# Patient Record
Sex: MALE | Race: BLACK OR AFRICAN AMERICAN | NOT HISPANIC OR LATINO | Employment: OTHER | URBAN - METROPOLITAN AREA
[De-identification: names, ages, dates, MRNs, and addresses within clinical notes are randomized per-mention and may not be internally consistent; named-entity substitution may affect disease eponyms.]

---

## 2022-06-01 DIAGNOSIS — M54.9 BACK PAIN: Primary | ICD-10-CM

## 2023-12-07 ENCOUNTER — HOSPITAL ENCOUNTER (EMERGENCY)
Facility: HOSPITAL | Age: 58
Discharge: LEFT BEFORE DISCHARGE | End: 2023-12-07
Payer: MEDICARE

## 2023-12-07 VITALS
OXYGEN SATURATION: 97 % | WEIGHT: 169 LBS | TEMPERATURE: 99 F | BODY MASS INDEX: 26.53 KG/M2 | SYSTOLIC BLOOD PRESSURE: 156 MMHG | DIASTOLIC BLOOD PRESSURE: 106 MMHG | RESPIRATION RATE: 16 BRPM | HEIGHT: 67 IN | HEART RATE: 71 BPM

## 2023-12-07 DIAGNOSIS — R56.9 SEIZURES: ICD-10-CM

## 2023-12-07 LAB
ALBUMIN SERPL-MCNC: 4.2 G/DL (ref 3.5–5)
ALBUMIN/GLOB SERPL: 1.3 RATIO (ref 1.1–2)
ALP SERPL-CCNC: 67 UNIT/L (ref 40–150)
ALT SERPL-CCNC: 14 UNIT/L (ref 0–55)
AST SERPL-CCNC: 31 UNIT/L (ref 5–34)
BASOPHILS # BLD AUTO: 0.07 X10(3)/MCL
BASOPHILS NFR BLD AUTO: 0.7 %
BILIRUB SERPL-MCNC: 0.4 MG/DL
BUN SERPL-MCNC: 14.7 MG/DL (ref 8.4–25.7)
CALCIUM SERPL-MCNC: 9.8 MG/DL (ref 8.4–10.2)
CHLORIDE SERPL-SCNC: 101 MMOL/L (ref 98–107)
CO2 SERPL-SCNC: 26 MMOL/L (ref 22–29)
CREAT SERPL-MCNC: 1.25 MG/DL (ref 0.73–1.18)
EOSINOPHIL # BLD AUTO: 0.14 X10(3)/MCL (ref 0–0.9)
EOSINOPHIL NFR BLD AUTO: 1.4 %
ERYTHROCYTE [DISTWIDTH] IN BLOOD BY AUTOMATED COUNT: 13.7 % (ref 11.5–17)
ETHANOL SERPL-MCNC: 351 MG/DL
GFR SERPLBLD CREATININE-BSD FMLA CKD-EPI: >60 MLS/MIN/1.73/M2
GLOBULIN SER-MCNC: 3.2 GM/DL (ref 2.4–3.5)
GLUCOSE SERPL-MCNC: 89 MG/DL (ref 74–100)
HCT VFR BLD AUTO: 40.7 % (ref 42–52)
HGB BLD-MCNC: 13.4 G/DL (ref 14–18)
IMM GRANULOCYTES # BLD AUTO: 0.04 X10(3)/MCL (ref 0–0.04)
IMM GRANULOCYTES NFR BLD AUTO: 0.4 %
LYMPHOCYTES # BLD AUTO: 4.33 X10(3)/MCL (ref 0.6–4.6)
LYMPHOCYTES NFR BLD AUTO: 44.8 %
MAGNESIUM SERPL-MCNC: 1.5 MG/DL (ref 1.6–2.6)
MCH RBC QN AUTO: 33.1 PG (ref 27–31)
MCHC RBC AUTO-ENTMCNC: 32.9 G/DL (ref 33–36)
MCV RBC AUTO: 100.5 FL (ref 80–94)
MONOCYTES # BLD AUTO: 0.68 X10(3)/MCL (ref 0.1–1.3)
MONOCYTES NFR BLD AUTO: 7 %
NEUTROPHILS # BLD AUTO: 4.4 X10(3)/MCL (ref 2.1–9.2)
NEUTROPHILS NFR BLD AUTO: 45.7 %
NRBC BLD AUTO-RTO: 0 %
PLATELET # BLD AUTO: 320 X10(3)/MCL (ref 130–400)
PMV BLD AUTO: 9.9 FL (ref 7.4–10.4)
POTASSIUM SERPL-SCNC: 4.1 MMOL/L (ref 3.5–5.1)
PROT SERPL-MCNC: 7.4 GM/DL (ref 6.4–8.3)
RBC # BLD AUTO: 4.05 X10(6)/MCL (ref 4.7–6.1)
SODIUM SERPL-SCNC: 143 MMOL/L (ref 136–145)
WBC # SPEC AUTO: 9.66 X10(3)/MCL (ref 4.5–11.5)

## 2023-12-07 PROCEDURE — 93010 ELECTROCARDIOGRAM REPORT: CPT | Mod: ,,, | Performed by: INTERNAL MEDICINE

## 2023-12-07 PROCEDURE — 83735 ASSAY OF MAGNESIUM: CPT | Performed by: PHYSICIAN ASSISTANT

## 2023-12-07 PROCEDURE — 85025 COMPLETE CBC W/AUTO DIFF WBC: CPT | Performed by: NURSE PRACTITIONER

## 2023-12-07 PROCEDURE — 93010 EKG 12-LEAD: ICD-10-PCS | Mod: ,,, | Performed by: INTERNAL MEDICINE

## 2023-12-07 PROCEDURE — 82077 ASSAY SPEC XCP UR&BREATH IA: CPT | Performed by: NURSE PRACTITIONER

## 2023-12-07 PROCEDURE — 99285 EMERGENCY DEPT VISIT HI MDM: CPT | Mod: 25

## 2023-12-07 PROCEDURE — 80053 COMPREHEN METABOLIC PANEL: CPT | Performed by: NURSE PRACTITIONER

## 2023-12-07 NOTE — FIRST PROVIDER EVALUATION
"Medical screening examination initiated.  I have conducted a focused provider triage encounter, findings are as follows:    Brief history of present illness:  EMS reports that patient has witnessed seizure like activity PTA. Denies any hx. Of seizures.     Vitals:    12/07/23 1046   BP: (!) 156/106   Pulse: 71   Resp: 16   Temp: 98.8 °F (37.1 °C)   TempSrc: Oral   SpO2: 97%   Weight: 76.7 kg (169 lb)   Height: 5' 7" (1.702 m)       Pertinent physical exam:  Awake, alert    Brief workup plan:  Labs, Imaging    Preliminary workup initiated; this workup will be continued and followed by the physician or advanced practice provider that is assigned to the patient when roomed.  "

## 2025-03-29 ENCOUNTER — HOSPITAL ENCOUNTER (INPATIENT)
Facility: HOSPITAL | Age: 60
LOS: 10 days | Discharge: LEFT BEFORE DISCHARGE | DRG: 871 | End: 2025-04-08
Attending: STUDENT IN AN ORGANIZED HEALTH CARE EDUCATION/TRAINING PROGRAM | Admitting: HOSPITALIST
Payer: MEDICARE

## 2025-03-29 DIAGNOSIS — N17.9 ACUTE RENAL FAILURE: ICD-10-CM

## 2025-03-29 DIAGNOSIS — K92.1 MELENA: Primary | ICD-10-CM

## 2025-03-29 DIAGNOSIS — R78.81 BACTEREMIA: ICD-10-CM

## 2025-03-29 DIAGNOSIS — A41.9 SEPSIS, DUE TO UNSPECIFIED ORGANISM, UNSPECIFIED WHETHER ACUTE ORGAN DYSFUNCTION PRESENT: ICD-10-CM

## 2025-03-29 DIAGNOSIS — Z13.6 SCREENING FOR CARDIOVASCULAR CONDITION: ICD-10-CM

## 2025-03-29 DIAGNOSIS — R50.9 FEVER: ICD-10-CM

## 2025-03-29 DIAGNOSIS — N30.90 CYSTITIS: ICD-10-CM

## 2025-03-29 LAB
ABS NEUT (OLG): 41.25 X10(3)/MCL (ref 2.1–9.2)
ACB COMPLEX DNA BLD POS QL NAA+NON-PROBE: NOT DETECTED
ALBUMIN SERPL-MCNC: 2.1 G/DL (ref 3.5–5)
ALBUMIN/GLOB SERPL: 0.6 RATIO (ref 1.1–2)
ALP SERPL-CCNC: 98 UNIT/L (ref 40–150)
ALT SERPL-CCNC: 14 UNIT/L (ref 0–55)
ANION GAP SERPL CALC-SCNC: 18 MEQ/L
ANISOCYTOSIS BLD QL SMEAR: ABNORMAL
AST SERPL-CCNC: 20 UNIT/L (ref 11–45)
B FRAGILIS DNA BLD POS QL NAA+PROBE: NOT DETECTED
BACTERIA #/AREA URNS AUTO: ABNORMAL /HPF
BILIRUB SERPL-MCNC: 0.5 MG/DL
BILIRUB UR QL STRIP.AUTO: NEGATIVE
BNP BLD-MCNC: 83.6 PG/ML
BUN SERPL-MCNC: 162.9 MG/DL (ref 8.4–25.7)
C ALBICANS DNA BLD POS QL NAA+PROBE: NOT DETECTED
C AURIS DNA BLD POS QL NAA+NON-PROBE: NOT DETECTED
C GATTII+NEOFOR DNA CSF QL NAA+NON-PROBE: NOT DETECTED
C GLABRATA DNA BLD POS QL NAA+PROBE: NOT DETECTED
C KRUSEI DNA BLD POS QL NAA+PROBE: NOT DETECTED
C PARAP DNA BLD POS QL NAA+PROBE: NOT DETECTED
C TROPICLS DNA BLD POS QL NAA+PROBE: NOT DETECTED
CALCIUM SERPL-MCNC: 8.9 MG/DL (ref 8.4–10.2)
CHLORIDE SERPL-SCNC: 92 MMOL/L (ref 98–107)
CLARITY UR: ABNORMAL
CO2 SERPL-SCNC: 20 MMOL/L (ref 22–29)
COLISTIN RES MCR-1 ISLT/SPM QL: NOT DETECTED
COLOR UR AUTO: ABNORMAL
CREAT SERPL-MCNC: 11.76 MG/DL (ref 0.72–1.25)
CREAT/UREA NIT SERPL: 14
E CLOAC COMP DNA BLD POS QL NAA+PROBE: NOT DETECTED
E COLI DNA BLD POS QL NAA+PROBE: DETECTED
E FAECALIS+OTHR E SP RRNA BLD POS FISH: NOT DETECTED
E FAECIUM HSP60 BLD POS QL PROBE: NOT DETECTED
ENTEROBACTERALES DNA BLD POS NAA+N-PRB: ABNORMAL
ERYTHROCYTE [DISTWIDTH] IN BLOOD BY AUTOMATED COUNT: 16.8 % (ref 11.5–17)
ESBL CFT TO CFT-CLAV IC RTO BD POS IMP: NOT DETECTED
GFR SERPLBLD CREATININE-BSD FMLA CKD-EPI: 5 ML/MIN/1.73/M2
GLOBULIN SER-MCNC: 3.8 GM/DL (ref 2.4–3.5)
GLUCOSE SERPL-MCNC: 123 MG/DL (ref 74–100)
GLUCOSE UR QL STRIP: NORMAL
GP B STREP DNA CSF QL NAA+NON-PROBE: NOT DETECTED
HAEM INFLU DNA CSF QL NAA+NON-PROBE: NOT DETECTED
HCT VFR BLD AUTO: 23.3 % (ref 42–52)
HGB BLD-MCNC: 8.7 G/DL (ref 14–18)
HGB UR QL STRIP: ABNORMAL
HYALINE CASTS #/AREA URNS LPF: ABNORMAL /LPF
IMP CARBAPENEMASE ISLT QL IA.RAPID: NOT DETECTED
INSTRUMENT WBC (OLG): 47.97 X10(3)/MCL
IRON SATN MFR SERPL: 10 % (ref 20–50)
IRON SERPL-MCNC: 28 UG/DL (ref 65–175)
K OXYTOCA OMPA BLD POS QL PROBE: NOT DETECTED
KETONES UR QL STRIP: NEGATIVE
KLEBSIELLA SP DNA BLD POS QL NAA+NON-PRB: NOT DETECTED
KLEBSIELLA SP DNA BLD POS QL NAA+NON-PRB: NOT DETECTED
KPC CARBAPENEMASE ISLT QL IA.RAPID: NOT DETECTED
L MONOCYTOG DNA CSF QL NAA+NON-PROBE: NOT DETECTED
LACTATE SERPL-SCNC: 1.5 MMOL/L (ref 0.5–2.2)
LEUKOCYTE ESTERASE UR QL STRIP: 500
LIPASE SERPL-CCNC: 194 U/L
LYMPHOCYTES NFR BLD MANUAL: 1.44 X10(3)/MCL (ref 0.6–4.6)
LYMPHOCYTES NFR BLD MANUAL: 3 %
MAGNESIUM SERPL-MCNC: 2.2 MG/DL (ref 1.6–2.6)
MCH RBC QN AUTO: 29.6 PG (ref 27–31)
MCHC RBC AUTO-ENTMCNC: 37.3 G/DL (ref 33–36)
MCV RBC AUTO: 79.3 FL (ref 80–94)
MECA+MECC NOSE QL NAA+PROBE: ABNORMAL
MECA+MECC+MREJ ISLT/SPM QL: ABNORMAL
METAMYELOCYTES NFR BLD MANUAL: 3 %
MONOCYTES NFR BLD MANUAL: 2.4 X10(3)/MCL (ref 0.1–1.3)
MONOCYTES NFR BLD MANUAL: 5 %
MYELOCYTES NFR BLD MANUAL: 3 %
N MEN DNA CSF QL NAA+NON-PROBE: NOT DETECTED
NDM CARBAPENEMASE ISLT QL IA.RAPID: NOT DETECTED
NEUTROPHILS NFR BLD MANUAL: 86 %
NITRITE UR QL STRIP: NEGATIVE
OHS QRS DURATION: 86 MS
OHS QTC CALCULATION: 427 MS
OVALOCYTES (OLG): ABNORMAL
OXA-48-LIKE CRBPNASE ISLT QL IA.RAPID: NOT DETECTED
P AERUGINOSA DNA BLD POS QL NAA+PROBE: NOT DETECTED
PH UR STRIP: 6 [PH]
PLATELET # BLD AUTO: 109 X10(3)/MCL (ref 130–400)
PLATELET # BLD EST: ABNORMAL 10*3/UL
PLATELETS.RETICULATED NFR BLD AUTO: 11.9 % (ref 0.9–11.2)
PMV BLD AUTO: ABNORMAL FL
POIKILOCYTOSIS BLD QL SMEAR: ABNORMAL
POTASSIUM SERPL-SCNC: 4.3 MMOL/L (ref 3.5–5.1)
PROCALCITONIN SERPL-MCNC: 11.53 NG/ML
PROT SERPL-MCNC: 5.9 GM/DL (ref 6.4–8.3)
PROT UR QL STRIP: ABNORMAL
PROTEUS SP DNA BLD POS QL NAA+PROBE: NOT DETECTED
RBC # BLD AUTO: 2.94 X10(6)/MCL (ref 4.7–6.1)
RBC #/AREA URNS AUTO: ABNORMAL /HPF
RBC MORPH BLD: ABNORMAL
S AUREUS DNA BLD POS QL NAA+PROBE: NOT DETECTED
S ENT+BONG DNA STL QL NAA+NON-PROBE: NOT DETECTED
S EPIDERMIDIS HSP60 BLD POS QL PROBE: NOT DETECTED
S LUGDUNENSIS SODA BLD POS QL PROBE: NOT DETECTED
S MALTOPH DNA BLD POS QL NAA+PROBE: NOT DETECTED
S MARCESCENS DNA BLD POS QL NAA+PROBE: NOT DETECTED
S PNEUM DNA CSF QL NAA+NON-PROBE: NOT DETECTED
S PYOGENES HSP60 BLD POS QL PROBE: NOT DETECTED
SODIUM SERPL-SCNC: 130 MMOL/L (ref 136–145)
SP GR UR STRIP.AUTO: 1.01 (ref 1–1.03)
SQUAMOUS #/AREA URNS LPF: ABNORMAL /HPF
STAPH SP TUF BLD POS QL PROBE: NOT DETECTED
STREPTOCOCCUS SP TUF BLD POS QL PROBE: NOT DETECTED
TARGETS BLD QL SMEAR: ABNORMAL
TIBC SERPL-MCNC: 243 UG/DL (ref 60–240)
TIBC SERPL-MCNC: 271 UG/DL (ref 250–450)
TRANSFERRIN SERPL-MCNC: 237 MG/DL (ref 174–364)
TRICHOMONAS UR QL COMP ASSIST: ABNORMAL /HPF
TROPONIN I SERPL-MCNC: 0.01 NG/ML (ref 0–0.04)
UROBILINOGEN UR STRIP-ACNC: NORMAL
VAN(A+B+C1+C2) GENES ISLT/SPM: ABNORMAL
VIM CARBAPENEMASE ISLT QL IA.RAPID: NOT DETECTED
VIT B12 SERPL-MCNC: 836 PG/ML (ref 213–816)
WBC # BLD AUTO: 47.97 X10(3)/MCL (ref 4.5–11.5)
WBC #/AREA URNS AUTO: >100 /HPF
WBC CLUMPS UR QL AUTO: ABNORMAL

## 2025-03-29 PROCEDURE — 83540 ASSAY OF IRON: CPT | Performed by: HOSPITALIST

## 2025-03-29 PROCEDURE — 82607 VITAMIN B-12: CPT | Performed by: HOSPITALIST

## 2025-03-29 PROCEDURE — 25000003 PHARM REV CODE 250: Performed by: STUDENT IN AN ORGANIZED HEALTH CARE EDUCATION/TRAINING PROGRAM

## 2025-03-29 PROCEDURE — 93005 ELECTROCARDIOGRAM TRACING: CPT

## 2025-03-29 PROCEDURE — 93010 ELECTROCARDIOGRAM REPORT: CPT | Mod: ,,, | Performed by: INTERNAL MEDICINE

## 2025-03-29 PROCEDURE — 25000003 PHARM REV CODE 250: Performed by: NURSE PRACTITIONER

## 2025-03-29 PROCEDURE — 81001 URINALYSIS AUTO W/SCOPE: CPT | Performed by: EMERGENCY MEDICINE

## 2025-03-29 PROCEDURE — 84145 PROCALCITONIN (PCT): CPT | Performed by: STUDENT IN AN ORGANIZED HEALTH CARE EDUCATION/TRAINING PROGRAM

## 2025-03-29 PROCEDURE — 83605 ASSAY OF LACTIC ACID: CPT | Performed by: STUDENT IN AN ORGANIZED HEALTH CARE EDUCATION/TRAINING PROGRAM

## 2025-03-29 PROCEDURE — 80053 COMPREHEN METABOLIC PANEL: CPT | Performed by: EMERGENCY MEDICINE

## 2025-03-29 PROCEDURE — 84484 ASSAY OF TROPONIN QUANT: CPT | Performed by: STUDENT IN AN ORGANIZED HEALTH CARE EDUCATION/TRAINING PROGRAM

## 2025-03-29 PROCEDURE — 63600175 PHARM REV CODE 636 W HCPCS: Performed by: STUDENT IN AN ORGANIZED HEALTH CARE EDUCATION/TRAINING PROGRAM

## 2025-03-29 PROCEDURE — 83690 ASSAY OF LIPASE: CPT | Performed by: STUDENT IN AN ORGANIZED HEALTH CARE EDUCATION/TRAINING PROGRAM

## 2025-03-29 PROCEDURE — 96365 THER/PROPH/DIAG IV INF INIT: CPT

## 2025-03-29 PROCEDURE — 83880 ASSAY OF NATRIURETIC PEPTIDE: CPT | Performed by: STUDENT IN AN ORGANIZED HEALTH CARE EDUCATION/TRAINING PROGRAM

## 2025-03-29 PROCEDURE — 83735 ASSAY OF MAGNESIUM: CPT | Performed by: STUDENT IN AN ORGANIZED HEALTH CARE EDUCATION/TRAINING PROGRAM

## 2025-03-29 PROCEDURE — 99285 EMERGENCY DEPT VISIT HI MDM: CPT | Mod: 25

## 2025-03-29 PROCEDURE — 36415 COLL VENOUS BLD VENIPUNCTURE: CPT | Performed by: HOSPITALIST

## 2025-03-29 PROCEDURE — 87154 CUL TYP ID BLD PTHGN 6+ TRGT: CPT | Performed by: STUDENT IN AN ORGANIZED HEALTH CARE EDUCATION/TRAINING PROGRAM

## 2025-03-29 PROCEDURE — 11000001 HC ACUTE MED/SURG PRIVATE ROOM

## 2025-03-29 PROCEDURE — 96375 TX/PRO/DX INJ NEW DRUG ADDON: CPT

## 2025-03-29 PROCEDURE — 87077 CULTURE AEROBIC IDENTIFY: CPT | Performed by: EMERGENCY MEDICINE

## 2025-03-29 PROCEDURE — 85007 BL SMEAR W/DIFF WBC COUNT: CPT | Performed by: EMERGENCY MEDICINE

## 2025-03-29 PROCEDURE — 25000003 PHARM REV CODE 250: Performed by: HOSPITALIST

## 2025-03-29 PROCEDURE — 87040 BLOOD CULTURE FOR BACTERIA: CPT | Performed by: STUDENT IN AN ORGANIZED HEALTH CARE EDUCATION/TRAINING PROGRAM

## 2025-03-29 PROCEDURE — 96361 HYDRATE IV INFUSION ADD-ON: CPT

## 2025-03-29 RX ORDER — ACETAMINOPHEN 325 MG/1
650 TABLET ORAL EVERY 8 HOURS PRN
Status: DISCONTINUED | OUTPATIENT
Start: 2025-03-29 | End: 2025-04-01

## 2025-03-29 RX ORDER — SODIUM CHLORIDE 9 MG/ML
INJECTION, SOLUTION INTRAVENOUS CONTINUOUS
Status: DISCONTINUED | OUTPATIENT
Start: 2025-03-29 | End: 2025-03-30

## 2025-03-29 RX ORDER — TALC
6 POWDER (GRAM) TOPICAL NIGHTLY PRN
Status: DISCONTINUED | OUTPATIENT
Start: 2025-03-29 | End: 2025-04-08 | Stop reason: HOSPADM

## 2025-03-29 RX ORDER — GABAPENTIN 300 MG/1
600 CAPSULE ORAL 3 TIMES DAILY
Status: DISCONTINUED | OUTPATIENT
Start: 2025-03-29 | End: 2025-03-30

## 2025-03-29 RX ORDER — GABAPENTIN 600 MG/1
600 TABLET ORAL 3 TIMES DAILY
COMMUNITY

## 2025-03-29 RX ORDER — SODIUM CHLORIDE 0.9 % (FLUSH) 0.9 %
10 SYRINGE (ML) INJECTION
Status: DISCONTINUED | OUTPATIENT
Start: 2025-03-29 | End: 2025-04-08 | Stop reason: HOSPADM

## 2025-03-29 RX ORDER — ONDANSETRON HYDROCHLORIDE 2 MG/ML
4 INJECTION, SOLUTION INTRAVENOUS EVERY 8 HOURS PRN
Status: DISCONTINUED | OUTPATIENT
Start: 2025-03-29 | End: 2025-04-08 | Stop reason: HOSPADM

## 2025-03-29 RX ORDER — MORPHINE SULFATE 4 MG/ML
2 INJECTION, SOLUTION INTRAMUSCULAR; INTRAVENOUS EVERY 4 HOURS PRN
Refills: 0 | Status: DISCONTINUED | OUTPATIENT
Start: 2025-03-29 | End: 2025-04-08 | Stop reason: HOSPADM

## 2025-03-29 RX ADMIN — ACETAMINOPHEN 650 MG: 325 TABLET, FILM COATED ORAL at 11:03

## 2025-03-29 RX ADMIN — PIPERACILLIN AND TAZOBACTAM 4.5 G: 4; .5 INJECTION, POWDER, LYOPHILIZED, FOR SOLUTION INTRAVENOUS; PARENTERAL at 10:03

## 2025-03-29 RX ADMIN — PIPERACILLIN SODIUM AND TAZOBACTAM SODIUM 4.5 G: 4; .5 INJECTION, POWDER, LYOPHILIZED, FOR SOLUTION INTRAVENOUS at 09:03

## 2025-03-29 RX ADMIN — GABAPENTIN 600 MG: 300 CAPSULE ORAL at 09:03

## 2025-03-29 RX ADMIN — SODIUM CHLORIDE: 9 INJECTION, SOLUTION INTRAVENOUS at 03:03

## 2025-03-29 RX ADMIN — VANCOMYCIN HYDROCHLORIDE 1500 MG: 1.5 INJECTION, POWDER, LYOPHILIZED, FOR SOLUTION INTRAVENOUS at 11:03

## 2025-03-29 RX ADMIN — GABAPENTIN 600 MG: 300 CAPSULE ORAL at 03:03

## 2025-03-29 RX ADMIN — SODIUM CHLORIDE, POTASSIUM CHLORIDE, SODIUM LACTATE AND CALCIUM CHLORIDE 2244 ML: 600; 310; 30; 20 INJECTION, SOLUTION INTRAVENOUS at 09:03

## 2025-03-29 NOTE — ED PROVIDER NOTES
Encounter Date: 3/29/2025    SCRIBE #1 NOTE: I, Joanie Elder, am scribing for, and in the presence of,  Tomy Heart MD. I have scribed the following portions of the note - Other sections scribed: HPI, ROS, PE.       History     Chief Complaint   Patient presents with    Abdominal Pain     Lower abdominal pain x 3 days. C/o nausea and diarrhea. Denies any urinary complaints or fever at home.      Patient is a 59 year old male presenting to the ED for evaluation of abdominal pain with black stool onset 3 days ago. He also endorses decreased urination as of 3 days ago. He denies history of kidney problems. He denies fever, nausea, or vomiting.    The history is provided by the patient. No  was used.     Review of patient's allergies indicates:   Allergen Reactions    Opioids - morphine analogues Hallucinations     Past Medical History:   Diagnosis Date    Neuropathy      Past Surgical History:   Procedure Laterality Date    ESOPHAGOGASTRODUODENOSCOPY N/A 3/30/2025    Procedure: EGD (ESOPHAGOGASTRODUODENOSCOPY);  Surgeon: Stan Elder MD;  Location: [facility];  Service: Endoscopy;  Laterality: N/A;     No family history on file.  Social History[1]  Review of Systems   Constitutional:  Negative for chills and fever.   HENT:  Negative for drooling and sore throat.    Eyes:  Negative for pain and redness.   Respiratory:  Negative for shortness of breath, wheezing and stridor.    Cardiovascular:  Negative for chest pain, palpitations and leg swelling.   Gastrointestinal:  Positive for abdominal pain. Negative for nausea and vomiting.        Black stool   Genitourinary:  Positive for decreased urine volume. Negative for dysuria and hematuria.   Musculoskeletal:  Negative for back pain, neck pain and neck stiffness.   Skin:  Negative for rash and wound.   Neurological:  Negative for weakness and numbness.   Hematological:  Does not bruise/bleed easily.       Physical Exam     Initial Vitals  [03/29/25 0808]   BP Pulse Resp Temp SpO2   101/66 80 20 97.6 °F (36.4 °C) 95 %      MAP       --         Physical Exam    Nursing note and vitals reviewed.  Constitutional: He appears well-developed.   Uncomfortable appearing   HENT: Mouth/Throat: Mucous membranes are dry.   Eyes: EOM are normal. Pupils are equal, round, and reactive to light. Scleral icterus (mild) is present.   Cardiovascular:  Normal rate and regular rhythm.           No murmur heard.  Pulmonary/Chest: Breath sounds normal. No respiratory distress. He has no wheezes. He has no rales.   Abdominal: Abdomen is soft. He exhibits distension (mild). There is abdominal tenderness (lower). There is no rebound and no guarding.     Skin: Skin is warm. Capillary refill takes less than 2 seconds. No rash noted.         ED Course   Critical Care    Date/Time: 3/29/2025 10:45 AM    Performed by: Tomy Heart MD  Authorized by: Tomy Heart MD  Direct patient critical care time: 35 minutes  Total critical care time (exclusive of procedural time) : 35 minutes  Critical care time was exclusive of separately billable procedures and treating other patients.  Critical care was necessary to treat or prevent imminent or life-threatening deterioration of the following conditions: dehydration and sepsis.  Critical care was time spent personally by me on the following activities: development of treatment plan with patient or surrogate, discussions with consultants, evaluation of patient's response to treatment, examination of patient, obtaining history from patient or surrogate, ordering and performing treatments and interventions, ordering and review of laboratory studies, ordering and review of radiographic studies, pulse oximetry, re-evaluation of patient's condition and review of old charts.        Labs Reviewed   COMPREHENSIVE METABOLIC PANEL - Abnormal       Result Value    Sodium 130 (*)     Potassium 4.3      Chloride 92 (*)     CO2 20 (*)      Glucose 123 (*)     Blood Urea Nitrogen 162.9 (*)     Creatinine 11.76 (*)     Calcium 8.9      Protein Total 5.9 (*)     Albumin 2.1 (*)     Globulin 3.8 (*)     Albumin/Globulin Ratio 0.6 (*)     Bilirubin Total 0.5      ALP 98      ALT 14      AST 20      eGFR 5      Anion Gap 18.0      BUN/Creatinine Ratio 14     URINALYSIS, REFLEX TO URINE CULTURE - Abnormal    Color, UA Light-Orange (*)     Appearance, UA Turbid (*)     Specific Gravity, UA 1.010      pH, UA 6.0      Protein, UA 1+ (*)     Glucose, UA Normal      Ketones, UA Negative      Blood, UA 3+ (*)     Bilirubin, UA Negative      Urobilinogen, UA Normal      Nitrites, UA Negative      Leukocyte Esterase,  (*)     RBC, UA 50-99 (*)     WBC, UA >100 (*)     WBC Clumps, UA Moderate (*)     Bacteria, UA Many (*)     Squamous Epithelial Cells, UA Trace      Trichomonas, UA Rare (*)     Hyaline Casts, UA 0-2 (*)    CBC WITH DIFFERENTIAL - Abnormal    WBC 47.97 (*)     RBC 2.94 (*)     Hgb 8.7 (*)     Hct 23.3 (*)     MCV 79.3 (*)     MCH 29.6      MCHC 37.3 (*)     RDW 16.8      Platelet 109 (*)     MPV        IPF 11.9 (*)    MANUAL DIFFERENTIAL - Abnormal    WBC 47.97      Neutrophils % 86      Lymphs % 3      Monocytes % 5      Metamyelocytes % 3 (*)     Myelocytes % 3 (*)     Neutrophils Abs 41.2542 (*)     Lymphs Abs 1.4391      Monocytes Abs 2.3985 (*)     Platelets Decreased (*)     RBC Morph Abnormal (*)     Poikilocytosis 1+ (*)     Anisocytosis 1+ (*)     Ovalocytes 1+ (*)     Target Cells 1+ (*)    LIPASE - Abnormal    Lipase Level 194 (*)    LACTIC ACID, PLASMA - Normal    Lactic Acid Level 1.5     TROPONIN I - Normal    Troponin-I 0.012     B-TYPE NATRIURETIC PEPTIDE - Normal    Natriuretic Peptide 83.6     MAGNESIUM - Normal    Magnesium Level 2.20     CBC W/ AUTO DIFFERENTIAL    Narrative:     The following orders were created for panel order CBC auto differential.  Procedure                               Abnormality         Status                      ---------                               -----------         ------                     CBC with Differential[[id number]]       Abnormal            Final result               Manual Differential[[id number]]         Abnormal            Final result                 Please view results for these tests on the individual orders.   PROCALCITONIN (PCT)    Procalcitonin 11.53          ECG Results              EKG 12-lead (Final result)        Collection Time Result Time QRS Duration OHS QTC Calculation    03/29/25 10:11:04 03/29/25 16:32:56 86 427                     Final result by Interface, Lab [facility] (03/29/25 16:33:06)                   Narrative:    Test Reason : Z13.6,    Vent. Rate :  77 BPM     Atrial Rate :  77 BPM     P-R Int : 162 ms          QRS Dur :  86 ms      QT Int : 378 ms       P-R-T Axes :  32  24  33 degrees    QTcB Int : 427 ms    Normal sinus rhythm  Possible Left atrial enlargement  Borderline Abnormal ECG  Confirmed by Cheko Bains ([id number]) on 3/29/2025 4:32:54 PM    Referred By: AAAREFERRAL SELF           Confirmed By: Cheko Bains                                  Imaging Results              US Retroperitoneal Limited (Final result)  Result time 03/29/25 12:20:31      Final result by Jakob Horan MD (03/29/25 12:20:31)                   Impression:      Findings concerning for pelvicaliectasis with changes of medical renal disease.  No overt evidence for obstructive uropathy.      Electronically signed by: Jakob Horan MD  Date:    03/29/2025  Time:    12:20               Narrative:    EXAMINATION:  US RETROPERITONEAL LIMITED    CLINICAL HISTORY:  Acute kidney failure, unspecified    TECHNIQUE:  Multiple sonographic images the kidneys were obtained by department sonographer.    COMPARISON:  None    FINDINGS:  Right kidney measures 10.9 cm in length.  Left kidney measures 10.2 cm length.  No evidence for hydronephrosis, however, pelvicaliectasis is identified.  The  parenchyma demonstrates increased echogenicity with cortical thinning.    Bladder is distended and normal.                                       CT Chest Abdomen Pelvis Without Contrast (XPD) (Final result)  Result time 03/29/25 09:44:25      Final result by Haylee Correia MD (03/29/25 09:44:25)                   Impression:      Abnormal edematous appearance of the right kidney.  There is fullness at the upper pole right kidney which is poorly characterized.  Appearance could be related to mass, phlegmon, focal pyelonephritis in the acute setting.  There is perinephric stranding on the right.  No martha hydronephrosis.  Further characterization limited without contrast.      Electronically signed by: Haylee Correia  Date:    03/29/2025  Time:    09:44               Narrative:    EXAMINATION:  CT CHEST ABDOMEN PELVIS WITHOUT CONTRAST(XPD)    CLINICAL HISTORY:  Sepsis;  lower abdominal pain x3 days.  Nausea.  Diarrhea.    TECHNIQUE:  Helical acquisition through the chest, abdomen and pelvis without  IV administration of contrast. Axial, sagittal and coronal reformats interpreted.    Automated tube current modulation, weight-based exposure dosing, and/or iterative reconstruction technique utilized to reach lowest reasonably achievable exposure rate.    DLP: 329 mGy*cm    COMPARISON:  No relevant priors available for comparison at time of this dictation    FINDINGS:  BASE OF NECK: No significant abnormality.    HEART: Normal size.    THORACIC VASCULATURE: Thoracic aorta is unremarkable.    KIARA/MEDIASTINUM: No enlarged lymph nodes by size criteria. Evaluation of hilar lymphadenopathy is limited without contrast.    AIRWAYS: Patent.    LUNGS/PLEURA: Mild dependent atelectasis.    THORACIC SOFT TISSUES: Unremarkable.    LIVER: Normal attenuation. No appreciable focal hepatic lesion.    BILIARY: Gallbladder decompressed.    PANCREAS: No inflammatory change.    SPLEEN: Normal in size    ADRENALS: No  mass.    KIDNEYS/URETERS: Edematous appearance of the right kidney.  Fullness at the upper pole right kidney poorly characterized.  Right perinephric stranding.  No martha hydronephrosis.    GI TRACT/MESENTERY: Evaluation of the bowel is limited without contrast.  No evidence of bowel obstruction or inflammation. The appendix is normal. Colonic diverticulosis without acute inflammatory change.    PERITONEUM: No free fluid.No free air.    LYMPH NODES: No enlarged lymph nodes by size criteria.    ABDOMINOPELVIC VASCULATURE: Aortoiliac atherosclerosis.    BLADDER: Normal appearance given degree of distention.    REPRODUCTIVE ORGANS: Normal as visualized.    ABDOMINAL WALL: Unremarkable.    BONES: No acute osseous abnormality.  Old, healed left rib deformities.                                       X-Ray Chest AP Portable (Final result)  Result time 03/29/25 10:10:10      Final result by Wilmer Hooper MD (03/29/25 10:10:10)                   Impression:      No acute cardiopulmonary process identified.      Electronically signed by: Wilmer Hooper  Date:    03/29/2025  Time:    10:10               Narrative:    EXAMINATION:  XR CHEST AP PORTABLE    CLINICAL HISTORY:  Sepsis; abdominal pain for 3 days.  Nausea and diarrhea.    TECHNIQUE:  One view    COMPARISON:  None available.    FINDINGS:  Cardiopericardial silhouette is within normal limits. Lungs are without dense focal or segmental consolidation, congestive process, pleural effusions or pneumothorax.  Multiple old fractures of the left ribs.                                       Medications   sodium chloride 0.9% flush 10 mL (has no administration in time range)   melatonin tablet 6 mg (6 mg Oral Given 4/3/25 2017)   ondansetron injection 4 mg (has no administration in time range)   morphine injection 2 mg (has no administration in time range)   albumin human 25% bottle 25 g (has no administration in time range)   0.9%  NaCl infusion (for blood administration) (200  mLs Intravenous Bolus 3/30/25 1940)   heparin (porcine) injection 3,000 Units (3,000 Units Intravenous Not Given 3/30/25 1330)   sodium chloride 0.9% flush 10 mL (has no administration in time range)   acetaminophen tablet 1,000 mg (1,000 mg Oral Given 4/7/25 1743)   ampicillin-sulbactam 1.5 g in 0.9% NaCl 100 mL IVPB (MB+) (0 g Intravenous Stopped 4/7/25 1412)   0.9%  NaCl infusion (for blood administration) (has no administration in time range)   pantoprazole injection 40 mg (40 mg Intravenous Given 4/7/25 2144)   lactated ringers bolus 2,244 mL (0 mLs Intravenous Stopped 3/29/25 1109)   piperacillin-tazobactam (ZOSYN) 4.5 g in D5W 100 mL IVPB (MB+) (0 g Intravenous Stopped 3/29/25 1134)   vancomycin 1,500 mg in D5W 250 mL IVPB (admixture device) (0 mg Intravenous Stopped 3/29/25 1303)   piperacillin-tazobactam (ZOSYN) 4.5 g in D5W 100 mL IVPB (MB+) (0 g Intravenous Stopped 3/30/25 1652)   ferric gluconate (Ferrlecit) 125 mg in 0.9% NaCl 110 mL IVPB (0 mg Intravenous Stopped 3/30/25 1003)   mupirocin 2 % ointment ( Nasal Given 4/4/25 0745)   acetaminophen 1,000 mg/100 mL (10 mg/mL) injection 1,000 mg (0 mg Intravenous Stopped 3/30/25 1414)   pantoprazole injection 80 mg (80 mg Intravenous Given 3/30/25 1837)   potassium chloride SA CR tablet 40 mEq (40 mEq Oral Given 3/31/25 0549)   potassium bicarbonate disintegrating tablet 50 mEq (50 mEq Oral Given 4/3/25 0903)   iohexoL (OMNIPAQUE 350) injection 94 mL (94 mLs Intravenous Given 4/3/25 1135)   LIDOcaine HCL 20 mg/ml (2%) injection (5 mLs Other Given 4/4/25 1110)   epoetin katerina injection 10,000 Units (10,000 Units Subcutaneous Given 4/5/25 1149)   magnesium sulfate 2g in water 50mL IVPB (premix) (0 g Intravenous Stopped 4/5/25 1859)     Followed by   magnesium sulfate 2g in water 50mL IVPB (premix) (0 g Intravenous Stopped 4/5/25 1930)     Medical Decision Making  Problems Addressed:  Acute renal failure: acute illness or injury  Sepsis, due to unspecified  "organism, unspecified whether acute organ dysfunction present: acute illness or injury    Amount and/or Complexity of Data Reviewed  Labs: ordered. Decision-making details documented in ED Course.  Radiology: ordered. Decision-making details documented in ED Course.    Risk  Decision regarding hospitalization.    Differential diagnosis (includes but is not limited to):   Intra-abdominal infection, appendicitis, colitis, gastroenteritis, urinary infection, dehydration, kidney injury, sepsis, electrolyte abnormalities    MDM Narrative  59-year-old male presents for evaluation of lower abdominal pain for the past 2-3 days as well as some nausea at home.  Denies any fevers.  Labs reviewed, severe leukocytosis of 47.97 noted.  Broad-spectrum antibiotics initiated with vancomycin and Zosyn, 30 cc/kilos IV fluid bolus ordered, blood cultures pending, lactic acid ordered.  Chest x-ray reviewed.  CT reviewed.  Patient remains hemodynamically stable.  Patient appears to continue with adequate perfusion, no indication for vasopressors.  Case discussed with the hospitalist, will admit for further care.    Dispo: Admit    My independent radiology interpretation: as above  Point of care US (independently performed and interpreted):   Decision rules/clinical scoring:     Sepsis Perfusion Assessment: "I attest a sepsis perfusion exam was performed within 6 hours of sepsis, severe sepsis, or septic shock presentation, following fluid resuscitation."     Amount and/or Complexity of Data Reviewed  Independent historian: none   Summary of history:   External data reviewed: notes from previous ED visits and notes from clinic visits  Summary of data reviewed: Prior records reviewed  Risk and benefits of testing: discussed   Labs: ordered and reviewed  Radiology: ordered and independent interpretation performed (see above or ED course)  ECG/medicine tests: ordered and independent interpretation performed (see above or ED " course)  Discussion of management or test interpretation with external provider(s): discussed with hospitalist physician   Summary of discussion: as above    Risk  Parenteral controlled substances   Drug therapy requiring intense monitoring for toxicity   Decision regarding hospitalization  Shared decision making     Critical Care  30-74 minutes     Data Reviewed/Counseling: I have personally reviewed the patient's vital signs, nursing notes, and other relevant tests, information, and imaging. I had a detailed discussion regarding the historical points, exam findings, and any diagnostic results supporting the discharge diagnosis. I personally performed the history, PE, MDM and procedures as documented above and agree with the scribe's documentation.    Portions of this note were dictated using voice recognition software. Although it was reviewed for accuracy, some inherent voice recognition errors may have occurred and may be present in this document.          Scribe Attestation:   Scribe #1: I performed the above scribed service and the documentation accurately describes the services I performed. I attest to the accuracy of the note.    Attending Attestation:           Physician Attestation for Scribe:  Physician Attestation Statement for Scribe #1: I, Tomy Heart MD, reviewed documentation, as scribed by Joanie Elder in my presence, and it is both accurate and complete.             ED Course as of 04/08/25 0204   Sat Mar 29, 2025   0859 WBC(!): 47.97  Blood cx x2, lactic acid, 30 cc/kg ivf, broad spectrum abx []   0952 X-Ray Chest AP Portable  Independently visualized/reviewed by me during the ED visit.  - No lobar consolidation or PTX []   1116 Paged hospitalist  [AB]   1150 Discussed with jaxon Santos NP: admit to  Dr Winchester []      ED Course User Index  [AB] Joanie Elder  [] Tomy Heart MD                           Clinical Impression:  Final diagnoses:  [Z13.6]  Screening for cardiovascular condition  [A41.9] Sepsis, due to unspecified organism, unspecified whether acute organ dysfunction present  [N17.9] Acute renal failure  [N30.90] Cystitis          ED Disposition Condition    Admit Stable                  [1]   Social History  Tobacco Use    Smoking status: Every Day     Current packs/day: 0.50     Types: Cigarettes   Substance Use Topics    Alcohol use: Yes     Alcohol/week: 28.0 standard drinks of alcohol     Types: 28 Shots of liquor per week     Comment: half a pint per day        Tomy Heart MD  04/08/25 0206

## 2025-03-29 NOTE — H&P
[facility]  Hospital Medicine History & Physical Examination       Patient Name: Tao Bee  MRN: [id number]  Patient Class: IP- Inpatient   Admission Date: 3/29/2025   Admitting Physician: VIRIDIANA Service   Attending Physician: Trevor Winchester MD  Primary Care Provider: Penny Garsia FNP  Face-to-Face encounter date: 03/29/2025  Code Status:Code Status Discussion Note   Chief Complaint: Abdominal Pain (Lower abdominal pain x 3 days. C/o nausea and diarrhea. Denies any urinary complaints or fever at home. )         Patient information was obtained from patient, patient's family, past medical records and ER records.     HISTORY OF PRESENT ILLNESS:   Tao Bee is a 59 y.o. male who  has a past medical history of Neuropathy.. The patient presented to [facility] on 3/29/2025     59-year-old male admitted to hospital medicine on 03/29/2025 secondary to lower abdominal pain and diarrhea for the last 3 days.  Associated with nausea but no vomiting.  Did report some melena starting about 3 days ago.  No previous history of renal disease.  No change in pain with eating.  He does report decreased urine output.  In the emergency room his vital signs are stable.  Blood pressure is little soft but responsive to fluids.  He was noted to have significant hyponatremia with a sodium of 130.  Creatinine markedly elevated at 11.76.  Also significant leukocytosis of 47.97.  CT of the abdomen shows edematous appearance of the right kidney with fullness in the upper pole.  Differential included mass, phlegmon, or focal pyelonephritis.  Renal ultrasound showed findings concerning for pelvic calcitonin versus.  He was started empirically on vancomycin and Zosyn.  A consult has been placed to Urology.  No urine specimen has yet been collected secondary to oliguria.  PAST MEDICAL HISTORY:     Past Medical History:   Diagnosis Date    Neuropathy        PAST SURGICAL HISTORY:     History reviewed. No pertinent  surgical history.    ALLERGIES:   Opioids - morphine analogues    FAMILY HISTORY:   Reviewed and negative    SOCIAL HISTORY:     Social History     Tobacco Use    Smoking status: Every Day     Current packs/day: 0.50     Types: Cigarettes    Smokeless tobacco: Not on file   Substance Use Topics    Alcohol use: Yes     Alcohol/week: 28.0 standard drinks of alcohol     Types: 28 Shots of liquor per week     Comment: half a pint per day        HOME MEDICATIONS:     Prior to Admission medications    Medication Sig Start Date End Date Taking? Authorizing Provider   gabapentin (NEURONTIN) 600 MG tablet Take 600 mg by mouth 3 (three) times daily.   Yes Provider, Historical       REVIEW OF SYSTEMS:   Except as documented, all other systems reviewed and negative     PHYSICAL EXAM:     VITAL SIGNS: 24 HRS MIN & MAX LAST   Temp  Min: 97.6 °F (36.4 °C)  Max: 98.1 °F (36.7 °C) 98.1 °F (36.7 °C)   BP  Min: 101/66  Max: 132/81 132/81   Pulse  Min: 76  Max: 83  78   Resp  Min: 12  Max: 20 13   SpO2  Min: 94 %  Max: 98 % 97 %       General appearance: Well-developed, well-nourished male in no apparent distress.  HENT: Atraumatic head. Moist mucous membranes of oral cavity.  Eyes: Normal extraocular movements.   Neck: Supple.   Lungs: Clear to auscultation bilaterally. No wheezing present.   Heart: Regular rate and rhythm. S1 and S2 present with no murmurs/gallop/rub. No pedal edema. No JVD present.   Abdomen: Soft, non-distended, non-tender. No rebound tenderness/guarding. Bowel sounds are normal.   Extremities: No cyanosis, clubbing, or edema.  Skin: No Rash.   Neuro: Motor and sensory exams grossly intact. Good tone. Muscle strength 5/5 in all 4 extremities  Psych/mental status: Appropriate mood and affect. Responds appropriately to questions.     LABS AND IMAGING:     Recent Labs   Lab 03/29/25  0829   WBC 47.97  47.97*   RBC 2.94*   HGB 8.7*   HCT 23.3*   MCV 79.3*   MCH 29.6   MCHC 37.3*   RDW 16.8   *       Recent Labs    Lab 03/29/25  0829   *   K 4.3   CL 92*   CO2 20*   .9*   CREATININE 11.76*   CALCIUM 8.9   MG 2.20   ALBUMIN 2.1*   ALKPHOS 98   ALT 14   AST 20   BILITOT 0.5       Microbiology Results (last 7 days)       Procedure Component Value Units Date/Time    Urine culture [[id number]] Collected: 03/29/25 1159    Order Status: Sent Specimen: Urine Updated: 03/29/25 1238    Blood culture x two cultures. Draw prior to antibiotics. [[id number]] Collected: 03/29/25 1055    Order Status: Resulted Specimen: Blood Updated: 03/29/25 1110    Blood culture x two cultures. Draw prior to antibiotics. [[id number]] Collected: 03/29/25 1037    Order Status: Resulted Specimen: Blood Updated: 03/29/25 1107             US Retroperitoneal Limited  Narrative: EXAMINATION:  US RETROPERITONEAL LIMITED    CLINICAL HISTORY:  Acute kidney failure, unspecified    TECHNIQUE:  Multiple sonographic images the kidneys were obtained by department sonographer.    COMPARISON:  None    FINDINGS:  Right kidney measures 10.9 cm in length.  Left kidney measures 10.2 cm length.  No evidence for hydronephrosis, however, pelvicaliectasis is identified.  The parenchyma demonstrates increased echogenicity with cortical thinning.    Bladder is distended and normal.  Impression: Findings concerning for pelvicaliectasis with changes of medical renal disease.  No overt evidence for obstructive uropathy.    Electronically signed by: Jakob Horan MD  Date:    03/29/2025  Time:    12:20  X-Ray Chest AP Portable  Narrative: EXAMINATION:  XR CHEST AP PORTABLE    CLINICAL HISTORY:  Sepsis; abdominal pain for 3 days.  Nausea and diarrhea.    TECHNIQUE:  One view    COMPARISON:  None available.    FINDINGS:  Cardiopericardial silhouette is within normal limits. Lungs are without dense focal or segmental consolidation, congestive process, pleural effusions or pneumothorax.  Multiple old fractures of the left ribs.  Impression: No acute cardiopulmonary process  identified.    Electronically signed by: Wilmer Hooper  Date:    03/29/2025  Time:    10:10  CT Chest Abdomen Pelvis Without Contrast (XPD)  Narrative: EXAMINATION:  CT CHEST ABDOMEN PELVIS WITHOUT CONTRAST(XPD)    CLINICAL HISTORY:  Sepsis;  lower abdominal pain x3 days.  Nausea.  Diarrhea.    TECHNIQUE:  Helical acquisition through the chest, abdomen and pelvis without  IV administration of contrast. Axial, sagittal and coronal reformats interpreted.    Automated tube current modulation, weight-based exposure dosing, and/or iterative reconstruction technique utilized to reach lowest reasonably achievable exposure rate.    DLP: 329 mGy*cm    COMPARISON:  No relevant priors available for comparison at time of this dictation    FINDINGS:  BASE OF NECK: No significant abnormality.    HEART: Normal size.    THORACIC VASCULATURE: Thoracic aorta is unremarkable.    KIARA/MEDIASTINUM: No enlarged lymph nodes by size criteria. Evaluation of hilar lymphadenopathy is limited without contrast.    AIRWAYS: Patent.    LUNGS/PLEURA: Mild dependent atelectasis.    THORACIC SOFT TISSUES: Unremarkable.    LIVER: Normal attenuation. No appreciable focal hepatic lesion.    BILIARY: Gallbladder decompressed.    PANCREAS: No inflammatory change.    SPLEEN: Normal in size    ADRENALS: No mass.    KIDNEYS/URETERS: Edematous appearance of the right kidney.  Fullness at the upper pole right kidney poorly characterized.  Right perinephric stranding.  No martha hydronephrosis.    GI TRACT/MESENTERY: Evaluation of the bowel is limited without contrast.  No evidence of bowel obstruction or inflammation. The appendix is normal. Colonic diverticulosis without acute inflammatory change.    PERITONEUM: No free fluid.No free air.    LYMPH NODES: No enlarged lymph nodes by size criteria.    ABDOMINOPELVIC VASCULATURE: Aortoiliac atherosclerosis.    BLADDER: Normal appearance given degree of distention.    REPRODUCTIVE ORGANS: Normal as  visualized.    ABDOMINAL WALL: Unremarkable.    BONES: No acute osseous abnormality.  Old, healed left rib deformities.  Impression: Abnormal edematous appearance of the right kidney.  There is fullness at the upper pole right kidney which is poorly characterized.  Appearance could be related to mass, phlegmon, focal pyelonephritis in the acute setting.  There is perinephric stranding on the right.  No martha hydronephrosis.  Further characterization limited without contrast.    Electronically signed by: Haylee Correia  Date:    03/29/2025  Time:    09:44      _____________________________________  INPATIENT LIST OF MEDICATIONS   Current Medications[1]      Scheduled Meds:   piperacillin-tazobactam (Zosyn) IV (PEDS and ADULTS) (extended infusion is not appropriate)  4.5 g Intravenous Q12H     Continuous Infusions:  PRN Meds:.  Current Facility-Administered Medications:     acetaminophen, 650 mg, Oral, Q8H PRN    melatonin, 6 mg, Oral, Nightly PRN    ondansetron, 4 mg, Intravenous, Q8H PRN    sodium chloride 0.9%, 10 mL, Intravenous, PRN      VTE Prophylaxis: will be placed on appropriate DVT prophylaxis and will be advised to be as mobile as possible and sit in a chair as tolerated  _____________________________________    ASSESSMENT & PLAN:   Severe sepsis  Suspected pyelonephritis  Acute renal failure  Metabolic acidosis  Hyponatremia   Anemia of unknown etiology    Patient currently on IV Zosyn.  Will continue to monitor closely.  Trend out his leukocytosis.    He needs aggressive volume resuscitation.  Will start on normal saline.  Fortunately no lactic acidosis.  Due to emphysematous changes of the right kidney urology has been consulted.  He has no known history of renal disease.  Had essentially normal creatinine 1 year ago.  No signs of significant acidosis, volume overload, or uremic type symptoms.  If no improvement in his creatinine/BUN in the next 24 hours may need Nephrology consultation.    Critical  care diagnosis: sepsis, iv antibiotics  Critical care interventions: hands on evaluation, review of labs/radiographs/records and discussions with family  Critical care time spent: >32 minutes     Trevor Winchester MD  2:11 PM 03/29/2025    Screening for Social Drivers for health:  Patient screened for food insecurity, housing instability, transportation needs, utility difficulties, and interpersonal safety (select all that apply as identified as concern)  []Housing or Food  []Transportation Needs  []Utility Difficulties  []Interpersonal safety  [x]None             [1]   Current Facility-Administered Medications:     acetaminophen tablet 650 mg, 650 mg, Oral, Q8H PRN, Orville, Danielle P, ANP    melatonin tablet 6 mg, 6 mg, Oral, Nightly PRN, Orville, Danielle P, ANP    ondansetron injection 4 mg, 4 mg, Intravenous, Q8H PRN, Orville, Danielle P, ANP    piperacillin-tazobactam (ZOSYN) 4.5 g in D5W 100 mL IVPB (MB+), 4.5 g, Intravenous, Q12H, Tomy Heart MD    sodium chloride 0.9% flush 10 mL, 10 mL, Intravenous, PRN, Orville Danielle P, ANP

## 2025-03-30 ENCOUNTER — ANESTHESIA (OUTPATIENT)
Dept: SURGERY | Facility: HOSPITAL | Age: 60
End: 2025-03-30
Payer: MEDICARE

## 2025-03-30 ENCOUNTER — ANESTHESIA EVENT (OUTPATIENT)
Dept: SURGERY | Facility: HOSPITAL | Age: 60
End: 2025-03-30
Payer: MEDICARE

## 2025-03-30 LAB
ABO + RH BLD: NORMAL
ABO + RH BLD: NORMAL
ABORH RETYPE: NORMAL
ABS NEUT (OLG): 29.36 X10(3)/MCL (ref 2.1–9.2)
ALBUMIN SERPL-MCNC: 1.9 G/DL (ref 3.5–5)
ALBUMIN/GLOB SERPL: 0.6 RATIO (ref 1.1–2)
ALP SERPL-CCNC: 85 UNIT/L (ref 40–150)
ALT SERPL-CCNC: 10 UNIT/L (ref 0–55)
ANION GAP SERPL CALC-SCNC: 17 MEQ/L
ANION GAP SERPL CALC-SCNC: 17 MEQ/L
ANISOCYTOSIS BLD QL SMEAR: ABNORMAL
AST SERPL-CCNC: 20 UNIT/L (ref 11–45)
BILIRUB SERPL-MCNC: 0.5 MG/DL
BLD PROD TYP BPU: NORMAL
BLD PROD TYP BPU: NORMAL
BLOOD UNIT EXPIRATION DATE: NORMAL
BLOOD UNIT EXPIRATION DATE: NORMAL
BLOOD UNIT TYPE CODE: 1700
BLOOD UNIT TYPE CODE: 7300
BUN SERPL-MCNC: 129.2 MG/DL (ref 8.4–25.7)
BUN SERPL-MCNC: 135 MG/DL (ref 8.4–25.7)
CALCIUM SERPL-MCNC: 7.5 MG/DL (ref 8.4–10.2)
CALCIUM SERPL-MCNC: 7.9 MG/DL (ref 8.4–10.2)
CHLORIDE SERPL-SCNC: 93 MMOL/L (ref 98–107)
CHLORIDE SERPL-SCNC: 94 MMOL/L (ref 98–107)
CO2 SERPL-SCNC: 15 MMOL/L (ref 22–29)
CO2 SERPL-SCNC: 16 MMOL/L (ref 22–29)
CREAT SERPL-MCNC: 10.45 MG/DL (ref 0.72–1.25)
CREAT SERPL-MCNC: 10.5 MG/DL (ref 0.72–1.25)
CREAT/UREA NIT SERPL: 12
CREAT/UREA NIT SERPL: 13
CROSSMATCH INTERPRETATION: NORMAL
CROSSMATCH INTERPRETATION: NORMAL
DISPENSE STATUS: NORMAL
DISPENSE STATUS: NORMAL
ERYTHROCYTE [DISTWIDTH] IN BLOOD BY AUTOMATED COUNT: 16.5 % (ref 11.5–17)
FERRITIN SERPL-MCNC: 264.66 NG/ML (ref 21.81–274.66)
FOLATE SERPL-MCNC: 5.1 NG/ML (ref 7–31.4)
GFR SERPLBLD CREATININE-BSD FMLA CKD-EPI: 5 ML/MIN/1.73/M2
GFR SERPLBLD CREATININE-BSD FMLA CKD-EPI: 5 ML/MIN/1.73/M2
GLOBULIN SER-MCNC: 3 GM/DL (ref 2.4–3.5)
GLUCOSE SERPL-MCNC: 121 MG/DL (ref 74–100)
GLUCOSE SERPL-MCNC: 131 MG/DL (ref 74–100)
GROUP & RH: NORMAL
HBV SURFACE AG SERPL QL IA: NONREACTIVE
HCT VFR BLD AUTO: 19 % (ref 42–52)
HEMATOLOGIST REVIEW: NORMAL
HGB BLD-MCNC: 7.1 G/DL (ref 14–18)
INDIRECT COOMBS: NORMAL
INR PPP: 1.4
INSTRUMENT WBC (OLG): 37.17 X10(3)/MCL
IRON SATN MFR SERPL: 7 % (ref 20–50)
IRON SERPL-MCNC: 15 UG/DL (ref 65–175)
LYMPHOCYTES NFR BLD MANUAL: 1.86 X10(3)/MCL (ref 0.6–4.6)
LYMPHOCYTES NFR BLD MANUAL: 5 %
MACROCYTES BLD QL SMEAR: ABNORMAL
MAGNESIUM SERPL-MCNC: 1.8 MG/DL (ref 1.6–2.6)
MCH RBC QN AUTO: 29.8 PG (ref 27–31)
MCHC RBC AUTO-ENTMCNC: 37.4 G/DL (ref 33–36)
MCV RBC AUTO: 79.8 FL (ref 80–94)
METAMYELOCYTES NFR BLD MANUAL: 4 %
MONOCYTES NFR BLD MANUAL: 3.35 X10(3)/MCL (ref 0.1–1.3)
MONOCYTES NFR BLD MANUAL: 9 %
MYELOCYTES NFR BLD MANUAL: 3 %
NEUTROPHILS NFR BLD MANUAL: 79 %
OVALOCYTES (OLG): ABNORMAL
PLATELET # BLD AUTO: 137 X10(3)/MCL (ref 130–400)
PLATELET # BLD EST: NORMAL 10*3/UL
PLATELETS.RETICULATED NFR BLD AUTO: 8.8 % (ref 0.9–11.2)
PMV BLD AUTO: 12.4 FL (ref 7.4–10.4)
POIKILOCYTOSIS BLD QL SMEAR: ABNORMAL
POTASSIUM SERPL-SCNC: 3.9 MMOL/L (ref 3.5–5.1)
POTASSIUM SERPL-SCNC: 4 MMOL/L (ref 3.5–5.1)
PROT SERPL-MCNC: 4.9 GM/DL (ref 6.4–8.3)
PROTHROMBIN TIME: 16.5 SECONDS (ref 12.5–14.5)
RBC # BLD AUTO: 2.38 X10(6)/MCL (ref 4.7–6.1)
RBC MORPH BLD: ABNORMAL
RET# (OHS): 0.02 X10E6/UL (ref 0.03–0.1)
RETICULOCYTE COUNT AUTOMATED (OLG): 0.67 % (ref 1.1–2.1)
SODIUM SERPL-SCNC: 125 MMOL/L (ref 136–145)
SODIUM SERPL-SCNC: 127 MMOL/L (ref 136–145)
SPECIMEN OUTDATE: NORMAL
TARGETS BLD QL SMEAR: ABNORMAL
TIBC SERPL-MCNC: 211 UG/DL (ref 60–240)
TIBC SERPL-MCNC: 226 UG/DL (ref 250–450)
TRANSFERRIN SERPL-MCNC: 212 MG/DL (ref 174–364)
UNIT NUMBER: NORMAL
UNIT NUMBER: NORMAL
VIT B12 SERPL-MCNC: 871 PG/ML (ref 213–816)
WBC # BLD AUTO: 37.17 X10(3)/MCL (ref 4.5–11.5)

## 2025-03-30 PROCEDURE — 82746 ASSAY OF FOLIC ACID SERUM: CPT | Performed by: HOSPITALIST

## 2025-03-30 PROCEDURE — 63600175 PHARM REV CODE 636 W HCPCS

## 2025-03-30 PROCEDURE — 02HV33Z INSERTION OF INFUSION DEVICE INTO SUPERIOR VENA CAVA, PERCUTANEOUS APPROACH: ICD-10-PCS | Performed by: SURGERY

## 2025-03-30 PROCEDURE — 99223 1ST HOSP IP/OBS HIGH 75: CPT | Mod: ,,, | Performed by: SURGERY

## 2025-03-30 PROCEDURE — 88305 TISSUE EXAM BY PATHOLOGIST: CPT | Performed by: INTERNAL MEDICINE

## 2025-03-30 PROCEDURE — 85025 COMPLETE CBC W/AUTO DIFF WBC: CPT | Performed by: HOSPITALIST

## 2025-03-30 PROCEDURE — P9016 RBC LEUKOCYTES REDUCED: HCPCS | Performed by: INTERNAL MEDICINE

## 2025-03-30 PROCEDURE — 63600175 PHARM REV CODE 636 W HCPCS: Performed by: INTERNAL MEDICINE

## 2025-03-30 PROCEDURE — 63600175 PHARM REV CODE 636 W HCPCS: Performed by: HOSPITALIST

## 2025-03-30 PROCEDURE — 43239 EGD BIOPSY SINGLE/MULTIPLE: CPT | Performed by: INTERNAL MEDICINE

## 2025-03-30 PROCEDURE — P9040 RBC LEUKOREDUCED IRRADIATED: HCPCS | Performed by: INTERNAL MEDICINE

## 2025-03-30 PROCEDURE — 85045 AUTOMATED RETICULOCYTE COUNT: CPT | Performed by: INTERNAL MEDICINE

## 2025-03-30 PROCEDURE — 0DJW3ZZ INSPECTION OF PERITONEUM, PERCUTANEOUS APPROACH: ICD-10-PCS | Performed by: PREVENTIVE MEDICINE

## 2025-03-30 PROCEDURE — 25000003 PHARM REV CODE 250: Performed by: INTERNAL MEDICINE

## 2025-03-30 PROCEDURE — 63600175 PHARM REV CODE 636 W HCPCS: Performed by: STUDENT IN AN ORGANIZED HEALTH CARE EDUCATION/TRAINING PROGRAM

## 2025-03-30 PROCEDURE — 0DB68ZX EXCISION OF STOMACH, VIA NATURAL OR ARTIFICIAL OPENING ENDOSCOPIC, DIAGNOSTIC: ICD-10-PCS | Performed by: INTERNAL MEDICINE

## 2025-03-30 PROCEDURE — 25000003 PHARM REV CODE 250: Performed by: NURSE PRACTITIONER

## 2025-03-30 PROCEDURE — 87340 HEPATITIS B SURFACE AG IA: CPT | Performed by: INTERNAL MEDICINE

## 2025-03-30 PROCEDURE — 80100014 HC HEMODIALYSIS 1:1

## 2025-03-30 PROCEDURE — 87040 BLOOD CULTURE FOR BACTERIA: CPT

## 2025-03-30 PROCEDURE — 83735 ASSAY OF MAGNESIUM: CPT | Performed by: HOSPITALIST

## 2025-03-30 PROCEDURE — 37000009 HC ANESTHESIA EA ADD 15 MINS: Performed by: INTERNAL MEDICINE

## 2025-03-30 PROCEDURE — 37000008 HC ANESTHESIA 1ST 15 MINUTES: Performed by: INTERNAL MEDICINE

## 2025-03-30 PROCEDURE — 36415 COLL VENOUS BLD VENIPUNCTURE: CPT | Performed by: INTERNAL MEDICINE

## 2025-03-30 PROCEDURE — 85610 PROTHROMBIN TIME: CPT | Performed by: INTERNAL MEDICINE

## 2025-03-30 PROCEDURE — 86923 COMPATIBILITY TEST ELECTRIC: CPT | Performed by: INTERNAL MEDICINE

## 2025-03-30 PROCEDURE — D9220A PRA ANESTHESIA: Mod: ANES,,, | Performed by: ANESTHESIOLOGY

## 2025-03-30 PROCEDURE — 25000003 PHARM REV CODE 250: Performed by: HOSPITALIST

## 2025-03-30 PROCEDURE — 5A1D70Z PERFORMANCE OF URINARY FILTRATION, INTERMITTENT, LESS THAN 6 HOURS PER DAY: ICD-10-PCS | Performed by: INTERNAL MEDICINE

## 2025-03-30 PROCEDURE — 80053 COMPREHEN METABOLIC PANEL: CPT | Performed by: INTERNAL MEDICINE

## 2025-03-30 PROCEDURE — 30233N1 TRANSFUSION OF NONAUTOLOGOUS RED BLOOD CELLS INTO PERIPHERAL VEIN, PERCUTANEOUS APPROACH: ICD-10-PCS | Performed by: INTERNAL MEDICINE

## 2025-03-30 PROCEDURE — 99223 1ST HOSP IP/OBS HIGH 75: CPT | Mod: ,,, | Performed by: INTERNAL MEDICINE

## 2025-03-30 PROCEDURE — 82728 ASSAY OF FERRITIN: CPT | Performed by: INTERNAL MEDICINE

## 2025-03-30 PROCEDURE — 82607 VITAMIN B-12: CPT | Performed by: INTERNAL MEDICINE

## 2025-03-30 PROCEDURE — 36415 COLL VENOUS BLD VENIPUNCTURE: CPT | Performed by: HOSPITALIST

## 2025-03-30 PROCEDURE — D9220A PRA ANESTHESIA: Mod: CRNA,,,

## 2025-03-30 PROCEDURE — 80048 BASIC METABOLIC PNL TOTAL CA: CPT | Performed by: HOSPITALIST

## 2025-03-30 PROCEDURE — 86850 RBC ANTIBODY SCREEN: CPT | Performed by: INTERNAL MEDICINE

## 2025-03-30 PROCEDURE — 83550 IRON BINDING TEST: CPT | Performed by: INTERNAL MEDICINE

## 2025-03-30 PROCEDURE — 20000000 HC ICU ROOM

## 2025-03-30 PROCEDURE — 25000003 PHARM REV CODE 250

## 2025-03-30 PROCEDURE — 25000003 PHARM REV CODE 250: Performed by: STUDENT IN AN ORGANIZED HEALTH CARE EDUCATION/TRAINING PROGRAM

## 2025-03-30 PROCEDURE — 86923 COMPATIBILITY TEST ELECTRIC: CPT | Mod: 91 | Performed by: INTERNAL MEDICINE

## 2025-03-30 PROCEDURE — 27201423 OPTIME MED/SURG SUP & DEVICES STERILE SUPPLY: Performed by: INTERNAL MEDICINE

## 2025-03-30 PROCEDURE — 27000221 HC OXYGEN, UP TO 24 HOURS

## 2025-03-30 RX ORDER — NOREPINEPHRINE BITARTRATE/D5W 4MG/250ML
0-1 PLASTIC BAG, INJECTION (ML) INTRAVENOUS CONTINUOUS
Status: DISCONTINUED | OUTPATIENT
Start: 2025-03-30 | End: 2025-03-30

## 2025-03-30 RX ORDER — SODIUM CHLORIDE 0.9 % (FLUSH) 0.9 %
10 SYRINGE (ML) INJECTION
Status: DISCONTINUED | OUTPATIENT
Start: 2025-03-30 | End: 2025-04-08 | Stop reason: HOSPADM

## 2025-03-30 RX ORDER — PANTOPRAZOLE SODIUM 40 MG/10ML
80 INJECTION, POWDER, LYOPHILIZED, FOR SOLUTION INTRAVENOUS ONCE
Status: COMPLETED | OUTPATIENT
Start: 2025-03-30 | End: 2025-03-30

## 2025-03-30 RX ORDER — HYDROCODONE BITARTRATE AND ACETAMINOPHEN 500; 5 MG/1; MG/1
TABLET ORAL
Status: DISCONTINUED | OUTPATIENT
Start: 2025-03-30 | End: 2025-04-08 | Stop reason: HOSPADM

## 2025-03-30 RX ORDER — HEPARIN SODIUM 1000 [USP'U]/ML
3000 INJECTION, SOLUTION INTRAVENOUS; SUBCUTANEOUS ONCE
Status: DISCONTINUED | OUTPATIENT
Start: 2025-03-30 | End: 2025-04-08 | Stop reason: HOSPADM

## 2025-03-30 RX ORDER — POTASSIUM CHLORIDE 7.45 MG/ML
80 INJECTION INTRAVENOUS
Status: DISCONTINUED | OUTPATIENT
Start: 2025-03-30 | End: 2025-04-05

## 2025-03-30 RX ORDER — ACETAMINOPHEN 10 MG/ML
1000 INJECTION, SOLUTION INTRAVENOUS ONCE
Status: COMPLETED | OUTPATIENT
Start: 2025-03-30 | End: 2025-03-30

## 2025-03-30 RX ORDER — POTASSIUM CHLORIDE 7.45 MG/ML
40 INJECTION INTRAVENOUS
Status: DISCONTINUED | OUTPATIENT
Start: 2025-03-30 | End: 2025-04-05

## 2025-03-30 RX ORDER — MAGNESIUM SULFATE HEPTAHYDRATE 40 MG/ML
2 INJECTION, SOLUTION INTRAVENOUS
Status: DISCONTINUED | OUTPATIENT
Start: 2025-03-30 | End: 2025-04-05

## 2025-03-30 RX ORDER — EPINEPHRINE 0.1 MG/ML
INJECTION INTRAVENOUS
Status: DISCONTINUED
Start: 2025-03-30 | End: 2025-03-30 | Stop reason: WASHOUT

## 2025-03-30 RX ORDER — CALCIUM GLUCONATE 20 MG/ML
3 INJECTION, SOLUTION INTRAVENOUS
Status: DISCONTINUED | OUTPATIENT
Start: 2025-03-30 | End: 2025-04-05

## 2025-03-30 RX ORDER — CALCIUM GLUCONATE 20 MG/ML
1 INJECTION, SOLUTION INTRAVENOUS
Status: DISCONTINUED | OUTPATIENT
Start: 2025-03-30 | End: 2025-04-05

## 2025-03-30 RX ORDER — FAMOTIDINE 10 MG/ML
20 INJECTION, SOLUTION INTRAVENOUS DAILY
Status: DISCONTINUED | OUTPATIENT
Start: 2025-03-30 | End: 2025-03-30

## 2025-03-30 RX ORDER — ALBUMIN HUMAN 250 G/1000ML
25 SOLUTION INTRAVENOUS
Status: DISCONTINUED | OUTPATIENT
Start: 2025-03-30 | End: 2025-04-08 | Stop reason: HOSPADM

## 2025-03-30 RX ORDER — HYDROCODONE BITARTRATE AND ACETAMINOPHEN 500; 5 MG/1; MG/1
TABLET ORAL
Status: DISCONTINUED | OUTPATIENT
Start: 2025-03-30 | End: 2025-03-30

## 2025-03-30 RX ORDER — NOREPINEPHRINE BITARTRATE/D5W 8 MG/250ML
PLASTIC BAG, INJECTION (ML) INTRAVENOUS
Status: DISPENSED
Start: 2025-03-30 | End: 2025-03-31

## 2025-03-30 RX ORDER — MUPIROCIN 20 MG/G
OINTMENT TOPICAL 2 TIMES DAILY
Status: COMPLETED | OUTPATIENT
Start: 2025-03-30 | End: 2025-04-04

## 2025-03-30 RX ORDER — ETOMIDATE 2 MG/ML
INJECTION INTRAVENOUS
Status: DISCONTINUED | OUTPATIENT
Start: 2025-03-30 | End: 2025-03-30

## 2025-03-30 RX ORDER — CIPROFLOXACIN 2 MG/ML
200 INJECTION, SOLUTION INTRAVENOUS
Status: DISCONTINUED | OUTPATIENT
Start: 2025-03-30 | End: 2025-04-01

## 2025-03-30 RX ORDER — CALCIUM GLUCONATE 20 MG/ML
2 INJECTION, SOLUTION INTRAVENOUS
Status: DISCONTINUED | OUTPATIENT
Start: 2025-03-30 | End: 2025-04-05

## 2025-03-30 RX ORDER — NOREPINEPHRINE BITARTRATE/D5W 8 MG/250ML
0-3 PLASTIC BAG, INJECTION (ML) INTRAVENOUS CONTINUOUS
Status: DISCONTINUED | OUTPATIENT
Start: 2025-03-30 | End: 2025-04-01

## 2025-03-30 RX ORDER — POTASSIUM CHLORIDE 7.45 MG/ML
60 INJECTION INTRAVENOUS
Status: DISCONTINUED | OUTPATIENT
Start: 2025-03-30 | End: 2025-04-05

## 2025-03-30 RX ORDER — PROPOFOL 10 MG/ML
VIAL (ML) INTRAVENOUS
Status: DISCONTINUED | OUTPATIENT
Start: 2025-03-30 | End: 2025-03-30

## 2025-03-30 RX ADMIN — OCTREOTIDE ACETATE 50 MCG/HR: 500 INJECTION, SOLUTION INTRAVENOUS; SUBCUTANEOUS at 06:03

## 2025-03-30 RX ADMIN — HYDROCODONE BITARTRATE AND ACETAMINOPHEN 0 ML: 500; 5 TABLET ORAL at 07:03

## 2025-03-30 RX ADMIN — SODIUM CHLORIDE 125 MG: 9 INJECTION, SOLUTION INTRAVENOUS at 09:03

## 2025-03-30 RX ADMIN — HYDROCODONE BITARTRATE AND ACETAMINOPHEN 200 ML: 500; 5 TABLET ORAL at 07:03

## 2025-03-30 RX ADMIN — MUPIROCIN: 20 OINTMENT TOPICAL at 08:03

## 2025-03-30 RX ADMIN — PANTOPRAZOLE SODIUM 80 MG: 40 INJECTION, POWDER, LYOPHILIZED, FOR SOLUTION INTRAVENOUS at 06:03

## 2025-03-30 RX ADMIN — PANTOPRAZOLE SODIUM 8 MG/HR: 40 INJECTION, POWDER, FOR SOLUTION INTRAVENOUS at 06:03

## 2025-03-30 RX ADMIN — PIPERACILLIN SODIUM AND TAZOBACTAM SODIUM 4.5 G: 4; .5 INJECTION, POWDER, LYOPHILIZED, FOR SOLUTION INTRAVENOUS at 12:03

## 2025-03-30 RX ADMIN — PROPOFOL 30 MG: 10 INJECTION, EMULSION INTRAVENOUS at 07:03

## 2025-03-30 RX ADMIN — SODIUM BICARBONATE: 84 INJECTION, SOLUTION INTRAVENOUS at 09:03

## 2025-03-30 RX ADMIN — GABAPENTIN 600 MG: 300 CAPSULE ORAL at 08:03

## 2025-03-30 RX ADMIN — CIPROFLOXACIN 200 MG: 2 INJECTION, SOLUTION INTRAVENOUS at 10:03

## 2025-03-30 RX ADMIN — ACETAMINOPHEN 650 MG: 325 TABLET, FILM COATED ORAL at 10:03

## 2025-03-30 RX ADMIN — CIPROFLOXACIN 200 MG: 2 INJECTION, SOLUTION INTRAVENOUS at 08:03

## 2025-03-30 RX ADMIN — ACETAMINOPHEN 1000 MG: 10 INJECTION INTRAVENOUS at 01:03

## 2025-03-30 RX ADMIN — ETOMIDATE 14 MG: 2 INJECTION INTRAVENOUS at 07:03

## 2025-03-30 NOTE — H&P
[facility] ICU  Pulmonary Critical Care Note    Patient Name: Tao Bee  MRN: [id number]  Admission Date: 3/29/2025  Hospital Length of Stay: 1 days  Code Status: Full Code  Attending Provider: Beau Alvarez Jr., MD,*  Primary Care Provider: Penny Garsia FNP     Subjective:     HPI:   59-year-old male with no known past medical history presented to this facility initially as a hospital medicine admit for evaluation of 3 days of lower abdominal pain, nausea, and vomiting.  Patient was admitted yesterday.  On admission, noted to have acute renal failure with a creatinine of 11.76 and a BUN of 162.  Leukocytosis of 47.  And hyponatremia.  CT abdomen pelvis without contrast showed an edematous appearing right kidney with fullness of the upper lobe indicative of pyelonephritis.  Blood cultures were drawn at the time and have grown Gram-negative rods in 2/2 bottles with BC ID showing E coli species not ESBL.  Urine culture also growing greater than 100,000 colonies of Gram-negative rods.  Nephrology was consulted at the time due to acute renal failure and persistent acidosis and hemodialysis recommended.  Patient was started on Zosyn and also ciprofloxacin for double Gram-negative coverage.  Leukocytosis appear to trend down overnight, by 10 points.  H&H also declined from 8.7 to7.1 overnight. 1 unit PRBC ordered to be transfused.  Patient currently receiving hemodialysis.  Patient does report still making urine at this time.    Patient was noted to be tachycardic, tachypneic, and febrile earlier today.  Blood pressure was not responding to fluid resuscitation.  RRT was called and patient was started on Levophed to maintain pressure.  Patient arrived to the ICU with ice packs and Levophed hung.      Hospital Course/Significant events:  Upgraded to the ICU on 03/30/2025    24 Hour Interval History:  N/a    Past Medical History:   Diagnosis Date    Neuropathy        History reviewed. No  pertinent surgical history.    Social History[1]        Current Outpatient Medications   Medication Instructions    gabapentin (NEURONTIN) 600 mg, 3 times daily       Review of patient's allergies indicates:   Allergen Reactions    Opioids - morphine analogues Hallucinations        Current Inpatient Medications   ciprofloxacin  200 mg Intravenous Q12H    famotidine (PF)  20 mg Intravenous Daily    heparin (porcine)  3,000 Units Intravenous Once    mupirocin   Nasal BID    NORepinephrine 8 mg        piperacillin-tazobactam (Zosyn) IV (PEDS and ADULTS) (extended infusion is not appropriate)  4.5 g Intravenous Q12H       Current Intravenous Infusions   NORepinephrine bitartrate-D5W  0-3 mcg/kg/min Intravenous Continuous        sodium bicarbonate 150 mEq in D5W 1,000 mL infusion   Intravenous Continuous 125 mL/hr at 03/30/25 1444 Rate Verify at 03/30/25 1444         Review of Systems   Constitutional:  Positive for fever. Negative for chills.   Eyes:  Negative for pain.   Cardiovascular:  Negative for chest pain and leg swelling.   Gastrointestinal:  Positive for abdominal pain, nausea and vomiting.   Genitourinary:  Positive for dysuria. Negative for frequency.   Skin:  Negative for rash.   Neurological:  Negative for dizziness, tremors and headaches.          Objective:       Intake/Output Summary (Last 24 hours) at 3/30/2025 1459  Last data filed at 3/30/2025 1444  Gross per 24 hour   Intake 746.92 ml   Output 400 ml   Net 346.92 ml         Vital Signs (Most Recent):  Temp: 99.1 °F (37.3 °C) (03/30/25 1414)  Pulse: 99 (03/30/25 1445)  Resp: 17 (03/30/25 1445)  BP: (!) 86/58 (03/30/25 1445)  SpO2: (!) 92 % (03/30/25 1445)  Body mass index is 25.84 kg/m².  Weight: 74.8 kg (164 lb 15.9 oz) Vital Signs (24h Range):  Temp:  [98 °F (36.7 °C)-104.1 °F (40.1 °C)] 99.1 °F (37.3 °C)  Pulse:  [] 99  Resp:  [14-20] 17  SpO2:  [91 %-98 %] 92 %  BP: ()/(46-89) 86/58     Physical Exam  Constitutional:       Appearance:  He is ill-appearing. He is not toxic-appearing.   HENT:      Head: Normocephalic and atraumatic.      Nose: Nose normal. No congestion or rhinorrhea.      Mouth/Throat:      Mouth: Mucous membranes are moist.      Pharynx: Oropharynx is clear.   Eyes:      Extraocular Movements: Extraocular movements intact.      Conjunctiva/sclera: Conjunctivae normal.      Pupils: Pupils are equal, round, and reactive to light.   Cardiovascular:      Rate and Rhythm: Regular rhythm. Tachycardia present.      Pulses: Normal pulses.      Heart sounds: No murmur heard.     Friction rub present. No gallop.   Pulmonary:      Effort: Pulmonary effort is normal. No respiratory distress.      Breath sounds: Normal breath sounds. No wheezing.   Abdominal:      General: Bowel sounds are normal.      Palpations: Abdomen is soft.      Tenderness: There is abdominal tenderness in the suprapubic area. There is right CVA tenderness. There is no left CVA tenderness.   Musculoskeletal:         General: Swelling present.      Cervical back: Normal range of motion and neck supple.      Right lower leg: No edema.      Left lower leg: No edema.   Skin:     General: Skin is warm.      Capillary Refill: Capillary refill takes less than 2 seconds.      Coloration: Skin is not pale.      Comments: Ulceration to the left Achilles, initially bandage noted to have granulation tissue without any evidence of infection   Neurological:      Mental Status: He is alert.           Lines/Drains/Airways       Drain  Duration                  Urethral Catheter 03/30/25 1300 Coude 16 Fr. <1 day              Peripheral Intravenous Line  Duration                  Peripheral IV - Single Lumen 03/29/25 0830 20 G Left Antecubital 1 day         Peripheral IV - Single Lumen 03/29/25 1030 20 G Right Antecubital 1 day         Peripheral IV - Single Lumen 03/30/25 1356 20 G Right Forearm <1 day                    Significant Labs:    Lab Results   Component Value Date    WBC  "37.17 (H) 03/30/2025    WBC 37.17 03/30/2025    HGB 7.1 (L) 03/30/2025    HCT 19.0 (LL) 03/30/2025    MCV 79.8 (L) 03/30/2025     03/30/2025           BMP  Lab Results   Component Value Date     (L) 03/30/2025    K 4.0 03/30/2025    CO2 16 (L) 03/30/2025    .2 (H) 03/30/2025    CREATININE 10.45 (H) 03/30/2025    CALCIUM 7.9 (L) 03/30/2025    AGAP 17.0 03/30/2025         ABG  No results for input(s): "PH", "PO2", "PCO2", "HCO3", "POCBASEDEF" in the last 168 hours.    Mechanical Ventilation Support:         Significant Imaging:  I have reviewed the pertinent imaging within the past 24 hours.        Assessment/Plan:     Assessment  Septic shock secondary to pyelonephritis  Acute renal failure with metabolic acidosis  Symptomatic anemia      Plan  -admitted to the ICU for close monitoring and requirement of pressor support  -Levophed ordered to keep MAP above 65  -nephrology is on board, appreciate assistance--hemodialysis is pending  -blood cultures x2 growing Gram-negative rods, BC ID showing E coli species not ESBL, matches urine culture which is also growing Gram-negative rods greater than 100,000 colonies  -will repeat blood cultures until negative  -continue Zosyn and ciprofloxacin until final speciation, then can deescalate with sensitivities    DVT Prophylaxis:  Heparin DVT prophylaxis  GI Prophylaxis:     32 minutes of critical care was time spent personally by me on the following activities: development of treatment plan with patient or surrogate and bedside caregivers, discussions with consultants, evaluation of patient's response to treatment, examination of patient, ordering and performing treatments and interventions, ordering and review of laboratory studies, ordering and review of radiographic studies, pulse oximetry, re-evaluation of patient's condition.  This critical care time did not overlap with that of any other provider or involve time for any procedures.     Joseph Sifuentes, " MD  Pulmonary Critical Care Medicine  [facility] [address] ICU  DOS: 03/30/2025         [1]   Social History  Socioeconomic History    Marital status: Single   Tobacco Use    Smoking status: Every Day     Current packs/day: 0.50     Types: Cigarettes   Substance and Sexual Activity    Alcohol use: Yes     Alcohol/week: 28.0 standard drinks of alcohol     Types: 28 Shots of liquor per week     Comment: half a pint per day     Social Drivers of Health     Financial Resource Strain: Low Risk  (3/29/2025)    Overall Financial Resource Strain (CARDIA)     Difficulty of Paying Living Expenses: Not hard at all   Food Insecurity: No Food Insecurity (3/29/2025)    Hunger Vital Sign     Worried About Running Out of Food in the Last Year: Never true     Ran Out of Food in the Last Year: Never true   Transportation Needs: No Transportation Needs (3/29/2025)    PRAPARE - Transportation     Lack of Transportation (Medical): No     Lack of Transportation (Non-Medical): No   Stress: No Stress Concern Present (3/29/2025)    [employer] - Occupational Stress Questionnaire     Feeling of Stress : Not at all   Housing Stability: Low Risk  (3/29/2025)    Housing Stability Vital Sign     Unable to Pay for Housing in the Last Year: No     Number of Times Moved in the Last Year: 0     Homeless in the Last Year: No

## 2025-03-30 NOTE — PROGRESS NOTES
[facility] Medical Telemetry  Central Venous Catheter  Procedure Note    SUMMARY     Date of Procedure: 3/30/2025     Procedure: Insertion of Central Venous Catheter    Provider: Stephanie Harvey MD    Assisting Provider: none      Indications: Need for dialysis      Pre-Procedure Diagnosis: acute renal failure     Post-Procedure Diagnosis: same     Anesthesia: local      Technical Procedures Used: sterile, seldinger, ultrasound guided     Description of the Findings of the Procedure:    Informed consent was obtained for the procedure, including sedation.  Risks of lung perforation, hemorrhage, arrhythmia, and adverse drug reaction were discussed.     Maximum sterile technique was used including antiseptics, cap, gloves, gown, hand hygiene, mask, and sheet.    Under sterile conditions the skin above the on the right internal jugular vein was prepped with betadine and covered with a sterile drape. Local anesthesia was applied to the skin and subcutaneous tissues. A 22-gauge needle was used to identify the vein. An 18-gauge needle was then inserted into the vein. A guide wire was then passed easily through the needle,. The catheter was then withdrawn. The tract was serially dilated. A 13.5 [place] double-lumen HD catheter was then inserted into the vessel over the guide wire. The catheter was sutured into place.    There were no changes to vital signs. Catheter was flushed with 10 cc NS. Patient did tolerate procedure well.    Recommendations:    CXR ordered to verify placement.    Complications: No    Stephanie Harvey MD  [employer] General Surgery PGY4

## 2025-03-30 NOTE — CODE/ RAPID DOCUMENTATION
Rapid Response called to room 971. Pt awake and alert with family and nurse at bedside. Pt tachycardic, febrile 103.1, and mildly hypotensive. Upon review of labs BUN/CR highly elevated with new RIJ HD cath placed approx 1hr prior and confirmed placement for use. H/H mildly low with RN statement of bloody stools. MD notified over phone with orders to transfuse PRBCs with HD. Pt in septic shock with potential decline, so transferred to ICU. Pt tolerated transfer with no acute distress noted. ICU nurse to arrange emergent HD with transfusion of blood products. Levo gtt on standby.

## 2025-03-30 NOTE — PLAN OF CARE
Problem: Infection  Goal: Absence of Infection Signs and Symptoms  Outcome: Progressing     Problem: Adult Inpatient Plan of Care  Goal: Plan of Care Review  Outcome: Progressing  Goal: Patient-Specific Goal (Individualized)  Outcome: Progressing  Goal: Absence of Hospital-Acquired Illness or Injury  Outcome: Progressing  Goal: Optimal Comfort and Wellbeing  Outcome: Progressing  Goal: Readiness for Transition of Care  Outcome: Progressing     Problem: Fall Injury Risk  Goal: Absence of Fall and Fall-Related Injury  Outcome: Progressing     Problem: Hemodialysis  Goal: Safe, Effective Therapy Delivery  Outcome: Progressing  Goal: Effective Tissue Perfusion  Outcome: Progressing  Goal: Absence of Infection Signs and Symptoms  Outcome: Progressing

## 2025-03-30 NOTE — CONSULTS
[facility]                       [address] [id number]    PATIENT NAME:       JUVENTINO BOURGEOIS  YOB: 1965  CSN:                [id number]   MRN:                [id number]  ADMIT DATE:         03/29/2025 08:10:00  PHYSICIAN:          Bishnu Billy MD                            CONSULTATION    DATE OF CONSULT:      HISTORY OF PRESENT ILLNESS:  The patient is a 59-year-old    gentleman, who was admitted to the hospital on 03/29/2025 with lower abdominal   pain and diarrhea, which had lasted the last 3 days.  The patient has nausea,   but no vomiting.  He had some melena 3 days ago.  He has a history of renal   disease.  He has had no change in bowel habits.  He has decreased urine output.    Vital signs have been stable.  Pressure has been a little low.  Creatinine is   markedly elevated (11.76).  CT scan was obtained and I have reviewed it.  It   shows what appears to be an edematous left kidney.  There is no martha   hydronephrosis.  It was a noncontrasted study.  When his creatinine is improved,   he should have a contrasted study and/or an ultrasound.  The patient has been   started on vancomycin and Zosyn for pyelonephritis.  They have had difficulty   placing a Nguyen catheter today and I was consulted to place a catheter at the   bedside.  The patient is acidotic with decreased urine output.  Labs are   markedly abnormal.    PAST MEDICAL HISTORY:  Neuropathy.    SURGICAL HISTORY:  No prior urologic history or surgeries.    FAMILY HISTORY:  Noncontributory.    ALLERGIES:  OPIOIDS.     PHYSICAL EXAMINATION:  GENERAL:  The patient looks anxious.  ABDOMEN:  Protuberant, but soft.  EXTREMITIES:  No clubbing, no cyanosis, no edema.  No erythema.  LUNGS:  He appears a little short of breath.    LABORATORY DATA:  White count is 37,000, hemoglobin and hematocrit 7.1 and 19.    BUN is 129 and  creatinine is 10.45.    ASSESSMENT AND PLAN:  Ill-appearing gentleman with uremic indices.  He has a   markedly elevated white count and a urinary tract infection with possibility of   pyelonephritis.    PROCEDURE:    1. Nguyen catheter placement-I placed a coude tipped catheter without any   difficulty using standard sterile technique.  Catheter went in.  He had a   significant postvoid residual.  2. The patient is planning on having dialysis today as per nephrology.  3. Continue broad spectrum antibiotics.  4. The patient is anemic.  He likely will require transfusion during dialysis.  5. Patient is ill.  I have discussed the case with the patient's sister, who was   at the bedside when I arrived.  Explained that he is critically ill and that   may ultimately require ICU management for now.  My plan was to leave a Nguyen   catheter.  He needs broad-spectrum antibiotics, dialysis, and supportive care.    I am hopeful with the catheter in and improved renal function after dialysis and   with the catheter in position, we can monitor his urine output more clearly and   more specifically.  The above situation was discussed at length with the   patient's sister.  We will follow along.        ______________________________  Bishnu Billy MD    SHS/AQS  DD:  03/30/2025  Time:  01:21PM  DT:  03/30/2025  Time:  03:09PM  Job #:  [id number]      CONSULTATION

## 2025-03-30 NOTE — CONSULTS
[facility] New Consult Note    Patient Name: Tao Bee  Admission Date: 3/29/2025  Hospital Length of Stay: 1 days  Code Status: Full Code   Attending Physician: Trevor Winchester MD   Primary Care Physician: Penny Garsia FNP  Principal Problem:<principal problem not specified>    NEPHROLOGY ON CALL NOTE    HPI:  59-year-old male admitted to hospital medicine on 03/29/2025 secondary to lower abdominal pain and diarrhea for the last 3 days. Associated with nausea but no vomiting. Did report some melena starting about 3 days ago. No previous history of renal disease. No change in pain with eating. He does report decreased urine output. In the emergency room his vital signs are stable. Blood pressure is little soft but responsive to fluids. He was noted to have significant hyponatremia with a sodium of 130. Creatinine markedly elevated at 11.76. Also significant leukocytosis of 47.97. CT of the abdomen shows edematous appearance of the right kidney with fullness in the upper pole. Differential included mass, phlegmon, or focal pyelonephritis. Renal ultrasound showed findings concerning for pelvic calcitonin versus. He was started empirically on vancomycin and Zosyn. A consult has been placed to Urology. No urine specimen has yet been collected secondary to oliguria.     Pt has been started on NSS yesterday, his uremic indices showing some improvement but his acidosis is worse  Urine output not quite quantified, but appears to be diminished  ++fever  Hemodynamics remain stable        Medical History:   Past Medical History:   Diagnosis Date    Neuropathy        Surgical History:   History reviewed. No pertinent surgical history.    Family History:   No family history on file..     Social History:   Social History     Tobacco Use    Smoking status: Every Day     Current packs/day: 0.50     Types: Cigarettes    Smokeless tobacco: Not on file   Substance Use Topics    Alcohol use: Yes     Alcohol/week: 28.0  "standard drinks of alcohol     Types: 28 Shots of liquor per week     Comment: half a pint per day       Allergies:  Review of patient's allergies indicates:   Allergen Reactions    Opioids - morphine analogues Hallucinations         Review of Systems:  Constitutional: fever  Skin: Denies wounds, rashes, no skin lesions or itching  EENT: Denies acute hearing/vision changes, tinnitus, rhinorrhea or dysphagia  Respiratory: no dyspnea  Cardiovascular: Denies chest pain, palpitations, or swelling  Gastrointestional: no abd pain currently  Genitourinary: dec UO  Musculoskeletal: Denies myalgias, back pain, flank pain, new decreased ROM or acute focal weakness  Neurological: Denies headaches, seizures, paresthesias, dizziness or asterixis  Hematological: Denies unusual bruising or bleeding      Medications:  Current Medications[1]     Scheduled Meds:   ferric gluconate (Ferrlecit) IVPB  125 mg Intravenous Once    gabapentin  600 mg Oral TID    piperacillin-tazobactam (Zosyn) IV (PEDS and ADULTS) (extended infusion is not appropriate)  4.5 g Intravenous Q12H     Continuous Infusions:   sodium bicarbonate 150 mEq in D5W 1,000 mL infusion   Intravenous Continuous             Objective:  /78   Pulse 92   Temp 99.4 °F (37.4 °C) (Oral)   Resp 13   Ht 5' 7" (1.702 m)   Wt 74.8 kg (165 lb)   SpO2 (!) 94%   BMI 25.84 kg/m²  Body mass index is 25.84 kg/m².    No intake/output data recorded.  No intake/output data recorded.        Physical Exam:  General: no acute distress, awake, alert  Eyes: PERRLA, EOMI, conjunctiva clear, eyelids without swelling  HENT: atraumatic, oropharynx and nasal mucosa patent, no difficulty hearing  Neck: full ROM, no JVD, no thyromegaly or lymphadenopathy  Respiratory: rhonchi at bases  Cardiovascular: RRR without rub; BL radial and pedal pulses felt  Edema: none  Gastrointestinal: soft, non-tender, non-distended; positive bowel sounds; no masses to palpation    Musculoskeletal: full ROM " "without limitation or discomfort  Integumentary: warm, dry; no rashes, wounds, or skin lesions  Neurological: oriented, appropriate, no acute deficits    Labs:  Chemistries:   Recent Labs   Lab 03/29/25  0829 03/30/25 0345   * 125*   K 4.3 3.9   CL 92* 93*   CO2 20* 15*   .9* 135.0*   CREATININE 11.76* 10.50*   CALCIUM 8.9 7.5*   BILITOT 0.5  --    ALKPHOS 98  --    ALT 14  --    AST 20  --    GLUCOSE 123* 121*   MG 2.20 1.80        CBC/Anemia Labs: Coags:    Recent Labs   Lab 03/29/25  0829 03/30/25 0345 03/30/25 0346 03/30/25  0552   WBC 47.97  47.97*  --  37.17  37.17*  --    HGB 8.7*  --  7.1*  --    HCT 23.3*  --  19.0*  --    *  --  137  --    MCV 79.3*  --  79.8*  --    RDW 16.8  --  16.5  --    IRON 28*  --   --  15*   FERRITIN  --   --   --  264.66   FOLATE  --  5.1*  --   --    [id number] 836*  --   --  871*    No results for input(s): "PT", "INR", "APTT" in the last 168 hours.       Impression:    LEANDRO: prerenal and related to gm-ve bacteremia, source likely   Acidosis  Uremic indices showing some improvement      Plan:     Nguyen  Antibiotics  Will add cipro for double gm-ve coverage  Change IV to HCO3 drip  If no improvement by AM--->HD        Alirio Black      Thank you for this consult.       ADDENDUM  11:05 AM  Pt more lethargic  More tremors noted  Lungs rhonchi  No rub  Abd soft   No edema    Stat labs reviewed  Persistent severe uremic indices  Acidosis    IMPRESSION  UREMIA  SEPSIS  SEVERE ANEMIA    P  Emergency HD today  Will transfuse with 2 UNITS PRBC ON HD TODAY         [1]   Current Facility-Administered Medications:     0.9%  NaCl infusion (for blood administration), , Intravenous, Q24H PRN, Kenneth Brown MD    0.9%  NaCl infusion (for blood administration), , Intravenous, Q24H PRN, Trevor Winchester MD    acetaminophen tablet 650 mg, 650 mg, Oral, Q8H PRN, Danielle Jacinto, ANP, 650 mg at 03/29/25 2303    ferric gluconate (Ferrlecit) 125 mg in 0.9% NaCl " 110 mL IVPB, 125 mg, Intravenous, Once, Trevor Winchester MD    gabapentin capsule 600 mg, 600 mg, Oral, TID, Trevor Winchester MD, 600 mg at 03/29/25 2104    melatonin tablet 6 mg, 6 mg, Oral, Nightly PRN, Danielle Jacinto, ANP    morphine injection 2 mg, 2 mg, Intravenous, Q4H PRN, Trevor Winchester MD    ondansetron injection 4 mg, 4 mg, Intravenous, Q8H PRN, Danielle Jacinto, ANP    piperacillin-tazobactam (ZOSYN) 4.5 g in D5W 100 mL IVPB (MB+), 4.5 g, Intravenous, Q12H, Tomy Heart MD, Stopped at 03/30/25 0104    sodium bicarbonate 150 mEq in D5W 1,000 mL infusion, , Intravenous, Continuous, Alirio Black MD    sodium chloride 0.9% flush 10 mL, 10 mL, Intravenous, PRN, Danielle Jacinto, ANP

## 2025-03-30 NOTE — PLAN OF CARE
Problem: Infection  Goal: Absence of Infection Signs and Symptoms  3/29/2025 2325 by Rosi Barney RN  Outcome: Progressing  3/29/2025 2324 by Rosi Barney RN  Outcome: Progressing     Problem: Adult Inpatient Plan of Care  Goal: Plan of Care Review  3/29/2025 2325 by Rosi Barney RN  Outcome: Progressing  3/29/2025 2324 by Rosi Barney RN  Outcome: Progressing  Goal: Patient-Specific Goal (Individualized)  3/29/2025 2325 by Rosi Barney RN  Outcome: Progressing  3/29/2025 2324 by Rosi Barney RN  Outcome: Progressing  Goal: Absence of Hospital-Acquired Illness or Injury  3/29/2025 2325 by Rosi Barney RN  Outcome: Progressing  3/29/2025 2324 by Rosi Barney RN  Outcome: Progressing  Goal: Optimal Comfort and Wellbeing  3/29/2025 2325 by Rosi Barney RN  Outcome: Progressing  3/29/2025 2324 by Rosi Barney RN  Outcome: Progressing  Goal: Readiness for Transition of Care  3/29/2025 2325 by Rosi Barney RN  Outcome: Progressing  3/29/2025 2324 by Rosi Barney RN  Outcome: Progressing     Problem: Fall Injury Risk  Goal: Absence of Fall and Fall-Related Injury  Outcome: Progressing

## 2025-03-30 NOTE — ANESTHESIA PREPROCEDURE EVALUATION
"03/30/2025    Tao Bee is a 59 y.o., male, in the ICU with acute GI bleed, acute renal failure, currently in septic shock with R sided pyelonephritis, requiring urgent EGD, see EMR for full history, h/o severe alcohol use,     Pre-operative evaluation for Procedure(s) (LRB):  EGD (ESOPHAGOGASTRODUODENOSCOPY) (N/A)    Pre-op Assessment    I have reviewed the Patient Summary Reports.     I have reviewed the Nursing Notes. I have reviewed the NPO Status.   I have reviewed the Medications.     Review of Systems  Anesthesia Hx:  No problems with previous Anesthesia                    Past Medical History:   Diagnosis Date    Neuropathy        Problem List[1]    Review of patient's allergies indicates:   Allergen Reactions    Opioids - morphine analogues Hallucinations       Current Outpatient Medications   Medication Instructions    gabapentin (NEURONTIN) 600 mg, 3 times daily       History reviewed. No pertinent surgical history.    Social History[2]    /63   Pulse 78   Temp 36.8 °C (98.3 °F) (Oral)   Resp 17   Ht 5' 7.01" (1.702 m)   Wt 74.8 kg (164 lb 15.9 oz)   SpO2 (!) 94%   BMI 25.84 kg/m²       Physical Exam  General: Well nourished and Cooperative    Airway:  Mallampati: II   Mouth Opening: Normal  TM Distance: Normal  Tongue: Normal  Neck ROM: Normal ROM    Chest/Lungs:  Clear to auscultation        Lab Results   Component Value Date    WBC 37.17 (H) 03/30/2025    WBC 37.17 03/30/2025    HGB 7.1 (L) 03/30/2025    HCT 19.0 (LL) 03/30/2025    MCV 79.8 (L) 03/30/2025     03/30/2025          BMP  Lab Results   Component Value Date    HCT 19.0 (LL) 03/30/2025     (L) 03/30/2025    K 4.0 03/30/2025    .2 (H) 03/30/2025    CREATININE 10.45 (H) 03/30/2025    CALCIUM 7.9 (L) 03/30/2025          Diagnostic Studies:  .  EKG  Results for orders placed or performed during the hospital encounter of 03/29/25   EKG 12-lead    Collection Time: 03/29/25 10:11 AM   Result Value Ref Range    " QRS Duration 86 ms    OHS QTC Calculation 427 ms    Narrative    Test Reason : Z13.6,    Vent. Rate :  77 BPM     Atrial Rate :  77 BPM     P-R Int : 162 ms          QRS Dur :  86 ms      QT Int : 378 ms       P-R-T Axes :  32  24  33 degrees    QTcB Int : 427 ms    Normal sinus rhythm  Possible Left atrial enlargement  Borderline Abnormal ECG  Confirmed by Cheko Bains ([id number]) on 3/29/2025 4:32:54 PM    Referred By: AAAREFERRAL SELF           Confirmed By: Cheko Bains         Anesthesia Plan  Type of Anesthesia, risks & benefits discussed:    Anesthesia Type: Gen Natural Airway  Intra-op Monitoring Plan: Standard ASA Monitors  Induction:  IV  Informed Consent: Informed consent signed with the Patient representative and all parties understand the risks and agree with anesthesia plan.  All questions answered.   ASA Score: 4 Emergent  Day of Surgery Review of History & Physical: H&P Update referred to the surgeon/provider.  Anesthesia Plan Notes: Critical labs Hct, Na, Ca,     May require GETA    May remain intubated if unable to extubate due to patients underlying medical comorbidities    Ready For Surgery From Anesthesia Perspective.     .  Anesthesia consent includes material facts, risks, complications & alternatives, and possibility of altering the anesthesia plan due to intraoperative conditions.    I reviewed problem list, prior to admission medication list, appropriate labs, any workup, Xray, EKG etc   See anesthesia chart for details of the anesthesia plan carried out.       Mo Meza MD    ¤                [1] There is no problem list on file for this patient.  [2]   Social History  Socioeconomic History    Marital status: Single   Tobacco Use    Smoking status: Every Day     Current packs/day: 0.50     Types: Cigarettes   Substance and Sexual Activity    Alcohol use: Yes     Alcohol/week: 28.0 standard drinks of alcohol     Types: 28 Shots of liquor per week     Comment: half a pint per  day     Social Drivers of Health     Financial Resource Strain: Low Risk  (3/29/2025)    Overall Financial Resource Strain (CARDIA)     Difficulty of Paying Living Expenses: Not hard at all   Food Insecurity: No Food Insecurity (3/29/2025)    Hunger Vital Sign     Worried About Running Out of Food in the Last Year: Never true     Ran Out of Food in the Last Year: Never true   Transportation Needs: No Transportation Needs (3/29/2025)    PRAPARE - Transportation     Lack of Transportation (Medical): No     Lack of Transportation (Non-Medical): No   Stress: No Stress Concern Present (3/29/2025)    [employer] - Occupational Stress Questionnaire     Feeling of Stress : Not at all   Housing Stability: Low Risk  (3/29/2025)    Housing Stability Vital Sign     Unable to Pay for Housing in the Last Year: No     Number of Times Moved in the Last Year: 0     Homeless in the Last Year: No

## 2025-03-30 NOTE — PROGRESS NOTES
03/30/25 1835   Vital Signs   Temp 98.3 °F (36.8 °C)   Temp Source Oral   Pulse 80   Resp 14   SpO2 (!) 93 %   Pulse Oximetry Type Continuous   Flow (L/min) (Oxygen Therapy) 2   Device (Oxygen Therapy) nasal cannula   /68   MAP (mmHg) 74   BP Location Right arm   BP Method cNIBP   Patient Position Lying        Hemodialysis Catheter 03/30/25 1233 right internal jugular   Placement Date/Time: 03/30/25 1233   Present Prior to Hospital Arrival?: No  Hand Hygiene: Performed  Barrier Precautions: Performed  Skin Antisepsis: ChloraPrep  Hemodialysis Catheter Type: Temporary catheter  Location: right internal jugular  Cathet...   Line Necessity Review CRRT/HD   Site Assessment No drainage;No redness;No swelling;No warmth   Line Securement Device Secured with sutures   Dressing Type CHG impregnated dressing/sponge;Central line dressing;Transparent (Tegaderm)   Dressing Status Clean;Dry;Intact   Dressing Intervention Integrity maintained   Date on Dressing 03/30/25   Dressing Due to be Changed 04/03/25   Venous Patency/Care flushed w/o difficulty;blood return present;intermittent infusion cap changed;normal saline locked;deaccessed   Arterial Patency/Care flushed w/o difficulty;deaccessed;intermittent infusion cap changed;blood return present;normal saline locked   Waveform Not being transduced   Post-Hemodialysis Assessment   Blood Volume Processed (Liters) 51.7 L   Dialyzer Clearance Lightly streaked   Duration of Treatment 180 minutes   Additional Fluid Intake (mL) 1000 mL   Total UF (mL) 1500 mL   Net Fluid Removal 500   Patient Response to Treatment Tx completed, tolerated well, lines flushed and then packed with saline to filling volume with new end caps, no SOB or distress noted

## 2025-03-30 NOTE — CONSULTS
"Gastroenterology Consult Note      CC: Melena     HPI 59 y.o. male with neuropathy and alcohol abuse who initially presented with acute-onset, lower quadrant abdominal pain with associated loose black stools and nausea. He was found to be in septic shock with E. Coli and Enterobacterales bacteremia secondary to right-sided pyelonephritis. He was also found to have acute renal failure requiring hemodialysis. GI consulted for acute-onset, large volume melena with associated severe anemia requiring PRBC transfusion. Patient denies any hematochezia. He denies any epigastric pain. He denies prior history of GI bleeding. He takes Aleve as needed for musculoskeletal pains. He drinks multiple liquor drinks daily, and his last drink was three days prior to admission. He does not have any known history of liver disease.     Chart reviewed and summarized here.    Past Medical History  Past Medical History:   Diagnosis Date    Neuropathy    Alcohol abuse     Past Surgical History  History reviewed. No pertinent surgical history.    Social History  Social History[1]    Family History  No family history on file.    Review of Systems  General ROS: negative for chills, fever or weight loss  Respiratory ROS: no cough, shortness of breath, or wheezing  Cardiovascular ROS: no chest pain or palpitations   Gastrointestinal ROS: Positive for abdominal pain, nausea, and melena; no vomiting       Physical Examination  /67   Pulse 79   Temp 98.3 °F (36.8 °C) (Oral)   Resp 14   Ht 5' 7.01" (1.702 m)   Wt 74.8 kg (164 lb 15.9 oz)   SpO2 (!) 94%   BMI 25.84 kg/m²   General appearance: alert, cooperative, no distress  HENT: Normocephalic, atraumatic, neck symmetrical, no nasal discharge; Hemodialysis catheter present in right-side of neck.   Eyes: conjunctivae/corneas clear, PERRL, EOM's intact  Lungs: clear to auscultation bilaterally, no dullness to percussion bilaterally  Heart: regular rate and rhythm without rub; no " displacement of the PMI   Abdomen: soft, non-distended, non-tender; bowel sounds normoactive; no organomegaly  Extremities: extremities symmetric; no clubbing, cyanosis, or edema  Integument: Skin color, texture, turgor normal; no rashes; hair distrubution normal  Neurologic: Alert and oriented X 3, normal strength, normal coordination     Labs:  Hgb 7.1 (Baseline ~13)    WBC 37  /Cr 10.4  Na 127  T bili 0.5  AST 20/ALT 10  ALP 85  Albumin 1.9  Blood cultures positive for E. Coli and Enterobacterales    Imaging:  CT A&P Wo contrast (3/29/2025):   No evidence of bowel obstruction. Right perinephric fat stranding and edematous right kidney concerning for pyelonephritis.     I have personally reviewed and interpreted these images.      Assessment:   59 y.o. male with neuropathy and alcohol abuse who initially presented with acute-onset, lower quadrant abdominal pain with associated loose black stools and nausea. He was found to be in septic shock with E. Coli and Enterobacterales bacteremia secondary to right-sided pyelonephritis. He was also found to have acute renal failure requiring hemodialysis. GI consulted for acute-onset, large volume melena with associated severe anemia requiring PRBC transfusion. No prior history of GI bleeding. Admits to daily alcohol use at home. Hgb 7.1 (baseline ~13) with thrombocytopenia. Suspect that his GI blood loss may be related to stress ulceration in the stomach/proximal duodenum in the setting of his critical illness vs. Variceal bleeding vs. Dieulafoy lesion. His GI blood loss is likely worsened by uremic platelet dysfunction.     Plan:   - Transfuse PRBCs for hemoglobin <7.   - Give IV pantoprazole 80 mg once then start pantoprazole infusions.   - Give octreotide bolus followed by octreotide infusion.   - Continue antibiotics for management of bacteremia/sepsis related to pyelonephritis.   - Start thiamine/folate/multivitamin daily.   - Monitor for alcohol  withdrawal.   - NPO for urgent EGD.     Stan Elder MD  [facility]          [1]   Social History  Tobacco Use    Smoking status: Every Day     Current packs/day: 0.50     Types: Cigarettes   Substance Use Topics    Alcohol use: Yes     Alcohol/week: 28.0 standard drinks of alcohol     Types: 28 Shots of liquor per week     Comment: half a pint per day

## 2025-03-30 NOTE — PROGRESS NOTES
[facility] Hospital Medicine Progress Note        Chief Complaint: Inpatient Follow-up     HPI:   59-year-old male admitted to hospital medicine on 03/29/2025 secondary to lower abdominal pain and diarrhea for the last 3 days. Associated with nausea but no vomiting. Did report some melena starting about 3 days ago. No previous history of renal disease. No change in pain with eating. He does report decreased urine output. In the emergency room his vital signs are stable. Blood pressure is little soft but responsive to fluids. He was noted to have significant hyponatremia with a sodium of 130. Creatinine markedly elevated at 11.76. Also significant leukocytosis of 47.97. CT of the abdomen shows edematous appearance of the right kidney with fullness in the upper pole. Differential included mass, phlegmon, or focal pyelonephritis. Renal ultrasound showed findings concerning for pelvic calcitonin versus. He was started empirically on vancomycin and Zosyn. A consult has been placed to Urology. No urine specimen has yet been collected secondary to oliguria.     Interval Hx:  Patient resting comfortably in bed.  No family with him this morning.  Noted drop in his hemoglobin and also soft blood pressure.  Will transfuse a unit of blood today.  Also with significant iron-deficiency.  Urine cultures growing Gram-negative rods.  His white count is somewhat improved this morning.  Creatinine coming down as well but hyponatremic.    Objective/physical exam:    General appearance: Well-developed, well-nourished male in no apparent distress.  HENT: Atraumatic head. Moist mucous membranes of oral cavity.  Eyes: Normal extraocular movements.   Neck: Supple.   Lungs: Clear to auscultation bilaterally. No wheezing present.   Heart: Regular rate and rhythm. S1 and S2 present with no murmurs/gallop/rub. No pedal edema. No JVD present.   Abdomen: Soft, non-distended, non-tender. No rebound  tenderness/guarding. Bowel sounds are normal.   Extremities: No cyanosis, clubbing, or edema.  Skin: No Rash.   Neuro: Motor and sensory exams grossly intact. Good tone. Muscle strength 5/5 in all 4 extremities  Psych/mental status: Appropriate mood and affect. Responds appropriately to questions.     VITAL SIGNS: 24 HRS MIN & MAX LAST   Temp  Min: 97.6 °F (36.4 °C)  Max: 100.3 °F (37.9 °C) 99.2 °F (37.3 °C)   BP  Min: 99/58  Max: 132/81 (!) 99/58   Pulse  Min: 76  Max: 97  97   Resp  Min: 12  Max: 20 13   SpO2  Min: 91 %  Max: 98 % (!) 94 %       Recent Labs   Lab 03/29/25  0829 03/30/25  0346   WBC 47.97  47.97* 37.17  37.17*   RBC 2.94* 2.38*   HGB 8.7* 7.1*   HCT 23.3* 19.0*   MCV 79.3* 79.8*   MCH 29.6 29.8   MCHC 37.3* 37.4*   RDW 16.8 16.5   * 137   MPV  --  12.4*       Recent Labs   Lab 03/29/25  0829 03/30/25  0345   * 125*   K 4.3 3.9   CL 92* 93*   CO2 20* 15*   .9* 135.0*   CREATININE 11.76* 10.50*   CALCIUM 8.9 7.5*   MG 2.20 1.80   ALBUMIN 2.1*  --    ALKPHOS 98  --    ALT 14  --    AST 20  --    BILITOT 0.5  --           Microbiology Results (last 7 days)       Procedure Component Value Units Date/Time    Urine culture [[id number]]  (Abnormal) Collected: 03/29/25 1159    Order Status: Completed Specimen: Urine Updated: 03/30/25 0651     Urine Culture >/= 100,000 colonies/ml Gram-negative Rods    BCID2 Panel [[id number]]  (Abnormal) Collected: 03/29/25 1037    Order Status: Completed Specimen: Blood Updated: 03/29/25 8874     CTX-M (ESBL ) Not Detected     IMP (Cabapenemase ) Not Detected     KPC resistance gene (Carbapenemase ) Not Detected     mcr-1 Not Detected     mecA ID N/A     Comment: Note: Antimicrobial resistance can occur via multiple mechanisms. A Not Detected result for antimicrobial resistance gene(s) does not indicate antimicrobial susceptibility. Subculturing is required for species identification and susceptibility testing of   isolates.         mecA/C and MREJ (MRSA) gene N/A     NDM (Carbapenemase ) Not Detected     OXA-48-like (Carbapenemase ) Not Detected     [place]/B (VRE gene) N/A     VIM (Carbapenemase ) Not Detected     Enterococcus faecalis Not Detected     Enterococcus faecium Not Detected     Listeria monocytogenes Not Detected     Staphylococcus spp. Not Detected     Staphylococcus aureus Not Detected     Staphylococcus epidermidis Not Detected     Staphylococcus lugdunensis Not Detected     Streptococcus spp. Not Detected     Streptococcus agalactiae (Group B) Not Detected     Streptococcus pneumoniae Not Detected     Streptococcus pyogenes (Group A) Not Detected     Acinetobacter calcoaceticus/baumannii complex Not Detected     Bacteroides fragilis Not Detected     Enterobacterales See Species for ID     Enterobacter cloacae complex Not Detected     Escherichia coli Detected     Klebsiella aerogenes Not Detected     Klebsiella oxytoca Not Detected     Klebsiella pneumoniae group Not Detected     Proteus spp. Not Detected     Salmonella spp. Not Detected     Serratia marcescens Not Detected     Haemophilus influenzae Not Detected     Neisseria meningitidis Not Detected     Pseudomonas aeruginosa Not Detected     Stenotrophomonas maltophilia Not Detected     Candida albicans Not Detected     Candida auris Not Detected     Candida glabrata Not Detected     Candida krusei Not Detected     Candida parapsilosis Not Detected     Candida tropicalis Not Detected     Cryptococcus neoformans/gattii Not Detected    Narrative:      The [employer] BCID2 Panel is a multiplexed nucleic acid test intended for the use with GenomeQuest® 2.0 or GenomeQuest® Pentaho Systems for the simultaneous qualitative detection and identification of multiple bacterial and yeast nucleic acids and select genetic determinants associated with antimicrobial resistance.  The [employer] BCID2 Panel test is performed directly on blood culture samples  identified as positive by a continuous monitoring blood culture system.  Results are intended to be interpreted in conjunction with Gram stain results.    Blood culture x two cultures. Draw prior to antibiotics. [[id number]]  (Abnormal) Collected: 03/29/25 1055    Order Status: Completed Specimen: Blood Updated: 03/29/25 2204     GRAM STAIN Gram Negative Rods      Seen in gram stain of broth only      2 of 2 bottles positive    Blood culture x two cultures. Draw prior to antibiotics. [[id number]]  (Abnormal) Collected: 03/29/25 1037    Order Status: Completed Specimen: Blood Updated: 03/29/25 2203     GRAM STAIN Gram Negative Rods      Seen in gram stain of broth only      2 of 2 bottles positive             Radiology:  US Retroperitoneal Limited  Narrative: EXAMINATION:  US RETROPERITONEAL LIMITED    CLINICAL HISTORY:  Acute kidney failure, unspecified    TECHNIQUE:  Multiple sonographic images the kidneys were obtained by department sonographer.    COMPARISON:  None    FINDINGS:  Right kidney measures 10.9 cm in length.  Left kidney measures 10.2 cm length.  No evidence for hydronephrosis, however, pelvicaliectasis is identified.  The parenchyma demonstrates increased echogenicity with cortical thinning.    Bladder is distended and normal.  Impression: Findings concerning for pelvicaliectasis with changes of medical renal disease.  No overt evidence for obstructive uropathy.    Electronically signed by: Jakob Horan MD  Date:    03/29/2025  Time:    12:20  X-Ray Chest AP Portable  Narrative: EXAMINATION:  XR CHEST AP PORTABLE    CLINICAL HISTORY:  Sepsis; abdominal pain for 3 days.  Nausea and diarrhea.    TECHNIQUE:  One view    COMPARISON:  None available.    FINDINGS:  Cardiopericardial silhouette is within normal limits. Lungs are without dense focal or segmental consolidation, congestive process, pleural effusions or pneumothorax.  Multiple old fractures of the left ribs.  Impression: No acute cardiopulmonary  process identified.    Electronically signed by: Wilmer Hooper  Date:    03/29/2025  Time:    10:10  CT Chest Abdomen Pelvis Without Contrast (XPD)  Narrative: EXAMINATION:  CT CHEST ABDOMEN PELVIS WITHOUT CONTRAST(XPD)    CLINICAL HISTORY:  Sepsis;  lower abdominal pain x3 days.  Nausea.  Diarrhea.    TECHNIQUE:  Helical acquisition through the chest, abdomen and pelvis without  IV administration of contrast. Axial, sagittal and coronal reformats interpreted.    Automated tube current modulation, weight-based exposure dosing, and/or iterative reconstruction technique utilized to reach lowest reasonably achievable exposure rate.    DLP: 329 mGy*cm    COMPARISON:  No relevant priors available for comparison at time of this dictation    FINDINGS:  BASE OF NECK: No significant abnormality.    HEART: Normal size.    THORACIC VASCULATURE: Thoracic aorta is unremarkable.    KIARA/MEDIASTINUM: No enlarged lymph nodes by size criteria. Evaluation of hilar lymphadenopathy is limited without contrast.    AIRWAYS: Patent.    LUNGS/PLEURA: Mild dependent atelectasis.    THORACIC SOFT TISSUES: Unremarkable.    LIVER: Normal attenuation. No appreciable focal hepatic lesion.    BILIARY: Gallbladder decompressed.    PANCREAS: No inflammatory change.    SPLEEN: Normal in size    ADRENALS: No mass.    KIDNEYS/URETERS: Edematous appearance of the right kidney.  Fullness at the upper pole right kidney poorly characterized.  Right perinephric stranding.  No martha hydronephrosis.    GI TRACT/MESENTERY: Evaluation of the bowel is limited without contrast.  No evidence of bowel obstruction or inflammation. The appendix is normal. Colonic diverticulosis without acute inflammatory change.    PERITONEUM: No free fluid.No free air.    LYMPH NODES: No enlarged lymph nodes by size criteria.    ABDOMINOPELVIC VASCULATURE: Aortoiliac atherosclerosis.    BLADDER: Normal appearance given degree of distention.    REPRODUCTIVE ORGANS: Normal as  visualized.    ABDOMINAL WALL: Unremarkable.    BONES: No acute osseous abnormality.  Old, healed left rib deformities.  Impression: Abnormal edematous appearance of the right kidney.  There is fullness at the upper pole right kidney which is poorly characterized.  Appearance could be related to mass, phlegmon, focal pyelonephritis in the acute setting.  There is perinephric stranding on the right.  No martha hydronephrosis.  Further characterization limited without contrast.    Electronically signed by: Haylee Correia  Date:    03/29/2025  Time:    09:44        Medications:  Scheduled Meds:   gabapentin  600 mg Oral TID    piperacillin-tazobactam (Zosyn) IV (PEDS and ADULTS) (extended infusion is not appropriate)  4.5 g Intravenous Q12H     Continuous Infusions:   0.9% NaCl   Intravenous Continuous 125 mL/hr at 03/29/25 1530 New Bag at 03/29/25 1530     PRN Meds:.  Current Facility-Administered Medications:     0.9%  NaCl infusion (for blood administration), , Intravenous, Q24H PRN    acetaminophen, 650 mg, Oral, Q8H PRN    melatonin, 6 mg, Oral, Nightly PRN    morphine, 2 mg, Intravenous, Q4H PRN    ondansetron, 4 mg, Intravenous, Q8H PRN    sodium chloride 0.9%, 10 mL, Intravenous, PRN    Nutrition:  Nutrition consulted. Most recent weight and BMI monitored-     Measurements:  Wt Readings from Last 1 Encounters:   03/29/25 74.8 kg (165 lb)   Body mass index is 25.84 kg/m².    Patient has been screened and assessed by RD.    Malnutrition Type:  Context:    Level:      Malnutrition Characteristic Summary:       Interventions/Recommendations (treatment strategy):           Assessment/Plan:   Severe sepsis  Suspected pyelonephritis  Acute renal failure  Metabolic acidosis  Hyponatremia   Anemia of unknown etiology    Continue with empiric Zosyn for now.  Urine culture with Gram-negative rods.  Will follow up speciation and sensitivities.  PCR with E coli and Enterobacter.  Not much change in his creatinine with  fluid resuscitation.  Sodium levels dropped as well.  Will go ahead and consult renal.  Possibly some ATN in the setting of severe sepsis.    Transfused 1 unit of blood this morning.  Will start on IV iron as well    Critical care diagnosis: sepsis, iv antibiotics  Critical care interventions: hands on evaluation, review of labs/radiographs/records and discussions with family  Critical care time spent: >32 minutes      Trevor Winchester MD   03/30/2025     All diagnosis and differential diagnosis have been reviewed; assessment and plan has been documented; I have personally reviewed the labs and test results that are presently available; I have reviewed the patients medication list; I have reviewed the consulting providers response and recommendations. I have reviewed or attempted to review medical records based upon their availability    All of the patient's questions have been  addressed and answered. Patient's is agreeable to the above stated plan. I will continue to monitor closely and make adjustments to medical management as needed.  _____________________________________________________________________

## 2025-03-30 NOTE — SIGNIFICANT EVENT
"Approximately 1:00 pm, Dr. Billy arrived in Rm. 971 to place arambula cath. Dr. Billy asked for assistance so I went in room to help. He and I both touched patient and noticed that he was really "hot". This was after I had given him Tylenol @ 10:58 for a Temp 99.4. After placing the arambula got VS machine to check patient's temp. Temp 104.0. I then checked the rest of his VS:    T-104.1  HR-120  BP-94/55  RR-8     Asked Charge Nurse to call RRT.     RRT arrived. Patient transferred to ICU. See RRT Nurse Note.  "

## 2025-03-30 NOTE — PROGRESS NOTES
Pharmacist Renal Dose Adjustment Note    Tao Bee is a 59 y.o. male being treated with the medication famotidine    Patient Data:    Vital Signs (Most Recent):  Temp: (!) 103.1 °F (39.5 °C) (03/30/25 1321)  Pulse: (!) 119 (03/30/25 1321)  Resp: 13 (03/29/25 1233)  BP: 99/60 (03/30/25 1317)  SpO2: 97 % (03/30/25 1321) Vital Signs (72h Range):  Temp:  [97.6 °F (36.4 °C)-104.1 °F (40.1 °C)]   Pulse:  []   Resp:  [12-20]   BP: ()/(55-89)   SpO2:  [91 %-98 %]      Recent Labs   Lab 03/29/25  0829 03/30/25  0345 03/30/25  1012   CREATININE 11.76* 10.50* 10.45*     Serum creatinine: 10.45 mg/dL (H) 03/30/25 1012  Estimated creatinine clearance: 7.1 mL/min (A)    Medication:famotidine dose: 20 mg frequency q12h will be changed to medication:famotidine dose:20 mg frequency:q24h    Pharmacist's Name: Yumiko Nassar

## 2025-03-30 NOTE — CONSULTS
Acute Care Surgery   Consult    Patient Name: Tao Bee  YOB: 1965  Date: 03/30/2025 11:08 AM  Date of Admission: 3/29/2025  HD#1  POD#* No surgery found *    PRESENTING HISTORY   Chief Complaint/Reason for Admission: Acute kidney failure  History source(s): patient and chart   History of Present Illness:  59-year-old male with a history of neuropathy who would was admitted on 03/29/2025 after onset of lower abdominal pain, diarrhea for 3 days.  Initial labs at that time showed significant kidney injury with a creatinine of 11.76, significant leukocytosis of 47, hyponatremia of 130.  CT of the abdomen and pelvis showed an edematous appearing right kidney with fullness of the upper pole.  We will cultures were drawn at that time with Gram-negative rods, urinalysis with Gram-negative rods.  Nephrology was consulted at that time and due to his worsening, persistent acidosis now recommend hemodialysis.  General surgery consulted for hemodialysis catheter placement.    Patient has never been on dialysis, never has a catheter in the neck.     Review of Systems:  12 point ROS negative except as stated in HPI    PAST HISTORY:   Past medical history:  Past Medical History:   Diagnosis Date    Neuropathy        Past surgical history:  History reviewed. No pertinent surgical history.    Family history:  No family history on file.    Social history:  Social History     Socioeconomic History    Marital status: Single   Tobacco Use    Smoking status: Every Day     Current packs/day: 0.50     Types: Cigarettes   Substance and Sexual Activity    Alcohol use: Yes     Alcohol/week: 28.0 standard drinks of alcohol     Types: 28 Shots of liquor per week     Comment: half a pint per day     Social Drivers of Health     Financial Resource Strain: Low Risk  (3/29/2025)    Overall Financial Resource Strain (CARDIA)     Difficulty of Paying Living Expenses: Not hard at all   Food Insecurity: No Food Insecurity  (3/29/2025)    Hunger Vital Sign     Worried About Running Out of Food in the Last Year: Never true     Ran Out of Food in the Last Year: Never true   Transportation Needs: No Transportation Needs (3/29/2025)    PRAPARE - Transportation     Lack of Transportation (Medical): No     Lack of Transportation (Non-Medical): No   Stress: No Stress Concern Present (3/29/2025)    [employer] - Occupational Stress Questionnaire     Feeling of Stress : Not at all   Housing Stability: Low Risk  (3/29/2025)    Housing Stability Vital Sign     Unable to Pay for Housing in the Last Year: No     Number of Times Moved in the Last Year: 0     Homeless in the Last Year: No     Tobacco Use History[1]   Social History     Substance and Sexual Activity   Alcohol Use Yes    Alcohol/week: 28.0 standard drinks of alcohol    Types: 28 Shots of liquor per week    Comment: half a pint per day        MEDICATIONS & ALLERGIES:     No current facility-administered medications on file prior to encounter.     Current Outpatient Medications on File Prior to Encounter   Medication Sig    gabapentin (NEURONTIN) 600 MG tablet Take 600 mg by mouth 3 (three) times daily.     Allergies:   Review of patient's allergies indicates:   Allergen Reactions    Opioids - morphine analogues Hallucinations     Scheduled Meds:   ciprofloxacin  200 mg Intravenous Q12H    mupirocin   Nasal BID    piperacillin-tazobactam (Zosyn) IV (PEDS and ADULTS) (extended infusion is not appropriate)  4.5 g Intravenous Q12H     Continuous Infusions:   sodium bicarbonate 150 mEq in D5W 1,000 mL infusion   Intravenous Continuous 125 mL/hr at 03/30/25 0938 New Bag at 03/30/25 0938     PRN Meds:  Current Facility-Administered Medications:     0.9%  NaCl infusion (for blood administration), , Intravenous, Q24H PRN    0.9%  NaCl infusion (for blood administration), , Intravenous, Q24H PRN    acetaminophen, 650 mg, Oral, Q8H PRN    albumin human 25%, 25 g,  "Intravenous, Q20 Min PRN    melatonin, 6 mg, Oral, Nightly PRN    morphine, 2 mg, Intravenous, Q4H PRN    ondansetron, 4 mg, Intravenous, Q8H PRN    sodium chloride 0.9%, 10 mL, Intravenous, PRN    OBJECTIVE:   Vital Signs:  VITAL SIGNS: 24 HR MIN & MAX LAST   Temp  Min: 98 °F (36.7 °C)  Max: 100.3 °F (37.9 °C)  99.4 °F (37.4 °C)   BP  Min: 99/58  Max: 183/89  (!) 183/89    Pulse  Min: 76  Max: 115  (!) 115    Resp  Min: 13  Max: 14  13    SpO2  Min: 91 %  Max: 98 %  95 %      HT: 5' 7" (170.2 cm)  WT: 74.8 kg (165 lb)  BMI: 25.8     Intake/output:  Intake/Output - Last 3 Shifts         03/28 0700 03/29 0659 03/29 0700 03/30 0659 03/30 0700 03/31 0659           Urine Occurrence  0 x     Stool Occurrence  0 x           No intake or output data in the 24 hours ending 03/30/25 1108      Physical Exam:  General: ill appearing  HEENT: Normocephalic, PERRL, on supplemental O2  CV: RR  Resp: NWOB  GI:  Abdomen soft, non-tender, non-distended, no guarding, no rebound  :  Deferred  MSK: No muscle atrophy, cyanosis, peripheral edema, moving all extremities spontaneously  Skin/wounds:  No rashes, ulcers, erythema  Neuro:  CNII-XII grossly intact, alert and oriented to person, place, and time    Labs:  Troponin:  Recent Labs     03/29/25  0829   TROPONINI 0.012     CBC:  Recent Labs     03/29/25  0829 03/30/25  0346   WBC 47.97  47.97* 37.17  37.17*   RBC 2.94* 2.38*   HGB 8.7* 7.1*   HCT 23.3* 19.0*   * 137   MCV 79.3* 79.8*   MCH 29.6 29.8   MCHC 37.3* 37.4*     CMP:  Recent Labs     03/29/25  0829 03/30/25  0345 03/30/25  1012   CALCIUM 8.9   < > 7.9*   ALBUMIN 2.1*  --  1.9*   *   < > 127*   K 4.3   < > 4.0   CO2 20*   < > 16*   CL 92*   < > 94*   .9*   < > 129.2*   CREATININE 11.76*   < > 10.45*   ALKPHOS 98  --  85   ALT 14  --  10   AST 20  --  20   BILITOT 0.5  --  0.5    < > = values in this interval not displayed.     Lactic Acid:  Recent Labs     03/29/25  1037   LACTATE 1.5     ETOH:  No " "results for input(s): "ETHANOL" in the last 72 hours.   Urine Drug Screen:  No results for input(s): "COCAINE", "OPIATE", "BARBITURATE", "AMPHETAMINE", "FENTANYL", "CANNABINOIDS", "MDMA" in the last 72 hours.    Invalid input(s): "BENZODIAZEPINE", "PHENCYCLIDINE"   ABG:  No results for input(s): "PH", "PO2", "PCO2", "HCO3", "BE" in the last 168 hours.   I have reviewed all pertinent lab results within the past 24 hours.    Diagnostic Results:  Imaging Results              US Retroperitoneal Limited (Final result)  Result time 03/29/25 12:20:31      Final result by Jakob Horan MD (03/29/25 12:20:31)                   Impression:      Findings concerning for pelvicaliectasis with changes of medical renal disease.  No overt evidence for obstructive uropathy.      Electronically signed by: Jakob Horan MD  Date:    03/29/2025  Time:    12:20               Narrative:    EXAMINATION:  US RETROPERITONEAL LIMITED    CLINICAL HISTORY:  Acute kidney failure, unspecified    TECHNIQUE:  Multiple sonographic images the kidneys were obtained by department sonographer.    COMPARISON:  None    FINDINGS:  Right kidney measures 10.9 cm in length.  Left kidney measures 10.2 cm length.  No evidence for hydronephrosis, however, pelvicaliectasis is identified.  The parenchyma demonstrates increased echogenicity with cortical thinning.    Bladder is distended and normal.                                       CT Chest Abdomen Pelvis Without Contrast (XPD) (Final result)  Result time 03/29/25 09:44:25      Final result by Haylee Correia MD (03/29/25 09:44:25)                   Impression:      Abnormal edematous appearance of the right kidney.  There is fullness at the upper pole right kidney which is poorly characterized.  Appearance could be related to mass, phlegmon, focal pyelonephritis in the acute setting.  There is perinephric stranding on the right.  No martha hydronephrosis.  Further characterization limited without " contrast.      Electronically signed by: Haylee Correia  Date:    03/29/2025  Time:    09:44               Narrative:    EXAMINATION:  CT CHEST ABDOMEN PELVIS WITHOUT CONTRAST(XPD)    CLINICAL HISTORY:  Sepsis;  lower abdominal pain x3 days.  Nausea.  Diarrhea.    TECHNIQUE:  Helical acquisition through the chest, abdomen and pelvis without  IV administration of contrast. Axial, sagittal and coronal reformats interpreted.    Automated tube current modulation, weight-based exposure dosing, and/or iterative reconstruction technique utilized to reach lowest reasonably achievable exposure rate.    DLP: 329 mGy*cm    COMPARISON:  No relevant priors available for comparison at time of this dictation    FINDINGS:  BASE OF NECK: No significant abnormality.    HEART: Normal size.    THORACIC VASCULATURE: Thoracic aorta is unremarkable.    KIARA/MEDIASTINUM: No enlarged lymph nodes by size criteria. Evaluation of hilar lymphadenopathy is limited without contrast.    AIRWAYS: Patent.    LUNGS/PLEURA: Mild dependent atelectasis.    THORACIC SOFT TISSUES: Unremarkable.    LIVER: Normal attenuation. No appreciable focal hepatic lesion.    BILIARY: Gallbladder decompressed.    PANCREAS: No inflammatory change.    SPLEEN: Normal in size    ADRENALS: No mass.    KIDNEYS/URETERS: Edematous appearance of the right kidney.  Fullness at the upper pole right kidney poorly characterized.  Right perinephric stranding.  No martha hydronephrosis.    GI TRACT/MESENTERY: Evaluation of the bowel is limited without contrast.  No evidence of bowel obstruction or inflammation. The appendix is normal. Colonic diverticulosis without acute inflammatory change.    PERITONEUM: No free fluid.No free air.    LYMPH NODES: No enlarged lymph nodes by size criteria.    ABDOMINOPELVIC VASCULATURE: Aortoiliac atherosclerosis.    BLADDER: Normal appearance given degree of distention.    REPRODUCTIVE ORGANS: Normal as visualized.    ABDOMINAL WALL:  Unremarkable.    BONES: No acute osseous abnormality.  Old, healed left rib deformities.                                       X-Ray Chest AP Portable (Final result)  Result time 03/29/25 10:10:10      Final result by Wilmer Hooper MD (03/29/25 10:10:10)                   Impression:      No acute cardiopulmonary process identified.      Electronically signed by: Wilmer Hooper  Date:    03/29/2025  Time:    10:10               Narrative:    EXAMINATION:  XR CHEST AP PORTABLE    CLINICAL HISTORY:  Sepsis; abdominal pain for 3 days.  Nausea and diarrhea.    TECHNIQUE:  One view    COMPARISON:  None available.    FINDINGS:  Cardiopericardial silhouette is within normal limits. Lungs are without dense focal or segmental consolidation, congestive process, pleural effusions or pneumothorax.  Multiple old fractures of the left ribs.                                     I have reviewed all pertinent imaging results/findings within the past 24 hours.    ASSESSMENT & PLAN:    59M admitted with acute kidney failure, bacteremia, urinary tract infection in need of urgent dialysis.     -Will place HD catheter at bedside  -RBA discussed with patient. Consents obtained   -Bedside US shows patent R IJ       Stephanie Harvey MD  [employer] General Surgery PGY4                [1]   Social History  Tobacco Use   Smoking Status Every Day    Current packs/day: 0.50    Types: Cigarettes   Smokeless Tobacco Not on file

## 2025-03-31 LAB
ABS NEUT (OLG): 33.53 X10(3)/MCL (ref 2.1–9.2)
ALBUMIN SERPL-MCNC: 1.6 G/DL (ref 3.5–5)
ALBUMIN/GLOB SERPL: 0.5 RATIO (ref 1.1–2)
ALP SERPL-CCNC: 73 UNIT/L (ref 40–150)
ALT SERPL-CCNC: 10 UNIT/L (ref 0–55)
ANION GAP SERPL CALC-SCNC: 12 MEQ/L
ANISOCYTOSIS BLD QL SMEAR: ABNORMAL
AST SERPL-CCNC: 23 UNIT/L (ref 11–45)
BACTERIA BLD CULT: ABNORMAL
BACTERIA BLD CULT: ABNORMAL
BACTERIA UR CULT: ABNORMAL
BILIRUB SERPL-MCNC: 0.5 MG/DL
BUN SERPL-MCNC: 60.4 MG/DL (ref 8.4–25.7)
CALCIUM SERPL-MCNC: 7.5 MG/DL (ref 8.4–10.2)
CHLORIDE SERPL-SCNC: 92 MMOL/L (ref 98–107)
CO2 SERPL-SCNC: 30 MMOL/L (ref 22–29)
CREAT SERPL-MCNC: 5.55 MG/DL (ref 0.72–1.25)
CREAT/UREA NIT SERPL: 11
ERYTHROCYTE [DISTWIDTH] IN BLOOD BY AUTOMATED COUNT: 15.6 % (ref 11.5–17)
FOLATE SERPL-MCNC: 4.8 NG/ML (ref 7–31.4)
GFR SERPLBLD CREATININE-BSD FMLA CKD-EPI: 11 ML/MIN/1.73/M2
GLOBULIN SER-MCNC: 3.2 GM/DL (ref 2.4–3.5)
GLUCOSE SERPL-MCNC: 115 MG/DL (ref 74–100)
GRAM STN SPEC: ABNORMAL
HCT VFR BLD AUTO: 26.4 % (ref 42–52)
HGB BLD-MCNC: 9.6 G/DL (ref 14–18)
INSTRUMENT WBC (OLG): 39.45 X10(3)/MCL
LYMPHOCYTES NFR BLD MANUAL: 2.37 X10(3)/MCL (ref 0.6–4.6)
LYMPHOCYTES NFR BLD MANUAL: 6 %
MACROCYTES BLD QL SMEAR: ABNORMAL
MAGNESIUM SERPL-MCNC: 1.7 MG/DL (ref 1.6–2.6)
MCH RBC QN AUTO: 29.4 PG (ref 27–31)
MCHC RBC AUTO-ENTMCNC: 36.4 G/DL (ref 33–36)
MCV RBC AUTO: 81 FL (ref 80–94)
METAMYELOCYTES NFR BLD MANUAL: 3 %
MONOCYTES NFR BLD MANUAL: 2.37 X10(3)/MCL (ref 0.1–1.3)
MONOCYTES NFR BLD MANUAL: 6 %
MYELOCYTES NFR BLD MANUAL: 1 %
NEUTROPHILS NFR BLD MANUAL: 85 %
PLATELET # BLD AUTO: 164 X10(3)/MCL (ref 130–400)
PLATELET # BLD EST: NORMAL 10*3/UL
PMV BLD AUTO: 12.8 FL (ref 7.4–10.4)
POIKILOCYTOSIS BLD QL SMEAR: ABNORMAL
POTASSIUM SERPL-SCNC: 3.3 MMOL/L (ref 3.5–5.1)
PROT SERPL-MCNC: 4.8 GM/DL (ref 6.4–8.3)
RBC # BLD AUTO: 3.26 X10(6)/MCL (ref 4.7–6.1)
RBC MORPH BLD: ABNORMAL
SODIUM SERPL-SCNC: 134 MMOL/L (ref 136–145)
TARGETS BLD QL SMEAR: ABNORMAL
WBC # BLD AUTO: 39.45 X10(3)/MCL (ref 4.5–11.5)

## 2025-03-31 PROCEDURE — 11000001 HC ACUTE MED/SURG PRIVATE ROOM

## 2025-03-31 PROCEDURE — 63600175 PHARM REV CODE 636 W HCPCS: Performed by: INTERNAL MEDICINE

## 2025-03-31 PROCEDURE — 80053 COMPREHEN METABOLIC PANEL: CPT | Performed by: INTERNAL MEDICINE

## 2025-03-31 PROCEDURE — 85025 COMPLETE CBC W/AUTO DIFF WBC: CPT | Performed by: HOSPITALIST

## 2025-03-31 PROCEDURE — 36415 COLL VENOUS BLD VENIPUNCTURE: CPT | Performed by: INTERNAL MEDICINE

## 2025-03-31 PROCEDURE — 99233 SBSQ HOSP IP/OBS HIGH 50: CPT | Mod: ,,, | Performed by: INTERNAL MEDICINE

## 2025-03-31 PROCEDURE — 82746 ASSAY OF FOLIC ACID SERUM: CPT | Performed by: INTERNAL MEDICINE

## 2025-03-31 PROCEDURE — 25000003 PHARM REV CODE 250: Performed by: INTERNAL MEDICINE

## 2025-03-31 PROCEDURE — 94761 N-INVAS EAR/PLS OXIMETRY MLT: CPT

## 2025-03-31 PROCEDURE — 94799 UNLISTED PULMONARY SVC/PX: CPT

## 2025-03-31 PROCEDURE — 83735 ASSAY OF MAGNESIUM: CPT | Performed by: HOSPITALIST

## 2025-03-31 PROCEDURE — 25000003 PHARM REV CODE 250

## 2025-03-31 RX ORDER — POTASSIUM CHLORIDE 20 MEQ/1
40 TABLET, EXTENDED RELEASE ORAL ONCE
Status: COMPLETED | OUTPATIENT
Start: 2025-03-31 | End: 2025-03-31

## 2025-03-31 RX ORDER — POTASSIUM CHLORIDE 14.9 MG/ML
20 INJECTION INTRAVENOUS ONCE
Status: DISCONTINUED | OUTPATIENT
Start: 2025-03-31 | End: 2025-03-31

## 2025-03-31 RX ADMIN — MUPIROCIN: 20 OINTMENT TOPICAL at 09:03

## 2025-03-31 RX ADMIN — CIPROFLOXACIN 200 MG: 2 INJECTION, SOLUTION INTRAVENOUS at 09:03

## 2025-03-31 RX ADMIN — SODIUM BICARBONATE: 84 INJECTION, SOLUTION INTRAVENOUS at 03:03

## 2025-03-31 RX ADMIN — PANTOPRAZOLE SODIUM 8 MG/HR: 40 INJECTION, POWDER, FOR SOLUTION INTRAVENOUS at 07:03

## 2025-03-31 RX ADMIN — POTASSIUM CHLORIDE 40 MEQ: 1500 TABLET, EXTENDED RELEASE ORAL at 05:03

## 2025-03-31 RX ADMIN — CIPROFLOXACIN 200 MG: 2 INJECTION, SOLUTION INTRAVENOUS at 10:03

## 2025-03-31 RX ADMIN — PANTOPRAZOLE SODIUM 8 MG/HR: 40 INJECTION, POWDER, FOR SOLUTION INTRAVENOUS at 02:03

## 2025-03-31 NOTE — TRANSFER OF CARE
"Anesthesia Transfer of Care Note    Patient: Tao Bee    Procedure(s) Performed: Procedure(s) (LRB):  EGD (ESOPHAGOGASTRODUODENOSCOPY) (N/A)    Patient location: ICU    Anesthesia Type: MAC    Transport from OR: Transported from OR on room air with adequate spontaneous ventilation    Post pain: adequate analgesia    Post assessment: no apparent anesthetic complications    Post vital signs: stable    Level of consciousness: sedated and responds to stimulation    Nausea/Vomiting: no nausea/vomiting    Complications: none    Transfer of care protocol was followed      Last vitals: Visit Vitals  BP (!) 92/56   Pulse 77   Temp 36 °C (96.8 °F)   Resp 15   Ht 5' 7.01" (1.702 m)   Wt 74.8 kg (164 lb 15.9 oz)   SpO2 100%   BMI 25.84 kg/m²     "

## 2025-03-31 NOTE — PROGRESS NOTES
Inpatient Nutrition Assessment    Admit Date: 3/29/2025   Total duration of encounter: 2 days   Patient Age: 59 y.o.    Nutrition Recommendation/Prescription     Continue Diet Renal On Dialysis as tolerated.  Add Novasource Renal (provides 475 kcal, 22 g protein per serving) TID.    Communication of Recommendations: reviewed with nurse and reviewed with patient    Nutrition Assessment     Malnutrition Assessment/Nutrition-Focused Physical Exam       Malnutrition Level: other (see comments) (Does not meet criteria) (03/31/25 1029)     Weight Loss (Malnutrition): other (see comments) (Does not meet criteria) (03/31/25 1029)              Muscle Mass (Malnutrition): mild depletion (03/31/25 1029)     Clavicle Bone Region (Muscle Loss): mild depletion                    Fluid Accumulation (Malnutrition): other (see comments) (Not present) (03/31/25 1029)        A minimum of two characteristics is recommended for diagnosis of either severe or non-severe malnutrition.    Chart Review    Reason Seen: malnutrition screening tool (MST)    Malnutrition Screening Tool Results   Have you recently lost weight without trying?: Yes: 2-13 lbs  Have you been eating poorly because of a decreased appetite?: Yes   MST Score: 2   Diagnosis:  Septic shock secondary to pyelonephritis  Acute renal failure, status post temporary dialysis catheter placement and hemodialysis  Symptomatic anemia secondary to acute blood loss from NSAID induced ulcers    Relevant Medical History: neuropathy and alcohol abuse     Scheduled Medications:  ciprofloxacin, 200 mg, Q12H  heparin (porcine), 3,000 Units, Once  mupirocin, , BID    Continuous Infusions:  NORepinephrine bitartrate-D5W  pantoprazole (PROTONIX) IV infusion, Last Rate: Stopped (03/30/25 1919)    PRN Medications:  0.9%  NaCl infusion (for blood administration), , Q24H PRN  acetaminophen, 650 mg, Q8H PRN  albumin human 25%, 25 g, Q20 Min PRN  calcium gluconate IVPB, 1 g, PRN  calcium gluconate  IVPB, 2 g, PRN  calcium gluconate IVPB, 3 g, PRN  magnesium sulfate 2 g IVPB, 2 g, PRN  magnesium sulfate 2 g IVPB, 2 g, PRN  melatonin, 6 mg, Nightly PRN  morphine, 2 mg, Q4H PRN  ondansetron, 4 mg, Q8H PRN  potassium chloride, 40 mEq, PRN   And  potassium chloride, 60 mEq, PRN   And  potassium chloride, 80 mEq, PRN  sodium chloride 0.9%, 10 mL, PRN  sodium chloride 0.9%, 10 mL, PRN  sodium phosphate 15 mmol in D5W 250 mL IVPB, 15 mmol, PRN  sodium phosphate 20.1 mmol in D5W 250 mL IVPB, 20.1 mmol, PRN  sodium phosphate 30 mmol in D5W 250 mL IVPB, 30 mmol, PRN    Calorie Containing IV Medications: no significant kcals from medications at this time    Recent Labs   Lab 03/29/25  0829 03/30/25  0345 03/30/25  0346 03/30/25  1012 03/31/25  0209   * 125*  --  127* 134*   K 4.3 3.9  --  4.0 3.3*   CALCIUM 8.9 7.5*  --  7.9* 7.5*   MG 2.20 1.80  --   --  1.70   CL 92* 93*  --  94* 92*   CO2 20* 15*  --  16* 30*   .9* 135.0*  --  129.2* 60.4*   CREATININE 11.76* 10.50*  --  10.45* 5.55*   EGFRNORACEVR 5 5  --  5 11   GLUCOSE 123* 121*  --  131* 115*   BILITOT 0.5  --   --  0.5 0.5   ALKPHOS 98  --   --  85 73   ALT 14  --   --  10 10   AST 20  --   --  20 23   ALBUMIN 2.1*  --   --  1.9* 1.6*   LIPASE 194*  --   --   --   --    WBC 47.97  47.97*  --  37.17  37.17*  --  39.45  39.45*   HGB 8.7*  --  7.1*  --  9.6*   HCT 23.3*  --  19.0*  --  26.4*     Nutrition Orders:  Diet Renal On Dialysis  Dietary nutrition supplements TID; Novasource Renal - Vanilla    Appetite/Oral Intake: good/% of meals  Factors Affecting Nutritional Intake: none identified  Social Needs Impacting Access to Food: none identified  Food/Latter-day/Cultural Preferences: none reported  Food Allergies: no known food allergies  Last Bowel Movement: 03/31/25  Wound(s):  None documented     Comments    3/31/25: Noted wt loss and decreased appetite PTA per MST. Pt stated UBW, current wt does indicate wt loss. Pt unsure of time frame  "though. HD started. Pt with 100% po intake of meal this AM. Willing to have ONS sent.     Anthropometrics    Height: 5' 7.01" (170.2 cm), Height Method: Measured  Last Weight: 74.8 kg (164 lb 15.9 oz) (25 1354), Weight Method: Standard Scale  BMI (Calculated): 25.8  BMI Classification: overweight (BMI 25-29.9)        Ideal Body Weight (IBW), Male: 148.06 lb     % Ideal Body Weight, Male (lb): 111.43 %                 Usual Body Weight (UBW), k.27 kg (unknown time frame per pt)  % Usual Body Weight: 97.06     Usual Weight Provided By: patient    Wt Readings from Last 5 Encounters:   25 74.8 kg (164 lb 15.9 oz)   23 76.7 kg (169 lb)     Weight Change(s) Since Admission:   Wt Readings from Last 1 Encounters:   25 1354 74.8 kg (164 lb 15.9 oz)   25 1353 74.8 kg (165 lb)   25 0835 74.8 kg (165 lb)   25 0808 74.8 kg (165 lb)   Admit Weight: 74.8 kg (165 lb) (25 0808), Weight Method: Stated    Estimated Needs    Weight Used For Calorie Calculations: 74.8 kg (164 lb 14.5 oz)  Energy Calorie Requirements (kcal): 2130kcal (1.4 stress factor)  Energy Need Method: Randolph- Jeor  Weight Used For Protein Calculations: 74.8 kg (164 lb 14.5 oz)  Protein Requirements: 90-105gm (1.2-1.4g/kg)  Fluid Requirements (mL): 1000ml + urinary output  CHO Requirement: 240gm (45% est kcal needs)     Enteral Nutrition     Patient not receiving enteral nutrition at this time.    Parenteral Nutrition     Patient not receiving parenteral nutrition support at this time.    Evaluation of Received Nutrient Intake    Calories: meeting estimated needs  Protein: meeting estimated needs    Patient Education     Not applicable.    Nutrition Diagnosis     PES: Unintended weight loss related to inability to consume sufficient nutrients as evidenced by decreased appetite PTA. (new)     PES:            Nutrition Interventions     Intervention(s): general/healthful diet, commercial beverage, and " collaboration with other providers  Intervention(s):      Goal: Meet greater than 80% of nutritional needs by follow-up. (new)  Goal: Consume % of oral supplements by follow-up. (new)    Nutrition Goals & Monitoring     Dietitian will monitor: food and beverage intake and energy intake  Discharge planning: resume home regimen  Nutrition Risk/Follow-Up: moderate (follow-up in 3-5 days)   Please consult if re-assessment needed sooner.

## 2025-03-31 NOTE — ANESTHESIA POSTPROCEDURE EVALUATION
Anesthesia Post Evaluation    Patient: Tao Bee    Procedure(s) Performed: Procedure(s) (LRB):  EGD (ESOPHAGOGASTRODUODENOSCOPY) (N/A)    Final Anesthesia Type: general      Patient location during evaluation: ICU  Patient participation: Yes- Able to Participate  Post-procedure mental status: @ basline.  Pain management: adequate  AQI66 DAINA Mitigation: See pacu RN note for any measures applied.  PONV status: See postop meds for drugs used to control n/v if any.  Anesthetic complications: no      Cardiovascular status: stable  Respiratory status: @ baseline.  Hydration status: euvolemic                Vitals Value Taken Time   /56 03/30/25 19:46   Temp 96 03/31/25 03:29   Pulse 71 03/30/25 19:46   Resp 13 03/30/25 19:46   SpO2 100 % 03/30/25 19:46   Vitals shown include unfiled device data.      Event Time   Out of Recovery 03/30/2025 19:47:53         Pain/Ciara Score: Pain Rating Prior to Med Admin: 0 (3/30/2025  1:59 PM)  Pain Rating Post Med Admin: 0 (3/30/2025  2:29 PM)

## 2025-03-31 NOTE — PROGRESS NOTES
[facility] [place] [facility] Inpatient [place] Note      HPI:     Patient Name: Tao Bee  Admission Date: 3/29/2025  Hospital Length of Stay: 2 days  Code Status: Full Code   Attending Physician: Beau Alvarez Jr., MD,*   Primary Care Physician: Penny Garsia FNP  Principal Problem:<principal problem not specified>      Today patient seen and examined  Labs, recent events, imaging and medications reviewed for this hospital stay  Emergency dialysis was done yesterday and he tolerated it well  Currently hemodynamics stabilizing fever pattern of dramatically  Blood cultures and urine cultures growing Gram-negative rods    Review of Systems:   Constitutional:  Feels much better  Skin: Denies wounds, no rashes, no itching, no new skin lesions  HEENT: Denies acute change in hearing or vision, tinnitus, or dysphagia  Respiratory:  Denies cough, shortness of breath, or wheezing  Cardiovascular: Denies chest pain, palpitations, or swelling  Gastrointestional: Denies abdominal pain, nausea, vomiting, diarrhea, or constipation  Genitourinary:  Catheter  Musculoskeletal: Denies myalgias, back pain, flank pain, new decreased ROM or acute focal weakness  Neurological: Denies headaches, seizures, dizziness, paresthesias or asterixis  Hematological: Denies unusual bruising or bleeding        Medications:   Scheduled Meds:   ciprofloxacin  200 mg Intravenous Q12H    heparin (porcine)  3,000 Units Intravenous Once    mupirocin   Nasal BID     Continuous Infusions:   NORepinephrine bitartrate-D5W  0-3 mcg/kg/min Intravenous Continuous        pantoprazole (PROTONIX) IV infusion  8 mg/hr Intravenous Continuous   Stopped at 03/30/25 1919         Vitals:     Vitals:    03/31/25 0515 03/31/25 0530 03/31/25 0545 03/31/25 0600   BP: (!) 115/59 123/66 (!) 154/63 (!) 153/74   BP Location:    Left arm   Patient Position:    Lying   Pulse: 78 79 78 76   Resp: 10 10 16 12   Temp:       TempSrc:       SpO2: 100% 100% 100% 100%   Weight:        Height:             I/O last 3 completed shifts:  In: 4602.7 [I.V.:2318; Blood:685; Other:1000; IV Piggyback:599.8]  Out: 2160 [Urine:660; Other:1500]    Intake/Output Summary (Last 24 hours) at 3/31/2025 0751  Last data filed at 3/31/2025 0603  Gross per 24 hour   Intake 4602.7 ml   Output 2160 ml   Net 2442.7 ml       Physical Exam:   General: no acute distress, awake, alert  Eyes: PERRLA, EOMI, conjunctiva clear, eyelids without swelling  HENT: atraumatic, oropharynx and nasal mucosa patent  Neck: full ROM, no JVD, no thyromegaly or lymphadenopathy  Respiratory: equal, unlabored, basilar rhonchi  Cardiovascular: RRR without rub; BL radial and pedal pulses felt  Edema: none  Gastrointestinal: soft, non-tender, non-distended; positive bowel sounds; no masses to palpation  Genitourinary:  Catheter  Musculoskeletal: full ROM without limitation or discomfort  Integumentary: warm, dry; no rashes, wounds, or skin lesions  Neurological: oriented, appropriate, no acute deficits        Labs:     Chemistries:   Recent Labs   Lab 03/29/25  0829 03/30/25  0345 03/30/25  1012 03/31/25  0209   * 125* 127* 134*   K 4.3 3.9 4.0 3.3*   CL 92* 93* 94* 92*   CO2 20* 15* 16* 30*   .9* 135.0* 129.2* 60.4*   CREATININE 11.76* 10.50* 10.45* 5.55*   CALCIUM 8.9 7.5* 7.9* 7.5*   BILITOT 0.5  --  0.5 0.5   ALKPHOS 98  --  85 73   ALT 14  --  10 10   AST 20  --  20 23   GLUCOSE 123* 121* 131* 115*   MG 2.20 1.80  --  1.70        CBC/Anemia Labs: Coags:    Recent Labs   Lab 03/29/25  0829 03/30/25  0345 03/30/25  0346 03/30/25  0552 03/31/25  0209   WBC 47.97  47.97*  --  37.17  37.17*  --  39.45  39.45*   HGB 8.7*  --  7.1*  --  9.6*   HCT 23.3*  --  19.0*  --  26.4*   *  --  137  --  164   MCV 79.3*  --  79.8*  --  81.0   RDW 16.8  --  16.5  --  15.6   IRON 28*  --   --  15*  --    FERRITIN  --   --   --  264.66  --    FOLATE  --  5.1*  --   --  4.8*   [id number] 836*  --   --  871*  --     Recent Labs   Lab  03/30/25 2016   INR 1.4*          Impression:     Gram-negative bacteremia with a LEANDRO  Uremia much improved following dialysis yesterday    Plan:     Continue antibiotics   workup  Patient clinically much improved  No acute indication for dialysis today       Alirio Black

## 2025-03-31 NOTE — PROGRESS NOTES
[facility] - [facility]  Pulmonary Critical Care Note    Patient Name: Tao Bee  MRN: [id number]  Admission Date: 3/29/2025  Hospital Length of Stay: 2 days  Code Status: Full Code  Attending Provider: Beau Alvarez Jr., MD,*  Primary Care Provider: Penny Garsia FNP     Subjective:     HPI:   59-year-old male with no known past medical history presented to this facility initially as a hospital medicine admit for evaluation of 3 days of lower abdominal pain, nausea, and vomiting.  Patient was admitted yesterday.  On admission, noted to have acute renal failure with a creatinine of 11.76 and a BUN of 162.  Leukocytosis of 47.  And hyponatremia.  CT abdomen pelvis without contrast showed an edematous appearing right kidney with fullness of the upper lobe indicative of pyelonephritis.  Blood cultures were drawn at the time and have grown Gram-negative rods in 2/2 bottles with BC ID showing E coli species not ESBL.  Urine culture also growing greater than 100,000 colonies of Gram-negative rods.  Nephrology was consulted at the time due to acute renal failure and persistent acidosis and hemodialysis recommended.  Patient was started on Zosyn and also ciprofloxacin for double Gram-negative coverage.  Leukocytosis appear to trend down overnight, by 10 points.  H&H also declined from 8.7 to7.1 overnight. 1 unit PRBC ordered to be transfused.  Patient currently receiving hemodialysis.  Patient does report still making urine at this time.    Patient was noted to be tachycardic, tachypneic, and febrile earlier today.  Blood pressure was not responding to fluid resuscitation.  RRT was called and patient was started on Levophed to maintain pressure.  Patient arrived to the ICU with ice packs and Levophed hung.      Hospital Course/Significant events:  Upgraded to the ICU on 03/30/2025    24 Hour Interval History:  Patient has undergone EGD which showed 2 deep nonbleeding ulcers likely attributable to  NSAID use.  No evidence ongoing bleeding.  Hemodynamics stabilized after transfusion of packed red blood cells.  He has had a dialysis catheter placed and underwent dialysis yesterday.  No plans for dialysis today    Past Medical History:   Diagnosis Date    Neuropathy        Past Surgical History:   Procedure Laterality Date    ESOPHAGOGASTRODUODENOSCOPY N/A 3/30/2025    Procedure: EGD (ESOPHAGOGASTRODUODENOSCOPY);  Surgeon: Stan Elder MD;  Location: [facility];  Service: Endoscopy;  Laterality: N/A;       Social History[1]        Current Outpatient Medications   Medication Instructions    gabapentin (NEURONTIN) 600 mg, 3 times daily       Review of patient's allergies indicates:   Allergen Reactions    Opioids - morphine analogues Hallucinations        Current Inpatient Medications   ciprofloxacin  200 mg Intravenous Q12H    heparin (porcine)  3,000 Units Intravenous Once    mupirocin   Nasal BID       Current Intravenous Infusions   NORepinephrine bitartrate-D5W  0-3 mcg/kg/min Intravenous Continuous        pantoprazole (PROTONIX) IV infusion  8 mg/hr Intravenous Continuous   Stopped at 03/30/25 1919                Objective:       Intake/Output Summary (Last 24 hours) at 3/31/2025 0905  Last data filed at 3/31/2025 0603  Gross per 24 hour   Intake 4602.7 ml   Output 2160 ml   Net 2442.7 ml         Vital Signs (Most Recent):  Temp: 98.8 °F (37.1 °C) (03/31/25 0400)  Pulse: 76 (03/31/25 0600)  Resp: 12 (03/31/25 0600)  BP: (!) 153/74 (03/31/25 0600)  SpO2: 100 % (03/31/25 0600)  Body mass index is 25.84 kg/m².  Weight: 74.8 kg (164 lb 15.9 oz) Vital Signs (24h Range):  Temp:  [96.8 °F (36 °C)-104.1 °F (40.1 °C)] 98.8 °F (37.1 °C)  Pulse:  [] 76  Resp:  [8-25] 12  SpO2:  [84 %-100 %] 100 %  BP: ()/() 153/74     Physical Exam  Constitutional:       Appearance: He is ill-appearing. He is not toxic-appearing.   HENT:      Head: Normocephalic and atraumatic.      Nose: Nose normal. No congestion or  rhinorrhea.      Mouth/Throat:      Mouth: Mucous membranes are moist.      Pharynx: Oropharynx is clear.   Eyes:      Extraocular Movements: Extraocular movements intact.      Conjunctiva/sclera: Conjunctivae normal.      Pupils: Pupils are equal, round, and reactive to light.   Cardiovascular:      Rate and Rhythm: Normal rate and regular rhythm.      Pulses: Normal pulses.      Heart sounds: No murmur heard.     No gallop.   Pulmonary:      Effort: Pulmonary effort is normal. No respiratory distress.      Breath sounds: Normal breath sounds. No wheezing.   Abdominal:      General: Bowel sounds are normal.      Palpations: Abdomen is soft. There is no mass.   Musculoskeletal:      Cervical back: Normal range of motion and neck supple.      Right lower leg: No edema.      Left lower leg: No edema.   Skin:     General: Skin is warm.      Capillary Refill: Capillary refill takes less than 2 seconds.   Neurological:      Mental Status: He is alert and oriented to person, place, and time.   Psychiatric:         Mood and Affect: Mood normal.         Behavior: Behavior normal.           Lines/Drains/Airways       Central Venous Catheter Line  Duration                  Hemodialysis Catheter 03/30/25 1233 right internal jugular <1 day              Drain  Duration                  Urethral Catheter 03/30/25 1300 Coude 16 Fr. <1 day              Peripheral Intravenous Line  Duration                  Peripheral IV - Single Lumen 03/29/25 1030 20 G Right Antecubital 1 day         Peripheral IV - Single Lumen 03/30/25 1356 20 G Right;Lateral Forearm <1 day         Peripheral IV - Single Lumen 03/31/25 0200 18 G Left;Proximal;Medial Forearm <1 day                    Significant Labs:    Lab Results   Component Value Date    WBC 39.45 (H) 03/31/2025    WBC 39.45 03/31/2025    HGB 9.6 (L) 03/31/2025    HCT 26.4 (L) 03/31/2025    MCV 81.0 03/31/2025     03/31/2025           BMP  Lab Results   Component Value Date      "(L) 03/31/2025    K 3.3 (L) 03/31/2025    CO2 30 (H) 03/31/2025    BUN 60.4 (H) 03/31/2025    CREATININE 5.55 (H) 03/31/2025    CALCIUM 7.5 (L) 03/31/2025    AGAP 12.0 03/31/2025         ABG  No results for input(s): "PH", "PO2", "PCO2", "HCO3", "POCBASEDEF" in the last 168 hours.    Mechanical Ventilation Support:         Significant Imaging:  I have reviewed the pertinent imaging within the past 24 hours.        Assessment/Plan:     Assessment  Septic shock secondary to pyelonephritis, now hemodynamically stable  Acute renal failure, status post temporary dialysis catheter placement and hemodialysis yesterday  Symptomatic anemia secondary to acute blood loss from NSAID induced ulcers, now status post transfusion  Hypotension-resolved      Plan  Patient is hemodynamically stable.  He is awake and alert.  Continue antimicrobials for pyelonephritis and sepsis.  Hemodialysis as per Nephrology.  Continue PPI and avoid NSAIDs.  Patient is stable for downgrade to the floor.     Chris Elder MD  Pulmonary Critical Care Medicine  [facility]  DOS: 03/31/2025         [1]   Social History  Socioeconomic History    Marital status: Single   Tobacco Use    Smoking status: Every Day     Current packs/day: 0.50     Types: Cigarettes   Substance and Sexual Activity    Alcohol use: Yes     Alcohol/week: 28.0 standard drinks of alcohol     Types: 28 Shots of liquor per week     Comment: half a pint per day     Social Drivers of Health     Financial Resource Strain: Low Risk  (3/29/2025)    Overall Financial Resource Strain (CARDIA)     Difficulty of Paying Living Expenses: Not hard at all   Food Insecurity: No Food Insecurity (3/29/2025)    Hunger Vital Sign     Worried About Running Out of Food in the Last Year: Never true     Ran Out of Food in the Last Year: Never true   Transportation Needs: No Transportation Needs (3/29/2025)    PRAPARE - Transportation     Lack of Transportation (Medical): No    "  Lack of Transportation (Non-Medical): No   Stress: No Stress Concern Present (3/29/2025)    [employer] - Occupational Stress Questionnaire     Feeling of Stress : Not at all   Housing Stability: Low Risk  (3/29/2025)    Housing Stability Vital Sign     Unable to Pay for Housing in the Last Year: No     Number of Times Moved in the Last Year: 0     Homeless in the Last Year: No

## 2025-03-31 NOTE — PROGRESS NOTES
"[facility]   Progress Note          SUBJECTIVE: Off vasopressors, no issues overnight.  Nursing reports last BM was brown.  Patient w/o complaints.       ROS: Negative unless stated in HPI    MEDS: Reviewed in EMR    OBJECTIVE:  T 97.5 °F (36.4 °C)   BP (!) 143/72   P 87   RR 20   O2 (!) 93 %  GENERAL: NAD; does not appear toxic  SKIN: no rash, no jaundice  HEENT: sclera non-icteric; PERRL  NECK: supple; no LAD  CHEST: CTA; nonlabored, equal expansion; no adventitious BS  CARDIOVASCULAR: RRR, S1S2; no murmur   ABDOMEN:  active bowel sounds; abdomen soft, nondistended, nontender to palpation  EXTREMITIES: no cyanosis or clubbing  NEURO: AAO, baseline    Labs:  Recent Labs     03/30/25 0346 03/31/25  0209   WBC 37.17  37.17* 39.45  39.45*   RBC 2.38* 3.26*   HGB 7.1* 9.6*   HCT 19.0* 26.4*   MCV 79.8* 81.0   MCH 29.8 29.4   MCHC 37.4* 36.4*   RDW 16.5 15.6    164     No results for input(s): "LACTIC" in the last 72 hours.  Recent Labs     03/30/25  2016   INR 1.4*     No results for input(s): "HGBA1C", "CHOL", "TRIG", "LDL", "VLDL", "HDL" in the last 72 hours.   Recent Labs     03/29/25  0829 03/30/25  0345 03/30/25  0552 03/30/25  1012 03/31/25  0209   * 125*  --  127* 134*   K 4.3 3.9  --  4.0 3.3*   CO2 20* 15*  --  16* 30*   .9* 135.0*  --  129.2* 60.4*   CREATININE 11.76* 10.50*  --  10.45* 5.55*   GLUCOSE 123* 121*  --  131* 115*   CALCIUM 8.9 7.5*  --  7.9* 7.5*   MG 2.20 1.80  --   --  1.70   ALBUMIN 2.1*  --   --  1.9* 1.6*   GLOBULIN 3.8*  --   --  3.0 3.2   ALKPHOS 98  --   --  85 73   ALT 14  --   --  10 10   AST 20  --   --  20 23   BILITOT 0.5  --   --  0.5 0.5   LIPASE 194*  --   --   --   --    FERRITIN  --   --  264.66  --   --    IRON 28*  --  15*  --   --    TIBC 271  --  226*  --   --      Recent Labs     03/29/25  0829   BNP 83.6   TROPONINI 0.012          Imaging: Reviewed pertinent imaging    ASSESSMENT / PLAN:  59 y.o. male unknown to us with " neuropathy and alcohol abuse who initially presented with acute-onset, lower quadrant abdominal pain with associated loose black stools and nausea. He was found to be in septic shock with E. Coli bacteremia secondary to right-sided pyelonephritis. He was also found to have acute renal failure requiring hemodialysis. GI consulted for acute-onset, large volume melena with associated severe anemia requiring PRBC transfusion. Patient denies any hematochezia. He denies any epigastric pain. He denies prior history of GI bleeding. He takes Aleve as needed for musculoskeletal pains. He drinks multiple liquor drinks daily, and his last drink was three days prior to admission. He does not have any known history of liver disease.       Melena  Severe anemia  Daily ETOH use  LEANDRO now on HD  Pyelonephritis / E. Coli bacteremia    -s/p EGD 3/30:  Moderate gastritis, biopsied for H pylori.  Nonbleeding gastric ulcer in the gastric antrum with clean ulcer base.  Nonbleeding gastric ulcer proximal to the pylorus with clean ulcer base.  Nonbleeding crater ulcer in the incisura without stigmata of recent bleeding.  Duodenitis.      Continue PPI gtt for 72 hours then can change to PPI PO BID x8 weeks.   F/u pathology.   Avoid all NSAIDs.   Needs repeat EGD in 8 weeks to evaluate for healing.   Monitor labs, clinically.  Should be downgraded to floor today.   Discussed with patient and nursing.       Thank you for allowing us to participate in the care of Tao NIMA Bee.    Ayleen Elder, FNVERONICA  [facility]

## 2025-03-31 NOTE — PROVATION PATIENT INSTRUCTIONS
Discharge Summary/Instructions after an Endoscopic Procedure  Patient Name: Tao Bee  Patient MRN: [id number]  Patient YOB: 1965 Sunday, March 30, 2025  Stan Elder MD  Dear patient,  As a result of recent federal legislation (The Federal Cures Act), you may   receive lab or pathology results from your procedure in your [facility]   account before your physician is able to contact you. Your physician or   their representative will relay the results to you with their   recommendations at their soonest availability.  Thank you,  RESTRICTIONS:  During your procedure today, you received medications for sedation.  These   medications may affect your judgment, balance and coordination.  Therefore,   for 24 hours, you have the following restrictions:   - DO NOT drive a car, operate machinery, make legal/financial decisions,   sign important papers or drink alcohol.    ACTIVITY:  Today: no heavy lifting, straining or running due to procedural   sedation/anesthesia.  The following day: return to full activity including work.  DIET:  Eat and drink normally unless instructed otherwise.     TREATMENT FOR COMMON SIDE EFFECTS:  - Mild abdominal pain, nausea, belching, bloating or excessive gas:  rest,   eat lightly and use a heating pad.  - Sore Throat: treat with throat lozenges and/or gargle with warm salt   water.  - Because air was used during the procedure, expelling large amounts of air   from your rectum or belching is normal.  - If a bowel prep was taken, you may not have a bowel movement for 1-3 days.    This is normal.  SYMPTOMS TO WATCH FOR AND REPORT TO YOUR PHYSICIAN:  1. Abdominal pain or bloating, other than gas cramps.  2. Chest pain.  3. Back pain.  4. Signs of infection such as: chills or fever occurring within 24 hours   after the procedure.  5. Rectal bleeding, which would show as bright red, maroon, or black stools.   (A tablespoon of blood from the rectum is not serious, especially if    hemorrhoids are present.)  6. Vomiting.  7. Weakness or dizziness.  GO DIRECTLY TO THE NEAREST EMERGENCY ROOM IF YOU HAVE ANY OF THE FOLLOWING:      Difficulty breathing              Chills and/or fever over 101 F   Persistent vomiting and/or vomiting blood   Severe abdominal pain   Severe chest pain   Black, tarry stools   Bleeding- more than one tablespoon   Any other symptom or condition that you feel may need urgent attention  Your doctor recommends these additional instructions:  If any biopsies were taken, your doctors clinic will contact you in 1 to 2   weeks with any results.  Recommendations:  - Return patient to ICU for ongoing care.   - Clear liquid diet.   - Transfuse PRBCs for hemoglobin <7.   - Continue PPI infusion for 72 hours then transition to oral PPI twice daily   for 8 weeks.   - Discontinue octreotide infusion.   - Monitor for recurrent overt GI blood loss.   - Avoid all NSAID medications.   - Supportive care with IV fluids and vasopressors as needed to maintain MAP   >65.    - Await pathology results.   - Repeat EGD in 8 weeks to assess for healing of gastric ulcerations.  Impressions:  - Normal esophagus.   - Moderate diffuse gastritis.  Biopsied for H. pylori.   - Non-bleeding gastric ulcer in the gastric antrum (lesser curvature) with a   clean ulcer base (Te Class III).   - Non-bleeding gastric ulcer just proximal to the pylorus with a clean ulcer   base (Te Class III).   - Non-bleeding cratered ulcer in the incisura without stigmata of recent   bleeding.   - Duodenitis.   Patient likely had prior GI blood loss from one of the cratered ulcerations   in the distal stomach. These ulcerations and his gastritis were likely   related to NSAID-induced PUD vs. H. pylori inefction vs. stress ulcerations   in the setting of his critical illness.   For questions, problems or results please call your physician - Stan Elder MD at Work:  [phone].  [facility]  [place] [facility] at [phone]  IF A COMPLICATION OR EMERGENCY SITUATION ARISES AND YOU ARE UNABLE TO REACH   YOUR PHYSICIAN - GO DIRECTLY TO THE EMERGENCY ROOM.  MD Stan Torrez MD  3/30/2025 7:51:33 PM  This report has been verified and signed electronically.  Dear patient,  As a result of recent federal legislation (The Federal Cures Act), you may   receive lab or pathology results from your procedure in your [facility]   account before your physician is able to contact you. Your physician or   their representative will relay the results to you with their   recommendations at their soonest availability.  Thank you,  PROVATION

## 2025-03-31 NOTE — PROGRESS NOTES
UROLOGY  PROGRESS  NOTE    Tao Bee 1965  [id number]  3/31/2025    Patient resting in bed   No complaints during rounds   Nguyen functioning well   Dialyzed yesterday  Weaned off of pressors    Afebrile  360 mL urine output overnight   WBC 39.45   H&H 9.6/26.4   BUN and creatinine 60.4/5.55    Intake/Output:  I/O this shift:  In: 135.8 [I.V.:35.8; IV Piggyback:100]  Out: -   I/O last 3 completed shifts:  In: 4602.7 [I.V.:2318; Blood:685; Other:1000; IV Piggyback:599.8]  Out: 2160 [Urine:660; Other:1500]     Exam:    NAD  Card: RRR  Resp: unlabored  Abd:  Soft, NT ND  :  Concentrated yellow urine draining to  bag  Extremity: no C/C/E    Recent Results (from the past 24 hours)   Protime-INR    Collection Time: 03/30/25  8:16 PM   Result Value Ref Range    PT 16.5 (H) 12.5 - 14.5 seconds    INR 1.4 (H) <=1.3   Folate    Collection Time: 03/31/25  2:09 AM   Result Value Ref Range    Folate Level 4.8 (L) 7.0 - 31.4 ng/mL   Magnesium    Collection Time: 03/31/25  2:09 AM   Result Value Ref Range    Magnesium Level 1.70 1.60 - 2.60 mg/dL   Comprehensive Metabolic Panel    Collection Time: 03/31/25  2:09 AM   Result Value Ref Range    Sodium 134 (L) 136 - 145 mmol/L    Potassium 3.3 (L) 3.5 - 5.1 mmol/L    Chloride 92 (L) 98 - 107 mmol/L    CO2 30 (H) 22 - 29 mmol/L    Glucose 115 (H) 74 - 100 mg/dL    Blood Urea Nitrogen 60.4 (H) 8.4 - 25.7 mg/dL    Creatinine 5.55 (H) 0.72 - 1.25 mg/dL    Calcium 7.5 (L) 8.4 - 10.2 mg/dL    Protein Total 4.8 (L) 6.4 - 8.3 gm/dL    Albumin 1.6 (L) 3.5 - 5.0 g/dL    Globulin 3.2 2.4 - 3.5 gm/dL    Albumin/Globulin Ratio 0.5 (L) 1.1 - 2.0 ratio    Bilirubin Total 0.5 <=1.5 mg/dL    ALP 73 40 - 150 unit/L    ALT 10 0 - 55 unit/L    AST 23 11 - 45 unit/L    eGFR 11 mL/min/1.73/m2    Anion Gap 12.0 mEq/L    BUN/Creatinine Ratio 11    CBC with Differential    Collection Time: 03/31/25  2:09 AM   Result Value Ref Range    WBC 39.45 (H) 4.50 - 11.50 x10(3)/mcL    RBC 3.26 (L) 4.70 -  6.10 x10(6)/mcL    Hgb 9.6 (L) 14.0 - 18.0 g/dL    Hct 26.4 (L) 42.0 - 52.0 %    MCV 81.0 80.0 - 94.0 fL    MCH 29.4 27.0 - 31.0 pg    MCHC 36.4 (H) 33.0 - 36.0 g/dL    RDW 15.6 11.5 - 17.0 %    Platelet 164 130 - 400 x10(3)/mcL    MPV 12.8 (H) 7.4 - 10.4 fL   Manual Differential    Collection Time: 03/31/25  2:09 AM   Result Value Ref Range    WBC 39.45 x10(3)/mcL    Neutrophils % 85 %    Lymphs % 6 %    Monocytes % 6 %    Metamyelocytes % 3 (H) <=0 %    Myelocytes % 1 (H) <=0 %    Neutrophils Abs 33.5325 (H) 2.1 - 9.2 x10(3)/mcL    Lymphs Abs 2.367 0.6 - 4.6 x10(3)/mcL    Monocytes Abs 2.367 (H) 0.1 - 1.3 x10(3)/mcL    Platelets Normal Normal, Adequate    RBC Morph Abnormal (A) Normal    Poikilocytosis 1+ (A) (none)    Anisocytosis 1+ (A) (none)    Macrocytosis 1+ (A) (none)    Target Cells 1+ (A) (none)            Urine culture [[id number]] (Abnormal)  Collected: 03/29/25 1159   Order Status: Completed Specimen: Urine Updated: 03/31/25 0933    Urine Culture >/= 100,000 colonies/ml Escherichia coli Abnormal    Susceptibility     Escherichia coli     SERA     Ampicillin <=2 Sensitive     Cefazolin (Other) 2 Sensitive     Cefazolin (Urine) 2 Sensitive     Cefepime <=0.12 Sensitive     Ceftriaxone <=0.25 Sensitive     Ciprofloxacin <=0.06 Sensitive     ESBL Neg Negative     Gentamicin <=1 Sensitive     Levofloxacin <=0.12 Sensitive     Meropenem <=0.25 Sensitive     Nitrofurantoin <=16 Sensitive     Piperacillin/Tazobactam <=4 Sensitive     Trimeth/Sulfa <=20 Sensitive                     Assessment:  59-year-old male admitted with septic shock secondary to pyelonephritis with acute renal failure   -dialyzed yesterday, no dialysis today  - creatinine improving  -weaned off of pressors   -blood and urine cultures with E coli - on Cipro    Plan:  -continue indwelling Ngyuen   -continue culture specific antibiotics   -no indication for urologic intervention at this time   -if he does not continue to progress as expected  with antibiotics, recommend repeat imaging      [place] NP

## 2025-04-01 LAB
ABS NEUT (OLG): 30.46 X10(3)/MCL (ref 2.1–9.2)
ALBUMIN SERPL-MCNC: 1.6 G/DL (ref 3.5–5)
ALBUMIN/GLOB SERPL: 0.6 RATIO (ref 1.1–2)
ALP SERPL-CCNC: 85 UNIT/L (ref 40–150)
ALT SERPL-CCNC: 8 UNIT/L (ref 0–55)
ANION GAP SERPL CALC-SCNC: 10 MEQ/L
AST SERPL-CCNC: 19 UNIT/L (ref 11–45)
BILIRUB SERPL-MCNC: 0.3 MG/DL
BUN SERPL-MCNC: 67.7 MG/DL (ref 8.4–25.7)
CALCIUM SERPL-MCNC: 7.4 MG/DL (ref 8.4–10.2)
CHLORIDE SERPL-SCNC: 95 MMOL/L (ref 98–107)
CO2 SERPL-SCNC: 29 MMOL/L (ref 22–29)
CREAT SERPL-MCNC: 5.86 MG/DL (ref 0.72–1.25)
CREAT/UREA NIT SERPL: 12
ERYTHROCYTE [DISTWIDTH] IN BLOOD BY AUTOMATED COUNT: 16.7 % (ref 11.5–17)
GFR SERPLBLD CREATININE-BSD FMLA CKD-EPI: 10 ML/MIN/1.73/M2
GLOBULIN SER-MCNC: 2.6 GM/DL (ref 2.4–3.5)
GLUCOSE SERPL-MCNC: 134 MG/DL (ref 74–100)
HCT VFR BLD AUTO: 25.6 % (ref 42–52)
HGB BLD-MCNC: 8.9 G/DL (ref 14–18)
INSTRUMENT WBC (OLG): 35.84 X10(3)/MCL
LYMPHOCYTES NFR BLD MANUAL: 2.51 X10(3)/MCL (ref 0.6–4.6)
LYMPHOCYTES NFR BLD MANUAL: 7 %
MCH RBC QN AUTO: 29.1 PG (ref 27–31)
MCHC RBC AUTO-ENTMCNC: 34.8 G/DL (ref 33–36)
MCV RBC AUTO: 83.7 FL (ref 80–94)
METAMYELOCYTES NFR BLD MANUAL: 3 %
MONOCYTES NFR BLD MANUAL: 1.43 X10(3)/MCL (ref 0.1–1.3)
MONOCYTES NFR BLD MANUAL: 4 %
NEUTROPHILS NFR BLD MANUAL: 85 %
OVALOCYTES (OLG): ABNORMAL
PHOSPHATE SERPL-MCNC: 4.3 MG/DL (ref 2.3–4.7)
PLATELET # BLD AUTO: 264 X10(3)/MCL (ref 130–400)
PLATELET # BLD EST: NORMAL 10*3/UL
PMV BLD AUTO: 11.5 FL (ref 7.4–10.4)
POIKILOCYTOSIS BLD QL SMEAR: ABNORMAL
POTASSIUM SERPL-SCNC: 3.5 MMOL/L (ref 3.5–5.1)
PROMYELOCYTES # BLD MANUAL: 1 %
PROT SERPL-MCNC: 4.2 GM/DL (ref 6.4–8.3)
PSYCHE PATHOLOGY RESULT: NORMAL
RBC # BLD AUTO: 3.06 X10(6)/MCL (ref 4.7–6.1)
RBC MORPH BLD: ABNORMAL
SODIUM SERPL-SCNC: 134 MMOL/L (ref 136–145)
TARGETS BLD QL SMEAR: ABNORMAL
WBC # BLD AUTO: 35.84 X10(3)/MCL (ref 4.5–11.5)

## 2025-04-01 PROCEDURE — 84100 ASSAY OF PHOSPHORUS: CPT | Performed by: INTERNAL MEDICINE

## 2025-04-01 PROCEDURE — 63600175 PHARM REV CODE 636 W HCPCS: Performed by: INTERNAL MEDICINE

## 2025-04-01 PROCEDURE — 25000003 PHARM REV CODE 250: Performed by: NURSE PRACTITIONER

## 2025-04-01 PROCEDURE — 25000003 PHARM REV CODE 250: Performed by: INTERNAL MEDICINE

## 2025-04-01 PROCEDURE — 36415 COLL VENOUS BLD VENIPUNCTURE: CPT | Performed by: INTERNAL MEDICINE

## 2025-04-01 PROCEDURE — 99900031 HC PATIENT EDUCATION (STAT)

## 2025-04-01 PROCEDURE — 21400001 HC TELEMETRY ROOM

## 2025-04-01 PROCEDURE — 85025 COMPLETE CBC W/AUTO DIFF WBC: CPT | Performed by: INTERNAL MEDICINE

## 2025-04-01 PROCEDURE — 27000221 HC OXYGEN, UP TO 24 HOURS

## 2025-04-01 PROCEDURE — 63600175 PHARM REV CODE 636 W HCPCS: Performed by: HOSPITALIST

## 2025-04-01 PROCEDURE — 80053 COMPREHEN METABOLIC PANEL: CPT | Performed by: INTERNAL MEDICINE

## 2025-04-01 PROCEDURE — 99232 SBSQ HOSP IP/OBS MODERATE 35: CPT | Mod: ,,, | Performed by: INTERNAL MEDICINE

## 2025-04-01 RX ORDER — SODIUM CHLORIDE, SODIUM LACTATE, POTASSIUM CHLORIDE, CALCIUM CHLORIDE 600; 310; 30; 20 MG/100ML; MG/100ML; MG/100ML; MG/100ML
INJECTION, SOLUTION INTRAVENOUS CONTINUOUS
Status: DISCONTINUED | OUTPATIENT
Start: 2025-04-01 | End: 2025-04-03

## 2025-04-01 RX ORDER — CEFTRIAXONE 2 G/1
2 INJECTION, POWDER, FOR SOLUTION INTRAMUSCULAR; INTRAVENOUS
Status: DISCONTINUED | OUTPATIENT
Start: 2025-04-01 | End: 2025-04-03

## 2025-04-01 RX ORDER — ACETAMINOPHEN 500 MG
1000 TABLET ORAL EVERY 6 HOURS PRN
Status: DISCONTINUED | OUTPATIENT
Start: 2025-04-01 | End: 2025-04-08 | Stop reason: HOSPADM

## 2025-04-01 RX ADMIN — SODIUM CHLORIDE, POTASSIUM CHLORIDE, SODIUM LACTATE AND CALCIUM CHLORIDE: 600; 310; 30; 20 INJECTION, SOLUTION INTRAVENOUS at 10:04

## 2025-04-01 RX ADMIN — PANTOPRAZOLE SODIUM 8 MG/HR: 40 INJECTION, POWDER, FOR SOLUTION INTRAVENOUS at 08:04

## 2025-04-01 RX ADMIN — PANTOPRAZOLE SODIUM 8 MG/HR: 40 INJECTION, POWDER, FOR SOLUTION INTRAVENOUS at 02:04

## 2025-04-01 RX ADMIN — MUPIROCIN: 20 OINTMENT TOPICAL at 08:04

## 2025-04-01 RX ADMIN — ACETAMINOPHEN 650 MG: 325 TABLET, FILM COATED ORAL at 10:04

## 2025-04-01 RX ADMIN — CEFTRIAXONE SODIUM 2 G: 2 INJECTION, POWDER, FOR SOLUTION INTRAMUSCULAR; INTRAVENOUS at 03:04

## 2025-04-01 RX ADMIN — CIPROFLOXACIN 200 MG: 2 INJECTION, SOLUTION INTRAVENOUS at 08:04

## 2025-04-01 RX ADMIN — PANTOPRAZOLE SODIUM 8 MG/HR: 40 INJECTION, POWDER, FOR SOLUTION INTRAVENOUS at 11:04

## 2025-04-01 RX ADMIN — SODIUM CHLORIDE, POTASSIUM CHLORIDE, SODIUM LACTATE AND CALCIUM CHLORIDE: 600; 310; 30; 20 INJECTION, SOLUTION INTRAVENOUS at 09:04

## 2025-04-01 RX ADMIN — ACETAMINOPHEN 650 MG: 325 TABLET, FILM COATED ORAL at 08:04

## 2025-04-01 NOTE — PROGRESS NOTES
[facility] Hospital Medicine Progress Note        Chief Complaint: Inpatient Follow-up     HPI:   59-year-old male admitted to hospital medicine on 03/29/2025 secondary to lower abdominal pain and diarrhea for the last 3 days. Associated with nausea but no vomiting. Did report some melena starting about 3 days ago. No previous history of renal disease. No change in pain with eating. He does report decreased urine output. In the emergency room his vital signs are stable. Blood pressure is little soft but responsive to fluids. He was noted to have significant hyponatremia with a sodium of 130. Creatinine markedly elevated at 11.76. Also significant leukocytosis of 47.97. CT of the abdomen shows edematous appearance of the right kidney with fullness in the upper pole. Differential included mass, phlegmon, or focal pyelonephritis. Renal ultrasound showed findings concerning for pelvic calcitonin versus. He was started empirically on vancomycin and Zosyn. A consult has been placed to Urology. No urine specimen has yet been collected secondary to oliguria.     Patient was upgraded to the ICU on the afternoon of 3/30 due to concern for septic shock.  He was briefly placed on vasopressors but then transfused and blood pressure stabilized he was downgraded back to hospitalist services on 03/31.  During his ICU stay GI was also consulted secondary to melena.  Took for EGD which revealed multiple nonbleeding gastric ulcer in the antrum with a clean base.  He was started on a Protonix drip.  Recommending continued PPI drip for 72 hours and then b.i.d..  Will need a repeat EGD in 8 weeks..  He continues to make improvement.  No longer febrile.  His white count is trending down.  Discussed with nursing at bedside.  Making significant improvement in his mental status.    Physical exam:  General appearance: Well-developed, well-nourished male in no apparent distress.  HENT: Atraumatic head. Moist  mucous membranes of oral cavity.  Eyes: Normal extraocular movements.   Neck: Supple.   Lungs: Clear to auscultation bilaterally. No wheezing present.   Heart: Regular rate and rhythm. S1 and S2 present with no murmurs/gallop/rub. No pedal edema. No JVD present.   Abdomen: Soft, non-distended, non-tender. No rebound tenderness/guarding. Bowel sounds are normal.   Extremities: No cyanosis, clubbing, or edema.  Skin: No Rash.   Neuro: Motor and sensory exams grossly intact. Good tone. Muscle strength 5/5 in all 4 extremities  Psych/mental status: Appropriate mood and affect. Responds appropriately to questions.   VITAL SIGNS: 24 HRS MIN & MAX LAST   Temp  Min: 97.5 °F (36.4 °C)  Max: 98.3 °F (36.8 °C) 98.3 °F (36.8 °C)   BP  Min: 110/58  Max: 174/58 (!) 171/83   Pulse  Min: 81  Max: 115  105   Resp  Min: 14  Max: 25 17   SpO2  Min: 89 %  Max: 100 % 96 %       Recent Labs   Lab 03/30/25  0346 03/31/25  0209 04/01/25  0401   WBC 37.17  37.17* 39.45  39.45* 35.84  35.84*   RBC 2.38* 3.26* 3.06*   HGB 7.1* 9.6* 8.9*   HCT 19.0* 26.4* 25.6*   MCV 79.8* 81.0 83.7   MCH 29.8 29.4 29.1   MCHC 37.4* 36.4* 34.8   RDW 16.5 15.6 16.7    164 264   MPV 12.4* 12.8* 11.5*       Recent Labs   Lab 03/29/25  0829 03/30/25  0345 03/30/25  1012 03/31/25  0209 04/01/25  0401   * 125* 127* 134* 134*   K 4.3 3.9 4.0 3.3* 3.5   CL 92* 93* 94* 92* 95*   CO2 20* 15* 16* 30* 29   .9* 135.0* 129.2* 60.4* 67.7*   CREATININE 11.76* 10.50* 10.45* 5.55* 5.86*   CALCIUM 8.9 7.5* 7.9* 7.5* 7.4*   MG 2.20 1.80  --  1.70  --    ALBUMIN 2.1*  --  1.9* 1.6* 1.6*   ALKPHOS 98  --  85 73 85   ALT 14  --  10 10 8   AST 20  --  20 23 19   BILITOT 0.5  --  0.5 0.5 0.3          Microbiology Results (last 7 days)       Procedure Component Value Units Date/Time    Blood Culture [[id number]]  (Normal) Collected: 03/30/25 2016    Order Status: Completed Specimen: Blood from Arm, Left Updated: 03/31/25 2202     Blood Culture No Growth At 24  Hours    Blood Culture [[id number]]  (Normal) Collected: 03/30/25 2016    Order Status: Completed Specimen: Blood from Arm, Left Updated: 03/31/25 2202     Blood Culture No Growth At 24 Hours    Urine culture [[id number]]  (Abnormal)  (Susceptibility) Collected: 03/29/25 1159    Order Status: Completed Specimen: Urine Updated: 03/31/25 0933     Urine Culture >/= 100,000 colonies/ml Escherichia coli    Blood culture x two cultures. Draw prior to antibiotics. [[id number]]  (Abnormal)  (Susceptibility) Collected: 03/29/25 1037    Order Status: Completed Specimen: Blood Updated: 03/31/25 0711     Blood Culture Escherichia coli     GRAM STAIN Gram Negative Rods      Seen in gram stain of broth only      2 of 2 bottles positive    Blood culture x two cultures. Draw prior to antibiotics. [[id number]]  (Abnormal)  (Susceptibility) Collected: 03/29/25 1055    Order Status: Completed Specimen: Blood Updated: 03/31/25 0710     Blood Culture Escherichia coli     GRAM STAIN Gram Negative Rods      Seen in gram stain of broth only      2 of 2 bottles positive    BCID2 Panel [[id number]]  (Abnormal) Collected: 03/29/25 1037    Order Status: Completed Specimen: Blood Updated: 03/29/25 2324     CTX-M (ESBL ) Not Detected     IMP (Cabapenemase ) Not Detected     KPC resistance gene (Carbapenemase ) Not Detected     mcr-1 Not Detected     mecA ID N/A     Comment: Note: Antimicrobial resistance can occur via multiple mechanisms. A Not Detected result for antimicrobial resistance gene(s) does not indicate antimicrobial susceptibility. Subculturing is required for species identification and susceptibility testing of   isolates.        mecA/C and MREJ (MRSA) gene N/A     NDM (Carbapenemase ) Not Detected     OXA-48-like (Carbapenemase ) Not Detected     [place]/B (VRE gene) N/A     VIM (Carbapenemase ) Not Detected     Enterococcus faecalis Not Detected     Enterococcus faecium Not Detected      Listeria monocytogenes Not Detected     Staphylococcus spp. Not Detected     Staphylococcus aureus Not Detected     Staphylococcus epidermidis Not Detected     Staphylococcus lugdunensis Not Detected     Streptococcus spp. Not Detected     Streptococcus agalactiae (Group B) Not Detected     Streptococcus pneumoniae Not Detected     Streptococcus pyogenes (Group A) Not Detected     Acinetobacter calcoaceticus/baumannii complex Not Detected     Bacteroides fragilis Not Detected     Enterobacterales See Species for ID     Enterobacter cloacae complex Not Detected     Escherichia coli Detected     Klebsiella aerogenes Not Detected     Klebsiella oxytoca Not Detected     Klebsiella pneumoniae group Not Detected     Proteus spp. Not Detected     Salmonella spp. Not Detected     Serratia marcescens Not Detected     Haemophilus influenzae Not Detected     Neisseria meningitidis Not Detected     Pseudomonas aeruginosa Not Detected     Stenotrophomonas maltophilia Not Detected     Candida albicans Not Detected     Candida auris Not Detected     Candida glabrata Not Detected     Candida krusei Not Detected     Candida parapsilosis Not Detected     Candida tropicalis Not Detected     Cryptococcus neoformans/gattii Not Detected    Narrative:      The [employer] BCID2 Panel is a multiplexed nucleic acid test intended for the use with [employer]® 2.0 or [employer]® [employer] for the simultaneous qualitative detection and identification of multiple bacterial and yeast nucleic acids and select genetic determinants associated with antimicrobial resistance.  The [employer] BCID2 Panel test is performed directly on blood culture samples identified as positive by a continuous monitoring blood culture system.  Results are intended to be interpreted in conjunction with Gram stain results.             Radiology:  X-Ray Chest 1 View  Narrative: EXAMINATION:  XR CHEST 1 VIEW    CLINICAL HISTORY:  R CVC;    TECHNIQUE:  One  view    COMPARISON:  March 29, 2025.    FINDINGS:  Cardiopericardial silhouette is within normal limits.  Right IJ approach central venous catheter terminates within distal superior vena cava.  There is no pneumothorax.  Bilateral basilar opacities are new and likely represent atelectasis.  No pulmonary edema.  Impression: Central venous catheter terminates within distal superior vena cava.    Electronically signed by: Wilmer Hooper  Date:    03/30/2025  Time:    13:16        Medications:  Scheduled Meds:   ciprofloxacin  200 mg Intravenous Q12H    heparin (porcine)  3,000 Units Intravenous Once    mupirocin   Nasal BID     Continuous Infusions:   NORepinephrine bitartrate-D5W  0-3 mcg/kg/min Intravenous Continuous        pantoprazole (PROTONIX) IV infusion  8 mg/hr Intravenous Continuous 20 mL/hr at 04/01/25 0243 8 mg/hr at 04/01/25 0243     PRN Meds:.  Current Facility-Administered Medications:     0.9%  NaCl infusion (for blood administration), , Intravenous, Q24H PRN    acetaminophen, 650 mg, Oral, Q8H PRN    albumin human 25%, 25 g, Intravenous, Q20 Min PRN    calcium gluconate IVPB, 1 g, Intravenous, PRN    calcium gluconate IVPB, 2 g, Intravenous, PRN    calcium gluconate IVPB, 3 g, Intravenous, PRN    magnesium sulfate 2 g IVPB, 2 g, Intravenous, PRN    magnesium sulfate 2 g IVPB, 2 g, Intravenous, PRN    melatonin, 6 mg, Oral, Nightly PRN    morphine, 2 mg, Intravenous, Q4H PRN    ondansetron, 4 mg, Intravenous, Q8H PRN    potassium chloride, 40 mEq, Intravenous, PRN **AND** potassium chloride, 60 mEq, Intravenous, PRN **AND** potassium chloride, 80 mEq, Intravenous, PRN    sodium chloride 0.9%, 10 mL, Intravenous, PRN    sodium chloride 0.9%, 10 mL, Intravenous, PRN    sodium phosphate 15 mmol in D5W 250 mL IVPB, 15 mmol, Intravenous, PRN    sodium phosphate 20.1 mmol in D5W 250 mL IVPB, 20.1 mmol, Intravenous, PRN    sodium phosphate 30 mmol in D5W 250 mL IVPB, 30 mmol, Intravenous,  PRN    Nutrition:  Nutrition consulted. Most recent weight and BMI monitored-     Measurements:  Wt Readings from Last 1 Encounters:   03/30/25 74.8 kg (164 lb 15.9 oz)   Body mass index is 25.84 kg/m².    Patient has been screened and assessed by RD.    Malnutrition Type:  Context:    Level: other (see comments) (Does not meet criteria)    Malnutrition Characteristic Summary:  Weight Loss (Malnutrition): other (see comments) (Does not meet criteria)  Muscle Mass (Malnutrition): mild depletion  Fluid Accumulation (Malnutrition): other (see comments) (Not present)    Interventions/Recommendations (treatment strategy):           Assessment/Plan:   Severe sepsis, septic shock  Suspected pyelonephritis  Symptomatic anemia   Multiple nonbleeding gastric ulcerations/duodenitis  Acute renal failure  Metabolic acidosis  Hyponatremia     Day 2 of Protonix drip.  Plan to continue another 24 hours and then transitioned to b.i.d. dosing .  GI recommending repeat EGD in 2 months  Hemoglobin slightly lower this morning at 8.9.  Will continue to monitor.  No transfusion this morning.    Patient remains on IV antibiotics for pyelonephritis.  Still significant leukocytosis but trending down.  Remains on IV ciprofloxacin.  Urine culture grew E coli.  Blood culture on 03/29 was also positive for E coli.  Repeat on 03/30 negative so far.  Blood pressure stabilized.  No further vasopressor support needed.  He is downgraded to the floor but remains boarding in the ICU.  Nephrology continues to follow along.  Renal function has continued to improve.  No need for acute hemodialysis.  Electrolytes stable.    Critical care diagnosis: sepsis, iv antibiotics  Critical care interventions: hands on evaluation, review of labs/radiographs/records and discussions with family  Critical care time spent: >32 minutes      Trevor Winchester MD   04/01/2025     All diagnosis and differential diagnosis have been reviewed; assessment and plan has been  documented; I have personally reviewed the labs and test results that are presently available; I have reviewed the patients medication list; I have reviewed the consulting providers response and recommendations. I have reviewed or attempted to review medical records based upon their availability    All of the patient's questions have been  addressed and answered. Patient's is agreeable to the above stated plan. I will continue to monitor closely and make adjustments to medical management as needed.  _____________________________________________________________________

## 2025-04-01 NOTE — PROGRESS NOTES
[facility] Inpatient Progress Note      HPI:     Patient Name: Tao Bee  Admission Date: 3/29/2025  Hospital Length of Stay: 3 days  Code Status: Full Code   Attending Physician: Trevor Winchester MD   Primary Care Physician: Penny Garsia FNP  Principal Problem:<principal problem not specified>      Today patient seen and examined  Labs, recent events, imaging and medications reviewed for this hospital stay  Alert and responsive  +urine output    Review of Systems:   Constitutional: fever pattern improved  Skin: Denies wounds, no rashes, no itching, no new skin lesions  HEENT: Denies acute change in hearing or vision, tinnitus, or dysphagia  Respiratory:  Denies cough, shortness of breath, or wheezing  Cardiovascular: Denies chest pain, palpitations, or swelling  Gastrointestional: no recent GIB episodes  Genitourinary: catheter  Musculoskeletal: Denies myalgias, back pain, flank pain, new decreased ROM or acute focal weakness  Neurological: Denies headaches, seizures, dizziness, paresthesias or asterixis  Hematological: Denies unusual bruising or bleeding        Medications:   Scheduled Meds:   ciprofloxacin  200 mg Intravenous Q12H    heparin (porcine)  3,000 Units Intravenous Once    mupirocin   Nasal BID     Continuous Infusions:   pantoprazole (PROTONIX) IV infusion  8 mg/hr Intravenous Continuous 20 mL/hr at 04/01/25 0243 8 mg/hr at 04/01/25 0243         Vitals:     Vitals:    04/01/25 0400 04/01/25 0500 04/01/25 0510 04/01/25 0600   BP: (!) 115/59 (S) (!) 174/58 (!) 149/66 (!) 171/83   Pulse: 99 106 102 105   Resp: (!) 21 19 20 17   Temp:       TempSrc:       SpO2: 100% 97% 97% 96%   Weight:       Height:             I/O last 3 completed shifts:  In: 1633.8 [I.V.:1436.4; IV Piggyback:197.3]  Out: 1335 [Urine:1335]    Intake/Output Summary (Last 24 hours) at 4/1/2025 0809  Last data filed at 4/1/2025 0600  Gross per 24 hour   Intake 135.81 ml   Output 975 ml   Net -839.19 ml       Physical Exam:    General: no acute distress, awake, alert  Eyes: PERRLA, EOMI, conjunctiva clear, eyelids without swelling  HENT: atraumatic, oropharynx and nasal mucosa patent  Neck: full ROM, no JVD, no thyromegaly or lymphadenopathy  Respiratory: equal, unlabored, rhonchi  Cardiovascular: RRR without  rub; BL radial and pedal pulses felt  Edema: none  Gastrointestinal: soft, non-tender, non-distended; positive bowel sounds; no masses to palpation  Genitourinary: arambula  Musculoskeletal: full ROM without limitation or discomfort  Integumentary: warm, dry; no rashes, wounds, or skin lesions  Neurological: oriented, appropriate, no acute deficits        Labs:     Chemistries:   Recent Labs   Lab 03/29/25  0829 03/30/25  0345 03/30/25  1012 03/31/25  0209 04/01/25  0401   * 125* 127* 134* 134*   K 4.3 3.9 4.0 3.3* 3.5   CL 92* 93* 94* 92* 95*   CO2 20* 15* 16* 30* 29   .9* 135.0* 129.2* 60.4* 67.7*   CREATININE 11.76* 10.50* 10.45* 5.55* 5.86*   CALCIUM 8.9 7.5* 7.9* 7.5* 7.4*   BILITOT 0.5  --  0.5 0.5 0.3   ALKPHOS 98  --  85 73 85   ALT 14  --  10 10 8   AST 20  --  20 23 19   GLUCOSE 123* 121* 131* 115* 134*   MG 2.20 1.80  --  1.70  --    PHOS  --   --   --   --  4.3        CBC/Anemia Labs: Coags:    Recent Labs   Lab 03/29/25  0829 03/30/25  0345 03/30/25  0346 03/30/25  0552 03/31/25  0209 04/01/25  0401   WBC 47.97  47.97*  --  37.17  37.17*  --  39.45  39.45* 35.84  35.84*   HGB 8.7*  --  7.1*  --  9.6* 8.9*   HCT 23.3*  --  19.0*  --  26.4* 25.6*   *  --  137  --  164 264   MCV 79.3*  --  79.8*  --  81.0 83.7   RDW 16.8  --  16.5  --  15.6 16.7   IRON 28*  --   --  15*  --   --    FERRITIN  --   --   --  264.66  --   --    FOLATE  --  5.1*  --   --  4.8*  --    YOZYEXFT60 836*  --   --  871*  --   --     Recent Labs   Lab 03/30/25 2016   INR 1.4*          Impression:     Ecoli bacteremia/sepsis with LEANDRO  Pt required emergency HD 2 days ago  Gastric ulcerations/duodenitis    Plan:     Cont  antibiotics  No acute indications for HD  Will monitor renal trend  Start LR at 75cc/hours       Alirio Black

## 2025-04-01 NOTE — PROGRESS NOTES
UROLOGY  PROGRESS  NOTE    Tao Bee 1965  [id number]  4/1/2025    Patient resting in bed   No complaints during rounds   Nguyen functioning well   No acute issues overnight  Downgraded from ICU    Afebrile  475 mL urine output overnight   WBC 35.84   H&H 8.9/25.6   BUN and creatinine 67.7/5.86    Intake/Output:  I/O this shift:  In: 200 [P.O.:200]  Out: -   I/O last 3 completed shifts:  In: 1633.8 [I.V.:1436.4; IV Piggyback:197.3]  Out: 1335 [Urine:1335]     Exam:    NAD  Card: RRR  Resp: unlabored  Abd:  Soft, NT ND  :  light yellow urine draining to  bag    Recent Results (from the past 24 hours)   Comprehensive Metabolic Panel    Collection Time: 04/01/25  4:01 AM   Result Value Ref Range    Sodium 134 (L) 136 - 145 mmol/L    Potassium 3.5 3.5 - 5.1 mmol/L    Chloride 95 (L) 98 - 107 mmol/L    CO2 29 22 - 29 mmol/L    Glucose 134 (H) 74 - 100 mg/dL    Blood Urea Nitrogen 67.7 (H) 8.4 - 25.7 mg/dL    Creatinine 5.86 (H) 0.72 - 1.25 mg/dL    Calcium 7.4 (L) 8.4 - 10.2 mg/dL    Protein Total 4.2 (L) 6.4 - 8.3 gm/dL    Albumin 1.6 (L) 3.5 - 5.0 g/dL    Globulin 2.6 2.4 - 3.5 gm/dL    Albumin/Globulin Ratio 0.6 (L) 1.1 - 2.0 ratio    Bilirubin Total 0.3 <=1.5 mg/dL    ALP 85 40 - 150 unit/L    ALT 8 0 - 55 unit/L    AST 19 11 - 45 unit/L    eGFR 10 mL/min/1.73/m2    Anion Gap 10.0 mEq/L    BUN/Creatinine Ratio 12    Phosphorus    Collection Time: 04/01/25  4:01 AM   Result Value Ref Range    Phosphorus Level 4.3 2.3 - 4.7 mg/dL   CBC with Differential    Collection Time: 04/01/25  4:01 AM   Result Value Ref Range    WBC 35.84 (H) 4.50 - 11.50 x10(3)/mcL    RBC 3.06 (L) 4.70 - 6.10 x10(6)/mcL    Hgb 8.9 (L) 14.0 - 18.0 g/dL    Hct 25.6 (L) 42.0 - 52.0 %    MCV 83.7 80.0 - 94.0 fL    MCH 29.1 27.0 - 31.0 pg    MCHC 34.8 33.0 - 36.0 g/dL    RDW 16.7 11.5 - 17.0 %    Platelet 264 130 - 400 x10(3)/mcL    MPV 11.5 (H) 7.4 - 10.4 fL   Manual Differential    Collection Time: 04/01/25  4:01 AM   Result Value Ref  Range    WBC 35.84 x10(3)/mcL    Neutrophils % 85 %    Lymphs % 7 %    Monocytes % 4 %    Metamyelocytes % 3 (H) <=0 %    Promyelocytes % 1 (H) <=0 %    Neutrophils Abs 30.464 (H) 2.1 - 9.2 x10(3)/mcL    Lymphs Abs 2.5088 0.6 - 4.6 x10(3)/mcL    Monocytes Abs 1.4336 (H) 0.1 - 1.3 x10(3)/mcL    Platelets Normal Normal, Adequate    RBC Morph Abnormal (A) Normal    Poikilocytosis 1+ (A) (none)    Ovalocytes 1+ (A) (none)    Target Cells 1+ (A) (none)            Urine culture [[id number]] (Abnormal)  Collected: 03/29/25 1159   Order Status: Completed Specimen: Urine Updated: 03/31/25 0933    Urine Culture >/= 100,000 colonies/ml Escherichia coli Abnormal    Susceptibility     Escherichia coli     SERA     Ampicillin <=2 Sensitive     Cefazolin (Other) 2 Sensitive     Cefazolin (Urine) 2 Sensitive     Cefepime <=0.12 Sensitive     Ceftriaxone <=0.25 Sensitive     Ciprofloxacin <=0.06 Sensitive     ESBL Neg Negative     Gentamicin <=1 Sensitive     Levofloxacin <=0.12 Sensitive     Meropenem <=0.25 Sensitive     Nitrofurantoin <=16 Sensitive     Piperacillin/Tazobactam <=4 Sensitive     Trimeth/Sulfa <=20 Sensitive                     Assessment:  59-year-old male admitted with septic shock secondary to pyelonephritis with acute renal failure   -last dialyzed 3/30 - nephrology following  -blood and urine cultures with E coli - on Cipro    Plan:  -continue indwelling Nguyen   -continue culture specific antibiotics   -no indication for urologic intervention at this time   -Plan for void trial prior to discharge once he his acute issues are stabilized  -if he does not continue to progress as expected with antibiotics, recommend repeat imaging      Le Boss NP

## 2025-04-02 LAB
ABS NEUT (OLG): 32.9 X10(3)/MCL (ref 2.1–9.2)
ALBUMIN SERPL-MCNC: 1.7 G/DL (ref 3.5–5)
ALBUMIN/GLOB SERPL: 0.6 RATIO (ref 1.1–2)
ALP SERPL-CCNC: 73 UNIT/L (ref 40–150)
ALT SERPL-CCNC: 5 UNIT/L (ref 0–55)
ANION GAP SERPL CALC-SCNC: 11 MEQ/L
ANISOCYTOSIS BLD QL SMEAR: ABNORMAL
APICAL FOUR CHAMBER EJECTION FRACTION: 59 %
APICAL TWO CHAMBER EJECTION FRACTION: 62 %
AST SERPL-CCNC: 16 UNIT/L (ref 11–45)
AV INDEX (PROSTH): 0.7
AV MEAN GRADIENT: 9 MMHG
AV PEAK GRADIENT: 18 MMHG
AV VALVE AREA BY VELOCITY RATIO: 2.5 CM²
AV VALVE AREA: 2.4 CM²
AV VELOCITY RATIO: 0.71
BILIRUB SERPL-MCNC: 0.4 MG/DL
BSA FOR ECHO PROCEDURE: 1.88 M2
BUN SERPL-MCNC: 66.5 MG/DL (ref 8.4–25.7)
CALCIUM SERPL-MCNC: 7.8 MG/DL (ref 8.4–10.2)
CHLORIDE SERPL-SCNC: 97 MMOL/L (ref 98–107)
CO2 SERPL-SCNC: 29 MMOL/L (ref 22–29)
CREAT SERPL-MCNC: 5.9 MG/DL (ref 0.72–1.25)
CREAT/UREA NIT SERPL: 11
CV ECHO LV RWT: 0.55 CM
DOP CALC AO PEAK VEL: 2.1 M/S
DOP CALC AO VTI: 32.8 CM
DOP CALC LVOT AREA: 3.5 CM2
DOP CALC LVOT DIAMETER: 2.1 CM
DOP CALC LVOT PEAK VEL: 1.5 M/S
DOP CALC LVOT STROKE VOLUME: 79.6 CM3
DOP CALC MV VTI: 33.4 CM
DOP CALCLVOT PEAK VEL VTI: 23 CM
E WAVE DECELERATION TIME: 280 MSEC
E/A RATIO: 0.78
E/E' RATIO: 8 M/S
ECHO LV POSTERIOR WALL: 1.1 CM (ref 0.6–1.1)
ERYTHROCYTE [DISTWIDTH] IN BLOOD BY AUTOMATED COUNT: 17 % (ref 11.5–17)
FRACTIONAL SHORTENING: 40 % (ref 28–44)
GFR SERPLBLD CREATININE-BSD FMLA CKD-EPI: 10 ML/MIN/1.73/M2
GLOBULIN SER-MCNC: 2.9 GM/DL (ref 2.4–3.5)
GLUCOSE SERPL-MCNC: 100 MG/DL (ref 74–100)
HCT VFR BLD AUTO: 25.6 % (ref 42–52)
HGB BLD-MCNC: 9 G/DL (ref 14–18)
HR MV ECHO: 87 BPM
INSTRUMENT WBC (OLG): 36.55 X10(3)/MCL
INTERVENTRICULAR SEPTUM: 1.1 CM (ref 0.6–1.1)
LEFT ATRIUM AREA SYSTOLIC (APICAL 2 CHAMBER): 13.8 CM2
LEFT ATRIUM SIZE: 2.6 CM
LEFT INTERNAL DIMENSION IN SYSTOLE: 2.4 CM (ref 2.1–4)
LEFT VENTRICLE DIASTOLIC VOLUME INDEX: 37.63 ML/M2
LEFT VENTRICLE DIASTOLIC VOLUME: 70 ML
LEFT VENTRICLE END DIASTOLIC VOLUME APICAL 2 CHAMBER: 88.6 ML
LEFT VENTRICLE END DIASTOLIC VOLUME APICAL 4 CHAMBER: 79.3 ML
LEFT VENTRICLE END SYSTOLIC VOLUME APICAL 2 CHAMBER: 31.3 ML
LEFT VENTRICLE MASS INDEX: 78.3 G/M2
LEFT VENTRICLE SYSTOLIC VOLUME INDEX: 10.8 ML/M2
LEFT VENTRICLE SYSTOLIC VOLUME: 20 ML
LEFT VENTRICULAR INTERNAL DIMENSION IN DIASTOLE: 4 CM (ref 3.5–6)
LEFT VENTRICULAR MASS: 145.6 G
LV LATERAL E/E' RATIO: 7.3 M/S
LV SEPTAL E/E' RATIO: 9.7 M/S
LVED V (TEICH): 70 ML
LVES V (TEICH): 19.5 ML
LVOT MG: 5 MMHG
LVOT MV: 0.98 CM/S
LYMPHOCYTES NFR BLD MANUAL: 1.83 X10(3)/MCL (ref 0.6–4.6)
LYMPHOCYTES NFR BLD MANUAL: 5 %
MACROCYTES BLD QL SMEAR: ABNORMAL
MCH RBC QN AUTO: 29.7 PG (ref 27–31)
MCHC RBC AUTO-ENTMCNC: 35.2 G/DL (ref 33–36)
MCV RBC AUTO: 84.5 FL (ref 80–94)
MONOCYTES NFR BLD MANUAL: 1.83 X10(3)/MCL (ref 0.1–1.3)
MONOCYTES NFR BLD MANUAL: 5 %
MV MEAN GRADIENT: 4 MMHG
MV PEAK A VEL: 1.12 M/S
MV PEAK E VEL: 0.87 M/S
MV PEAK GRADIENT: 5 MMHG
MV STENOSIS PRESSURE HALF TIME: 69 MS
MV VALVE AREA BY CONTINUITY EQUATION: 2.38 CM2
MV VALVE AREA P 1/2 METHOD: 3.19 CM2
NEUTROPHILS NFR BLD MANUAL: 90 %
OHS LV EJECTION FRACTION SIMPSONS BIPLANE MOD: 60 %
PHOSPHATE SERPL-MCNC: 4.5 MG/DL (ref 2.3–4.7)
PISA TR MAX VEL: 2.3 M/S
PLATELET # BLD AUTO: 397 X10(3)/MCL (ref 130–400)
PLATELET # BLD EST: NORMAL 10*3/UL
PMV BLD AUTO: 11.4 FL (ref 7.4–10.4)
POIKILOCYTOSIS BLD QL SMEAR: ABNORMAL
POTASSIUM SERPL-SCNC: 3.9 MMOL/L (ref 3.5–5.1)
PROT SERPL-MCNC: 4.6 GM/DL (ref 6.4–8.3)
PV PEAK GRADIENT: 8 MMHG
PV PEAK VELOCITY: 1.41 M/S
RBC # BLD AUTO: 3.03 X10(6)/MCL (ref 4.7–6.1)
RBC MORPH BLD: ABNORMAL
SODIUM SERPL-SCNC: 137 MMOL/L (ref 136–145)
TARGETS BLD QL SMEAR: ABNORMAL
TDI LATERAL: 0.12 M/S
TDI SEPTAL: 0.09 M/S
TDI: 0.11 M/S
TR MAX PG: 21 MMHG
TRICUSPID ANNULAR PLANE SYSTOLIC EXCURSION: 2.68 CM
WBC # BLD AUTO: 36.55 X10(3)/MCL (ref 4.5–11.5)
Z-SCORE OF LEFT VENTRICULAR DIMENSION IN END DIASTOLE: -2.58
Z-SCORE OF LEFT VENTRICULAR DIMENSION IN END SYSTOLE: -2.26

## 2025-04-02 PROCEDURE — 94799 UNLISTED PULMONARY SVC/PX: CPT

## 2025-04-02 PROCEDURE — 25000003 PHARM REV CODE 250: Performed by: INTERNAL MEDICINE

## 2025-04-02 PROCEDURE — 27000221 HC OXYGEN, UP TO 24 HOURS

## 2025-04-02 PROCEDURE — 25000003 PHARM REV CODE 250: Performed by: NURSE PRACTITIONER

## 2025-04-02 PROCEDURE — 36415 COLL VENOUS BLD VENIPUNCTURE: CPT | Performed by: HOSPITALIST

## 2025-04-02 PROCEDURE — 85025 COMPLETE CBC W/AUTO DIFF WBC: CPT | Performed by: HOSPITALIST

## 2025-04-02 PROCEDURE — 25000003 PHARM REV CODE 250: Performed by: HOSPITALIST

## 2025-04-02 PROCEDURE — 99900035 HC TECH TIME PER 15 MIN (STAT)

## 2025-04-02 PROCEDURE — 63600175 PHARM REV CODE 636 W HCPCS: Performed by: HOSPITALIST

## 2025-04-02 PROCEDURE — 21400001 HC TELEMETRY ROOM

## 2025-04-02 PROCEDURE — 63600175 PHARM REV CODE 636 W HCPCS: Performed by: INTERNAL MEDICINE

## 2025-04-02 PROCEDURE — 80053 COMPREHEN METABOLIC PANEL: CPT | Performed by: INTERNAL MEDICINE

## 2025-04-02 PROCEDURE — 84100 ASSAY OF PHOSPHORUS: CPT | Performed by: INTERNAL MEDICINE

## 2025-04-02 PROCEDURE — 99233 SBSQ HOSP IP/OBS HIGH 50: CPT | Mod: ,,, | Performed by: INTERNAL MEDICINE

## 2025-04-02 PROCEDURE — 94761 N-INVAS EAR/PLS OXIMETRY MLT: CPT

## 2025-04-02 RX ADMIN — MUPIROCIN: 20 OINTMENT TOPICAL at 08:04

## 2025-04-02 RX ADMIN — Medication 6 MG: at 08:04

## 2025-04-02 RX ADMIN — MUPIROCIN: 20 OINTMENT TOPICAL at 09:04

## 2025-04-02 RX ADMIN — ACETAMINOPHEN 1000 MG: 500 TABLET ORAL at 11:04

## 2025-04-02 RX ADMIN — PANTOPRAZOLE SODIUM 8 MG/HR: 40 INJECTION, POWDER, FOR SOLUTION INTRAVENOUS at 04:04

## 2025-04-02 RX ADMIN — CEFTRIAXONE SODIUM 2 G: 2 INJECTION, POWDER, FOR SOLUTION INTRAMUSCULAR; INTRAVENOUS at 03:04

## 2025-04-02 NOTE — PLAN OF CARE
Problem: Infection  Goal: Absence of Infection Signs and Symptoms  4/2/2025 1611 by Stephanie Berman LPN  Outcome: Progressing  4/2/2025 1057 by Stephanie Berman LPN  Outcome: Progressing     Problem: Adult Inpatient Plan of Care  Goal: Plan of Care Review  4/2/2025 1611 by Stephanie Berman LPN  Outcome: Progressing  4/2/2025 1057 by Stephanie Berman LPN  Outcome: Progressing  Goal: Patient-Specific Goal (Individualized)  4/2/2025 1611 by Stephanie Berman LPN  Outcome: Progressing  4/2/2025 1057 by Stephanie Berman LPN  Outcome: Progressing  Goal: Absence of Hospital-Acquired Illness or Injury  4/2/2025 1611 by Stephanie Berman LPN  Outcome: Progressing  4/2/2025 1057 by Stephanie Berman LPN  Outcome: Progressing  Goal: Optimal Comfort and Wellbeing  4/2/2025 1611 by Stephanie Berman LPN  Outcome: Progressing  4/2/2025 1057 by Stephanie Berman LPN  Outcome: Progressing  Goal: Readiness for Transition of Care  4/2/2025 1611 by Stephanie Berman LPN  Outcome: Progressing  4/2/2025 1057 by Stephanie Berman LPN  Outcome: Progressing     Problem: Fall Injury Risk  Goal: Absence of Fall and Fall-Related Injury  4/2/2025 1611 by Stephanie Berman LPN  Outcome: Progressing  4/2/2025 1057 by Stephanie Berman LPN  Outcome: Progressing     Problem: Hemodialysis  Goal: Safe, Effective Therapy Delivery  4/2/2025 1611 by Stephanie Berman LPN  Outcome: Progressing  4/2/2025 1057 by Stephanie Berman LPN  Outcome: Progressing  Goal: Effective Tissue Perfusion  4/2/2025 1611 by Stephanie Berman LPN  Outcome: Progressing  4/2/2025 1057 by Stephanie Berman LPN  Outcome: Progressing  Goal: Absence of Infection Signs and Symptoms  4/2/2025 1611 by Stephanie Berman LPN  Outcome: Progressing  4/2/2025 1057 by Stephanie Berman LPN  Outcome: Progressing     Problem: Skin Injury Risk Increased  Goal: Skin Health and Integrity  4/2/2025 1611 by Stephanie Berman LPN  Outcome: Progressing  4/2/2025 1057 by Stephanie Berman LPN  Outcome: Progressing

## 2025-04-02 NOTE — PLAN OF CARE
Problem: Infection  Goal: Absence of Infection Signs and Symptoms  Outcome: Progressing     Problem: Adult Inpatient Plan of Care  Goal: Plan of Care Review  Outcome: Progressing  Goal: Patient-Specific Goal (Individualized)  Outcome: Progressing  Goal: Absence of Hospital-Acquired Illness or Injury  Outcome: Progressing  Goal: Optimal Comfort and Wellbeing  Outcome: Progressing  Goal: Readiness for Transition of Care  Outcome: Progressing     Problem: Fall Injury Risk  Goal: Absence of Fall and Fall-Related Injury  Outcome: Progressing     Problem: Hemodialysis  Goal: Safe, Effective Therapy Delivery  Outcome: Progressing  Goal: Effective Tissue Perfusion  Outcome: Progressing  Goal: Absence of Infection Signs and Symptoms  Outcome: Progressing     Problem: Skin Injury Risk Increased  Goal: Skin Health and Integrity  Outcome: Progressing

## 2025-04-02 NOTE — PLAN OF CARE
Problem: Infection  Goal: Absence of Infection Signs and Symptoms  Outcome: Progressing     Problem: Adult Inpatient Plan of Care  Goal: Plan of Care Review  Outcome: Progressing  Goal: Patient-Specific Goal (Individualized)  Outcome: Progressing  Goal: Absence of Hospital-Acquired Illness or Injury  Outcome: Progressing  Goal: Optimal Comfort and Wellbeing  Outcome: Progressing  Goal: Readiness for Transition of Care  Outcome: Progressing     Problem: Fall Injury Risk  Goal: Absence of Fall and Fall-Related Injury  Outcome: Progressing

## 2025-04-02 NOTE — PROGRESS NOTES
[facility] Hospital Medicine Progress Note        Chief Complaint: Inpatient Follow-up     HPI:   59-year-old male admitted to hospital medicine on 03/29/2025 secondary to lower abdominal pain and diarrhea for the last 3 days. Associated with nausea but no vomiting. Did report some melena starting about 3 days ago. No previous history of renal disease. No change in pain with eating. He does report decreased urine output. In the emergency room his vital signs are stable. Blood pressure is little soft but responsive to fluids. He was noted to have significant hyponatremia with a sodium of 130. Creatinine markedly elevated at 11.76. Also significant leukocytosis of 47.97. CT of the abdomen shows edematous appearance of the right kidney with fullness in the upper pole. Differential included mass, phlegmon, or focal pyelonephritis. Renal ultrasound showed findings concerning for pelvic calcitonin versus. He was started empirically on vancomycin and Zosyn. A consult has been placed to Urology. No urine specimen has yet been collected secondary to oliguria.      Patient was upgraded to the ICU on the afternoon of 3/30 due to concern for septic shock.  He was briefly placed on vasopressors but then transfused and blood pressure stabilized he was downgraded back to hospitalist services on 03/31.  During his ICU stay GI was also consulted secondary to melena.  Took for EGD which revealed multiple nonbleeding gastric ulcer in the antrum with a clean base.  He was started on a Protonix drip.  Recommending continued PPI drip for 72 hours and then b.i.d..  Will need a repeat EGD in 8 weeks..  He continues to make improvement.  No longer febrile.  His white count is trending down.  Discussed with nursing at bedside.  Making significant improvement in his mental status.     Interval History:  Patient is sitting up in bed.  Feeling well.  Asking for breakfast.  Denies any current complaints.  Pain is  well controlled.  Nursing reports that he did have a fever last night of 102.4.  Remains on IV antibiotics.  Still on Protonix drip as well.  No evidence of melena or hematochezia    Physical exam:  General appearance: Well-developed, well-nourished male in no apparent distress.  HENT: Atraumatic head. Moist mucous membranes of oral cavity.  Eyes: Normal extraocular movements.   Neck: Supple.   Lungs: Clear to auscultation bilaterally. No wheezing present.   Heart: Regular rate and rhythm. S1 and S2 present with no murmurs/gallop/rub. No pedal edema. No JVD present.   Abdomen: Soft, non-distended, non-tender. No rebound tenderness/guarding. Bowel sounds are normal.   Extremities: No cyanosis, clubbing, or edema.  Skin: No Rash.   Neuro: Motor and sensory exams grossly intact. Good tone. Muscle strength 5/5 in all 4 extremities  Psych/mental status: Appropriate mood and affect. Responds appropriately to questions.     VITAL SIGNS: 24 HRS MIN & MAX LAST   Temp  Min: 98.6 °F (37 °C)  Max: 102.4 °F (39.1 °C) 99.6 °F (37.6 °C)   BP  Min: 103/53  Max: 181/81 (!) 152/86   Pulse  Min: 90  Max: 112  90   Resp  Min: 14  Max: 23 18   SpO2  Min: 92 %  Max: 100 % 98 %       Recent Labs   Lab 03/31/25  0209 04/01/25  0401 04/02/25  0436   WBC 39.45  39.45* 35.84  35.84* 36.55  36.55*   RBC 3.26* 3.06* 3.03*   HGB 9.6* 8.9* 9.0*   HCT 26.4* 25.6* 25.6*   MCV 81.0 83.7 84.5   MCH 29.4 29.1 29.7   MCHC 36.4* 34.8 35.2   RDW 15.6 16.7 17.0    264 397   MPV 12.8* 11.5* 11.4*       Recent Labs   Lab 03/29/25  0829 03/30/25  0345 03/30/25  1012 03/31/25  0209 04/01/25  0401 04/02/25  0436   * 125*   < > 134* 134* 137   K 4.3 3.9   < > 3.3* 3.5 3.9   CL 92* 93*   < > 92* 95* 97*   CO2 20* 15*   < > 30* 29 29   .9* 135.0*   < > 60.4* 67.7* 66.5*   CREATININE 11.76* 10.50*   < > 5.55* 5.86* 5.90*   CALCIUM 8.9 7.5*   < > 7.5* 7.4* 7.8*   MG 2.20 1.80  --  1.70  --   --    ALBUMIN 2.1*  --    < > 1.6* 1.6* 1.7*    ALKPHOS 98  --    < > 73 85 73   ALT 14  --    < > 10 8 5   AST 20  --    < > 23 19 16   BILITOT 0.5  --    < > 0.5 0.3 0.4    < > = values in this interval not displayed.          Microbiology Results (last 7 days)       Procedure Component Value Units Date/Time    Blood Culture [[id number]]  (Normal) Collected: 03/30/25 2016    Order Status: Completed Specimen: Blood from Arm, Left Updated: 04/01/25 2201     Blood Culture No Growth At 48 Hours    Blood Culture [[id number]]  (Abnormal) Collected: 03/30/25 2016    Order Status: Completed Specimen: Blood from Arm, Left Updated: 04/01/25 1422     GRAM STAIN Gram Negative Rods      Seen in gram stain of broth only      1 of 1 Pediatric bottle positive    Urine culture [[id number]]  (Abnormal)  (Susceptibility) Collected: 03/29/25 1159    Order Status: Completed Specimen: Urine Updated: 03/31/25 0933     Urine Culture >/= 100,000 colonies/ml Escherichia coli    Blood culture x two cultures. Draw prior to antibiotics. [[id number]]  (Abnormal)  (Susceptibility) Collected: 03/29/25 1037    Order Status: Completed Specimen: Blood Updated: 03/31/25 0711     Blood Culture Escherichia coli     GRAM STAIN Gram Negative Rods      Seen in gram stain of broth only      2 of 2 bottles positive    Blood culture x two cultures. Draw prior to antibiotics. [[id number]]  (Abnormal)  (Susceptibility) Collected: 03/29/25 1055    Order Status: Completed Specimen: Blood Updated: 03/31/25 0710     Blood Culture Escherichia coli     GRAM STAIN Gram Negative Rods      Seen in gram stain of broth only      2 of 2 bottles positive    BCID2 Panel [[id number]]  (Abnormal) Collected: 03/29/25 1037    Order Status: Completed Specimen: Blood Updated: 03/29/25 2324     CTX-M (ESBL ) Not Detected     IMP (Cabapenemase ) Not Detected     KPC resistance gene (Carbapenemase ) Not Detected     mcr-1 Not Detected     mecA ID N/A     Comment: Note: Antimicrobial resistance can occur  via multiple mechanisms. A Not Detected result for antimicrobial resistance gene(s) does not indicate antimicrobial susceptibility. Subculturing is required for species identification and susceptibility testing of   isolates.        mecA/C and MREJ (MRSA) gene N/A     NDM (Carbapenemase ) Not Detected     OXA-48-like (Carbapenemase ) Not Detected     [place]/B (VRE gene) N/A     VIM (Carbapenemase ) Not Detected     Enterococcus faecalis Not Detected     Enterococcus faecium Not Detected     Listeria monocytogenes Not Detected     Staphylococcus spp. Not Detected     Staphylococcus aureus Not Detected     Staphylococcus epidermidis Not Detected     Staphylococcus lugdunensis Not Detected     Streptococcus spp. Not Detected     Streptococcus agalactiae (Group B) Not Detected     Streptococcus pneumoniae Not Detected     Streptococcus pyogenes (Group A) Not Detected     Acinetobacter calcoaceticus/baumannii complex Not Detected     Bacteroides fragilis Not Detected     Enterobacterales See Species for ID     Enterobacter cloacae complex Not Detected     Escherichia coli Detected     Klebsiella aerogenes Not Detected     Klebsiella oxytoca Not Detected     Klebsiella pneumoniae group Not Detected     Proteus spp. Not Detected     Salmonella spp. Not Detected     Serratia marcescens Not Detected     Haemophilus influenzae Not Detected     Neisseria meningitidis Not Detected     Pseudomonas aeruginosa Not Detected     Stenotrophomonas maltophilia Not Detected     Candida albicans Not Detected     Candida auris Not Detected     Candida glabrata Not Detected     Candida krusei Not Detected     Candida parapsilosis Not Detected     Candida tropicalis Not Detected     Cryptococcus neoformans/gattii Not Detected    Narrative:      The [employer] BCID2 Panel is a multiplexed nucleic acid test intended for the use with [employer]® 2.0 or [employer] for the simultaneous  qualitative detection and identification of multiple bacterial and yeast nucleic acids and select genetic determinants associated with antimicrobial resistance.  The [employer] BCID2 Panel test is performed directly on blood culture samples identified as positive by a continuous monitoring blood culture system.  Results are intended to be interpreted in conjunction with Gram stain results.             Radiology:  X-Ray Chest 1 View  Narrative: EXAMINATION:  XR CHEST 1 VIEW    CLINICAL HISTORY:  R CVC;    TECHNIQUE:  One view    COMPARISON:  March 29, 2025.    FINDINGS:  Cardiopericardial silhouette is within normal limits.  Right IJ approach central venous catheter terminates within distal superior vena cava.  There is no pneumothorax.  Bilateral basilar opacities are new and likely represent atelectasis.  No pulmonary edema.  Impression: Central venous catheter terminates within distal superior vena cava.    Electronically signed by: Wilmer Hooper  Date:    03/30/2025  Time:    13:16        Medications:  Scheduled Meds:   cefTRIAXone (Rocephin) IV (PEDS and ADULTS)  2 g Intravenous Q24H    heparin (porcine)  3,000 Units Intravenous Once    mupirocin   Nasal BID     Continuous Infusions:   lactated ringers   Intravenous Continuous 75 mL/hr at 04/01/25 2200 New Bag at 04/01/25 2200    pantoprazole (PROTONIX) IV infusion  8 mg/hr Intravenous Continuous 20 mL/hr at 04/02/25 0439 8 mg/hr at 04/02/25 0439     PRN Meds:.  Current Facility-Administered Medications:     0.9%  NaCl infusion (for blood administration), , Intravenous, Q24H PRN    acetaminophen, 1,000 mg, Oral, Q6H PRN    albumin human 25%, 25 g, Intravenous, Q20 Min PRN    calcium gluconate IVPB, 1 g, Intravenous, PRN    calcium gluconate IVPB, 2 g, Intravenous, PRN    calcium gluconate IVPB, 3 g, Intravenous, PRN    magnesium sulfate 2 g IVPB, 2 g, Intravenous, PRN    magnesium sulfate 2 g IVPB, 2 g, Intravenous, PRN    melatonin, 6 mg, Oral, Nightly PRN     morphine, 2 mg, Intravenous, Q4H PRN    ondansetron, 4 mg, Intravenous, Q8H PRN    potassium chloride, 40 mEq, Intravenous, PRN **AND** potassium chloride, 60 mEq, Intravenous, PRN **AND** potassium chloride, 80 mEq, Intravenous, PRN    sodium chloride 0.9%, 10 mL, Intravenous, PRN    sodium chloride 0.9%, 10 mL, Intravenous, PRN    sodium phosphate 15 mmol in D5W 250 mL IVPB, 15 mmol, Intravenous, PRN    sodium phosphate 20.1 mmol in D5W 250 mL IVPB, 20.1 mmol, Intravenous, PRN    sodium phosphate 30 mmol in D5W 250 mL IVPB, 30 mmol, Intravenous, PRN    Nutrition:  Nutrition consulted. Most recent weight and BMI monitored-     Measurements:  Wt Readings from Last 1 Encounters:   03/30/25 74.8 kg (164 lb 15.9 oz)   Body mass index is 25.84 kg/m².    Patient has been screened and assessed by RD.    Malnutrition Type:  Context:    Level: other (see comments) (Does not meet criteria)    Malnutrition Characteristic Summary:  Weight Loss (Malnutrition): other (see comments) (Does not meet criteria)  Muscle Mass (Malnutrition): mild depletion  Fluid Accumulation (Malnutrition): other (see comments) (Not present)    Interventions/Recommendations (treatment strategy):           Assessment/Plan:   Severe sepsis, septic shock  Suspected pyelonephritis  Symptomatic anemia   Multiple nonbleeding gastric ulcerations/duodenitis  Acute renal failure  Metabolic acidosis  Hyponatremia     Patient with increased white count this morning and spiking fevers again.  Yesterday we had transitioned him from ciprofloxacin to IV Rocephin for more tailored coverage of E coli growing in his blood and urine.  Repeat blood cultures from 3/30 were negative.  She would be appropriate coverage.  Will continue with Rocephin for now and continue to monitor fever curve.  If still spiking fevers may need repeat draw.  Clinically he does seem much improved from where he was several days ago.  Protonix drip will finish today.  Can be transitioned to oral  Protonix 40 mg b.i.d. afterwards.  Hemoglobin increased to 9.0 today  Nephrology continues to follow along his renal function.  Creatinine 5.9 this morning.  Electrolytes are stable.    Critical care diagnosis: sepsis, iv antibiotics  Critical care interventions: hands on evaluation, review of labs/radiographs/records and discussions with family  Critical care time spent: >32 minutes        Trevor Winchester MD   04/02/2025     All diagnosis and differential diagnosis have been reviewed; assessment and plan has been documented; I have personally reviewed the labs and test results that are presently available; I have reviewed the patients medication list; I have reviewed the consulting providers response and recommendations. I have reviewed or attempted to review medical records based upon their availability    All of the patient's questions have been  addressed and answered. Patient's is agreeable to the above stated plan. I will continue to monitor closely and make adjustments to medical management as needed.  _____________________________________________________________________

## 2025-04-02 NOTE — PROGRESS NOTES
[facility] Inpatient Progress Note      HPI:     Patient Name: Tao Bee  Admission Date: 3/29/2025  Hospital Length of Stay: 4 days  Code Status: Full Code   Attending Physician: Trevor Winchester MD   Primary Care Physician: Penny Garsia FNP  Principal Problem:<principal problem not specified>      Today patient seen and examined  Labs, recent events, imaging and medications reviewed for this hospital stay  Feels Okay denies any major complaints  Recurrent fever spikes noted        Review of Systems:   Constitutional:  Recurrent fever spikes but feels overall better  Skin: Denies wounds, no rashes, no itching, no new skin lesions  HEENT: Denies acute change in hearing or vision, tinnitus, or dysphagia  Respiratory:  Denies cough, shortness of breath, or wheezing  Cardiovascular: Denies chest pain, palpitations, or swelling  Gastrointestional:  No documented recent upper GI bleeding  Genitourinary:  Nguyen  Musculoskeletal: Denies myalgias, back pain, flank pain, new decreased ROM or acute focal weakness  Neurological: Denies headaches, seizures, dizziness, paresthesias or asterixis  Hematological: Denies unusual bruising or bleeding  Psychiatric: Denies hallucinations, depression, or confusion      Medications:   Scheduled Meds:   cefTRIAXone (Rocephin) IV (PEDS and ADULTS)  2 g Intravenous Q24H    heparin (porcine)  3,000 Units Intravenous Once    mupirocin   Nasal BID     Continuous Infusions:   lactated ringers   Intravenous Continuous 75 mL/hr at 04/01/25 2200 New Bag at 04/01/25 2200    pantoprazole (PROTONIX) IV infusion  8 mg/hr Intravenous Continuous 20 mL/hr at 04/02/25 0439 8 mg/hr at 04/02/25 0439         Vitals:     Vitals:    04/01/25 2020 04/01/25 2111 04/01/25 2322 04/02/25 0330   BP:   127/70 (!) 152/86   Patient Position:   Lying Lying   Pulse:   99 90   Resp:   18 18   Temp: (!) 101.5 °F (38.6 °C) (!) 102.4 °F (39.1 °C) 98.6 °F (37 °C) 99.6 °F (37.6 °C)   TempSrc:  Oral Oral Oral   SpO2:  97%  98% 98%   Weight:       Height:             I/O last 3 completed shifts:  In: 1300 [P.O.:1300]  Out: 2225 [Urine:2225]    Intake/Output Summary (Last 24 hours) at 4/2/2025 0709  Last data filed at 4/2/2025 0621  Gross per 24 hour   Intake 1300 ml   Output 1750 ml   Net -450 ml       Physical Exam:   General: no acute distress, awake, alert  Eyes: PERRLA, EOMI, conjunctiva clear, eyelids without swelling  HENT: atraumatic, oropharynx and nasal mucosa patent  Neck: full ROM, no JVD, no thyromegaly or lymphadenopathy  Respiratory: equal, unlabored, clear to auscultation A/P  Cardiovascular: RRR harsh systolic murmur grade 2-3/6 in the aortic area; BL radial and pedal pulses felt  Edema: none  Gastrointestinal: soft, non-tender, non-distended; positive bowel sounds; no masses to palpation  Genitourinary:  Nguyen  Musculoskeletal: full ROM without limitation or discomfort  Integumentary: warm, dry; no rashes, wounds, or skin lesions  Neurological: oriented, appropriate, no acute deficits  Dialysis access:  Right IJ      Labs:     Chemistries:   Recent Labs   Lab 03/29/25  0829 03/30/25  0345 03/30/25  1012 03/31/25  0209 04/01/25  0401 04/02/25  0436   * 125*   < > 134* 134* 137   K 4.3 3.9   < > 3.3* 3.5 3.9   CL 92* 93*   < > 92* 95* 97*   CO2 20* 15*   < > 30* 29 29   .9* 135.0*   < > 60.4* 67.7* 66.5*   CREATININE 11.76* 10.50*   < > 5.55* 5.86* 5.90*   CALCIUM 8.9 7.5*   < > 7.5* 7.4* 7.8*   BILITOT 0.5  --    < > 0.5 0.3 0.4   ALKPHOS 98  --    < > 73 85 73   ALT 14  --    < > 10 8 5   AST 20  --    < > 23 19 16   GLUCOSE 123* 121*   < > 115* 134* 100   MG 2.20 1.80  --  1.70  --   --    PHOS  --   --   --   --  4.3 4.5    < > = values in this interval not displayed.        CBC/Anemia Labs: Coags:    Recent Labs   Lab 03/29/25  0829 03/30/25  0345 03/30/25  0346 03/30/25  0552 03/31/25  0209 04/01/25  0401 04/02/25  0436   WBC 47.97  47.97*  --    < >  --  39.45  39.45* 35.84  35.84* 36.55   36.55*   HGB 8.7*  --    < >  --  9.6* 8.9* 9.0*   HCT 23.3*  --    < >  --  26.4* 25.6* 25.6*   *  --    < >  --  164 264 397   MCV 79.3*  --    < >  --  81.0 83.7 84.5   RDW 16.8  --    < >  --  15.6 16.7 17.0   IRON 28*  --   --  15*  --   --   --    FERRITIN  --   --   --  264.66  --   --   --    FOLATE  --  5.1*  --   --  4.8*  --   --    GHOCDKBH13 836*  --   --  871*  --   --   --     < > = values in this interval not displayed.    Recent Labs   Lab 03/30/25 2016   INR 1.4*          Impression:     Ecoli sepsis  in origin with LEANDRO  Uremia and acidosis requiring emergency dialysis on Sunday  Upper GI bleed  Recurrent fever and loud murmur rule out vegetation    Plan:   Check echo  Continue antibiotics  No acute indications for dialysis  We will HD p.r.nLauro Black

## 2025-04-02 NOTE — PROGRESS NOTES
UROLOGY  PROGRESS  NOTE    Tao Bee 1965  [id number]  4/2/2025    Patient resting in bed   Denies abdominal or flank pain   Feeling well  Sister at bedside during rounds    Temp 102.4° overnight  BP and heart rate stable  1100 mL urine output overnight   WBC 36.55   H&H 9/25.6   BUN and creatinine 66.5/5.9    Intake/Output:  No intake/output data recorded.  I/O last 3 completed shifts:  In: 1300 [P.O.:1300]  Out: 2225 [Urine:2225]     Exam:    NAD  Card: RRR  Resp: unlabored  Abd:  Soft, NT ND  :  light yellow urine draining to  bag    Recent Results (from the past 24 hours)   Comprehensive Metabolic Panel    Collection Time: 04/02/25  4:36 AM   Result Value Ref Range    Sodium 137 136 - 145 mmol/L    Potassium 3.9 3.5 - 5.1 mmol/L    Chloride 97 (L) 98 - 107 mmol/L    CO2 29 22 - 29 mmol/L    Glucose 100 74 - 100 mg/dL    Blood Urea Nitrogen 66.5 (H) 8.4 - 25.7 mg/dL    Creatinine 5.90 (H) 0.72 - 1.25 mg/dL    Calcium 7.8 (L) 8.4 - 10.2 mg/dL    Protein Total 4.6 (L) 6.4 - 8.3 gm/dL    Albumin 1.7 (L) 3.5 - 5.0 g/dL    Globulin 2.9 2.4 - 3.5 gm/dL    Albumin/Globulin Ratio 0.6 (L) 1.1 - 2.0 ratio    Bilirubin Total 0.4 <=1.5 mg/dL    ALP 73 40 - 150 unit/L    ALT 5 0 - 55 unit/L    AST 16 11 - 45 unit/L    eGFR 10 mL/min/1.73/m2    Anion Gap 11.0 mEq/L    BUN/Creatinine Ratio 11    Phosphorus    Collection Time: 04/02/25  4:36 AM   Result Value Ref Range    Phosphorus Level 4.5 2.3 - 4.7 mg/dL   CBC with Differential    Collection Time: 04/02/25  4:36 AM   Result Value Ref Range    WBC 36.55 (H) 4.50 - 11.50 x10(3)/mcL    RBC 3.03 (L) 4.70 - 6.10 x10(6)/mcL    Hgb 9.0 (L) 14.0 - 18.0 g/dL    Hct 25.6 (L) 42.0 - 52.0 %    MCV 84.5 80.0 - 94.0 fL    MCH 29.7 27.0 - 31.0 pg    MCHC 35.2 33.0 - 36.0 g/dL    RDW 17.0 11.5 - 17.0 %    Platelet 397 130 - 400 x10(3)/mcL    MPV 11.4 (H) 7.4 - 10.4 fL   Manual Differential    Collection Time: 04/02/25  4:36 AM   Result Value Ref Range    WBC 36.55 x10(3)/mcL     Neutrophils % 90 %    Lymphs % 5 %    Monocytes % 5 %    Neutrophils Abs 32.895 (H) 2.1 - 9.2 x10(3)/mcL    Lymphs Abs 1.8275 0.6 - 4.6 x10(3)/mcL    Monocytes Abs 1.8275 (H) 0.1 - 1.3 x10(3)/mcL    Platelets Normal Normal, Adequate    RBC Morph Abnormal (A) Normal    Poikilocytosis 2+ (A) (none)    Anisocytosis 1+ (A) (none)    Macrocytosis 1+ (A) (none)    Target Cells 2+ (A) (none)   Echo    Collection Time: 04/02/25 10:23 AM   Result Value Ref Range    BSA 1.88 m2    Borrego's Biplane MOD Ejection Fraction 60 %    A2C EF 62 %    A4C EF 59 %    LVOT stroke volume 79.6 cm3    LVIDd 4.0 3.5 - 6.0 cm    LV Systolic Volume 20 mL    LV Systolic Volume Index 10.8 mL/m2    LVIDs 2.4 2.1 - 4.0 cm    LV ESV A2C 31.30 mL    LV Diastolic Volume 70 mL    LV Diastolic Volume Index 37.63 mL/m2    LV EDV A2C 88.228362741780357 mL    LV EDV A4C 79.30 mL    Left Ventricular End Systolic Volume by Teichholz Method 19.50 mL    Left Ventricular End Diastolic Volume by Teichholz Method 70.00 mL    IVS 1.1 0.6 - 1.1 cm    LVOT diameter 2.1 cm    LVOT area 3.5 cm2    FS 40.0 28 - 44 %    Left Ventricle Relative Wall Thickness 0.55 cm    PW 1.1 0.6 - 1.1 cm    LV mass 145.6 g    LV Mass Index 78.3 g/m2    MV Peak E Angel 0.87 m/s    TDI LATERAL 0.12 m/s    TDI SEPTAL 0.09 m/s    E/E' ratio 8 m/s    MV Peak A Angel 1.12 m/s    TR Max Angel 2.3 m/s    E/A ratio 0.78     E wave deceleration time 280 msec    LV SEPTAL E/E' RATIO 9.7 m/s    LV LATERAL E/E' RATIO 7.3 m/s    LVOT peak angel 1.5 m/s    Left Ventricular Outflow Tract Mean Velocity 0.98 cm/s    Left Ventricular Outflow Tract Mean Gradient 5.00 mmHg    TAPSE 2.68 cm    LA size 2.6 cm    AV mean gradient 9 mmHg    AV peak gradient 18 mmHg    Ao peak angel 2.1 m/s    Ao VTI 32.8 cm    LVOT peak VTI 23.0 cm    AV valve area 2.4 cm²    AV Velocity Ratio 0.71     AV index (prosthetic) 0.70     MICHELLE by Velocity Ratio 2.5 cm²    MV mean gradient 4 mmHg    MV peak gradient 5 mmHg    MV stenosis  pressure 1/2 time 69.00 ms    MV valve area p 1/2 method 3.19 cm2    MV valve area by continuity eq 2.38 cm2    MV VTI 33.4 cm    Triscuspid Valve Regurgitation Peak Gradient 21 mmHg    PV PEAK VELOCITY 1.41 m/s    PV peak gradient 8 mmHg    Mean e' 0.11 m/s    ZLVIDS -2.26     ZLVIDD -2.58     LA area A2C 13.80 cm2    Mitral Valve Heart Rate 87 bpm            Urine culture [[id number]] (Abnormal)  Collected: 03/29/25 1159   Order Status: Completed Specimen: Urine Updated: 03/31/25 9801    Urine Culture >/= 100,000 colonies/ml Escherichia coli Abnormal    Susceptibility     Escherichia coli     SERA     Ampicillin <=2 Sensitive     Cefazolin (Other) 2 Sensitive     Cefazolin (Urine) 2 Sensitive     Cefepime <=0.12 Sensitive     Ceftriaxone <=0.25 Sensitive     Ciprofloxacin <=0.06 Sensitive     ESBL Neg Negative     Gentamicin <=1 Sensitive     Levofloxacin <=0.12 Sensitive     Meropenem <=0.25 Sensitive     Nitrofurantoin <=16 Sensitive     Piperacillin/Tazobactam <=4 Sensitive     Trimeth/Sulfa <=20 Sensitive                     Assessment:  59-year-old male admitted with septic shock secondary to pyelonephritis with acute renal failure   -last dialyzed 3/30 - nephrology following  -blood and urine cultures with E coli - on rocephin    Plan:  -continue indwelling Nguyen   -continue culture specific antibiotics   -will repeat CT abdomen and pelvis due to fever, persistent leukocytosis despite culture specific antibiotics  -following      [place] NP

## 2025-04-02 NOTE — PLAN OF CARE
04/02/25 1121   Discharge Assessment   Assessment Type Discharge Planning Assessment   Confirmed/corrected address, phone number and insurance Yes   Confirmed Demographics Correct on Facesheet   Source of Information patient   Communicated MONI with patient/caregiver Date not available/Unable to determine   Reason For Admission ARF, abd pain, sepsis   People in Home alone  (pt lives alone in a single story home with 2 steps to enter the home and rails along the steps)   Do you expect to return to your current living situation? Yes   Do you have help at home or someone to help you manage your care at home? Yes   Who are your caregiver(s) and their phone number(s)? Pt reports his sister or brother will be able to assist   Prior to hospitilization cognitive status: Unable to Assess   Current cognitive status: Alert/Oriented   Walking or Climbing Stairs Difficulty yes   Walking or Climbing Stairs ambulation difficulty, requires equipment   Mobility Management cane   Dressing/Bathing Difficulty no   Home Layout Able to live on 1st floor   Equipment Currently Used at Home cane, straight   Readmission within 30 days? No   Do you currently have service(s) that help you manage your care at home? No   Do you take prescription medications? Yes   Do you have prescription coverage? Yes   Coverage [facility] dual complete   Who is going to help you get home at discharge? brother or sister   How do you get to doctors appointments? family or friend will provide   Are you on dialysis? No   Discharge Plan A Home   Discharge Plan B Home   DME Needed Upon Discharge  none   Discharge Plan discussed with: Patient   Transition of Care Barriers None   Housing Stability   In the last 12 months, was there a time when you were not able to pay the mortgage or rent on time? N   Transportation Needs   In the past 12 months, has lack of transportation kept you from medical appointments or from getting medications? no   Food Insecurity   Within the  past 12 months, you worried that your food would run out before you got the money to buy more. Never true     Pt's PCP is Penny KWOK. Pt's  is his sister, Princess ([phone]). Pt reports he has a nurse that comes to his home once a week. (? services). Pt uses [facility] off of [facility]. Pt reports his sister or brother will be able to bring pt home upon dc. CM to follow

## 2025-04-03 LAB
ABO + RH BLD: NORMAL
ABS NEUT (OLG): 24.97 X10(3)/MCL (ref 2.1–9.2)
ALBUMIN SERPL-MCNC: 1.6 G/DL (ref 3.5–5)
ALBUMIN/GLOB SERPL: 0.5 RATIO (ref 1.1–2)
ALP SERPL-CCNC: 62 UNIT/L (ref 40–150)
ALT SERPL-CCNC: 7 UNIT/L (ref 0–55)
ANION GAP SERPL CALC-SCNC: 8 MEQ/L
ANISOCYTOSIS BLD QL SMEAR: ABNORMAL
AST SERPL-CCNC: 18 UNIT/L (ref 11–45)
BACTERIA BLD CULT: ABNORMAL
BILIRUB SERPL-MCNC: 0.3 MG/DL
BLD PROD TYP BPU: NORMAL
BLOOD UNIT EXPIRATION DATE: NORMAL
BLOOD UNIT TYPE CODE: 7300
BUN SERPL-MCNC: 65.3 MG/DL (ref 8.4–25.7)
CALCIUM SERPL-MCNC: 7.8 MG/DL (ref 8.4–10.2)
CHLORIDE SERPL-SCNC: 99 MMOL/L (ref 98–107)
CO2 SERPL-SCNC: 31 MMOL/L (ref 22–29)
CREAT SERPL-MCNC: 5.58 MG/DL (ref 0.72–1.25)
CREAT/UREA NIT SERPL: 12
CROSSMATCH INTERPRETATION: NORMAL
DISPENSE STATUS: NORMAL
EOSINOPHIL NFR BLD MANUAL: 0.91 X10(3)/MCL (ref 0–0.9)
EOSINOPHIL NFR BLD MANUAL: 3 %
ERYTHROCYTE [DISTWIDTH] IN BLOOD BY AUTOMATED COUNT: 17.4 % (ref 11.5–17)
GFR SERPLBLD CREATININE-BSD FMLA CKD-EPI: 11 ML/MIN/1.73/M2
GLOBULIN SER-MCNC: 3 GM/DL (ref 2.4–3.5)
GLUCOSE SERPL-MCNC: 116 MG/DL (ref 74–100)
GRAM STN SPEC: ABNORMAL
GROUP & RH: NORMAL
HCT VFR BLD AUTO: 23.5 % (ref 42–52)
HGB BLD-MCNC: 7.9 G/DL (ref 14–18)
INDIRECT COOMBS: NORMAL
INSTRUMENT WBC (OLG): 30.45 X10(3)/MCL
LYMPHOCYTES NFR BLD MANUAL: 2.13 X10(3)/MCL (ref 0.6–4.6)
LYMPHOCYTES NFR BLD MANUAL: 7 %
MACROCYTES BLD QL SMEAR: ABNORMAL
MCH RBC QN AUTO: 29.4 PG (ref 27–31)
MCHC RBC AUTO-ENTMCNC: 33.6 G/DL (ref 33–36)
MCV RBC AUTO: 87.4 FL (ref 80–94)
MONOCYTES NFR BLD MANUAL: 1.83 X10(3)/MCL (ref 0.1–1.3)
MONOCYTES NFR BLD MANUAL: 6 %
MYELOCYTES NFR BLD MANUAL: 2 %
NEUTROPHILS NFR BLD MANUAL: 82 %
PHOSPHATE SERPL-MCNC: 4.5 MG/DL (ref 2.3–4.7)
PLATELET # BLD AUTO: 487 X10(3)/MCL (ref 130–400)
PLATELET # BLD EST: ABNORMAL 10*3/UL
PMV BLD AUTO: 10.5 FL (ref 7.4–10.4)
POIKILOCYTOSIS BLD QL SMEAR: ABNORMAL
POTASSIUM SERPL-SCNC: 3.4 MMOL/L (ref 3.5–5.1)
PROT SERPL-MCNC: 4.6 GM/DL (ref 6.4–8.3)
RBC # BLD AUTO: 2.69 X10(6)/MCL (ref 4.7–6.1)
RBC MORPH BLD: ABNORMAL
SODIUM SERPL-SCNC: 138 MMOL/L (ref 136–145)
SPECIMEN OUTDATE: NORMAL
TARGETS BLD QL SMEAR: ABNORMAL
UNIT NUMBER: NORMAL
VANCOMYCIN SERPL-MCNC: 7.6 UG/ML (ref 15–20)
WBC # BLD AUTO: 30.45 X10(3)/MCL (ref 4.5–11.5)

## 2025-04-03 PROCEDURE — 99233 SBSQ HOSP IP/OBS HIGH 50: CPT | Mod: ,,, | Performed by: INTERNAL MEDICINE

## 2025-04-03 PROCEDURE — 90935 HEMODIALYSIS ONE EVALUATION: CPT

## 2025-04-03 PROCEDURE — 94760 N-INVAS EAR/PLS OXIMETRY 1: CPT

## 2025-04-03 PROCEDURE — 63600175 PHARM REV CODE 636 W HCPCS: Performed by: INTERNAL MEDICINE

## 2025-04-03 PROCEDURE — 85025 COMPLETE CBC W/AUTO DIFF WBC: CPT | Performed by: HOSPITALIST

## 2025-04-03 PROCEDURE — 86923 COMPATIBILITY TEST ELECTRIC: CPT | Performed by: INTERNAL MEDICINE

## 2025-04-03 PROCEDURE — 25000003 PHARM REV CODE 250: Performed by: NURSE PRACTITIONER

## 2025-04-03 PROCEDURE — 80053 COMPREHEN METABOLIC PANEL: CPT | Performed by: INTERNAL MEDICINE

## 2025-04-03 PROCEDURE — 97162 PT EVAL MOD COMPLEX 30 MIN: CPT

## 2025-04-03 PROCEDURE — 80202 ASSAY OF VANCOMYCIN: CPT | Performed by: HOSPITALIST

## 2025-04-03 PROCEDURE — P9016 RBC LEUKOCYTES REDUCED: HCPCS | Performed by: INTERNAL MEDICINE

## 2025-04-03 PROCEDURE — 87040 BLOOD CULTURE FOR BACTERIA: CPT | Performed by: INTERNAL MEDICINE

## 2025-04-03 PROCEDURE — 84100 ASSAY OF PHOSPHORUS: CPT | Performed by: INTERNAL MEDICINE

## 2025-04-03 PROCEDURE — 21400001 HC TELEMETRY ROOM

## 2025-04-03 PROCEDURE — 86901 BLOOD TYPING SEROLOGIC RH(D): CPT | Performed by: INTERNAL MEDICINE

## 2025-04-03 PROCEDURE — 99900031 HC PATIENT EDUCATION (STAT)

## 2025-04-03 PROCEDURE — 25500020 PHARM REV CODE 255: Performed by: HOSPITALIST

## 2025-04-03 PROCEDURE — 25000003 PHARM REV CODE 250: Performed by: INTERNAL MEDICINE

## 2025-04-03 PROCEDURE — G0427 INPT/ED TELECONSULT70: HCPCS | Mod: GT,Q6,, | Performed by: INTERNAL MEDICINE

## 2025-04-03 PROCEDURE — 27000221 HC OXYGEN, UP TO 24 HOURS

## 2025-04-03 PROCEDURE — 36415 COLL VENOUS BLD VENIPUNCTURE: CPT | Performed by: INTERNAL MEDICINE

## 2025-04-03 RX ORDER — HYDROCODONE BITARTRATE AND ACETAMINOPHEN 500; 5 MG/1; MG/1
TABLET ORAL
Status: DISCONTINUED | OUTPATIENT
Start: 2025-04-03 | End: 2025-04-08 | Stop reason: HOSPADM

## 2025-04-03 RX ADMIN — MUPIROCIN: 20 OINTMENT TOPICAL at 09:04

## 2025-04-03 RX ADMIN — IOHEXOL 94 ML: 350 INJECTION, SOLUTION INTRAVENOUS at 11:04

## 2025-04-03 RX ADMIN — AMPICILLIN SODIUM AND SULBACTAM SODIUM 1.5 G: 1; .5 INJECTION, POWDER, FOR SOLUTION INTRAMUSCULAR; INTRAVENOUS at 02:04

## 2025-04-03 RX ADMIN — Medication 6 MG: at 08:04

## 2025-04-03 RX ADMIN — MUPIROCIN: 20 OINTMENT TOPICAL at 08:04

## 2025-04-03 RX ADMIN — ACETAMINOPHEN 1000 MG: 500 TABLET ORAL at 05:04

## 2025-04-03 RX ADMIN — POTASSIUM BICARBONATE 50 MEQ: 977.5 TABLET, EFFERVESCENT ORAL at 09:04

## 2025-04-03 NOTE — PROGRESS NOTES
Inpatient Nutrition Assessment    Admit Date: 3/29/2025   Total duration of encounter: 5 days   Patient Age: 59 y.o.    Nutrition Recommendation/Prescription     Continue Diet Renal On Dialysis as tolerated.  Continue Novasource Renal (provides 475 kcal, 22 g protein per serving) TID.    Communication of Recommendations: reviewed with nurse and reviewed with patient    Nutrition Assessment     Malnutrition Assessment/Nutrition-Focused Physical Exam       Malnutrition Level: other (see comments) (Does not meet criteria) (03/31/25 1029)     Weight Loss (Malnutrition): other (see comments) (Does not meet criteria) (03/31/25 1029)              Muscle Mass (Malnutrition): mild depletion (03/31/25 1029)     Clavicle Bone Region (Muscle Loss): mild depletion                    Fluid Accumulation (Malnutrition): other (see comments) (Not present) (03/31/25 1029)        A minimum of two characteristics is recommended for diagnosis of either severe or non-severe malnutrition.    Chart Review    Reason Seen: malnutrition screening tool (MST)    Malnutrition Screening Tool Results   Have you recently lost weight without trying?: Yes: 2-13 lbs  Have you been eating poorly because of a decreased appetite?: Yes   MST Score: 2   Diagnosis:  Septic shock secondary to pyelonephritis  Acute renal failure, status post temporary dialysis catheter placement and hemodialysis  Symptomatic anemia secondary to acute blood loss from NSAID induced ulcers    Relevant Medical History: neuropathy and alcohol abuse     Scheduled Medications:  cefTRIAXone (Rocephin) IV (PEDS and ADULTS), 2 g, Q24H  heparin (porcine), 3,000 Units, Once  mupirocin, , BID    Continuous Infusions:  pantoprazole (PROTONIX) IV infusion, Last Rate: 8 mg/hr (04/02/25 0029)    PRN Medications:  0.9%  NaCl infusion (for blood administration), , Q24H PRN  acetaminophen, 1,000 mg, Q6H PRN  albumin human 25%, 25 g, Q20 Min PRN  calcium gluconate IVPB, 1 g, PRN  calcium  gluconate IVPB, 2 g, PRN  calcium gluconate IVPB, 3 g, PRN  magnesium sulfate 2 g IVPB, 2 g, PRN  magnesium sulfate 2 g IVPB, 2 g, PRN  melatonin, 6 mg, Nightly PRN  morphine, 2 mg, Q4H PRN  ondansetron, 4 mg, Q8H PRN  potassium chloride, 40 mEq, PRN   And  potassium chloride, 60 mEq, PRN   And  potassium chloride, 80 mEq, PRN  sodium chloride 0.9%, 10 mL, PRN  sodium chloride 0.9%, 10 mL, PRN  sodium phosphate 15 mmol in D5W 250 mL IVPB, 15 mmol, PRN  sodium phosphate 20.1 mmol in D5W 250 mL IVPB, 20.1 mmol, PRN  sodium phosphate 30 mmol in D5W 250 mL IVPB, 30 mmol, PRN    Calorie Containing IV Medications: no significant kcals from medications at this time    Recent Labs   Lab 03/29/25  0829 03/30/25  0345 03/30/25  0346 03/30/25  1012 03/31/25  0209 04/01/25  0401 04/02/25  0436 04/03/25  0505   * 125*  --  127* 134* 134* 137 138   K 4.3 3.9  --  4.0 3.3* 3.5 3.9 3.4*   CALCIUM 8.9 7.5*  --  7.9* 7.5* 7.4* 7.8* 7.8*   PHOS  --   --   --   --   --  4.3 4.5 4.5   MG 2.20 1.80  --   --  1.70  --   --   --    CL 92* 93*  --  94* 92* 95* 97* 99   CO2 20* 15*  --  16* 30* 29 29 31*   .9* 135.0*  --  129.2* 60.4* 67.7* 66.5* 65.3*   CREATININE 11.76* 10.50*  --  10.45* 5.55* 5.86* 5.90* 5.58*   EGFRNORACEVR 5 5  --  5 11 10 10 11   GLUCOSE 123* 121*  --  131* 115* 134* 100 116*   BILITOT 0.5  --   --  0.5 0.5 0.3 0.4 0.3   ALKPHOS 98  --   --  85 73 85 73 62   ALT 14  --   --  10 10 8 5 7   AST 20  --   --  20 23 19 16 18   ALBUMIN 2.1*  --   --  1.9* 1.6* 1.6* 1.7* 1.6*   LIPASE 194*  --   --   --   --   --   --   --    WBC 47.97  47.97*  --  37.17  37.17*  --  39.45  39.45* 35.84  35.84* 36.55  36.55* 30.45  30.45*   HGB 8.7*  --  7.1*  --  9.6* 8.9* 9.0* 7.9*   HCT 23.3*  --  19.0*  --  26.4* 25.6* 25.6* 23.5*     Nutrition Orders:  Diet Renal On Dialysis  Dietary nutrition supplements TID; [facility]    Appetite/Oral Intake: good/% of meals  Factors Affecting Nutritional  "Intake: none identified  Social Needs Impacting Access to Food: none identified  Food/Synagogue/Cultural Preferences: none reported  Food Allergies: no known food allergies  Last Bowel Movement: 25  Wound(s):  None documented     Comments    3/31/25: Noted wt loss and decreased appetite PTA per MST. Pt stated UBW, current wt does indicate wt loss. Pt unsure of time frame though. HD started. Pt with 100% po intake of meal this AM. Willing to have ONS sent.     4/3/25: Pt eating well per nurse; pt out for HD.    Anthropometrics    Height: 5' 7.01" (170.2 cm), Height Method: Measured  Last Weight: 77.1 kg (169 lb 15.6 oz) (25 0600), Weight Method: Bed Scale  BMI (Calculated): 26.6  BMI Classification: overweight (BMI 25-29.9)        Ideal Body Weight (IBW), Male: 148.06 lb     % Ideal Body Weight, Male (lb): 111.43 %                 Usual Body Weight (UBW), k.27 kg (unknown time frame per pt)  % Usual Body Weight: 97.06     Usual Weight Provided By: patient    Wt Readings from Last 5 Encounters:   25 77.1 kg (169 lb 15.6 oz)   23 76.7 kg (169 lb)     Weight Change(s) Since Admission:   Wt Readings from Last 1 Encounters:   25 0600 77.1 kg (169 lb 15.6 oz)   25 1354 74.8 kg (164 lb 15.9 oz)   25 1353 74.8 kg (165 lb)   25 0835 74.8 kg (165 lb)   25 0808 74.8 kg (165 lb)   Admit Weight: 74.8 kg (165 lb) (25 0808), Weight Method: Stated    Estimated Needs    Weight Used For Calorie Calculations: 74.8 kg (164 lb 14.5 oz)  Energy Calorie Requirements (kcal): 2130kcal (1.4 stress factor)  Energy Need Method: [place]-St Jeor  Weight Used For Protein Calculations: 74.8 kg (164 lb 14.5 oz)  Protein Requirements: 90-105gm (1.2-1.4g/kg)  Fluid Requirements (mL): 1000ml + urinary output  CHO Requirement: 240gm (45% est kcal needs)     Enteral Nutrition     Patient not receiving enteral nutrition at this time.    Parenteral Nutrition     Patient not receiving " parenteral nutrition support at this time.    Evaluation of Received Nutrient Intake    Calories: meeting estimated needs  Protein: meeting estimated needs    Patient Education     Not applicable.    Nutrition Diagnosis     PES: Unintended weight loss related to inability to consume sufficient nutrients as evidenced by decreased appetite PTA. (active)     PES:            Nutrition Interventions     Intervention(s): general/healthful diet, commercial beverage, and collaboration with other providers  Intervention(s):      Goal: Meet greater than 80% of nutritional needs by follow-up. (goal progressing)  Goal: Consume % of oral supplements by follow-up. (goal progressing)    Nutrition Goals & Monitoring     Dietitian will monitor: food and beverage intake and energy intake  Discharge planning: continue Renal  diet with Novasource Renal oral supplements  Nutrition Risk/Follow-Up: moderate (follow-up in 3-5 days)   Please consult if re-assessment needed sooner.

## 2025-04-03 NOTE — CONSULTS
Tele-Infectious Diseases Consultation      Patient Name: Tao Bee  Patient : 1965  Age/Sex: 59 y.o. male   Room/Bed: 1060/1060 A  Admission Date/Time: 3/29/2025  8:10 AM  Date of consultation:  4/3/2025  Referring MD: Trevor Winchester MD       Assessment and Plan:     Tao Bee is a 59 y.o. male with:  Septic shock 2/2 E coli bacteremia  E coli bacteremia: Source is   Right-sided pyelonephritis with E coli  Hypoalbuminemia with albumin of 1.6  LEANDRO requiring hemodialysis  Thickened mitral valve leaflet    Impression:     59-year-old male with E. coli urosepsis complicated by LEANDRO requiring hemodialysis, now clinically improved with resolution of septic shock. Initial urine and blood cultures grew pan-sensitive E. coli. Repeat blood cultures on 3/30 showed only one set positive, and patient has been febrile since admission with hemodynamic stability. Fever is very common in patients with severe pyelonephritis, and typically persists for several days, even after starting appropriate antibiotics. Current antibiotic is ceftriaxone; however, its high protein binding raises concern for reduced free drug levels in the context of severe hypoalbuminemia. Unasyn (ampicillin/sulbactam) has much lower protein binding than ceftriaxone, which makes it more predictable in patients with hypoalbuminemia.  Patient had TTE which showed thickened mitral leaflet, can not rule out vegetation. Although endocarditis with E coli is extremely rare, I would proceed with MARY to definitively rule out endocarditis given  abnormal transthoracic echo    Recommendations:     Agree with repeating blood culture  Would hold off on further vancomycin  Repeated CT abdomen ordered, we will follow  Agree with proceeding with MARY   Switch ceftriaxone to Unasyn 1.5 q.12 h    The antibiotics being administered require intensive monitoring for drug toxicity    All questions and concerns addressed with patient and understands and agrees  with plan.      Thank you for allowing us to contribute to this patient's care. Please call ID with any questions.     Marino Castellanos MD  Attending, Infectious Disease     Reason for consultation:      E coli bacteremia    Chief complain:      Abdominal pain and diarrhea    History of present illness:      Tao Bee is a 59-year-old male with a history of neuropathy who was admitted on 3/29/2025 with a 3-day history of lower abdominal pain, associated with nausea and diarrhea. He denied urinary symptoms but noted decreased urine output over the same period. In the ED, he was afebrile with a temperature of 97.6°F, heart rate of 80 bpm, and blood pressure of 101/66 mmHg. Initial labs were notable for hyponatremia (Na 130), leukocytosis (WBC 47.97), thrombocytopenia (platelets 105), and acute kidney injury with creatinine of 11.76. Urinalysis showed >100 WBCs, and both urine and blood cultures grew pan-sensitive E. coli. He was started on IV antibiotics, a temporary dialysis catheter was placed, and he was initiated on hemodialysis. Repeat blood cultures on 3/30 remained positive for E. coli in one set. CT abdomen/pelvis from 3/29 revealed an edematous right kidney with fullness in the upper pole concerning for a possible mass, phlegmon, or focal pyelonephritis, along with perinephric fat stranding. On 3/30, the patient developed sepsis with a temperature of 104.1°F, heart rate of 120 bpm, and blood pressure of 94/55 mmHg, prompting transfer to the ICU and initiation of vasopressor support (Levophed). A repeat CT abdomen on 4/2 showed no significant interval change. He experienced additional febrile episodes on 4/1 (102.4°F) and multiple fevers overnight on 4/2. He remains on intravenous antibiotics as outlined below.  He underwent transthoracic echo which showed thickened mitral valve leaflet, can not rule out vegetation.    Review of system:      A review of the patient's history and complaints did not reveal  any medical problems other than those outlined in the HPI. Specifically, there were no additional constitutional complaints, and no complaints of problems with the eyes, ears, nose, and mouth, cardiovascular, respiratory, gastrointestinal, musculoskeletal, neurological, integumentary, psychiatric, endocrine, or hematological systems.    Antibiotics Received:     Zosyn 3/29-  Ciprofloxacin 3/30-4/1   Ceftriaxone 4/1-    Social history:      Social History     Socioeconomic History    Marital status: Single   Tobacco Use    Smoking status: Every Day     Current packs/day: 0.50     Types: Cigarettes   Substance and Sexual Activity    Alcohol use: Yes     Alcohol/week: 28.0 standard drinks of alcohol     Types: 28 Shots of liquor per week     Comment: half a pint per day     Social Drivers of Health     Financial Resource Strain: Low Risk  (3/29/2025)    Overall Financial Resource Strain (CARDIA)     Difficulty of Paying Living Expenses: Not hard at all   Food Insecurity: No Food Insecurity (4/2/2025)    Hunger Vital Sign     Worried About Running Out of Food in the Last Year: Never true     Ran Out of Food in the Last Year: Never true   Transportation Needs: No Transportation Needs (4/2/2025)    PRAPARE - Transportation     Lack of Transportation (Medical): No     Lack of Transportation (Non-Medical): No   Stress: No Stress Concern Present (3/29/2025)    [employer] - Occupational Stress Questionnaire     Feeling of Stress : Not at all   Housing Stability: Low Risk  (4/2/2025)    Housing Stability Vital Sign     Unable to Pay for Housing in the Last Year: No     Number of Times Moved in the Last Year: 0     Homeless in the Last Year: No       Past medical history:     Past Medical History:   Diagnosis Date    Neuropathy        Past Surgical history:     Past Surgical History:   Procedure Laterality Date    ESOPHAGOGASTRODUODENOSCOPY N/A 3/30/2025    Procedure: EGD  (ESOPHAGOGASTRODUODENOSCOPY);  Surgeon: Stan Elder MD;  Location: [facility];  Service: Endoscopy;  Laterality: N/A;       Family history:     No family history on file.    Allergy history:      Review of patient's allergies indicates:   Allergen Reactions    Opioids - morphine analogues Hallucinations       Objective:    Vital signs:  Vitals:    25 0307 25 0600 25 0700 25 0900   BP: 133/79  (!) 150/82    Patient Position:       Pulse: 98  93    Resp:   20    Temp: 99.8 °F (37.7 °C)  (!) 101.3 °F (38.5 °C)    TempSrc: Oral  Oral    SpO2: 96%  96% 96%   Weight:  77.1 kg (169 lb 15.6 oz)     Height:         Temp (24hrs), Av °F (37.8 °C), Min:98.4 °F (36.9 °C), Max:101.8 °F (38.8 °C)      Physical examination:  GEN: Awake, resting comfortably  EYES: EOMI, no scleral icterus  HENT: MMM. No oral lesions. Fair dentition.  NECK: Supple, no cervical lymphadenopathy or meningismus.  Right IJ in place  CARDIO: RRR, no murmur.  PULM/CHEST: CTAB. No increased work of breathing  ABD: Normal bowel sounds. Soft, not tender or distended. No HSM appreciated.  MSK: no obvious effusion, swelling, increased warmth, or erythema of major joints. No pedal edema.  SKIN: No rashes. No stigmata of endocarditis.  NEURO: AOx3. Speech fluent. Face symmetric.  PSYCH: Mood appropriate    Diagnostic data:     CBC  Recent Labs   Lab 25  0346 25  0209 25  0401 25  0436 25  0505   WBC 37.17  37.17*  --  39.45  39.45* 35.84  35.84* 36.55  36.55* 30.45  30.45*   HGB 7.1*  --  9.6* 8.9* 9.0* 7.9*     --  164 264 397 487*   INR  --  1.4*  --   --   --   --        BMP  Recent Labs   Lab 25  0829 25  0345 25  1012 25  0209 25  0401 25  0436 25  0505   * 125* 127* 134* 134* 137 138   K 4.3 3.9 4.0 3.3* 3.5 3.9 3.4*   CL 92* 93* 94* 92* 95* 97* 99   CO2 20* 15* 16* 30* 29 29 31*   .9* 135.0* 129.2* 60.4* 67.7* 66.5*  65.3*   CREATININE 11.76* 10.50* 10.45* 5.55* 5.86* 5.90* 5.58*   CALCIUM 8.9 7.5* 7.9* 7.5* 7.4* 7.8* 7.8*   MG 2.20 1.80  --  1.70  --   --   --    PHOS  --   --   --   --  4.3 4.5 4.5       Results for orders placed during the hospital encounter of 03/29/25    X-Ray Chest AP Portable    Narrative  EXAMINATION:  XR CHEST AP PORTABLE    CLINICAL HISTORY:  Sepsis; abdominal pain for 3 days.  Nausea and diarrhea.    TECHNIQUE:  One view    COMPARISON:  None available.    FINDINGS:  Cardiopericardial silhouette is within normal limits. Lungs are without dense focal or segmental consolidation, congestive process, pleural effusions or pneumothorax.  Multiple old fractures of the left ribs.    Impression  No acute cardiopulmonary process identified.      Electronically signed by: Wilmer Hooper  Date:    03/29/2025  Time:    10:10    Recent Labs   Lab 04/01/25  0401 04/02/25  0436 04/03/25  0505   WBC 35.84  35.84* 36.55  36.55* 30.45  30.45*   HGB 8.9* 9.0* 7.9*   HCT 25.6* 25.6* 23.5*    397 487*     Estimated Creatinine Clearance: 13.3 mL/min (A) (based on SCr of 5.58 mg/dL (H)).    Lab Results   Component Value Date    CREATININE 5.58 (H) 04/03/2025    CREATININE 5.90 (H) 04/02/2025    CREATININE 5.86 (H) 04/01/2025    ALKPHOS 62 04/03/2025    ALKPHOS 73 04/02/2025    ALKPHOS 85 04/01/2025         Microbiology and ID tests:     Microbiology Results (last 7 days)       Procedure Component Value Units Date/Time    Blood Culture [[id number]]     Order Status: Sent Specimen: Blood     Blood Culture [[id number]]     Order Status: Sent Specimen: Blood     Blood Culture [[id number]]  (Abnormal)  (Susceptibility) Collected: 03/30/25 2016    Order Status: Completed Specimen: Blood from Arm, Left Updated: 04/03/25 0700     Blood Culture Escherichia coli     GRAM STAIN Gram Negative Rods      Seen in gram stain of broth only      1 of 1 Pediatric bottle positive    Blood Culture [[id number]]  (Normal) Collected:  03/30/25 2016    Order Status: Completed Specimen: Blood from Arm, Left Updated: 04/02/25 2202     Blood Culture No Growth At 72 Hours    Urine culture [[id number]]  (Abnormal)  (Susceptibility) Collected: 03/29/25 1159    Order Status: Completed Specimen: Urine Updated: 03/31/25 0933     Urine Culture >/= 100,000 colonies/ml Escherichia coli    Blood culture x two cultures. Draw prior to antibiotics. [[id number]]  (Abnormal)  (Susceptibility) Collected: 03/29/25 1037    Order Status: Completed Specimen: Blood Updated: 03/31/25 0711     Blood Culture Escherichia coli     GRAM STAIN Gram Negative Rods      Seen in gram stain of broth only      2 of 2 bottles positive    Blood culture x two cultures. Draw prior to antibiotics. [[id number]]  (Abnormal)  (Susceptibility) Collected: 03/29/25 1055    Order Status: Completed Specimen: Blood Updated: 03/31/25 0710     Blood Culture Escherichia coli     GRAM STAIN Gram Negative Rods      Seen in gram stain of broth only      2 of 2 bottles positive    BCID2 Panel [[id number]]  (Abnormal) Collected: 03/29/25 1037    Order Status: Completed Specimen: Blood Updated: 03/29/25 2324     CTX-M (ESBL ) Not Detected     IMP (Cabapenemase ) Not Detected     KPC resistance gene (Carbapenemase ) Not Detected     mcr-1 Not Detected     mecA ID N/A     Comment: Note: Antimicrobial resistance can occur via multiple mechanisms. A Not Detected result for antimicrobial resistance gene(s) does not indicate antimicrobial susceptibility. Subculturing is required for species identification and susceptibility testing of   isolates.        mecA/C and MREJ (MRSA) gene N/A     NDM (Carbapenemase ) Not Detected     OXA-48-like (Carbapenemase ) Not Detected     [place]/B (VRE gene) N/A     VIM (Carbapenemase ) Not Detected     Enterococcus faecalis Not Detected     Enterococcus faecium Not Detected     Listeria monocytogenes Not Detected     Staphylococcus  spp. Not Detected     Staphylococcus aureus Not Detected     Staphylococcus epidermidis Not Detected     Staphylococcus lugdunensis Not Detected     Streptococcus spp. Not Detected     Streptococcus agalactiae (Group B) Not Detected     Streptococcus pneumoniae Not Detected     Streptococcus pyogenes (Group A) Not Detected     Acinetobacter calcoaceticus/baumannii complex Not Detected     Bacteroides fragilis Not Detected     Enterobacterales See Species for ID     Enterobacter cloacae complex Not Detected     Escherichia coli Detected     Klebsiella aerogenes Not Detected     Klebsiella oxytoca Not Detected     Klebsiella pneumoniae group Not Detected     Proteus spp. Not Detected     Salmonella spp. Not Detected     Serratia marcescens Not Detected     Haemophilus influenzae Not Detected     Neisseria meningitidis Not Detected     Pseudomonas aeruginosa Not Detected     Stenotrophomonas maltophilia Not Detected     Candida albicans Not Detected     Candida auris Not Detected     Candida glabrata Not Detected     Candida krusei Not Detected     Candida parapsilosis Not Detected     Candida tropicalis Not Detected     Cryptococcus neoformans/gattii Not Detected    Narrative:      The Gem BCID2 Panel is a multiplexed nucleic acid test intended for the use with [employer]® 2.0 or [employer]® DSTLD Systems for the simultaneous qualitative detection and identification of multiple bacterial and yeast nucleic acids and select genetic determinants associated with antimicrobial resistance.  The [employer] BCID2 Panel test is performed directly on blood culture samples identified as positive by a continuous monitoring blood culture system.  Results are intended to be interpreted in conjunction with Gram stain results.              Medications   Inpatient  Scheduled Meds:   cefTRIAXone (Rocephin) IV (PEDS and ADULTS)  2 g Intravenous Q24H    heparin (porcine)  3,000 Units Intravenous Once    mupirocin   Nasal  BID    vancomycin (VANCOCIN) 1,000 mg in D5W 250 mL IVPB (admixture device)  1,000 mg Intravenous Once     Continuous Infusions:   pantoprazole (PROTONIX) IV infusion  8 mg/hr Intravenous Continuous 20 mL/hr at 04/02/25 0439 8 mg/hr at 04/02/25 0439     PRN Meds:.  Current Facility-Administered Medications:     0.9%  NaCl infusion (for blood administration), , Intravenous, Q24H PRN    acetaminophen, 1,000 mg, Oral, Q6H PRN    albumin human 25%, 25 g, Intravenous, Q20 Min PRN    calcium gluconate IVPB, 1 g, Intravenous, PRN    calcium gluconate IVPB, 2 g, Intravenous, PRN    calcium gluconate IVPB, 3 g, Intravenous, PRN    magnesium sulfate 2 g IVPB, 2 g, Intravenous, PRN    magnesium sulfate 2 g IVPB, 2 g, Intravenous, PRN    melatonin, 6 mg, Oral, Nightly PRN    morphine, 2 mg, Intravenous, Q4H PRN    ondansetron, 4 mg, Intravenous, Q8H PRN    potassium chloride, 40 mEq, Intravenous, PRN **AND** potassium chloride, 60 mEq, Intravenous, PRN **AND** potassium chloride, 80 mEq, Intravenous, PRN    sodium chloride 0.9%, 10 mL, Intravenous, PRN    sodium chloride 0.9%, 10 mL, Intravenous, PRN    sodium phosphate 15 mmol in D5W 250 mL IVPB, 15 mmol, Intravenous, PRN    sodium phosphate 20.1 mmol in D5W 250 mL IVPB, 20.1 mmol, Intravenous, PRN    sodium phosphate 30 mmol in D5W 250 mL IVPB, 30 mmol, Intravenous, PRN    vancomycin - pharmacy to dose, , Intravenous, pharmacy to manage frequency    Home Meds  Current Outpatient Medications   Medication Instructions    gabapentin (NEURONTIN) 600 mg, 3 times daily       Diagnostic studies:      CT Abdomen Pelvis  Without Contrast   Final Result      X-Ray Chest 1 View   Final Result      Central venous catheter terminates within distal superior vena cava.         Electronically signed by: Wilmer Hooper   Date:    03/30/2025   Time:    13:16      US Retroperitoneal Limited   Final Result      Findings concerning for pelvicaliectasis with changes of medical renal disease.  No  overt evidence for obstructive uropathy.         Electronically signed by: Jakob Horan MD   Date:    03/29/2025   Time:    12:20      CT Chest Abdomen Pelvis Without Contrast (XPD)   Final Result      Abnormal edematous appearance of the right kidney.  There is fullness at the upper pole right kidney which is poorly characterized.  Appearance could be related to mass, phlegmon, focal pyelonephritis in the acute setting.  There is perinephric stranding on the right.  No martha hydronephrosis.  Further characterization limited without contrast.         Electronically signed by: Haylee Correia   Date:    03/29/2025   Time:    09:44      X-Ray Chest AP Portable   Final Result      No acute cardiopulmonary process identified.         Electronically signed by: Wilmer Hooper   Date:    03/29/2025   Time:    10:10      CT Abdomen Pelvis With IV Contrast NO Oral Contrast    (Results Pending)         4/3/2025 10:25 AM      The patient location is: [facility]  Total time spent with patient: 75    The attending portion of this evaluation, treatment, and documentation was performed per Marino Castellanos MD via Telemedicine AudioVisual using the secure [employer] software platform with 2 way audio/video. The provider was located off-site and the patient is located in the hospital. The aforementioned video software was utilized to document the relevant history and physical exam.     Critical care time spent: >75 minutes

## 2025-04-03 NOTE — PROGRESS NOTES
[facility]  Hospital Medicine Progress Note        Chief Complaint: Inpatient Follow-up    HPI:   59-year-old male with significant history of peripheral neuropathy admitted to hospital medicine services with complaints of lower abdominal pain, diarrhea, nausea, decreased urine output.  Borderline hypotensive, responded to fluids.  Labs were significant for electrolyte derangement, severe acute kidney injury, severe leukocytosis.  CT chest, abdomen, pelvis with edematous right kidney and also concern for mass versus phlegmon versus pyelonephritis.  Patient was initiated on empiric antibiotics, nephrology and urology services consulted.  Patient was initiated on emergent HD, he was also anemic, 2 units PRBC was transfused with HD.  After admission patient became significantly hypotensive warranting transfer to ICU for vasopressors.  Patient's blood cultures grew E coli, most likely renal source.  Urology following.  Patient also endorsed melanotic stools prior to presentation, given severe anemia gastroenterology was consulted, patient had EGD done which revealed 3 gastric ulcers not requiring any intervention, recommended PPI drip for 72 hours.  Once hemodynamics stabilized patient was downgraded back to hospital medicine services.  HD being continued.  Urology placed indwelling Nguyen.  Since he continued with fever spikes decided to repeat CT abdomen/pelvis.  This was done without contrast on 04/02 which demonstrated similar findings as admit CT.      Interval Hx:   Patient seen at bedside in hemodialysis, he continues with high-grade temp spikes, hemodynamics stable, no overt bleeding, no abdominal pain, nausea, vomiting, no other acute events overnight    Objective/physical exam:  General: In no acute distress, afebrile  Chest: Clear to auscultation bilaterally  Heart: S1, S2, no appreciable murmur  Abdomen: Soft, nontender, BS +  MSK: Warm, no lower extremity edema, no clubbing or  cyanosis  Neurologic: Alert and oriented x4, moving all extremities with good strength     VITAL SIGNS: 24 HRS MIN & MAX LAST   Temp  Min: 98.4 °F (36.9 °C)  Max: 101.8 °F (38.8 °C) (!) 101.3 °F (38.5 °C)   BP  Min: 125/71  Max: 150/82 (!) 150/82   Pulse  Min: 88  Max: 105  93   Resp  Min: 18  Max: 20 20   SpO2  Min: 96 %  Max: 99 % 96 %       Recent Labs   Lab 04/03/25  0505   WBC 30.45  30.45*   RBC 2.69*   HGB 7.9*   HCT 23.5*   MCV 87.4   MCH 29.4   MCHC 33.6   RDW 17.4*   *   MPV 10.5*         Recent Labs   Lab 03/31/25  0209 04/01/25  0401 04/03/25  0505   *   < > 138   K 3.3*   < > 3.4*   CL 92*   < > 99   CO2 30*   < > 31*   BUN 60.4*   < > 65.3*   CREATININE 5.55*   < > 5.58*   CALCIUM 7.5*   < > 7.8*   MG 1.70  --   --    ALBUMIN 1.6*   < > 1.6*   ALKPHOS 73   < > 62   ALT 10   < > 7   AST 23   < > 18   BILITOT 0.5   < > 0.3    < > = values in this interval not displayed.          Microbiology Results (last 7 days)       Procedure Component Value Units Date/Time    Blood Culture [[id number]]     Order Status: Sent Specimen: Blood     Blood Culture [[id number]]     Order Status: Sent Specimen: Blood     Blood Culture [[id number]]  (Abnormal)  (Susceptibility) Collected: 03/30/25 2016    Order Status: Completed Specimen: Blood from Arm, Left Updated: 04/03/25 0700     Blood Culture Escherichia coli     GRAM STAIN Gram Negative Rods      Seen in gram stain of broth only      1 of 1 Pediatric bottle positive    Blood Culture [[id number]]  (Normal) Collected: 03/30/25 2016    Order Status: Completed Specimen: Blood from Arm, Left Updated: 04/02/25 2202     Blood Culture No Growth At 72 Hours    Urine culture [[id number]]  (Abnormal)  (Susceptibility) Collected: 03/29/25 1159    Order Status: Completed Specimen: Urine Updated: 03/31/25 0933     Urine Culture >/= 100,000 colonies/ml Escherichia coli    Blood culture x two cultures. Draw prior to antibiotics. [[id number]]  (Abnormal)   (Susceptibility) Collected: 03/29/25 1037    Order Status: Completed Specimen: Blood Updated: 03/31/25 0711     Blood Culture Escherichia coli     GRAM STAIN Gram Negative Rods      Seen in gram stain of broth only      2 of 2 bottles positive    Blood culture x two cultures. Draw prior to antibiotics. [[id number]]  (Abnormal)  (Susceptibility) Collected: 03/29/25 1055    Order Status: Completed Specimen: Blood Updated: 03/31/25 0710     Blood Culture Escherichia coli     GRAM STAIN Gram Negative Rods      Seen in gram stain of broth only      2 of 2 bottles positive    BCID2 Panel [[id number]]  (Abnormal) Collected: 03/29/25 1037    Order Status: Completed Specimen: Blood Updated: 03/29/25 2324     CTX-M (ESBL ) Not Detected     IMP (Cabapenemase ) Not Detected     KPC resistance gene (Carbapenemase ) Not Detected     mcr-1 Not Detected     mecA ID N/A     Comment: Note: Antimicrobial resistance can occur via multiple mechanisms. A Not Detected result for antimicrobial resistance gene(s) does not indicate antimicrobial susceptibility. Subculturing is required for species identification and susceptibility testing of   isolates.        mecA/C and MREJ (MRSA) gene N/A     NDM (Carbapenemase ) Not Detected     OXA-48-like (Carbapenemase ) Not Detected     [place]/B (VRE gene) N/A     VIM (Carbapenemase ) Not Detected     Enterococcus faecalis Not Detected     Enterococcus faecium Not Detected     Listeria monocytogenes Not Detected     Staphylococcus spp. Not Detected     Staphylococcus aureus Not Detected     Staphylococcus epidermidis Not Detected     Staphylococcus lugdunensis Not Detected     Streptococcus spp. Not Detected     Streptococcus agalactiae (Group B) Not Detected     Streptococcus pneumoniae Not Detected     Streptococcus pyogenes (Group A) Not Detected     Acinetobacter calcoaceticus/baumannii complex Not Detected     Bacteroides fragilis Not Detected      Enterobacterales See Species for ID     Enterobacter cloacae complex Not Detected     Escherichia coli Detected     Klebsiella aerogenes Not Detected     Klebsiella oxytoca Not Detected     Klebsiella pneumoniae group Not Detected     Proteus spp. Not Detected     Salmonella spp. Not Detected     Serratia marcescens Not Detected     Haemophilus influenzae Not Detected     Neisseria meningitidis Not Detected     Pseudomonas aeruginosa Not Detected     Stenotrophomonas maltophilia Not Detected     Candida albicans Not Detected     Candida auris Not Detected     Candida glabrata Not Detected     Candida krusei Not Detected     Candida parapsilosis Not Detected     Candida tropicalis Not Detected     Cryptococcus neoformans/gattii Not Detected    Narrative:      The [email or web address] BCID2 Panel is a multiplexed nucleic acid test intended for the use with [employer]® 2.0 or [employer]Works® [employer] for the simultaneous qualitative detection and identification of multiple bacterial and yeast nucleic acids and select genetic determinants associated with antimicrobial resistance.  The [email or web address] BCID2 Panel test is performed directly on blood culture samples identified as positive by a continuous monitoring blood culture system.  Results are intended to be interpreted in conjunction with Gram stain results.             Scheduled Med:   cefTRIAXone (Rocephin) IV (PEDS and ADULTS)  2 g Intravenous Q24H    heparin (porcine)  3,000 Units Intravenous Once    mupirocin   Nasal BID    potassium bicarbonate  50 mEq Oral Once          Assessment/Plan:    Severe sepsis with septic shock  E coli bacteremia leading to above, secondary to below  Acute Right pyelonephritis with right upper pole/right perinephric abscess extending to psoas muscle  Rule out mitral valve endocarditis  Persistent high-grade temp spikes with severe leukocytosis  Severe acute kidney injury/ischemic ATN secondary to sepsis requiring hemodialysis  Acute on  chronic normocytic anemia  Acute GI bleed secondary to multiple gastric ulcer  Hypokalemia  History of peripheral neuropathy   Bilateral consolidation-rule out Hap  Prophylaxis    Continues with high-grade temp spikes and persistent leukocytosis and therefore a CT abdomen/pelvis was repeated today with contrast which is concerning for right upper pole/perinephric abscess extending to psoas   Discussed with Urology, consulting IR for CT-guided drainage   Also consulted Infectious Disease and antibiotics switched to Unasyn  Check MRSA PCR in the setting of suspected HA P, add vancomycin if positive  TTE findings concerning for mitral valve endocarditis, cardiology consulted, sandra next week  I will repeat blood cultures today  Nephrology on board managing HD, patient was dialyzed today  No more overt bleeding, but hemoglobin is less than 8 and therefore I ordered 1 unit PRBC transfusion  Continue Protonix GTT through today, switch to p.o. Protonix tomorrow after 72 hours of Protonix drip  Regular labs a.m.  DVT prophylaxis-cautiously continue subQ heparin since there is no overt bleeding    Critical care time-35 minutes  Critical care diagnosis-severe sepsis with bacteremia requiring IV antibiotics   Critical care interventions: Hands-on evaluation, review of labs/radiographs/records and discussions with patient       Jackie Mir MD   04/03/2025

## 2025-04-03 NOTE — CONSULTS
Inpatient consult to Cardiology  Consult performed by: Krystal Caldera FNP  Consult ordered by: Jackie Mir MD  Reason for consult: MARY        [facility]    Cardiology  Consult Note    Patient Name: Tao Bee  MRN: [id number]  Admission Date: 3/29/2025  Hospital Length of Stay: 5 days  Code Status: Full Code   Attending Provider: Trevor Winchester MD   Consulting Provider: SUNDAR Yip  Primary Care Physician: Penny Garsia FNP  Principal Problem:<principal problem not specified>    Patient information was obtained from patient, past medical records, ER records, and primary team.     Subjective:     Chief Complaint/Reason for Consult: MARY    HPI: Mr. Bee is a 60 y/o male with a history of HLD, HTN, CKD, who is known to [facility], Dr. Molina. He presented to the ER on 3.29.25 with complaints of abdominal pain, diarrhea, and melena. He was found to have septic shock and sepsis. He was also found to have non-bleeding gastric ulcers in the antrum with a clean base; GI recommended to repeat EGD in 8 weeks. He has had positive blood cultures with E.coli. Significant labs include WBC 30.45, H&H 7.9/23.5, PLTs 487, K 3.4, BUN/Creat 65.3/5.58, Albumin 1.6. ECHO showed an echo dense structure on the anterior leaflet, cannot exclude vegetation vs calcified chordae. [facility] has been consulted for MARY.     PMH: HLD, HTN, CKD, Non-Healing Wounds, Vitamin D Deficiency, Neuropathy  PSH: EGD  Family History: Mother - DM II  Social History: Current Smoker (1/2 PPD). Alcohol use (28 drinks per week). Denies illicit drug use.     Previous Cardiac Diagnostics:   ECHO (4.3.25):  Left Ventricle: The left ventricle is normal in size. Increased wall thickness. There is normal systolic function with a visually estimated ejection fraction of 60 - 65%. Right Ventricle: The right ventricle is normal in size. Systolic function is normal. TAPSE is 2.68 cm. Mitral Valve: Mildly thickened leaflets. There is a echo  dense structure on the anterior leaflet, cannot exclude vegetation vs calcified chordae. There is mild regurgitation. Recommend AMRY for further evaluation if clinically indicated.    Lower Ext Venous US (2.26.24):  The study quality is average. Limited unilateral study right  leg only post GSV RFA procedure. No evidence of DVT is noted from the right CFV to the distal femoral vein segment with good compression, augmentation and phasic flow obtained.The right GSV is non-compressible with no flow noted from the proximal thigh to the proximal calf consistent with successful RFA procedure.    Arterial US (1.23.24):  The study quality is average. Normal bilateral lower extremity arterial duplex ultrasound.  No plaquing is visualized in bilateral lower extremities.    Venogram (12.7.22):  Bilateral femoral venography did not reveal critical venous compression Intravenous ultrasound was performed which did not reveal critical compression greater than 50%. Therefore no stenting was performed    ECHO (10.8.20):  The study quality is average. The left ventricle is decreased in size. Global left ventricular systolic function is normal. The left ventricular ejection fraction is 70%. The left ventricle diastolic function is impaired (Grade I) with normal left atrial pressure. Noted left ventricular hypertrophy. It is mild. Dilatation of the aortic root limited to the sinus of valsalva (3.9 cms) is noted. Mild (1+) mitral regurgitation.    NPI (10.7.20):  This is a normal perfusion study, no perfusion defects noted. There is no evidence of ischemia. This scan is suggestive of low risk for future cardiovascular events. The left ventricular cavity is noted to be normal on the stress study. The left ventricular ejection fraction was calculated to be 63% and left ventricular global function is normal. The study quality is good.       Review of patient's allergies indicates:   Allergen Reactions    Opioids - morphine analogues  Hallucinations     No current facility-administered medications on file prior to encounter.     Current Outpatient Medications on File Prior to Encounter   Medication Sig    gabapentin (NEURONTIN) 600 MG tablet Take 600 mg by mouth 3 (three) times daily.     Review of Systems   Constitutional:  Positive for fatigue and fever.   Respiratory:  Negative for shortness of breath.    Cardiovascular:  Negative for chest pain, palpitations and leg swelling.   All other systems reviewed and are negative.      Objective:     Vital Signs (Most Recent):  Temp: (!) 101.3 °F (38.5 °C) (04/03/25 0700)  Pulse: 93 (04/03/25 0700)  Resp: 20 (04/03/25 0700)  BP: (!) 150/82 (04/03/25 0700)  SpO2: 96 % (04/03/25 0900) Vital Signs (24h Range):  Temp:  [98.4 °F (36.9 °C)-101.8 °F (38.8 °C)] 101.3 °F (38.5 °C)  Pulse:  [] 93  Resp:  [18-20] 20  SpO2:  [96 %-99 %] 96 %  BP: (125-150)/(71-82) 150/82   Weight: 77.1 kg (169 lb 15.6 oz)  Body mass index is 26.62 kg/m².  SpO2: 96 %       Intake/Output Summary (Last 24 hours) at 4/3/2025 1058  Last data filed at 4/3/2025 1030  Gross per 24 hour   Intake 840 ml   Output 1238 ml   Net -398 ml     Lines/Drains/Airways       Central Venous Catheter Line  Duration                  Hemodialysis Catheter 03/30/25 1233 right internal jugular 3 days              Drain  Duration                  Urethral Catheter 03/30/25 1300 Coude 16 Fr. 3 days              Peripheral Intravenous Line  Duration                  Peripheral IV - Single Lumen 03/29/25 1030 20 G Right Antecubital 5 days         Peripheral IV - Single Lumen 03/30/25 1356 20 G Right;Lateral Forearm 3 days         Peripheral IV - Single Lumen 03/31/25 0200 18 G Left;Proximal;Medial Forearm 3 days                  Significant Labs:   Chemistries:   Recent Labs   Lab 03/29/25  0829 03/30/25  0345 03/30/25  1012 03/31/25  0209 04/01/25  0401 04/02/25  0436 04/03/25  0505   * 125* 127* 134* 134* 137 138   K 4.3 3.9 4.0 3.3* 3.5 3.9 3.4*    CL 92* 93* 94* 92* 95* 97* 99   CO2 20* 15* 16* 30* 29 29 31*   .9* 135.0* 129.2* 60.4* 67.7* 66.5* 65.3*   CREATININE 11.76* 10.50* 10.45* 5.55* 5.86* 5.90* 5.58*   CALCIUM 8.9 7.5* 7.9* 7.5* 7.4* 7.8* 7.8*   BILITOT 0.5  --  0.5 0.5 0.3 0.4 0.3   ALKPHOS 98  --  85 73 85 73 62   ALT 14  --  10 10 8 5 7   AST 20  --  20 23 19 16 18   GLUCOSE 123* 121* 131* 115* 134* 100 116*   MG 2.20 1.80  --  1.70  --   --   --    PHOS  --   --   --   --  4.3 4.5 4.5   TROPONINI 0.012  --   --   --   --   --   --         CBC/Anemia Labs: [facility]:    Recent Labs   Lab 03/29/25  0829 03/30/25  0345 03/30/25  0346 03/30/25  0552 03/31/25  0209 04/01/25  0401 04/02/25  0436 04/03/25  0505   WBC 47.97  47.97*  --    < >  --  39.45  39.45* 35.84  35.84* 36.55  36.55* 30.45  30.45*   HGB 8.7*  --    < >  --  9.6* 8.9* 9.0* 7.9*   HCT 23.3*  --    < >  --  26.4* 25.6* 25.6* 23.5*   *  --    < >  --  164 264 397 487*   MCV 79.3*  --    < >  --  81.0 83.7 84.5 87.4   RDW 16.8  --    < >  --  15.6 16.7 17.0 17.4*   IRON 28*  --   --  15*  --   --   --   --    FERRITIN  --   --   --  264.66  --   --   --   --    FOLATE  --  5.1*  --   --  4.8*  --   --   --    [id number] 836*  --   --  871*  --   --   --   --     < > = values in this interval not displayed.    Recent Labs   Lab 03/30/25 2016   INR 1.4*        Significant Imaging:  Imaging Results              US Retroperitoneal Limited (Final result)  Result time 03/29/25 12:20:31      Final result by Jakob Horan MD (03/29/25 12:20:31)                   Impression:      Findings concerning for pelvicaliectasis with changes of medical renal disease.  No overt evidence for obstructive uropathy.      Electronically signed by: Jakob Horan MD  Date:    03/29/2025  Time:    12:20               Narrative:    EXAMINATION:  US RETROPERITONEAL LIMITED    CLINICAL HISTORY:  Acute kidney failure, unspecified    TECHNIQUE:  Multiple sonographic images the kidneys were obtained by  department sonographer.    COMPARISON:  None    FINDINGS:  Right kidney measures 10.9 cm in length.  Left kidney measures 10.2 cm length.  No evidence for hydronephrosis, however, pelvicaliectasis is identified.  The parenchyma demonstrates increased echogenicity with cortical thinning.    Bladder is distended and normal.                                       CT Chest Abdomen Pelvis Without Contrast (XPD) (Final result)  Result time 03/29/25 09:44:25      Final result by Haylee Correia MD (03/29/25 09:44:25)                   Impression:      Abnormal edematous appearance of the right kidney.  There is fullness at the upper pole right kidney which is poorly characterized.  Appearance could be related to mass, phlegmon, focal pyelonephritis in the acute setting.  There is perinephric stranding on the right.  No martha hydronephrosis.  Further characterization limited without contrast.      Electronically signed by: Haylee Correia  Date:    03/29/2025  Time:    09:44               Narrative:    EXAMINATION:  CT CHEST ABDOMEN PELVIS WITHOUT CONTRAST(XPD)    CLINICAL HISTORY:  Sepsis;  lower abdominal pain x3 days.  Nausea.  Diarrhea.    TECHNIQUE:  Helical acquisition through the chest, abdomen and pelvis without  IV administration of contrast. Axial, sagittal and coronal reformats interpreted.    Automated tube current modulation, weight-based exposure dosing, and/or iterative reconstruction technique utilized to reach lowest reasonably achievable exposure rate.    DLP: 329 mGy*cm    COMPARISON:  No relevant priors available for comparison at time of this dictation    FINDINGS:  BASE OF NECK: No significant abnormality.    HEART: Normal size.    THORACIC VASCULATURE: Thoracic aorta is unremarkable.    KIARA/MEDIASTINUM: No enlarged lymph nodes by size criteria. Evaluation of hilar lymphadenopathy is limited without contrast.    AIRWAYS: Patent.    LUNGS/PLEURA: Mild dependent atelectasis.    THORACIC SOFT  TISSUES: Unremarkable.    LIVER: Normal attenuation. No appreciable focal hepatic lesion.    BILIARY: Gallbladder decompressed.    PANCREAS: No inflammatory change.    SPLEEN: Normal in size    ADRENALS: No mass.    KIDNEYS/URETERS: Edematous appearance of the right kidney.  Fullness at the upper pole right kidney poorly characterized.  Right perinephric stranding.  No martha hydronephrosis.    GI TRACT/MESENTERY: Evaluation of the bowel is limited without contrast.  No evidence of bowel obstruction or inflammation. The appendix is normal. Colonic diverticulosis without acute inflammatory change.    PERITONEUM: No free fluid.No free air.    LYMPH NODES: No enlarged lymph nodes by size criteria.    ABDOMINOPELVIC VASCULATURE: Aortoiliac atherosclerosis.    BLADDER: Normal appearance given degree of distention.    REPRODUCTIVE ORGANS: Normal as visualized.    ABDOMINAL WALL: Unremarkable.    BONES: No acute osseous abnormality.  Old, healed left rib deformities.                                       X-Ray Chest AP Portable (Final result)  Result time 03/29/25 10:10:10      Final result by Wilmer Hooper MD (03/29/25 10:10:10)                   Impression:      No acute cardiopulmonary process identified.      Electronically signed by: Wilmer Hooper  Date:    03/29/2025  Time:    10:10               Narrative:    EXAMINATION:  XR CHEST AP PORTABLE    CLINICAL HISTORY:  Sepsis; abdominal pain for 3 days.  Nausea and diarrhea.    TECHNIQUE:  One view    COMPARISON:  None available.    FINDINGS:  Cardiopericardial silhouette is within normal limits. Lungs are without dense focal or segmental consolidation, congestive process, pleural effusions or pneumothorax.  Multiple old fractures of the left ribs.                                    EKG:         Telemetry:  SR    Physical Exam  Vitals and nursing note reviewed.   HENT:      Head: Normocephalic.      Nose: Nose normal.      Mouth/Throat:      Mouth: Mucous membranes are  dry.   Eyes:      Extraocular Movements: Extraocular movements intact.   Cardiovascular:      Rate and Rhythm: Normal rate and regular rhythm.      Pulses: Normal pulses.      Heart sounds: Murmur heard.   Pulmonary:      Effort: Pulmonary effort is normal.      Breath sounds: Rales present.      Comments: Diminished Lung sounds at bases     On NC 2 L   Abdominal:      Palpations: Abdomen is soft.   Musculoskeletal:         General: Normal range of motion.      Cervical back: Normal range of motion.      Right lower leg: Edema present.      Left lower leg: Edema present.   Neurological:      Mental Status: He is alert and oriented to person, place, and time.   Psychiatric:         Mood and Affect: Mood normal.         Behavior: Behavior normal.         Home Medications:   Medications Ordered Prior to Encounter[1]  Current Schedule Inpatient Medications:   ampicillin-sulbactam  1.5 g Intravenous Q24H    heparin (porcine)  3,000 Units Intravenous Once    mupirocin   Nasal BID     Continuous Infusions:   pantoprazole (PROTONIX) IV infusion  8 mg/hr Intravenous Continuous 20 mL/hr at 04/02/25 0439 8 mg/hr at 04/02/25 0439     Assessment:   Anterior Leaflet Vegetation vs Calcified Chordae     - ECHO (4.2.25): There is a echo dense structure on the anterior leaflet, cannot exclude vegetation vs calcified chordae.   Leukocytosis/Sepsis    - BC positive for E.coli   UTI/Pyelonephritis   Fever   Anemia - Worsening   Non-bleeding Gastric Ulcers   Thrombocytosis   Electrolyte Derangements     - Hypokalemia    - Hyponatremia - Resolved   Metabolic Acidosis   VHD    - Mild MR   HLD  HTN  CKD/ESRD    - on HD   Non-Healing Wounds  Venous Insuffiencey   Vitamin D Deficiency  Neuropathy  History of GI Bleed       Plan:   Plan for MARY once anesthesia is available; likely early next week  Consents Obtained (Placed on Chart)  Electrolyte management per Primary Team   Labs in AM: CBC, BMP, and Mag    Thank you for your consult.      SUNDAR Yip  Cardiology  [facility]    I agree with the findings of the complexity of problems addressed and take responsibility for the plan's risks and complications. I approved the plan documented by Krystal Caldera NP.            [1]   No current facility-administered medications on file prior to encounter.     Current Outpatient Medications on File Prior to Encounter   Medication Sig Dispense Refill    gabapentin (NEURONTIN) 600 MG tablet Take 600 mg by mouth 3 (three) times daily.

## 2025-04-03 NOTE — PROGRESS NOTES
UROLOGY  PROGRESS  NOTE    Tao Bee 1965  [id number]  4/3/2025    No new issues  Patient feeling well today    Continues with fever, max 101.8 overnight   WBC 30.45  H&H 7.9/23.5  BUN/creatinine 65.3/5.58  900 mL urine output overnight    Intake/Output:  No intake/output data recorded.  I/O last 3 completed shifts:  In: 1160 [P.O.:1160]  Out: 2000 [Urine:2000]     Exam:    NAD  Card: RRR  Resp: unlabored  Abd:  Soft, NT ND  :  light yellow urine draining to  bag    Recent Results (from the past 24 hours)   Echo    Collection Time: 04/02/25 10:23 AM   Result Value Ref Range    BSA 1.88 m2    Borrgeo's Biplane MOD Ejection Fraction 60 %    A2C EF 62 %    A4C EF 59 %    LVOT stroke volume 79.6 cm3    LVIDd 4.0 3.5 - 6.0 cm    LV Systolic Volume 20 mL    LV Systolic Volume Index 10.8 mL/m2    LVIDs 2.4 2.1 - 4.0 cm    LV ESV A2C 31.30 mL    LV Diastolic Volume 70 mL    LV Diastolic Volume Index 37.63 mL/m2    LV EDV A2C 88.719937016738524 mL    LV EDV A4C 79.30 mL    Left Ventricular End Systolic Volume by Teichholz Method 19.50 mL    Left Ventricular End Diastolic Volume by Teichholz Method 70.00 mL    IVS 1.1 0.6 - 1.1 cm    LVOT diameter 2.1 cm    LVOT area 3.5 cm2    FS 40.0 28 - 44 %    Left Ventricle Relative Wall Thickness 0.55 cm    PW 1.1 0.6 - 1.1 cm    LV mass 145.6 g    LV Mass Index 78.3 g/m2    MV Peak E Angel 0.87 m/s    TDI LATERAL 0.12 m/s    TDI SEPTAL 0.09 m/s    E/E' ratio 8 m/s    MV Peak A Angel 1.12 m/s    TR Max Angel 2.3 m/s    E/A ratio 0.78     E wave deceleration time 280 msec    LV SEPTAL E/E' RATIO 9.7 m/s    LV LATERAL E/E' RATIO 7.3 m/s    LVOT peak angel 1.5 m/s    Left Ventricular Outflow Tract Mean Velocity 0.98 cm/s    Left Ventricular Outflow Tract Mean Gradient 5.00 mmHg    TAPSE 2.68 cm    LA size 2.6 cm    AV mean gradient 9 mmHg    AV peak gradient 18 mmHg    Ao peak angel 2.1 m/s    Ao VTI 32.8 cm    LVOT peak VTI 23.0 cm    AV valve area 2.4 cm²    AV Velocity Ratio 0.71      AV index (prosthetic) 0.70     MICHELLE by Velocity Ratio 2.5 cm²    MV mean gradient 4 mmHg    MV peak gradient 5 mmHg    MV stenosis pressure 1/2 time 69.00 ms    MV valve area p 1/2 method 3.19 cm2    MV valve area by continuity eq 2.38 cm2    MV VTI 33.4 cm    Triscuspid Valve Regurgitation Peak Gradient 21 mmHg    PV PEAK VELOCITY 1.41 m/s    PV peak gradient 8 mmHg    Mean e' 0.11 m/s    ZLVIDS -2.26     ZLVIDD -2.58     LA area A2C 13.80 cm2    Mitral Valve Heart Rate 87 bpm   Comprehensive Metabolic Panel    Collection Time: 04/03/25  5:05 AM   Result Value Ref Range    Sodium 138 136 - 145 mmol/L    Potassium 3.4 (L) 3.5 - 5.1 mmol/L    Chloride 99 98 - 107 mmol/L    CO2 31 (H) 22 - 29 mmol/L    Glucose 116 (H) 74 - 100 mg/dL    Blood Urea Nitrogen 65.3 (H) 8.4 - 25.7 mg/dL    Creatinine 5.58 (H) 0.72 - 1.25 mg/dL    Calcium 7.8 (L) 8.4 - 10.2 mg/dL    Protein Total 4.6 (L) 6.4 - 8.3 gm/dL    Albumin 1.6 (L) 3.5 - 5.0 g/dL    Globulin 3.0 2.4 - 3.5 gm/dL    Albumin/Globulin Ratio 0.5 (L) 1.1 - 2.0 ratio    Bilirubin Total 0.3 <=1.5 mg/dL    ALP 62 40 - 150 unit/L    ALT 7 0 - 55 unit/L    AST 18 11 - 45 unit/L    eGFR 11 mL/min/1.73/m2    Anion Gap 8.0 mEq/L    BUN/Creatinine Ratio 12    Phosphorus    Collection Time: 04/03/25  5:05 AM   Result Value Ref Range    Phosphorus Level 4.5 2.3 - 4.7 mg/dL   CBC with Differential    Collection Time: 04/03/25  5:05 AM   Result Value Ref Range    WBC 30.45 (H) 4.50 - 11.50 x10(3)/mcL    RBC 2.69 (L) 4.70 - 6.10 x10(6)/mcL    Hgb 7.9 (L) 14.0 - 18.0 g/dL    Hct 23.5 (L) 42.0 - 52.0 %    MCV 87.4 80.0 - 94.0 fL    MCH 29.4 27.0 - 31.0 pg    MCHC 33.6 33.0 - 36.0 g/dL    RDW 17.4 (H) 11.5 - 17.0 %    Platelet 487 (H) 130 - 400 x10(3)/mcL    MPV 10.5 (H) 7.4 - 10.4 fL   Manual Differential    Collection Time: 04/03/25  5:05 AM   Result Value Ref Range    WBC 30.45 x10(3)/mcL    Neutrophils % 82 %    Lymphs % 7 %    Monocytes % 6 %    Eosinophils % 3 %    Myelocytes % 2  (H) <=0 %    Neutrophils Abs 24.969 (H) 2.1 - 9.2 x10(3)/mcL    Lymphs Abs 2.1315 0.6 - 4.6 x10(3)/mcL    Monocytes Abs 1.827 (H) 0.1 - 1.3 x10(3)/mcL    Eosinophils Abs 0.9135 (H) 0 - 0.9 x10(3)/mcL    Platelets Increased (A) Normal, Adequate    RBC Morph Abnormal (A) Normal    Poikilocytosis 2+ (A) (none)    Anisocytosis 1+ (A) (none)    Macrocytosis 1+ (A) (none)    Target Cells 2+ (A) (none)   Vancomycin, Random    Collection Time: 04/03/25  5:05 AM   Result Value Ref Range    Vancomycin Random 7.6 (L) 15.0 - 20.0 ug/ml            Urine culture [[id number]] (Abnormal)  Collected: 03/29/25 1159   Order Status: Completed Specimen: Urine Updated: 03/31/25 0933    Urine Culture >/= 100,000 colonies/ml Escherichia coli Abnormal    Susceptibility     Escherichia coli     SERA     Ampicillin <=2 Sensitive     Cefazolin (Other) 2 Sensitive     Cefazolin (Urine) 2 Sensitive     Cefepime <=0.12 Sensitive     Ceftriaxone <=0.25 Sensitive     Ciprofloxacin <=0.06 Sensitive     ESBL Neg Negative     Gentamicin <=1 Sensitive     Levofloxacin <=0.12 Sensitive     Meropenem <=0.25 Sensitive     Nitrofurantoin <=16 Sensitive     Piperacillin/Tazobactam <=4 Sensitive     Trimeth/Sulfa <=20 Sensitive                     Assessment:  59-year-old male admitted with septic shock secondary to pyelonephritis with acute renal failure   -last dialyzed 3/30 - nephrology following; plans for dialysis today  -blood and urine cultures with E coli - on rocephin - continues spiking temps, WBC 30.45 today    Plan:  -CT abdomen and pelvis yesterday without contrast with no acute change, persistent edematous left kidney, unable to rule out contrast secondary to noncontrast scan  -discussed with Dr. Black - we will obtain CT abdomen and pelvis with contrast, plans for dialysis after scan  -further to follow once imaging obtained    ADDENDUM:  -CT reviewed. Reveals possible right upper pole 4cm abscess and possible right perinephric collection  abutting the psoas muscle measuring up to 8cm.  -IR consulted for drainage      [place] NP
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
## 2025-04-03 NOTE — PROGRESS NOTES
[facility] Inpatient Progress Note      HPI:     Patient Name: Tao Bee  Admission Date: 3/29/2025  Hospital Length of Stay: 5 days  Code Status: Full Code   Attending Physician: Trevor Winchester MD   Primary Care Physician: Penny Garsia FNP  Principal Problem:<principal problem not specified>      Today patient seen and examined  Labs, recent events, imaging and medications reviewed for this hospital stay  Feels ok   Unfortunately still spiking temp  CT --> ascites, L renal pole edema, effusion  Echo-->thickened mitral leaflet cannot exclude vegetation      Review of Systems:   Constitutional: fever  Skin: Denies wounds, no rashes, no itching, no new skin lesions  HEENT: Denies acute change in hearing or vision, tinnitus, or dysphagia  Respiratory: some dyspnea  Cardiovascular: Denies chest pain, palpitations,   Gastrointestional: Denies abdominal pain, nausea, vomiting, diarrhea, or constipation  Genitourinary: catheter  Musculoskeletal: Denies myalgias, back pain, flank pain, new decreased ROM or acute focal weakness  Neurological: Denies headaches, seizures, dizziness, paresthesias or asterixis  Hematological: Denies unusual bruising or bleeding        Medications:   Scheduled Meds:   cefTRIAXone (Rocephin) IV (PEDS and ADULTS)  2 g Intravenous Q24H    heparin (porcine)  3,000 Units Intravenous Once    mupirocin   Nasal BID     Continuous Infusions:   pantoprazole (PROTONIX) IV infusion  8 mg/hr Intravenous Continuous 20 mL/hr at 04/02/25 0439 8 mg/hr at 04/02/25 0439         Vitals:     Vitals:    04/03/25 0150 04/03/25 0307 04/03/25 0600 04/03/25 0700   BP:  133/79  (!) 150/82   Patient Position:       Pulse:  98  93   Resp:    20   Temp: (!) 100.9 °F (38.3 °C) 99.8 °F (37.7 °C)  (!) 101.3 °F (38.5 °C)   TempSrc: Oral Oral  Oral   SpO2:  96%  96%   Weight:   77.1 kg (169 lb 15.6 oz)    Height:             I/O last 3 completed shifts:  In: 1160 [P.O.:1160]  Out: 2000 [Urine:2000]    Intake/Output  Summary (Last 24 hours) at 4/3/2025 0813  Last data filed at 4/3/2025 0356  Gross per 24 hour   Intake 840 ml   Output 900 ml   Net -60 ml       Physical Exam:   General: no acute distress, awake, alert  Eyes: PERRLA, EOMI, conjunctiva clear, eyelids without swelling  HENT: atraumatic, oropharynx and nasal mucosa patent  Neck: full ROM, no JVD, no thyromegaly or lymphadenopathy  Respiratory: bilateral rhonchi, ++dec BS bases  Cardiovascular: RRR ++BILLIE G3/6 LSB; BL radial and pedal pulses felt  Edema: Tr  Gastrointestinal: soft, non-tender, non-distended; positive bowel sounds; no masses to palpation    Musculoskeletal: without limitation or discomfort  Integumentary: warm, dry; no rashes, wounds, or skin lesions  Neurological: oriented, appropriate, no acute deficits        Labs:     Chemistries:   Recent Labs   Lab 03/29/25  0829 03/30/25  0345 03/30/25  1012 03/31/25  0209 04/01/25  0401 04/02/25  0436 04/03/25  0505   * 125*   < > 134* 134* 137 138   K 4.3 3.9   < > 3.3* 3.5 3.9 3.4*   CL 92* 93*   < > 92* 95* 97* 99   CO2 20* 15*   < > 30* 29 29 31*   .9* 135.0*   < > 60.4* 67.7* 66.5* 65.3*   CREATININE 11.76* 10.50*   < > 5.55* 5.86* 5.90* 5.58*   CALCIUM 8.9 7.5*   < > 7.5* 7.4* 7.8* 7.8*   BILITOT 0.5  --    < > 0.5 0.3 0.4 0.3   ALKPHOS 98  --    < > 73 85 73 62   ALT 14  --    < > 10 8 5 7   AST 20  --    < > 23 19 16 18   GLUCOSE 123* 121*   < > 115* 134* 100 116*   MG 2.20 1.80  --  1.70  --   --   --    PHOS  --   --   --   --  4.3 4.5 4.5    < > = values in this interval not displayed.        CBC/Anemia Labs: Coags:    Recent Labs   Lab 03/29/25  0829 03/30/25  0345 03/30/25  0346 03/30/25  0552 03/31/25  0209 04/01/25  0401 04/02/25  0436 04/03/25  0505   WBC 47.97  47.97*  --    < >  --  39.45  39.45* 35.84  35.84* 36.55  36.55* 30.45  30.45*   HGB 8.7*  --    < >  --  9.6* 8.9* 9.0* 7.9*   HCT 23.3*  --    < >  --  26.4* 25.6* 25.6* 23.5*   *  --    < >  --  164 264 397 487*    MCV 79.3*  --    < >  --  81.0 83.7 84.5 87.4   RDW 16.8  --    < >  --  15.6 16.7 17.0 17.4*   IRON 28*  --   --  15*  --   --   --   --    FERRITIN  --   --   --  264.66  --   --   --   --    FOLATE  --  5.1*  --   --  4.8*  --   --   --    [id number] 836*  --   --  871*  --   --   --   --     < > = values in this interval not displayed.    Recent Labs   Lab 03/30/25 2016   INR 1.4*          Impression:   Ecoli sepsis with LEANDRO  Pt required emergency dialysis last sunday  Recurrent fever, ?renal abscess vs endocarditis  Volume increase      Plan:     HD today due to volume overload  Discussed with primary team : needs MARY and ID consult  Awaiting  decision       Alirio Black

## 2025-04-03 NOTE — NURSING
04/03/25 1428   Post-Hemodialysis Assessment   Rinseback Volume (mL) 500 mL   Blood Volume Processed (Liters) 48 L   Dialyzer Clearance Lightly streaked   Duration of Treatment 180 minutes   Total UF (mL) 2000 mL   Patient Response to Treatment Pt tolerated HD well   Post-Hemodialysis Comments HD x 2 hours, net UF 1.5 L, pt tolerated HD well - tx performed right after CT with contrast

## 2025-04-03 NOTE — PLAN OF CARE
Problem: Physical Therapy  Goal: Physical Therapy Goal  Description: Goals to be met by: 5/3/25     Patient will increase functional independence with mobility by performin. Supine to sit with [place]  2. Sit to supine with [place]  3. Sit to stand transfer with Modified [place]  4. Gait  x 200 feet with Modified [place] using Rolling Walker.   5. Ascend/descend 2 stairs with bilateral Handrails & Modified [place]    Outcome: Progressing

## 2025-04-03 NOTE — NURSING
04/03/25 1030   Post-Hemodialysis Assessment   Blood Volume Processed (Liters) 24.3 L   Dialyzer Clearance Clear   Duration of Treatment 60 minutes   Total UF (mL) 338 mL   Patient Response to Treatment pt tolerated tx well   Post-Hemodialysis Comments Dr. Black rounded and stated the pt has to go to CT, tx ended, pt reinfused, will come back to run for an additional 2 hours after. Net  mL. NAD noted, VSS, Last /84 and HR 82.

## 2025-04-04 LAB
ABS NEUT (OLG): 17.67 X10(3)/MCL (ref 2.1–9.2)
ALBUMIN SERPL-MCNC: 1.6 G/DL (ref 3.5–5)
ALBUMIN/GLOB SERPL: 0.5 RATIO (ref 1.1–2)
ALP SERPL-CCNC: 70 UNIT/L (ref 40–150)
ALT SERPL-CCNC: 8 UNIT/L (ref 0–55)
ANION GAP SERPL CALC-SCNC: 8 MEQ/L
ANISOCYTOSIS BLD QL SMEAR: ABNORMAL
AST SERPL-CCNC: 20 UNIT/L (ref 11–45)
BACTERIA BLD CULT: NORMAL
BASOPHILS NFR BLD MANUAL: 0.64 X10(3)/MCL (ref 0–0.2)
BASOPHILS NFR BLD MANUAL: 3 %
BILIRUB SERPL-MCNC: 0.2 MG/DL
BUN SERPL-MCNC: 34.7 MG/DL (ref 8.4–25.7)
CALCIUM SERPL-MCNC: 8.1 MG/DL (ref 8.4–10.2)
CHLORIDE SERPL-SCNC: 104 MMOL/L (ref 98–107)
CO2 SERPL-SCNC: 28 MMOL/L (ref 22–29)
CREAT SERPL-MCNC: 3.59 MG/DL (ref 0.72–1.25)
CREAT/UREA NIT SERPL: 10
ERYTHROCYTE [DISTWIDTH] IN BLOOD BY AUTOMATED COUNT: 17.3 % (ref 11.5–17)
GFR SERPLBLD CREATININE-BSD FMLA CKD-EPI: 19 ML/MIN/1.73/M2
GLOBULIN SER-MCNC: 3.5 GM/DL (ref 2.4–3.5)
GLUCOSE SERPL-MCNC: 104 MG/DL (ref 74–100)
HCT VFR BLD AUTO: 25.9 % (ref 42–52)
HGB BLD-MCNC: 8.7 G/DL (ref 14–18)
INSTRUMENT WBC (OLG): 21.29 X10(3)/MCL
LYMPHOCYTES NFR BLD MANUAL: 1.28 X10(3)/MCL (ref 0.6–4.6)
LYMPHOCYTES NFR BLD MANUAL: 6 %
MACROCYTES BLD QL SMEAR: ABNORMAL
MCH RBC QN AUTO: 29.3 PG (ref 27–31)
MCHC RBC AUTO-ENTMCNC: 33.6 G/DL (ref 33–36)
MCV RBC AUTO: 87.2 FL (ref 80–94)
MONOCYTES NFR BLD MANUAL: 1.92 X10(3)/MCL (ref 0.1–1.3)
MONOCYTES NFR BLD MANUAL: 9 %
MRSA PCR SCRN (OHS): NOT DETECTED
NEUTROPHILS NFR BLD MANUAL: 83 %
PHOSPHATE SERPL-MCNC: 3.5 MG/DL (ref 2.3–4.7)
PLATELET # BLD AUTO: 522 X10(3)/MCL (ref 130–400)
PLATELET # BLD EST: ABNORMAL 10*3/UL
PMV BLD AUTO: 10.3 FL (ref 7.4–10.4)
POIKILOCYTOSIS BLD QL SMEAR: ABNORMAL
POTASSIUM SERPL-SCNC: 3.6 MMOL/L (ref 3.5–5.1)
PROT SERPL-MCNC: 5.1 GM/DL (ref 6.4–8.3)
RBC # BLD AUTO: 2.97 X10(6)/MCL (ref 4.7–6.1)
RBC MORPH BLD: ABNORMAL
SODIUM SERPL-SCNC: 140 MMOL/L (ref 136–145)
WBC # BLD AUTO: 21.29 X10(3)/MCL (ref 4.5–11.5)

## 2025-04-04 PROCEDURE — 25000003 PHARM REV CODE 250: Performed by: INTERNAL MEDICINE

## 2025-04-04 PROCEDURE — 84100 ASSAY OF PHOSPHORUS: CPT | Performed by: INTERNAL MEDICINE

## 2025-04-04 PROCEDURE — 80053 COMPREHEN METABOLIC PANEL: CPT | Performed by: INTERNAL MEDICINE

## 2025-04-04 PROCEDURE — 21400001 HC TELEMETRY ROOM

## 2025-04-04 PROCEDURE — 63600175 PHARM REV CODE 636 W HCPCS: Performed by: PREVENTIVE MEDICINE

## 2025-04-04 PROCEDURE — 99233 SBSQ HOSP IP/OBS HIGH 50: CPT | Mod: ,,, | Performed by: INTERNAL MEDICINE

## 2025-04-04 PROCEDURE — 97530 THERAPEUTIC ACTIVITIES: CPT | Mod: CQ

## 2025-04-04 PROCEDURE — 87641 MR-STAPH DNA AMP PROBE: CPT | Performed by: INTERNAL MEDICINE

## 2025-04-04 PROCEDURE — 97110 THERAPEUTIC EXERCISES: CPT | Mod: CQ

## 2025-04-04 PROCEDURE — 63600175 PHARM REV CODE 636 W HCPCS: Performed by: INTERNAL MEDICINE

## 2025-04-04 PROCEDURE — 97116 GAIT TRAINING THERAPY: CPT | Mod: CQ

## 2025-04-04 PROCEDURE — 99900035 HC TECH TIME PER 15 MIN (STAT)

## 2025-04-04 PROCEDURE — 27000221 HC OXYGEN, UP TO 24 HOURS

## 2025-04-04 PROCEDURE — 85025 COMPLETE CBC W/AUTO DIFF WBC: CPT | Performed by: INTERNAL MEDICINE

## 2025-04-04 PROCEDURE — 36415 COLL VENOUS BLD VENIPUNCTURE: CPT | Performed by: INTERNAL MEDICINE

## 2025-04-04 RX ORDER — SODIUM CHLORIDE 9 MG/ML
INJECTION, SOLUTION INTRAVENOUS ONCE
Status: CANCELLED | OUTPATIENT
Start: 2025-04-04 | End: 2025-04-04

## 2025-04-04 RX ORDER — LIDOCAINE HYDROCHLORIDE 20 MG/ML
INJECTION, SOLUTION INFILTRATION; PERINEURAL
Status: COMPLETED | OUTPATIENT
Start: 2025-04-04 | End: 2025-04-04

## 2025-04-04 RX ORDER — PANTOPRAZOLE SODIUM 40 MG/10ML
40 INJECTION, POWDER, LYOPHILIZED, FOR SOLUTION INTRAVENOUS 2 TIMES DAILY
Status: DISCONTINUED | OUTPATIENT
Start: 2025-04-04 | End: 2025-04-08 | Stop reason: HOSPADM

## 2025-04-04 RX ADMIN — AMPICILLIN SODIUM AND SULBACTAM SODIUM 1.5 G: 1; .5 INJECTION, POWDER, FOR SOLUTION INTRAMUSCULAR; INTRAVENOUS at 11:04

## 2025-04-04 RX ADMIN — PANTOPRAZOLE SODIUM 40 MG: 40 INJECTION, POWDER, LYOPHILIZED, FOR SOLUTION INTRAVENOUS at 08:04

## 2025-04-04 RX ADMIN — MUPIROCIN: 20 OINTMENT TOPICAL at 07:04

## 2025-04-04 RX ADMIN — LIDOCAINE HYDROCHLORIDE 5 ML: 20 INJECTION, SOLUTION INFILTRATION; PERINEURAL at 11:04

## 2025-04-04 NOTE — PROGRESS NOTES
"  [facility]    Cardiology  Progress Note    Patient Name: Tao Bee  MRN: [id number]  Admission Date: 3/29/2025  Hospital Length of Stay: 6 days  Code Status: Full Code   Attending Provider: Jackie Mir MD   Consulting Provider: SUNDAR Yip  Primary Care Physician: Penny Garsia FNP  Principal Problem:<principal problem not specified>    Patient information was obtained from patient, past medical records, ER records, and primary team.     Subjective:     Chief Complaint/Reason for Consult: MARY    HPI: Mr. Bee is a 60 y/o male with a history of HLD, HTN, CKD, who is known to [facility], Dr. Molina. He presented to the ER on 3.29.25 with complaints of abdominal pain, diarrhea, and melena. He was found to have septic shock and sepsis. He was also found to have non-bleeding gastric ulcers in the antrum with a clean base; GI recommended to repeat EGD in 8 weeks. He has had positive blood cultures with E.coli. Significant labs include WBC 30.45, H&H 7.9/23.5, PLTs 487, K 3.4, BUN/Creat 65.3/5.58, Albumin 1.6. ECHO showed an echo dense structure on the anterior leaflet, cannot exclude vegetation vs calcified chordae. [facility] has been consulted for MARY.     4.4.25: NAD. VSS. No complaints of CP/SOB/Palps. "I feel okay" WBC 21.29, H&H 8.7/25.9, Plts 522, BUN/Creat 34.7/3.59, Glucose 104, Albumin 1.6    PMH: HLD, HTN, CKD, Non-Healing Wounds, Vitamin D Deficiency, Neuropathy  PSH: EGD  Family History: Mother - DM II  Social History: Current Smoker (1/2 PPD). Alcohol use (28 drinks per week). Denies illicit drug use.     Previous Cardiac Diagnostics:   ECHO (4.3.25):  Left Ventricle: The left ventricle is normal in size. Increased wall thickness. There is normal systolic function with a visually estimated ejection fraction of 60 - 65%. Right Ventricle: The right ventricle is normal in size. Systolic function is normal. TAPSE is 2.68 cm. Mitral Valve: Mildly thickened leaflets. There is a " echo dense structure on the anterior leaflet, cannot exclude vegetation vs calcified chordae. There is mild regurgitation. Recommend MARY for further evaluation if clinically indicated.    Lower Ext Venous US (2.26.24):  The study quality is average. Limited unilateral study right  leg only post GSV RFA procedure. No evidence of DVT is noted from the right CFV to the distal femoral vein segment with good compression, augmentation and phasic flow obtained.The right GSV is non-compressible with no flow noted from the proximal thigh to the proximal calf consistent with successful RFA procedure.    Arterial US (1.23.24):  The study quality is average. Normal bilateral lower extremity arterial duplex ultrasound.  No plaquing is visualized in bilateral lower extremities.    Venogram (12.7.22):  Bilateral femoral venography did not reveal critical venous compression Intravenous ultrasound was performed which did not reveal critical compression greater than 50%. Therefore no stenting was performed    ECHO (10.8.20):  The study quality is average. The left ventricle is decreased in size. Global left ventricular systolic function is normal. The left ventricular ejection fraction is 70%. The left ventricle diastolic function is impaired (Grade I) with normal left atrial pressure. Noted left ventricular hypertrophy. It is mild. Dilatation of the aortic root limited to the sinus of valsalva (3.9 cms) is noted. Mild (1+) mitral regurgitation.    NPI (10.7.20):  This is a normal perfusion study, no perfusion defects noted. There is no evidence of ischemia. This scan is suggestive of low risk for future cardiovascular events. The left ventricular cavity is noted to be normal on the stress study. The left ventricular ejection fraction was calculated to be 63% and left ventricular global function is normal. The study quality is good.       Review of patient's allergies indicates:   Allergen Reactions    Opioids - morphine analogues  Hallucinations     No current facility-administered medications on file prior to encounter.     Current Outpatient Medications on File Prior to Encounter   Medication Sig    gabapentin (NEURONTIN) 600 MG tablet Take 600 mg by mouth 3 (three) times daily.     Review of Systems   Constitutional:  Positive for fatigue and fever.   Respiratory:  Negative for shortness of breath.    Cardiovascular:  Negative for chest pain, palpitations and leg swelling.   All other systems reviewed and are negative.      Objective:     Vital Signs (Most Recent):  Temp: 99.5 °F (37.5 °C) (04/04/25 0759)  Pulse: 74 (04/04/25 0759)  Resp: 13 (04/04/25 0759)  BP: 136/78 (04/04/25 0759)  SpO2: 99 % (04/04/25 0759) Vital Signs (24h Range):  Temp:  [98.7 °F (37.1 °C)-99.5 °F (37.5 °C)] 99.5 °F (37.5 °C)  Pulse:  [] 74  Resp:  [13-18] 13  SpO2:  [94 %-100 %] 99 %  BP: (122-146)/(64-81) 136/78   Weight: 76.2 kg (167 lb 15.9 oz)  Body mass index is 26.3 kg/m².  SpO2: 99 %       Intake/Output Summary (Last 24 hours) at 4/4/2025 0812  Last data filed at 4/4/2025 0314  Gross per 24 hour   Intake 922.08 ml   Output 3238 ml   Net -2315.92 ml     Lines/Drains/Airways       Central Venous Catheter Line  Duration                  Hemodialysis Catheter 03/30/25 1233 right internal jugular 4 days              Drain  Duration                  Urethral Catheter 03/30/25 1300 Coude 16 Fr. 4 days              Peripheral Intravenous Line  Duration                  Peripheral IV - Single Lumen 03/29/25 1030 20 G Right Antecubital 5 days         Peripheral IV - Single Lumen 03/30/25 1356 20 G Right;Lateral Forearm 4 days         Peripheral IV - Single Lumen 03/31/25 0200 18 G Left;Proximal;Medial Forearm 4 days                  Significant Labs:   Chemistries:   Recent Labs   Lab 03/29/25  0829 03/30/25  0345 03/30/25  1012 03/31/25  0209 03/31/25  0209 04/01/25  0401 04/02/25  0436 04/03/25  0505 04/04/25  0357   * 125*   < > 134*  --  134* 137 138  140   K 4.3 3.9   < > 3.3*  --  3.5 3.9 3.4* 3.6   CL 92* 93*   < > 92*  --  95* 97* 99 104   CO2 20* 15*   < > 30*  --  29 29 31* 28   .9* 135.0*   < > 60.4*  --  67.7* 66.5* 65.3* 34.7*   CREATININE 11.76* 10.50*   < > 5.55*  --  5.86* 5.90* 5.58* 3.59*   CALCIUM 8.9 7.5*   < > 7.5*  --  7.4* 7.8* 7.8* 8.1*   BILITOT 0.5  --    < > 0.5  --  0.3 0.4 0.3 0.2   ALKPHOS 98  --    < > 73  --  85 73 62 70   ALT 14  --    < > 10  --  8 5 7 8   AST 20  --    < > 23  --  19 16 18 20   GLUCOSE 123* 121*   < > 115*  --  134* 100 116* 104*   MG 2.20 1.80  --  1.70  --   --   --   --   --    PHOS  --   --   --   --    < > 4.3 4.5 4.5 3.5   TROPONINI 0.012  --   --   --   --   --   --   --   --     < > = values in this interval not displayed.        CBC/Anemia Labs: Coags:    Recent Labs   Lab 03/29/25  0829 03/30/25  0345 03/30/25  0346 03/30/25  0552 03/31/25  0209 04/01/25  0401 04/02/25  0436 04/03/25  0505 04/04/25  0357   WBC 47.97  47.97*  --    < >  --  39.45  39.45*   < > 36.55  36.55* 30.45  30.45* 21.29  21.29*   HGB 8.7*  --    < >  --  9.6*   < > 9.0* 7.9* 8.7*   HCT 23.3*  --    < >  --  26.4*   < > 25.6* 23.5* 25.9*   *  --    < >  --  164   < > 397 487* 522*   MCV 79.3*  --    < >  --  81.0   < > 84.5 87.4 87.2   RDW 16.8  --    < >  --  15.6   < > 17.0 17.4* 17.3*   IRON 28*  --   --  15*  --   --   --   --   --    FERRITIN  --   --   --  264.66  --   --   --   --   --    FOLATE  --  5.1*  --   --  4.8*  --   --   --   --    [id number] 836*  --   --  871*  --   --   --   --   --     < > = values in this interval not displayed.    Recent Labs   Lab 03/30/25 2016   INR 1.4*        Significant Imaging:  Imaging Results              US Retroperitoneal Limited (Final result)  Result time 03/29/25 12:20:31      Final result by Jakob Horan MD (03/29/25 12:20:31)                   Impression:      Findings concerning for pelvicaliectasis with changes of medical renal disease.  No overt evidence  for obstructive uropathy.      Electronically signed by: Jakob Horan MD  Date:    03/29/2025  Time:    12:20               Narrative:    EXAMINATION:  US RETROPERITONEAL LIMITED    CLINICAL HISTORY:  Acute kidney failure, unspecified    TECHNIQUE:  Multiple sonographic images the kidneys were obtained by department sonographer.    COMPARISON:  None    FINDINGS:  Right kidney measures 10.9 cm in length.  Left kidney measures 10.2 cm length.  No evidence for hydronephrosis, however, pelvicaliectasis is identified.  The parenchyma demonstrates increased echogenicity with cortical thinning.    Bladder is distended and normal.                                       CT Chest Abdomen Pelvis Without Contrast (XPD) (Final result)  Result time 03/29/25 09:44:25      Final result by Haylee Correia MD (03/29/25 09:44:25)                   Impression:      Abnormal edematous appearance of the right kidney.  There is fullness at the upper pole right kidney which is poorly characterized.  Appearance could be related to mass, phlegmon, focal pyelonephritis in the acute setting.  There is perinephric stranding on the right.  No martha hydronephrosis.  Further characterization limited without contrast.      Electronically signed by: Haylee Correia  Date:    03/29/2025  Time:    09:44               Narrative:    EXAMINATION:  CT CHEST ABDOMEN PELVIS WITHOUT CONTRAST(XPD)    CLINICAL HISTORY:  Sepsis;  lower abdominal pain x3 days.  Nausea.  Diarrhea.    TECHNIQUE:  Helical acquisition through the chest, abdomen and pelvis without  IV administration of contrast. Axial, sagittal and coronal reformats interpreted.    Automated tube current modulation, weight-based exposure dosing, and/or iterative reconstruction technique utilized to reach lowest reasonably achievable exposure rate.    DLP: 329 mGy*cm    COMPARISON:  No relevant priors available for comparison at time of this dictation    FINDINGS:  BASE OF NECK: No significant  abnormality.    HEART: Normal size.    THORACIC VASCULATURE: Thoracic aorta is unremarkable.    KIARA/MEDIASTINUM: No enlarged lymph nodes by size criteria. Evaluation of hilar lymphadenopathy is limited without contrast.    AIRWAYS: Patent.    LUNGS/PLEURA: Mild dependent atelectasis.    THORACIC SOFT TISSUES: Unremarkable.    LIVER: Normal attenuation. No appreciable focal hepatic lesion.    BILIARY: Gallbladder decompressed.    PANCREAS: No inflammatory change.    SPLEEN: Normal in size    ADRENALS: No mass.    KIDNEYS/URETERS: Edematous appearance of the right kidney.  Fullness at the upper pole right kidney poorly characterized.  Right perinephric stranding.  No martha hydronephrosis.    GI TRACT/MESENTERY: Evaluation of the bowel is limited without contrast.  No evidence of bowel obstruction or inflammation. The appendix is normal. Colonic diverticulosis without acute inflammatory change.    PERITONEUM: No free fluid.No free air.    LYMPH NODES: No enlarged lymph nodes by size criteria.    ABDOMINOPELVIC VASCULATURE: Aortoiliac atherosclerosis.    BLADDER: Normal appearance given degree of distention.    REPRODUCTIVE ORGANS: Normal as visualized.    ABDOMINAL WALL: Unremarkable.    BONES: No acute osseous abnormality.  Old, healed left rib deformities.                                       X-Ray Chest AP Portable (Final result)  Result time 03/29/25 10:10:10      Final result by Wilmer Hooper MD (03/29/25 10:10:10)                   Impression:      No acute cardiopulmonary process identified.      Electronically signed by: Wilmer Hooper  Date:    03/29/2025  Time:    10:10               Narrative:    EXAMINATION:  XR CHEST AP PORTABLE    CLINICAL HISTORY:  Sepsis; abdominal pain for 3 days.  Nausea and diarrhea.    TECHNIQUE:  One view    COMPARISON:  None available.    FINDINGS:  Cardiopericardial silhouette is within normal limits. Lungs are without dense focal or segmental consolidation, congestive  process, pleural effusions or pneumothorax.  Multiple old fractures of the left ribs.                                    EKG:         Telemetry:  SR    Physical Exam  Vitals and nursing note reviewed.   HENT:      Head: Normocephalic.      Nose: Nose normal.      Mouth/Throat:      Mouth: Mucous membranes are dry.   Eyes:      Extraocular Movements: Extraocular movements intact.   Cardiovascular:      Rate and Rhythm: Normal rate and regular rhythm.      Pulses: Normal pulses.      Heart sounds: Murmur heard.   Pulmonary:      Effort: Pulmonary effort is normal.      Breath sounds: Rales present.      Comments: Diminished Lung sounds at bases     On NC 2 L   Abdominal:      Palpations: Abdomen is soft.   Musculoskeletal:         General: Normal range of motion.      Cervical back: Normal range of motion.      Right lower leg: Edema present.      Left lower leg: Edema present.   Neurological:      Mental Status: He is alert and oriented to person, place, and time.   Psychiatric:         Mood and Affect: Mood normal.         Behavior: Behavior normal.         Home Medications:   Medications Ordered Prior to Encounter[1]  Current Schedule Inpatient Medications:   ampicillin-sulbactam  1.5 g Intravenous Q24H    heparin (porcine)  3,000 Units Intravenous Once     Continuous Infusions:   pantoprazole (PROTONIX) IV infusion  8 mg/hr Intravenous Continuous 20 mL/hr at 04/02/25 0439 8 mg/hr at 04/02/25 0439     Assessment:   Anterior Leaflet Vegetation vs Calcified Chordae     - ECHO (4.2.25): There is a echo dense structure on the anterior leaflet, cannot exclude vegetation vs calcified chordae.   Leukocytosis/Sepsis    - BC positive for E.coli   UTI/Pyelonephritis   Fever   Anemia   Non-bleeding Gastric Ulcers   Thrombocytosis - Worsening   Electrolyte Derangements     - Hypokalemia (Resolved)    - Hyponatremia - Resolved   Metabolic Acidosis   VHD    - Mild MR   HLD  HTN  LEANDRO vs CKD/ESRD    - on HD   Non-Healing  Wounds  Venous Insuffiencey   Vitamin D Deficiency  Neuropathy  History of GI Bleed       Plan:   Currently NPO for IR Procedure   Plan for MARY once anesthesia is available; likely early next week  Consents Obtained (Placed on Chart)  Electrolyte management per Primary Team   Labs in AM: CBC, CMP, and Mag    SUNDAR Yip  Cardiology  [facility]    Physician addendum:  MARY next week      Patient's cardiac care is performed as a split-shared visit with CAROL ANN d/t complicated medical management as detailed in A/P and associated high acuity requiring physician expertise. I obtained and performed relevant components of history/exam. Medical decision-making is formulated by me. It is a pleasure to care for the patient.    Lee Del Angel MD  Cardiology               [1]   No current facility-administered medications on file prior to encounter.     Current Outpatient Medications on File Prior to Encounter   Medication Sig Dispense Refill    gabapentin (NEURONTIN) 600 MG tablet Take 600 mg by mouth 3 (three) times daily.

## 2025-04-04 NOTE — BRIEF OP NOTE
[facility] - Observation Unit  Radiology - Brief Procedure Note    Procedure Performed by: Epi Huber MD  Date: 04/04/2025 Time: 11:39 AM    Procedure: CT-guided aspiration    Pre-Op Diagnosis: retroperitoneal / renal abscess  Post-Op Diagnosis: same    Procedure Findings: Right flank prepped/draped in usual sterile technique. Intermittent CT was utilized in typical fashion to advance a tandem needle/catheter unit into target right psoas fluid collection, followed by aspiration attempt. Despite multiple catheter repositioning efforts, no fluid was obtained. The catheter was then removed uneventfully. Of note, the collection is smaller on today's scan relative to the recent CTs.    Estimated Blood Loss: Minimal    Specimen/Tissue Removed: No    Complications: No immediate post-procedure complications identified.    Please refer to dedicated guidance imaging report for additional details.      EPI HUBER MD  Radiology  [phone] // .[phone] // .[phone]

## 2025-04-04 NOTE — PROGRESS NOTES
UROLOGY  PROGRESS  NOTE    Tao Bee 1965  [id number]  4/4/2025    Taken for IR drainage today   Discussed with Dr. Huber - fluid collection appeared smaller today and unable to aspirate any fluid likely due to it being too thick    He is resting in bed   No complaints during rounds   No acute events overnight    Fever trend improving  Hemodynamically stable  Urine output overnight not documented  WBC 21.29  H&H 8.7/25.9   BUN and creatinine 34.7/3.59    Intake/Output:  No intake/output data recorded.  I/O last 3 completed shifts:  In: 922.1 [P.O.:600; Blood:322.1]  Out: 4138 [Urine:1800; Other:2338]     Exam:    NAD  Card: RRR  Resp: unlabored  Abd:  Soft, NT ND  :  light yellow urine draining to  bag    Recent Results (from the past 24 hours)   Prepare RBC 1 Unit    Collection Time: 04/03/25  7:11 PM   Result Value Ref Range    UNIT NUMBER [id number]     UNIT ABO/RH B POS     DISPENSE STATUS Transfused     Unit Expiration [id number]     Product Code V0534B93     Unit Blood Type Code 7300     CROSSMATCH INTERPRETATION Compatible    Type & Screen    Collection Time: 04/03/25  7:11 PM   Result Value Ref Range    Group & Rh B POS     Indirect Dalton GEL NEG     Specimen Outdate 04/06/2025 23:59    Comprehensive Metabolic Panel    Collection Time: 04/04/25  3:57 AM   Result Value Ref Range    Sodium 140 136 - 145 mmol/L    Potassium 3.6 3.5 - 5.1 mmol/L    Chloride 104 98 - 107 mmol/L    CO2 28 22 - 29 mmol/L    Glucose 104 (H) 74 - 100 mg/dL    Blood Urea Nitrogen 34.7 (H) 8.4 - 25.7 mg/dL    Creatinine 3.59 (H) 0.72 - 1.25 mg/dL    Calcium 8.1 (L) 8.4 - 10.2 mg/dL    Protein Total 5.1 (L) 6.4 - 8.3 gm/dL    Albumin 1.6 (L) 3.5 - 5.0 g/dL    Globulin 3.5 2.4 - 3.5 gm/dL    Albumin/Globulin Ratio 0.5 (L) 1.1 - 2.0 ratio    Bilirubin Total 0.2 <=1.5 mg/dL    ALP 70 40 - 150 unit/L    ALT 8 0 - 55 unit/L    AST 20 11 - 45 unit/L    eGFR 19 mL/min/1.73/m2    Anion Gap 8.0 mEq/L    BUN/Creatinine Ratio 10     Phosphorus    Collection Time: 04/04/25  3:57 AM   Result Value Ref Range    Phosphorus Level 3.5 2.3 - 4.7 mg/dL   CBC with Differential    Collection Time: 04/04/25  3:57 AM   Result Value Ref Range    WBC 21.29 (H) 4.50 - 11.50 x10(3)/mcL    RBC 2.97 (L) 4.70 - 6.10 x10(6)/mcL    Hgb 8.7 (L) 14.0 - 18.0 g/dL    Hct 25.9 (L) 42.0 - 52.0 %    MCV 87.2 80.0 - 94.0 fL    MCH 29.3 27.0 - 31.0 pg    MCHC 33.6 33.0 - 36.0 g/dL    RDW 17.3 (H) 11.5 - 17.0 %    Platelet 522 (H) 130 - 400 x10(3)/mcL    MPV 10.3 7.4 - 10.4 fL   Manual Differential    Collection Time: 04/04/25  3:57 AM   Result Value Ref Range    WBC 21.29 x10(3)/mcL    Neutrophils % 83 %    Lymphs % 6 %    Monocytes % 9 %    Basophils % 3 %    Neutrophils Abs 17.6707 (H) 2.1 - 9.2 x10(3)/mcL    Lymphs Abs 1.2774 0.6 - 4.6 x10(3)/mcL    Monocytes Abs 1.9161 (H) 0.1 - 1.3 x10(3)/mcL    Basophils Abs 0.6387 (H) 0 - 0.2 x10(3)/mcL    Platelets Increased (A) Normal, Adequate    RBC Morph Abnormal (A) Normal    Poikilocytosis 1+ (A) (none)    Anisocytosis 1+ (A) (none)    Macrocytosis 1+ (A) (none)            Urine culture [[id number]] (Abnormal)  Collected: 03/29/25 1159   Order Status: Completed Specimen: Urine Updated: 03/31/25 0973    Urine Culture >/= 100,000 colonies/ml Escherichia coli Abnormal    Susceptibility     Escherichia coli     SERA     Ampicillin <=2 Sensitive     Cefazolin (Other) 2 Sensitive     Cefazolin (Urine) 2 Sensitive     Cefepime <=0.12 Sensitive     Ceftriaxone <=0.25 Sensitive     Ciprofloxacin <=0.06 Sensitive     ESBL Neg Negative     Gentamicin <=1 Sensitive     Levofloxacin <=0.12 Sensitive     Meropenem <=0.25 Sensitive     Nitrofurantoin <=16 Sensitive     Piperacillin/Tazobactam <=4 Sensitive     Trimeth/Sulfa <=20 Sensitive                     Assessment:  59-year-old male admitted with septic shock secondary to pyelonephritis with acute renal failure  -IR attempted aspiration of psoas abscess today however abscess to thick;  collection did appear smaller today on imaging  -remains on intermittent HD - nephrology following  -blood and urine cultures with E coli - continued with fever and leukocytosis despite culture specific antibiotics, Infectious Disease was consulted yesterday and he has been changed to Unasyn, awaiting MARY    Plan:  -continue indwelling Nguyen until acute issues are improved, plan for void trial prior to discharge   -continue antibiotics per ID  -no indication for urologic intervention at this time  -please call as needed over the weekend with any issues      Le Boss NP

## 2025-04-04 NOTE — PROGRESS NOTES
[facility] Inpatient Progress Note      HPI:     Patient Name: Tao Bee  Admission Date: 3/29/2025  Hospital Length of Stay: 6 days  Code Status: Full Code   Attending Physician: Jackie Mir MD   Primary Care Physician: Penny Garsia FNP  Principal Problem:<principal problem not specified>      Today patient seen and examined  Labs, recent events, imaging and medications reviewed for this hospital stay  Feels better  No SOB  No  NV  Pt for CT guided aspiration today  MARY for next week    Review of Systems:   Constitutional: still intermittent fever  Skin:  no rashes, no itching, no new skin lesions  HEENT: no acute change in hearing or vision, tinnitus, or dysphagia  Respiratory: no worsening dyspnea  Cardiovascular: no  chest pain, palpitations, or swelling  Gastrointestional: Denies abdominal pain, nausea, vomiting, diarrhea, or constipation    Musculoskeletal: Denies myalgias, back pain, flank pain, new decreased ROM or acute focal weakness  Neurological: no seizures, no headaches  Hematological: no unusual bruising or bleeding        Medications:   Scheduled Meds:   ampicillin-sulbactam  1.5 g Intravenous Q24H    heparin (porcine)  3,000 Units Intravenous Once     Continuous Infusions:   pantoprazole (PROTONIX) IV infusion  8 mg/hr Intravenous Continuous 20 mL/hr at 04/02/25 0439 8 mg/hr at 04/02/25 0439         Vitals:     Vitals:    04/04/25 0403 04/04/25 0616 04/04/25 0700 04/04/25 0759   BP: (!) 146/81  136/78 136/78   Pulse: 76  74 74   Resp: 16  13 13   Temp: 99.4 °F (37.4 °C)  99.5 °F (37.5 °C) 99.5 °F (37.5 °C)   TempSrc: Oral  Oral    SpO2: 99%  99% 99%   Weight:  76.2 kg (167 lb 15.9 oz)     Height:             I/O last 3 completed shifts:  In: 922.1 [P.O.:600; Blood:322.1]  Out: 4138 [Urine:1800; Other:2338]    Intake/Output Summary (Last 24 hours) at 4/4/2025 0823  Last data filed at 4/4/2025 0314  Gross per 24 hour   Intake 922.08 ml   Output 3238 ml   Net -2315.92 ml       Physical  Exam:   General: no acute distress, awake, alert  Eyes: PERRLA, EOMI, conjunctiva clear, eyelids without swelling  HENT: atraumatic, oropharynx and nasal mucosa patent  Neck:  no JVD, no thyromegaly or lymphadenopathy  Respiratory: rhonchi  Cardiovascular: RRR rub; +BILLIE, BL radial and pedal pulses felt  Edema: none  Gastrointestinal: soft, non-tender, non-distended; positive bowel sounds; no masses to palpation    Musculoskeletal: without limitation or discomfort  Integumentary: warm, dry; no rashes, wounds, or skin lesions  Neurological: oriented, appropriate, no acute deficits        Labs:     Chemistries:   Recent Labs   Lab 03/29/25  0829 03/30/25  0345 03/30/25  1012 03/31/25  0209 04/01/25  0401 04/02/25  0436 04/03/25  0505 04/04/25  0357   * 125*   < > 134*   < > 137 138 140   K 4.3 3.9   < > 3.3*   < > 3.9 3.4* 3.6   CL 92* 93*   < > 92*   < > 97* 99 104   CO2 20* 15*   < > 30*   < > 29 31* 28   .9* 135.0*   < > 60.4*   < > 66.5* 65.3* 34.7*   CREATININE 11.76* 10.50*   < > 5.55*   < > 5.90* 5.58* 3.59*   CALCIUM 8.9 7.5*   < > 7.5*   < > 7.8* 7.8* 8.1*   BILITOT 0.5  --    < > 0.5   < > 0.4 0.3 0.2   ALKPHOS 98  --    < > 73   < > 73 62 70   ALT 14  --    < > 10   < > 5 7 8   AST 20  --    < > 23   < > 16 18 20   GLUCOSE 123* 121*   < > 115*   < > 100 116* 104*   MG 2.20 1.80  --  1.70  --   --   --   --    PHOS  --   --   --   --    < > 4.5 4.5 3.5    < > = values in this interval not displayed.        CBC/Anemia Labs: Coags:    Recent Labs   Lab 03/29/25  0829 03/30/25  0345 03/30/25  0346 03/30/25  0552 03/31/25  0209 04/01/25  0401 04/02/25  0436 04/03/25  0505 04/04/25  0357   WBC 47.97  47.97*  --    < >  --  39.45  39.45*   < > 36.55  36.55* 30.45  30.45* 21.29  21.29*   HGB 8.7*  --    < >  --  9.6*   < > 9.0* 7.9* 8.7*   HCT 23.3*  --    < >  --  26.4*   < > 25.6* 23.5* 25.9*   *  --    < >  --  164   < > 397 487* 522*   MCV 79.3*  --    < >  --  81.0   < > 84.5 87.4 87.2    RDW 16.8  --    < >  --  15.6   < > 17.0 17.4* 17.3*   IRON 28*  --   --  15*  --   --   --   --   --    FERRITIN  --   --   --  264.66  --   --   --   --   --    FOLATE  --  5.1*  --   --  4.8*  --   --   --   --    [id number] 836*  --   --  871*  --   --   --   --   --     < > = values in this interval not displayed.    Recent Labs   Lab 03/30/25 2016   INR 1.4*          Impression:   LEANDRO  E coli bacteremia  Possible renal abscess  ?endocarditis    Plan:        HD in am  Will monitor for signs of renal recovery    Alirio Black
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
## 2025-04-04 NOTE — PLAN OF CARE
Problem: Infection  Goal: Absence of Infection Signs and Symptoms  Outcome: Progressing     Problem: Adult Inpatient Plan of Care  Goal: Plan of Care Review  Outcome: Progressing  Goal: Patient-Specific Goal (Individualized)  Outcome: Progressing  Goal: Absence of Hospital-Acquired Illness or Injury  Outcome: Progressing  Goal: Optimal Comfort and Wellbeing  Outcome: Progressing  Goal: Readiness for Transition of Care  Outcome: Progressing     Problem: Fall Injury Risk  Goal: Absence of Fall and Fall-Related Injury  Outcome: Progressing     Problem: Skin Injury Risk Increased  Goal: Skin Health and Integrity  Outcome: Progressing     Problem: Wound  Goal: Optimal Coping  Outcome: Progressing  Goal: Optimal Functional Ability  Outcome: Progressing  Goal: Absence of Infection Signs and Symptoms  Outcome: Progressing  Goal: Improved Oral Intake  Outcome: Progressing  Goal: Optimal Pain Control and Function  Outcome: Progressing  Goal: Skin Health and Integrity  Outcome: Progressing  Goal: Optimal Wound Healing  Outcome: Progressing

## 2025-04-04 NOTE — PROGRESS NOTES
[facility] Hospital Medicine Progress Note        Chief Complaint: Inpatient Follow-up    HPI:   59-year-old male with significant history of peripheral neuropathy admitted to hospital medicine services with complaints of lower abdominal pain, diarrhea, nausea, decreased urine output.  Borderline hypotensive, responded to fluids.  Labs were significant for electrolyte derangement, severe acute kidney injury, severe leukocytosis.  CT chest, abdomen, pelvis with edematous right kidney and also concern for mass versus phlegmon versus pyelonephritis.  Patient was initiated on empiric antibiotics, nephrology and urology services consulted.  Patient was initiated on emergent HD, he was also anemic, 2 units PRBC was transfused with HD.  After admission patient became significantly hypotensive warranting transfer to ICU for vasopressors.  Patient's blood cultures grew E coli, most likely renal source.  Urology following.  Patient also endorsed melanotic stools prior to presentation, given severe anemia gastroenterology was consulted, patient had EGD done which revealed 3 gastric ulcers not requiring any intervention, recommended PPI drip for 72 hours.  Once hemodynamics stabilized patient was downgraded back to hospital medicine services.  HD being continued.  Urology placed indwelling Nguyen.  Since he continued with fever spikes decided to repeat CT abdomen/pelvis.  This was done without contrast on 04/02 which demonstrated similar findings as admit CT.  Patient is still continued with significant high-grade temp spikes on 04/03 and therefore CT was repeated with contrast which showed abscess collection right upper pole kidney/perinephric area extending to psoas region, IR consulted for drainage, antibiotics changed to Unasyn, ID consulted, given concern for endocarditis cardiology was consulted for MARY.  Less than eight and therefore 1 unit PRBC transfusion ordered on 04/03      Interval Hx:    Patient seen at bedside .  No high-grade temp spikes today morning, patient is upset that he is having to be NPO for the procedure, BP accelerated, no other new complaints, no acute events overnight, no abdominal pain  Objective/physical exam:  General: In no acute distress, afebrile  Chest: Clear to auscultation bilaterally  Heart: S1, S2, no appreciable murmur  Abdomen: Soft, nontender, BS +  MSK: Warm, no lower extremity edema, no clubbing or cyanosis  Neurologic: Alert and oriented x4, moving all extremities with good strength     VITAL SIGNS: 24 HRS MIN & MAX LAST   Temp  Min: 98.7 °F (37.1 °C)  Max: 99.5 °F (37.5 °C) 99.5 °F (37.5 °C)   BP  Min: 122/64  Max: 146/81 136/78   Pulse  Min: 74  Max: 101  74   Resp  Min: 13  Max: 18 13   SpO2  Min: 94 %  Max: 100 % 99 %       Recent Labs   Lab 04/04/25  0357   WBC 21.29  21.29*   RBC 2.97*   HGB 8.7*   HCT 25.9*   MCV 87.2   MCH 29.3   MCHC 33.6   RDW 17.3*   *   MPV 10.3         Recent Labs   Lab 03/31/25  0209 04/01/25  0401 04/04/25  0357   *   < > 140   K 3.3*   < > 3.6   CL 92*   < > 104   CO2 30*   < > 28   BUN 60.4*   < > 34.7*   CREATININE 5.55*   < > 3.59*   CALCIUM 7.5*   < > 8.1*   MG 1.70  --   --    ALBUMIN 1.6*   < > 1.6*   ALKPHOS 73   < > 70   ALT 10   < > 8   AST 23   < > 20   BILITOT 0.5   < > 0.2    < > = values in this interval not displayed.          Microbiology Results (last 7 days)       Procedure Component Value Units Date/Time    Blood Culture [[id number]] Collected: 04/03/25 2200    Order Status: Resulted Specimen: Blood from Arm, Left Updated: 04/03/25 2224    Blood Culture [[id number]] Collected: 04/03/25 2056    Order Status: Resulted Specimen: Blood from Arm, Right Updated: 04/03/25 2224    Blood Culture [[id number]]  (Normal) Collected: 03/30/25 2016    Order Status: Completed Specimen: Blood from Arm, Left Updated: 04/03/25 2201     Blood Culture No Growth At 96 Hours    Blood Culture [[id number]]  (Abnormal)   (Susceptibility) Collected: 03/30/25 2016    Order Status: Completed Specimen: Blood from Arm, Left Updated: 04/03/25 0700     Blood Culture Escherichia coli     GRAM STAIN Gram Negative Rods      Seen in gram stain of broth only      1 of 1 Pediatric bottle positive    Urine culture [[id number]]  (Abnormal)  (Susceptibility) Collected: 03/29/25 1159    Order Status: Completed Specimen: Urine Updated: 03/31/25 0933     Urine Culture >/= 100,000 colonies/ml Escherichia coli    Blood culture x two cultures. Draw prior to antibiotics. [[id number]]  (Abnormal)  (Susceptibility) Collected: 03/29/25 1037    Order Status: Completed Specimen: Blood Updated: 03/31/25 0711     Blood Culture Escherichia coli     GRAM STAIN Gram Negative Rods      Seen in gram stain of broth only      2 of 2 bottles positive    Blood culture x two cultures. Draw prior to antibiotics. [[id number]]  (Abnormal)  (Susceptibility) Collected: 03/29/25 1055    Order Status: Completed Specimen: Blood Updated: 03/31/25 0710     Blood Culture Escherichia coli     GRAM STAIN Gram Negative Rods      Seen in gram stain of broth only      2 of 2 bottles positive    BCID2 Panel [[id number]]  (Abnormal) Collected: 03/29/25 1037    Order Status: Completed Specimen: Blood Updated: 03/29/25 2324     CTX-M (ESBL ) Not Detected     IMP (Cabapenemase ) Not Detected     KPC resistance gene (Carbapenemase ) Not Detected     mcr-1 Not Detected     mecA ID N/A     Comment: Note: Antimicrobial resistance can occur via multiple mechanisms. A Not Detected result for antimicrobial resistance gene(s) does not indicate antimicrobial susceptibility. Subculturing is required for species identification and susceptibility testing of   isolates.        mecA/C and MREJ (MRSA) gene N/A     NDM (Carbapenemase ) Not Detected     OXA-48-like (Carbapenemase ) Not Detected     [place]/B (VRE gene) N/A     VIM (Carbapenemase ) Not Detected      Enterococcus faecalis Not Detected     Enterococcus faecium Not Detected     Listeria monocytogenes Not Detected     Staphylococcus spp. Not Detected     Staphylococcus aureus Not Detected     Staphylococcus epidermidis Not Detected     Staphylococcus lugdunensis Not Detected     Streptococcus spp. Not Detected     Streptococcus agalactiae (Group B) Not Detected     Streptococcus pneumoniae Not Detected     Streptococcus pyogenes (Group A) Not Detected     Acinetobacter calcoaceticus/baumannii complex Not Detected     Bacteroides fragilis Not Detected     Enterobacterales See Species for ID     Enterobacter cloacae complex Not Detected     Escherichia coli Detected     Klebsiella aerogenes Not Detected     Klebsiella oxytoca Not Detected     Klebsiella pneumoniae group Not Detected     Proteus spp. Not Detected     Salmonella spp. Not Detected     Serratia marcescens Not Detected     Haemophilus influenzae Not Detected     Neisseria meningitidis Not Detected     Pseudomonas aeruginosa Not Detected     Stenotrophomonas maltophilia Not Detected     Candida albicans Not Detected     Candida auris Not Detected     Candida glabrata Not Detected     Candida krusei Not Detected     Candida parapsilosis Not Detected     Candida tropicalis Not Detected     Cryptococcus neoformans/gattii Not Detected    Narrative:      The 3LM BCID2 Panel is a multiplexed nucleic acid test intended for the use with [employer]® 2.0 or [employer]® [employer] for the simultaneous qualitative detection and identification of multiple bacterial and yeast nucleic acids and select genetic determinants associated with antimicrobial resistance.  The [employer] BCID2 Panel test is performed directly on blood culture samples identified as positive by a continuous monitoring blood culture system.  Results are intended to be interpreted in conjunction with Gram stain results.             Scheduled Med:   ampicillin-sulbactam  1.5 g  Intravenous Q24H    heparin (porcine)  3,000 Units Intravenous Once    pantoprazole  40 mg Intravenous BID          Assessment/Plan:    Severe sepsis with septic shock  E coli bacteremia leading to above, secondary to below  Acute Right pyelonephritis with right upper pole/right perinephric abscess extending to psoas muscle  Rule out mitral valve endocarditis  Persistent high-grade temp spikes with severe leukocytosis-better today  Severe acute kidney injury/ischemic ATN secondary to sepsis requiring hemodialysis  Acute on chronic normocytic anemia-improved post transfusion 4/3  Acute GI bleed secondary to multiple gastric ulcer  History of peripheral neuropathy   Bilateral consolidation-rule out Hap  Prophylaxis    Fever spikes and leukocytosis better today after switching antibiotics to Unasyn   Follow up MRSA PCR   Follow up repeat cultures from 4/3  CT abdomen/pelvis was repeated /3 with contrast which is concerning for right upper pole/perinephric abscess extending to psoas   Urology recommended IR consult for CT-guided drainage, unfortunately IR was not able to drain the fluids since it was very thick   Urology notified, await recommendations  ID following  TTE findings concerning for mitral valve endocarditis, cardiology consulted, sandra next week  Nephrology on board managing HD, patient was dialyzed 4/3, monitor for renal recovery  Hemoglobin appropriately improved after receiving 1 unit PRBC on 04/03  DC Protonix drip and switch to IV Protonix 40 mg b.i.d.  DVT prophylaxis-cautiously continue subQ heparin since there is no overt bleeding    Critical care time-35 minutes  Critical care diagnosis-severe sepsis with bacteremia requiring IV antibiotics   Critical care interventions: Hands-on evaluation, review of labs/radiographs/records and discussions with patient       Jackie Mir MD   04/04/2025

## 2025-04-04 NOTE — CONSULTS
[facility] - Observation Unit  Interventional Radiology - Consultation Note    SUBJECTIVE   History of Present Illness: Tao Bee is a 59 y.o. male with recently identified right psoas abscess and developing adjacent renal abscess.    IR service consulted for consideration of image-guided drainage.    Scheduled Meds:    ampicillin-sulbactam  1.5 g Intravenous Q24H    heparin (porcine)  3,000 Units Intravenous Once    pantoprazole  40 mg Intravenous BID       Continuous Infusions:       PRN Meds:    Current Facility-Administered Medications:     0.9%  NaCl infusion (for blood administration), , Intravenous, Q24H PRN    0.9%  NaCl infusion (for blood administration), , Intravenous, Q24H PRN    acetaminophen, 1,000 mg, Oral, Q6H PRN    albumin human 25%, 25 g, Intravenous, Q20 Min PRN    calcium gluconate IVPB, 1 g, Intravenous, PRN    calcium gluconate IVPB, 2 g, Intravenous, PRN    calcium gluconate IVPB, 3 g, Intravenous, PRN    magnesium sulfate 2 g IVPB, 2 g, Intravenous, PRN    magnesium sulfate 2 g IVPB, 2 g, Intravenous, PRN    melatonin, 6 mg, Oral, Nightly PRN    morphine, 2 mg, Intravenous, Q4H PRN    ondansetron, 4 mg, Intravenous, Q8H PRN    potassium chloride, 40 mEq, Intravenous, PRN **AND** potassium chloride, 60 mEq, Intravenous, PRN **AND** potassium chloride, 80 mEq, Intravenous, PRN    sodium chloride 0.9%, 10 mL, Intravenous, PRN    sodium chloride 0.9%, 10 mL, Intravenous, PRN    sodium phosphate 15 mmol in D5W 250 mL IVPB, 15 mmol, Intravenous, PRN    sodium phosphate 20.1 mmol in D5W 250 mL IVPB, 20.1 mmol, Intravenous, PRN    sodium phosphate 30 mmol in D5W 250 mL IVPB, 30 mmol, Intravenous, PRN    Review of patient's allergies indicates:   Allergen Reactions    Opioids - morphine analogues Hallucinations       Past Medical History:   Diagnosis Date    Neuropathy        Review of Systems: Intake ROS reviewed; no significant interval changes.    OBJECTIVE     VITAL SIGNS: 24  HR MIN & MAX LAST   Temp  Min: 98.7 °F (37.1 °C)  Max: 99.5 °F (37.5 °C)  99.5 °F (37.5 °C)   BP  Min: 122/64  Max: 146/81  136/78    Pulse  Min: 74  Max: 101  74    Resp  Min: 13  Max: 18  13    SpO2  Min: 94 %  Max: 100 %  99 %      Laboratory:  Lab Results   Component Value Date/Time    WBC 21.29 (H) 04/04/2025 03:57 AM    WBC 21.29 04/04/2025 03:57 AM    HGB 8.7 (L) 04/04/2025 03:57 AM    HCT 25.9 (L) 04/04/2025 03:57 AM     Lab Results   Component Value Date/Time     (H) 04/04/2025 03:57 AM    INR 1.4 (H) 03/30/2025 08:16 PM       Imaging:  3 April 2025 CT abd/pelv - developing phlegmon/early abscess at medial right kidney with adjacent peripherally enhancing collection along the psoas muscle    ASSESSMENT/PLAN   59 y.o. male with developing right renal abscess and adjacent psoas collection.      IR plan/recs:  Proceed with CT-guided aspiration today.    Sedation/Anesthesia Assessment:  ASA: ASA 2 - Patient with mild systemic disease with no functional limitations  Mallampati score: II (hard and soft palate, upper portion of tonsils anduvula visible)  Patient/family history of adverse anesthesia/sedation event: No  H&P obtained or updated within past 30 days, and within 24 hours of admission/registration: Yes    Sedation plan: Local and Moderate        Thank you for including us in the care of this patient. Please call with any questions.    Epi Huber MD  Radiology  [phone] // [phone] // [phone]

## 2025-04-04 NOTE — SEDATION DOCUMENTATION
No fluid sample collected, see MD report and ordering provider updated per Dr Huber- Report given to primary nurse

## 2025-04-05 LAB
ALBUMIN SERPL-MCNC: 1.6 G/DL (ref 3.5–5)
ALBUMIN/GLOB SERPL: 0.4 RATIO (ref 1.1–2)
ALP SERPL-CCNC: 74 UNIT/L (ref 40–150)
ALT SERPL-CCNC: 10 UNIT/L (ref 0–55)
ANION GAP SERPL CALC-SCNC: 8 MEQ/L
AST SERPL-CCNC: 32 UNIT/L (ref 11–45)
BASOPHILS # BLD AUTO: 0.09 X10(3)/MCL
BASOPHILS NFR BLD AUTO: 0.5 %
BILIRUB SERPL-MCNC: 0.2 MG/DL
BUN SERPL-MCNC: 37.9 MG/DL (ref 8.4–25.7)
CALCIUM SERPL-MCNC: 8.2 MG/DL (ref 8.4–10.2)
CHLORIDE SERPL-SCNC: 104 MMOL/L (ref 98–107)
CO2 SERPL-SCNC: 26 MMOL/L (ref 22–29)
CREAT SERPL-MCNC: 4.03 MG/DL (ref 0.72–1.25)
CREAT/UREA NIT SERPL: 9
EOSINOPHIL # BLD AUTO: 0.21 X10(3)/MCL (ref 0–0.9)
EOSINOPHIL NFR BLD AUTO: 1.1 %
ERYTHROCYTE [DISTWIDTH] IN BLOOD BY AUTOMATED COUNT: 17.2 % (ref 11.5–17)
GFR SERPLBLD CREATININE-BSD FMLA CKD-EPI: 16 ML/MIN/1.73/M2
GLOBULIN SER-MCNC: 3.7 GM/DL (ref 2.4–3.5)
GLUCOSE SERPL-MCNC: 123 MG/DL (ref 74–100)
HCT VFR BLD AUTO: 25.3 % (ref 42–52)
HGB BLD-MCNC: 8.3 G/DL (ref 14–18)
IMM GRANULOCYTES # BLD AUTO: 0.75 X10(3)/MCL (ref 0–0.04)
IMM GRANULOCYTES NFR BLD AUTO: 3.8 %
LYMPHOCYTES # BLD AUTO: 1.99 X10(3)/MCL (ref 0.6–4.6)
LYMPHOCYTES NFR BLD AUTO: 10.2 %
MAGNESIUM SERPL-MCNC: 1.6 MG/DL (ref 1.6–2.6)
MCH RBC QN AUTO: 29.2 PG (ref 27–31)
MCHC RBC AUTO-ENTMCNC: 32.8 G/DL (ref 33–36)
MCV RBC AUTO: 89.1 FL (ref 80–94)
MONOCYTES # BLD AUTO: 2.08 X10(3)/MCL (ref 0.1–1.3)
MONOCYTES NFR BLD AUTO: 10.7 %
NEUTROPHILS # BLD AUTO: 14.38 X10(3)/MCL (ref 2.1–9.2)
NEUTROPHILS NFR BLD AUTO: 73.7 %
NRBC BLD AUTO-RTO: 0 %
PLATELET # BLD AUTO: 543 X10(3)/MCL (ref 130–400)
PMV BLD AUTO: 10.2 FL (ref 7.4–10.4)
POTASSIUM SERPL-SCNC: 3.7 MMOL/L (ref 3.5–5.1)
PROT SERPL-MCNC: 5.3 GM/DL (ref 6.4–8.3)
RBC # BLD AUTO: 2.84 X10(6)/MCL (ref 4.7–6.1)
SODIUM SERPL-SCNC: 138 MMOL/L (ref 136–145)
WBC # BLD AUTO: 19.5 X10(3)/MCL (ref 4.5–11.5)

## 2025-04-05 PROCEDURE — 25000003 PHARM REV CODE 250: Performed by: NURSE PRACTITIONER

## 2025-04-05 PROCEDURE — 21400001 HC TELEMETRY ROOM

## 2025-04-05 PROCEDURE — 25000003 PHARM REV CODE 250: Performed by: INTERNAL MEDICINE

## 2025-04-05 PROCEDURE — 63600175 PHARM REV CODE 636 W HCPCS

## 2025-04-05 PROCEDURE — 36415 COLL VENOUS BLD VENIPUNCTURE: CPT | Performed by: NURSE PRACTITIONER

## 2025-04-05 PROCEDURE — 27000221 HC OXYGEN, UP TO 24 HOURS

## 2025-04-05 PROCEDURE — 85025 COMPLETE CBC W/AUTO DIFF WBC: CPT | Performed by: NURSE PRACTITIONER

## 2025-04-05 PROCEDURE — 90935 HEMODIALYSIS ONE EVALUATION: CPT

## 2025-04-05 PROCEDURE — 63600175 PHARM REV CODE 636 W HCPCS: Mod: JZ,EC,TB | Performed by: NURSE PRACTITIONER

## 2025-04-05 PROCEDURE — 99233 SBSQ HOSP IP/OBS HIGH 50: CPT | Mod: ,,, | Performed by: NURSE PRACTITIONER

## 2025-04-05 PROCEDURE — 63600175 PHARM REV CODE 636 W HCPCS: Performed by: INTERNAL MEDICINE

## 2025-04-05 PROCEDURE — 80053 COMPREHEN METABOLIC PANEL: CPT | Performed by: NURSE PRACTITIONER

## 2025-04-05 PROCEDURE — 83735 ASSAY OF MAGNESIUM: CPT

## 2025-04-05 RX ORDER — MAGNESIUM SULFATE HEPTAHYDRATE 40 MG/ML
2 INJECTION, SOLUTION INTRAVENOUS ONCE
Status: COMPLETED | OUTPATIENT
Start: 2025-04-05 | End: 2025-04-05

## 2025-04-05 RX ADMIN — MAGNESIUM SULFATE HEPTAHYDRATE 2 G: 40 INJECTION, SOLUTION INTRAVENOUS at 04:04

## 2025-04-05 RX ADMIN — ERYTHROPOIETIN 10000 UNITS: 10000 INJECTION, SOLUTION INTRAVENOUS; SUBCUTANEOUS at 11:04

## 2025-04-05 RX ADMIN — PANTOPRAZOLE SODIUM 40 MG: 40 INJECTION, POWDER, LYOPHILIZED, FOR SOLUTION INTRAVENOUS at 08:04

## 2025-04-05 RX ADMIN — AMPICILLIN SODIUM AND SULBACTAM SODIUM 1.5 G: 1; .5 INJECTION, POWDER, FOR SOLUTION INTRAMUSCULAR; INTRAVENOUS at 03:04

## 2025-04-05 RX ADMIN — PANTOPRAZOLE SODIUM 40 MG: 40 INJECTION, POWDER, LYOPHILIZED, FOR SOLUTION INTRAVENOUS at 09:04

## 2025-04-05 RX ADMIN — ACETAMINOPHEN 1000 MG: 500 TABLET ORAL at 04:04

## 2025-04-05 RX ADMIN — MAGNESIUM SULFATE HEPTAHYDRATE 2 G: 40 INJECTION, SOLUTION INTRAVENOUS at 05:04

## 2025-04-05 RX ADMIN — ACETAMINOPHEN 1000 MG: 500 TABLET ORAL at 03:04

## 2025-04-05 NOTE — PROGRESS NOTES
04/05/25 1418        Hemodialysis Catheter 03/30/25 1233 right internal jugular   Placement Date/Time: 03/30/25 1233   Present Prior to Hospital Arrival?: No  Hand Hygiene: Performed  Barrier Precautions: Performed  Skin Antisepsis: ChloraPrep  Hemodialysis Catheter Type: Temporary catheter  Location: right internal jugular  Cathet...   Site Assessment No drainage;No redness;No swelling;No warmth   Line Securement Device Secured with sutures   Dressing Type CHG impregnated dressing/sponge;Transparent (Tegaderm)   Dressing Status Clean;Dry;Intact   Dressing Intervention Integrity maintained   Date on Dressing 04/03/25   Dressing Due to be Changed 04/10/25   Post-Hemodialysis Assessment   Blood Volume Processed (Liters) 63 L   Dialyzer Clearance Lightly streaked   Duration of Treatment 180 minutes   Additional Fluid Intake (mL) 500 mL   Total UF (mL) 1000 mL   Net Fluid Removal 500   Patient Response to Treatment tolerated well   Post-Hemodialysis Comments tx complete, pt reinfused, cvc deaccessed per p&p

## 2025-04-05 NOTE — PLAN OF CARE
Problem: Infection  Goal: Absence of Infection Signs and Symptoms  Outcome: Progressing     Problem: Adult Inpatient Plan of Care  Goal: Plan of Care Review  Outcome: Progressing  Goal: Patient-Specific Goal (Individualized)  Outcome: Progressing  Goal: Absence of Hospital-Acquired Illness or Injury  Outcome: Progressing  Goal: Optimal Comfort and Wellbeing  Outcome: Progressing  Goal: Readiness for Transition of Care  Outcome: Progressing     Problem: Fall Injury Risk  Goal: Absence of Fall and Fall-Related Injury  Outcome: Progressing     Problem: Hemodialysis  Goal: Safe, Effective Therapy Delivery  Outcome: Progressing  Goal: Effective Tissue Perfusion  Outcome: Progressing  Goal: Absence of Infection Signs and Symptoms  Outcome: Progressing     Problem: Skin Injury Risk Increased  Goal: Skin Health and Integrity  Outcome: Progressing     Problem: Wound  Goal: Optimal Coping  Outcome: Progressing  Goal: Optimal Functional Ability  Outcome: Progressing  Goal: Absence of Infection Signs and Symptoms  Outcome: Progressing  Goal: Improved Oral Intake  Outcome: Progressing  Goal: Optimal Pain Control and Function  Outcome: Progressing  Goal: Skin Health and Integrity  Outcome: Progressing  Goal: Optimal Wound Healing  Outcome: Progressing

## 2025-04-05 NOTE — PROGRESS NOTES
"  [facility]    Cardiology  Progress Note    Patient Name: Tao Bee  MRN: [id number]  Admission Date: 3/29/2025  Hospital Length of Stay: 7 days  Code Status: Full Code   Attending Provider: Jackie Mir MD   Consulting Provider: SUNDAR Yip  Primary Care Physician: Penny Garsia FNP  Principal Problem:<principal problem not specified>    Patient information was obtained from patient, past medical records, ER records, and primary team.     Subjective:     Chief Complaint/Reason for Consult: MARY    HPI: Mr. Bee is a 60 y/o male with a history of HLD, HTN, CKD, who is known to [facility], Dr. Molina. He presented to the ER on 3.29.25 with complaints of abdominal pain, diarrhea, and melena. He was found to have septic shock and sepsis. He was also found to have non-bleeding gastric ulcers in the antrum with a clean base; GI recommended to repeat EGD in 8 weeks. He has had positive blood cultures with E.coli. Significant labs include WBC 30.45, H&H 7.9/23.5, PLTs 487, K 3.4, BUN/Creat 65.3/5.58, Albumin 1.6. ECHO showed an echo dense structure on the anterior leaflet, cannot exclude vegetation vs calcified chordae. [facility] has been consulted for MARY.     4.4.25: NAD. VSS. No complaints of CP/SOB/Palps. "I feel okay" WBC 21.29, H&H 8.7/25.9, Plts 522, BUN/Creat 34.7/3.59, Glucose 104, Albumin 1.6  4.5.25: NAD. VSS. No complaints of CP/SOB/Palps. "I'm okay. Ready to go back to our room" WBC 19.50, H&H 8.3/25.3, PLTs 543, K 4.03, Mag 1.6    PMH: HLD, HTN, CKD, Non-Healing Wounds, Vitamin D Deficiency, Neuropathy  PSH: EGD  Family History: Mother - DM II  Social History: Current Smoker (1/2 PPD). Alcohol use (28 drinks per week). Denies illicit drug use.     Previous Cardiac Diagnostics:   ECHO (4.3.25):  Left Ventricle: The left ventricle is normal in size. Increased wall thickness. There is normal systolic function with a visually estimated ejection fraction of 60 - 65%. Right " Ventricle: The right ventricle is normal in size. Systolic function is normal. TAPSE is 2.68 cm. Mitral Valve: Mildly thickened leaflets. There is a echo dense structure on the anterior leaflet, cannot exclude vegetation vs calcified chordae. There is mild regurgitation. Recommend MARY for further evaluation if clinically indicated.    Lower Ext Venous US (2.26.24):  The study quality is average. Limited unilateral study right  leg only post GSV RFA procedure. No evidence of DVT is noted from the right CFV to the distal femoral vein segment with good compression, augmentation and phasic flow obtained.The right GSV is non-compressible with no flow noted from the proximal thigh to the proximal calf consistent with successful RFA procedure.    Arterial US (1.23.24):  The study quality is average. Normal bilateral lower extremity arterial duplex ultrasound.  No plaquing is visualized in bilateral lower extremities.    Venogram (12.7.22):  Bilateral femoral venography did not reveal critical venous compression Intravenous ultrasound was performed which did not reveal critical compression greater than 50%. Therefore no stenting was performed    ECHO (10.8.20):  The study quality is average. The left ventricle is decreased in size. Global left ventricular systolic function is normal. The left ventricular ejection fraction is 70%. The left ventricle diastolic function is impaired (Grade I) with normal left atrial pressure. Noted left ventricular hypertrophy. It is mild. Dilatation of the aortic root limited to the sinus of valsalva (3.9 cms) is noted. Mild (1+) mitral regurgitation.    NPI (10.7.20):  This is a normal perfusion study, no perfusion defects noted. There is no evidence of ischemia. This scan is suggestive of low risk for future cardiovascular events. The left ventricular cavity is noted to be normal on the stress study. The left ventricular ejection fraction was calculated to be 63% and left ventricular global  function is normal. The study quality is good.       Review of patient's allergies indicates:   Allergen Reactions    Opioids - morphine analogues Hallucinations     No current facility-administered medications on file prior to encounter.     Current Outpatient Medications on File Prior to Encounter   Medication Sig    gabapentin (NEURONTIN) 600 MG tablet Take 600 mg by mouth 3 (three) times daily.     Review of Systems   Constitutional:  Positive for fatigue and fever.   Respiratory:  Negative for shortness of breath.    Cardiovascular:  Negative for chest pain, palpitations and leg swelling.   All other systems reviewed and are negative.      Objective:     Vital Signs (Most Recent):  Temp: 98.7 °F (37.1 °C) (04/05/25 0815)  Pulse: 71 (04/05/25 0800)  Resp: 18 (04/05/25 0327)  BP: 130/79 (04/05/25 0751)  SpO2: 100 % (04/05/25 0751) Vital Signs (24h Range):  Temp:  [98.7 °F (37.1 °C)-99.6 °F (37.6 °C)] 98.7 °F (37.1 °C)  Pulse:  [70-86] 71  Resp:  [16-18] 18  SpO2:  [97 %-100 %] 100 %  BP: (128-134)/(72-79) 130/79   Weight: 73.9 kg (162 lb 14.7 oz)  Body mass index is 25.51 kg/m².  SpO2: 100 %       Intake/Output Summary (Last 24 hours) at 4/5/2025 1426  Last data filed at 4/5/2025 1418  Gross per 24 hour   Intake 1000 ml   Output 2900 ml   Net -1900 ml     Lines/Drains/Airways       Central Venous Catheter Line  Duration                  Hemodialysis Catheter 03/30/25 1233 right internal jugular 6 days              Drain  Duration                  Urethral Catheter 03/30/25 1300 Coude 16 Fr. 6 days              Peripheral Intravenous Line  Duration                  Peripheral IV - Single Lumen 03/29/25 1030 20 G Right Antecubital 7 days         Peripheral IV - Single Lumen 03/30/25 1356 20 G Right;Lateral Forearm 6 days         Peripheral IV - Single Lumen 03/31/25 0200 18 G Left;Proximal;Medial Forearm 5 days                  Significant Labs:   Chemistries:   Recent Labs   Lab 03/30/25  0345 03/30/25  1012  03/31/25  0209 03/31/25  0209 04/01/25  0401 04/02/25  0436 04/03/25  0505 04/04/25  0357 04/05/25  0316   *   < > 134*  --  134* 137 138 140 138   K 3.9   < > 3.3*  --  3.5 3.9 3.4* 3.6 3.7   CL 93*   < > 92*  --  95* 97* 99 104 104   CO2 15*   < > 30*  --  29 29 31* 28 26   .0*   < > 60.4*  --  67.7* 66.5* 65.3* 34.7* 37.9*   CREATININE 10.50*   < > 5.55*  --  5.86* 5.90* 5.58* 3.59* 4.03*   CALCIUM 7.5*   < > 7.5*  --  7.4* 7.8* 7.8* 8.1* 8.2*   BILITOT  --    < > 0.5  --  0.3 0.4 0.3 0.2 0.2   ALKPHOS  --    < > 73  --  85 73 62 70 74   ALT  --    < > 10  --  8 5 7 8 10   AST  --    < > 23  --  19 16 18 20 32   GLUCOSE 121*   < > 115*  --  134* 100 116* 104* 123*   MG 1.80  --  1.70  --   --   --   --   --  1.60   PHOS  --   --   --    < > 4.3 4.5 4.5 3.5  --     < > = values in this interval not displayed.        CBC/Anemia Labs: Coags:    Recent Labs   Lab 03/30/25  0345 03/30/25  0346 03/30/25  0552 03/31/25  0209 04/01/25  0401 04/03/25  0505 04/04/25  0357 04/05/25 0316   WBC  --    < >  --  39.45  39.45*   < > 30.45  30.45* 21.29  21.29* 19.50*   HGB  --    < >  --  9.6*   < > 7.9* 8.7* 8.3*   HCT  --    < >  --  26.4*   < > 23.5* 25.9* 25.3*   PLT  --    < >  --  164   < > 487* 522* 543*   MCV  --    < >  --  81.0   < > 87.4 87.2 89.1   RDW  --    < >  --  15.6   < > 17.4* 17.3* 17.2*   IRON  --   --  15*  --   --   --   --   --    FERRITIN  --   --  264.66  --   --   --   --   --    FOLATE 5.1*  --   --  4.8*  --   --   --   --    [id number]  --   --  871*  --   --   --   --   --     < > = values in this interval not displayed.    Recent Labs   Lab 03/30/25 2016   INR 1.4*        Significant Imaging:  Imaging Results              US Retroperitoneal Limited (Final result)  Result time 03/29/25 12:20:31      Final result by Jakob Horan MD (03/29/25 12:20:31)                   Impression:      Findings concerning for pelvicaliectasis with changes of medical renal disease.  No overt  evidence for obstructive uropathy.      Electronically signed by: Jakob Horan MD  Date:    03/29/2025  Time:    12:20               Narrative:    EXAMINATION:  US RETROPERITONEAL LIMITED    CLINICAL HISTORY:  Acute kidney failure, unspecified    TECHNIQUE:  Multiple sonographic images the kidneys were obtained by department sonographer.    COMPARISON:  None    FINDINGS:  Right kidney measures 10.9 cm in length.  Left kidney measures 10.2 cm length.  No evidence for hydronephrosis, however, pelvicaliectasis is identified.  The parenchyma demonstrates increased echogenicity with cortical thinning.    Bladder is distended and normal.                                       CT Chest Abdomen Pelvis Without Contrast (XPD) (Final result)  Result time 03/29/25 09:44:25      Final result by Haylee Correia MD (03/29/25 09:44:25)                   Impression:      Abnormal edematous appearance of the right kidney.  There is fullness at the upper pole right kidney which is poorly characterized.  Appearance could be related to mass, phlegmon, focal pyelonephritis in the acute setting.  There is perinephric stranding on the right.  No martha hydronephrosis.  Further characterization limited without contrast.      Electronically signed by: Haylee Correia  Date:    03/29/2025  Time:    09:44               Narrative:    EXAMINATION:  CT CHEST ABDOMEN PELVIS WITHOUT CONTRAST(XPD)    CLINICAL HISTORY:  Sepsis;  lower abdominal pain x3 days.  Nausea.  Diarrhea.    TECHNIQUE:  Helical acquisition through the chest, abdomen and pelvis without  IV administration of contrast. Axial, sagittal and coronal reformats interpreted.    Automated tube current modulation, weight-based exposure dosing, and/or iterative reconstruction technique utilized to reach lowest reasonably achievable exposure rate.    DLP: 329 mGy*cm    COMPARISON:  No relevant priors available for comparison at time of this dictation    FINDINGS:  BASE OF NECK: No  significant abnormality.    HEART: Normal size.    THORACIC VASCULATURE: Thoracic aorta is unremarkable.    KIARA/MEDIASTINUM: No enlarged lymph nodes by size criteria. Evaluation of hilar lymphadenopathy is limited without contrast.    AIRWAYS: Patent.    LUNGS/PLEURA: Mild dependent atelectasis.    THORACIC SOFT TISSUES: Unremarkable.    LIVER: Normal attenuation. No appreciable focal hepatic lesion.    BILIARY: Gallbladder decompressed.    PANCREAS: No inflammatory change.    SPLEEN: Normal in size    ADRENALS: No mass.    KIDNEYS/URETERS: Edematous appearance of the right kidney.  Fullness at the upper pole right kidney poorly characterized.  Right perinephric stranding.  No martha hydronephrosis.    GI TRACT/MESENTERY: Evaluation of the bowel is limited without contrast.  No evidence of bowel obstruction or inflammation. The appendix is normal. Colonic diverticulosis without acute inflammatory change.    PERITONEUM: No free fluid.No free air.    LYMPH NODES: No enlarged lymph nodes by size criteria.    ABDOMINOPELVIC VASCULATURE: Aortoiliac atherosclerosis.    BLADDER: Normal appearance given degree of distention.    REPRODUCTIVE ORGANS: Normal as visualized.    ABDOMINAL WALL: Unremarkable.    BONES: No acute osseous abnormality.  Old, healed left rib deformities.                                       X-Ray Chest AP Portable (Final result)  Result time 03/29/25 10:10:10      Final result by Wilmer Hooper MD (03/29/25 10:10:10)                   Impression:      No acute cardiopulmonary process identified.      Electronically signed by: Wilmer Hooper  Date:    03/29/2025  Time:    10:10               Narrative:    EXAMINATION:  XR CHEST AP PORTABLE    CLINICAL HISTORY:  Sepsis; abdominal pain for 3 days.  Nausea and diarrhea.    TECHNIQUE:  One view    COMPARISON:  None available.    FINDINGS:  Cardiopericardial silhouette is within normal limits. Lungs are without dense focal or segmental consolidation,  congestive process, pleural effusions or pneumothorax.  Multiple old fractures of the left ribs.                                    EKG:         Telemetry:  SR    Physical Exam  Vitals and nursing note reviewed.   HENT:      Head: Normocephalic.      Nose: Nose normal.      Mouth/Throat:      Mouth: Mucous membranes are dry.   Eyes:      Extraocular Movements: Extraocular movements intact.   Cardiovascular:      Rate and Rhythm: Normal rate and regular rhythm.      Pulses: Normal pulses.      Heart sounds: Murmur heard.   Pulmonary:      Effort: Pulmonary effort is normal.      Breath sounds: No rales.      Comments: Diminished Lung sounds at bases     On NC 2 L   Abdominal:      Palpations: Abdomen is soft.   Musculoskeletal:         General: Normal range of motion.      Cervical back: Normal range of motion.      Right lower leg: No edema.      Left lower leg: No edema.   Neurological:      Mental Status: He is alert and oriented to person, place, and time.   Psychiatric:         Mood and Affect: Mood normal.         Behavior: Behavior normal.         Home Medications:   Medications Ordered Prior to Encounter[1]  Current Schedule Inpatient Medications:   ampicillin-sulbactam  1.5 g Intravenous Q24H    heparin (porcine)  3,000 Units Intravenous Once    pantoprazole  40 mg Intravenous BID     Continuous Infusions:      Assessment:   Anterior Leaflet Vegetation vs Calcified Chordae     - ECHO (4.2.25): There is a echo dense structure on the anterior leaflet, cannot exclude vegetation vs calcified chordae.   Leukocytosis/Sepsis    - BC positive for E.coli   UTI/Pyelonephritis   Fever   Anemia   Non-bleeding Gastric Ulcers   Thrombocytosis - Worsening   Electrolyte Derangements     - Hypomagnesia     - Hypokalemia (Resolved)    - Hyponatremia - Resolved   Metabolic Acidosis   VHD    - Mild MR   HLD  HTN  LEANDRO vs CKD/ESRD    - on HD   Non-Healing Wounds  Venous Insuffiencey   Vitamin D Deficiency  Neuropathy  History of  GI Bleed       Plan:   Plan for MARY once anesthesia is available; likely early next week  Consents Obtained (Placed on Chart)  Keep Mag > 2 and Potassium > 4; replete mag Today  Labs in AM: CBC, CMP, and Mag    SUNDAR Yip  Cardiology  [facility]              [1]   No current facility-administered medications on file prior to encounter.     Current Outpatient Medications on File Prior to Encounter   Medication Sig Dispense Refill    gabapentin (NEURONTIN) 600 MG tablet Take 600 mg by mouth 3 (three) times daily.

## 2025-04-05 NOTE — PROGRESS NOTES
[facility] In[facility] Progress Note      HPI:     Patient Name: Tao Bee  Admission Date: 3/29/2025  Hospital Length of Stay: 7 days  Code Status: Full Code   Attending Physician: Jackie Mir MD   Primary Care Physician: Penny Garsia FNP  Principal Problem:<principal problem not specified>      Today patient seen and examined  Labs, recent events, imaging and medications reviewed for this hospital stay  Resting in bed. No complaints this morning.   Underwent CT guided aspiration of retroperitoneal abscess  Scheduled for hemodialysis today.   MARY for next week    Review of Systems:   Constitutional: still intermittent fever  Skin:  no rashes, no itching, no new skin lesions  HEENT: no acute change in hearing or vision, tinnitus, or dysphagia  Respiratory: no worsening dyspnea  Cardiovascular: no  chest pain, palpitations, or swelling  Gastrointestional: Denies abdominal pain, nausea, vomiting, diarrhea, or constipation  Musculoskeletal: Denies myalgias, back pain, flank pain, new decreased ROM or acute focal weakness  Neurological: no seizures, no headaches  Hematological: no unusual bruising or bleeding        Medications:   Scheduled Meds:   ampicillin-sulbactam  1.5 g Intravenous Q24H    heparin (porcine)  3,000 Units Intravenous Once    pantoprazole  40 mg Intravenous BID     Continuous Infusions:          Vitals:     Vitals:    04/05/25 0327 04/05/25 0500 04/05/25 0751 04/05/25 0815   BP: 133/76  130/79    BP Location:       Patient Position: Lying      Pulse: 80  70    Resp: 18      Temp: 99.1 °F (37.3 °C)   98.7 °F (37.1 °C)   TempSrc: Oral   Oral   SpO2: 97%  100%    Weight:  73.9 kg (162 lb 14.7 oz)     Height:             I/O last 3 completed shifts:  In: 822.1 [P.O.:500; Blood:322.1]  Out: 1900 [Urine:1900]    Intake/Output Summary (Last 24 hours) at 4/5/2025 0917  Last data filed at 4/5/2025 0500  Gross per 24 hour   Intake 500 ml   Output 1900 ml   Net -1400 ml       Physical Exam:    General: no acute distress, awake, alert  Eyes: PERRLA, EOMI, conjunctiva clear, eyelids without swelling  HENT: atraumatic, oropharynx and nasal mucosa patent  Neck:  no JVD, no thyromegaly or lymphadenopathy  Respiratory: rhonchi  Cardiovascular: RRR rub; +BILLIE, BL radial and pedal pulses felt  Edema: none  Gastrointestinal: soft, non-tender, non-distended; positive bowel sounds; no masses to palpation  Musculoskeletal: without limitation or discomfort  Integumentary: warm, dry; no rashes, wounds, or skin lesions  Neurological: oriented, appropriate, no acute deficits        Labs:     Chemistries:   Recent Labs   Lab 03/30/25  0345 03/30/25  1012 03/31/25  0209 04/01/25  0401 04/02/25  0436 04/03/25  0505 04/04/25  0357 04/05/25  0316   *   < > 134*   < > 137 138 140 138   K 3.9   < > 3.3*   < > 3.9 3.4* 3.6 3.7   CL 93*   < > 92*   < > 97* 99 104 104   CO2 15*   < > 30*   < > 29 31* 28 26   .0*   < > 60.4*   < > 66.5* 65.3* 34.7* 37.9*   CREATININE 10.50*   < > 5.55*   < > 5.90* 5.58* 3.59* 4.03*   CALCIUM 7.5*   < > 7.5*   < > 7.8* 7.8* 8.1* 8.2*   BILITOT  --    < > 0.5   < > 0.4 0.3 0.2 0.2   ALKPHOS  --    < > 73   < > 73 62 70 74   ALT  --    < > 10   < > 5 7 8 10   AST  --    < > 23   < > 16 18 20 32   GLUCOSE 121*   < > 115*   < > 100 116* 104* 123*   MG 1.80  --  1.70  --   --   --   --  1.60   PHOS  --   --   --    < > 4.5 4.5 3.5  --     < > = values in this interval not displayed.        CBC/Anemia Labs: Coags:    Recent Labs   Lab 03/30/25  0345 03/30/25  0346 03/30/25  0552 03/31/25  0209 04/01/25  0401 04/03/25  0505 04/04/25  0357 04/05/25  0316   WBC  --    < >  --  39.45  39.45*   < > 30.45  30.45* 21.29  21.29* 19.50*   HGB  --    < >  --  9.6*   < > 7.9* 8.7* 8.3*   HCT  --    < >  --  26.4*   < > 23.5* 25.9* 25.3*   PLT  --    < >  --  164   < > 487* 522* 543*   MCV  --    < >  --  81.0   < > 87.4 87.2 89.1   RDW  --    < >  --  15.6   < > 17.4* 17.3* 17.2*   IRON  --   --   15*  --   --   --   --   --    FERRITIN  --   --  264.66  --   --   --   --   --    FOLATE 5.1*  --   --  4.8*  --   --   --   --    [id number]  --   --  871*  --   --   --   --   --     < > = values in this interval not displayed.    Recent Labs   Lab 03/30/25 2016   INR 1.4*          Impression:   LEANDRO-initiated on HD  E coli bacteremia  Possible renal abscess post attempted CT guided aspiration 4/4  ?endocarditis-TTE planned for next week.     Plan:     HD today.  Procrit 10,000 u x 1 with dialysis today.   Will monitor for signs of renal recovery    Filomena Almeida ACNP-BC

## 2025-04-05 NOTE — PROGRESS NOTES
[facility] Hospital Medicine Progress Note        Chief Complaint: Inpatient Follow-up    HPI:   59-year-old male with significant history of peripheral neuropathy admitted to hospital medicine services with complaints of lower abdominal pain, diarrhea, nausea, decreased urine output.  Borderline hypotensive, responded to fluids.  Labs were significant for electrolyte derangement, severe acute kidney injury, severe leukocytosis.  CT chest, abdomen, pelvis with edematous right kidney and also concern for mass versus phlegmon versus pyelonephritis.  Patient was initiated on empiric antibiotics, nephrology and urology services consulted.  Patient was initiated on emergent HD, he was also anemic, 2 units PRBC was transfused with HD.  After admission patient became significantly hypotensive warranting transfer to ICU for vasopressors.  Patient's blood cultures grew E coli, most likely renal source.  Urology following.  Patient also endorsed melanotic stools prior to presentation, given severe anemia gastroenterology was consulted, patient had EGD done which revealed 3 gastric ulcers not requiring any intervention, recommended PPI drip for 72 hours.  Once hemodynamics stabilized patient was downgraded back to hospital medicine services.  HD being continued.  Urology placed indwelling Nguyen.  Since he continued with fever spikes decided to repeat CT abdomen/pelvis.  This was done without contrast on 04/02 which demonstrated similar findings as admit CT.  Patient is still continued with significant high-grade temp spikes on 04/03 and therefore CT was repeated with contrast which showed abscess collection right upper pole kidney/perinephric area extending to psoas region, IR consulted for drainage, antibiotics changed to Unasyn, ID consulted, given concern for endocarditis cardiology was consulted for MARY.  Less than eight and therefore 1 unit PRBC transfusion ordered on 04/03.  Hemoglobin  improved, attempted CT-guided drainage of the abscess, fluid was too thick and therefore could not be drained, planning for conservative management with antibiotics      Interval Hx:   Patient seen at bedside .  Comfortably laying in bed, reports occasional right-sided abdominal discomfort, he is now afebrile, hemodynamics stable, no other new complaints, no acute events overnight, no overt bleeding     Objective/physical exam:  General: In no acute distress, afebrile  Chest: Clear to auscultation bilaterally  Heart: S1, S2, no appreciable murmur  Abdomen: Soft, nontender, BS +  MSK: Warm, no lower extremity edema, no clubbing or cyanosis  Neurologic: Alert and oriented x4, moving all extremities with good strength     VITAL SIGNS: 24 HRS MIN & MAX LAST   Temp  Min: 97.8 °F (36.6 °C)  Max: 99.6 °F (37.6 °C) 99.1 °F (37.3 °C)   BP  Min: 128/75  Max: 160/87 130/79   Pulse  Min: 70  Max: 86  70   Resp  Min: 16  Max: 19 18   SpO2  Min: 97 %  Max: 100 % 100 %       Recent Labs   Lab 04/05/25 0316   WBC 19.50*   RBC 2.84*   HGB 8.3*   HCT 25.3*   MCV 89.1   MCH 29.2   MCHC 32.8*   RDW 17.2*   *   MPV 10.2         Recent Labs   Lab 04/05/25 0316      K 3.7      CO2 26   BUN 37.9*   CREATININE 4.03*   CALCIUM 8.2*   MG 1.60   ALBUMIN 1.6*   ALKPHOS 74   ALT 10   AST 32   BILITOT 0.2          Microbiology Results (last 7 days)       Procedure Component Value Units Date/Time    Blood Culture [[id number]]  (Normal) Collected: 04/03/25 2056    Order Status: Completed Specimen: Blood from Arm, Right Updated: 04/05/25 0024     Blood Culture No Growth At 24 Hours    Blood Culture [[id number]]  (Normal) Collected: 04/03/25 2200    Order Status: Completed Specimen: Blood from Arm, Left Updated: 04/05/25 0024     Blood Culture No Growth At 24 Hours    Blood Culture [[id number]]  (Normal) Collected: 03/30/25 2016    Order Status: Completed Specimen: Blood from Arm, Left Updated: 04/04/25 2202     Blood Culture No  Growth at 5 days    Blood Culture [[id number]]  (Abnormal)  (Susceptibility) Collected: 03/30/25 2016    Order Status: Completed Specimen: Blood from Arm, Left Updated: 04/03/25 0700     Blood Culture Escherichia coli     GRAM STAIN Gram Negative Rods      Seen in gram stain of broth only      1 of 1 Pediatric bottle positive    Urine culture [[id number]]  (Abnormal)  (Susceptibility) Collected: 03/29/25 1159    Order Status: Completed Specimen: Urine Updated: 03/31/25 0933     Urine Culture >/= 100,000 colonies/ml Escherichia coli    Blood culture x two cultures. Draw prior to antibiotics. [[id number]]  (Abnormal)  (Susceptibility) Collected: 03/29/25 1037    Order Status: Completed Specimen: Blood Updated: 03/31/25 0711     Blood Culture Escherichia coli     GRAM STAIN Gram Negative Rods      Seen in gram stain of broth only      2 of 2 bottles positive    Blood culture x two cultures. Draw prior to antibiotics. [[id number]]  (Abnormal)  (Susceptibility) Collected: 03/29/25 1055    Order Status: Completed Specimen: Blood Updated: 03/31/25 0710     Blood Culture Escherichia coli     GRAM STAIN Gram Negative Rods      Seen in gram stain of broth only      2 of 2 bottles positive    BCID2 Panel [[id number]]  (Abnormal) Collected: 03/29/25 1037    Order Status: Completed Specimen: Blood Updated: 03/29/25 2324     CTX-M (ESBL ) Not Detected     IMP (Cabapenemase ) Not Detected     KPC resistance gene (Carbapenemase ) Not Detected     mcr-1 Not Detected     mecA ID N/A     Comment: Note: Antimicrobial resistance can occur via multiple mechanisms. A Not Detected result for antimicrobial resistance gene(s) does not indicate antimicrobial susceptibility. Subculturing is required for species identification and susceptibility testing of   isolates.        mecA/C and MREJ (MRSA) gene N/A     NDM (Carbapenemase ) Not Detected     OXA-48-like (Carbapenemase ) Not Detected     [place]/B (VRE  gene) N/A     VIM (Carbapenemase ) Not Detected     Enterococcus faecalis Not Detected     Enterococcus faecium Not Detected     Listeria monocytogenes Not Detected     Staphylococcus spp. Not Detected     Staphylococcus aureus Not Detected     Staphylococcus epidermidis Not Detected     Staphylococcus lugdunensis Not Detected     Streptococcus spp. Not Detected     Streptococcus agalactiae (Group B) Not Detected     Streptococcus pneumoniae Not Detected     Streptococcus pyogenes (Group A) Not Detected     Acinetobacter calcoaceticus/baumannii complex Not Detected     Bacteroides fragilis Not Detected     Enterobacterales See Species for ID     Enterobacter cloacae complex Not Detected     Escherichia coli Detected     Klebsiella aerogenes Not Detected     Klebsiella oxytoca Not Detected     Klebsiella pneumoniae group Not Detected     Proteus spp. Not Detected     Salmonella spp. Not Detected     Serratia marcescens Not Detected     Haemophilus influenzae Not Detected     Neisseria meningitidis Not Detected     Pseudomonas aeruginosa Not Detected     Stenotrophomonas maltophilia Not Detected     Candida albicans Not Detected     Candida auris Not Detected     Candida glabrata Not Detected     Candida krusei Not Detected     Candida parapsilosis Not Detected     Candida tropicalis Not Detected     Cryptococcus neoformans/gattii Not Detected    Narrative:      The AmigoCAT BCID2 Panel is a multiplexed nucleic acid test intended for the use with Exhale Fans® 2.0 or Exhale Fans® Wakie Systems for the simultaneous qualitative detection and identification of multiple bacterial and yeast nucleic acids and select genetic determinants associated with antimicrobial resistance.  The Bio[employer] BCID2 Panel test is performed directly on blood culture samples identified as positive by a continuous monitoring blood culture system.  Results are intended to be interpreted in conjunction with Gram stain results.              Scheduled Med:   ampicillin-sulbactam  1.5 g Intravenous Q24H    heparin (porcine)  3,000 Units Intravenous Once    pantoprazole  40 mg Intravenous BID          Assessment/Plan:    Severe sepsis with septic shock  E coli bacteremia leading to above, secondary to below  Acute Right pyelonephritis with right upper pole/right perinephric abscess extending to psoas muscle-failed CT-guided drainage  Rule out mitral valve endocarditis  Severe acute kidney injury/ischemic ATN secondary to sepsis requiring hemodialysis  Acute on chronic normocytic anemia-improved post transfusion 4/3  Acute GI bleed secondary to multiple gastric ulcer  History of peripheral neuropathy   Bilateral consolidation-rule out Hap  Prophylaxis    No more having high-grade temp spikes, leukocytosis also much better after switching to Unasyn   MRSA PCR negative  Repeat blood cultures from 4/3 also negative  IR attempted CT-guided drainage of right upper pole/perinephric abscess extending to psoas , but was unsuccessful since fluid was very thick  Urology aware, now planning for conservative management with antibiotics and will repeat CT in case of recurrent fever spikes or any symptomatic worsening  TTE findings concerning for mitral valve endocarditis, cardiology consulted, sandra next week  Nephrology on board managing HD, patient will be dialyzed today, monitoring for signs of renal recovery  Hemoglobin appropriately improved after receiving 1 unit PRBC on 04/03  Continue IV Protonix, no more overt bleeding  DVT prophylaxis-cautiously continue subQ heparin since there is no overt bleeding          Jackie Mir MD   04/05/2025

## 2025-04-06 LAB
ALBUMIN SERPL-MCNC: 1.8 G/DL (ref 3.5–5)
ALBUMIN/GLOB SERPL: 0.5 RATIO (ref 1.1–2)
ALP SERPL-CCNC: 67 UNIT/L (ref 40–150)
ALT SERPL-CCNC: 11 UNIT/L (ref 0–55)
ANION GAP SERPL CALC-SCNC: 6 MEQ/L
AST SERPL-CCNC: 21 UNIT/L (ref 11–45)
BASOPHILS # BLD AUTO: 0.09 X10(3)/MCL
BASOPHILS NFR BLD AUTO: 0.6 %
BILIRUB SERPL-MCNC: 0.3 MG/DL
BUN SERPL-MCNC: 22 MG/DL (ref 8.4–25.7)
CALCIUM SERPL-MCNC: 8.3 MG/DL (ref 8.4–10.2)
CHLORIDE SERPL-SCNC: 104 MMOL/L (ref 98–107)
CO2 SERPL-SCNC: 29 MMOL/L (ref 22–29)
CREAT SERPL-MCNC: 2.72 MG/DL (ref 0.72–1.25)
CREAT/UREA NIT SERPL: 8
EOSINOPHIL # BLD AUTO: 0.17 X10(3)/MCL (ref 0–0.9)
EOSINOPHIL NFR BLD AUTO: 1 %
ERYTHROCYTE [DISTWIDTH] IN BLOOD BY AUTOMATED COUNT: 17.2 % (ref 11.5–17)
GFR SERPLBLD CREATININE-BSD FMLA CKD-EPI: 26 ML/MIN/1.73/M2
GLOBULIN SER-MCNC: 3.8 GM/DL (ref 2.4–3.5)
GLUCOSE SERPL-MCNC: 95 MG/DL (ref 74–100)
HCT VFR BLD AUTO: 26.1 % (ref 42–52)
HGB BLD-MCNC: 8.7 G/DL (ref 14–18)
IMM GRANULOCYTES # BLD AUTO: 0.36 X10(3)/MCL (ref 0–0.04)
IMM GRANULOCYTES NFR BLD AUTO: 2.2 %
LYMPHOCYTES # BLD AUTO: 1.85 X10(3)/MCL (ref 0.6–4.6)
LYMPHOCYTES NFR BLD AUTO: 11.4 %
MCH RBC QN AUTO: 29.3 PG (ref 27–31)
MCHC RBC AUTO-ENTMCNC: 33.3 G/DL (ref 33–36)
MCV RBC AUTO: 87.9 FL (ref 80–94)
MONOCYTES # BLD AUTO: 1.69 X10(3)/MCL (ref 0.1–1.3)
MONOCYTES NFR BLD AUTO: 10.4 %
NEUTROPHILS # BLD AUTO: 12.11 X10(3)/MCL (ref 2.1–9.2)
NEUTROPHILS NFR BLD AUTO: 74.4 %
NRBC BLD AUTO-RTO: 0 %
PLATELET # BLD AUTO: 562 X10(3)/MCL (ref 130–400)
PMV BLD AUTO: 9.9 FL (ref 7.4–10.4)
POTASSIUM SERPL-SCNC: 4.1 MMOL/L (ref 3.5–5.1)
PROT SERPL-MCNC: 5.6 GM/DL (ref 6.4–8.3)
RBC # BLD AUTO: 2.97 X10(6)/MCL (ref 4.7–6.1)
SODIUM SERPL-SCNC: 139 MMOL/L (ref 136–145)
WBC # BLD AUTO: 16.27 X10(3)/MCL (ref 4.5–11.5)

## 2025-04-06 PROCEDURE — 25000003 PHARM REV CODE 250: Performed by: NURSE PRACTITIONER

## 2025-04-06 PROCEDURE — 85025 COMPLETE CBC W/AUTO DIFF WBC: CPT | Performed by: INTERNAL MEDICINE

## 2025-04-06 PROCEDURE — 99900035 HC TECH TIME PER 15 MIN (STAT)

## 2025-04-06 PROCEDURE — 80053 COMPREHEN METABOLIC PANEL: CPT | Performed by: INTERNAL MEDICINE

## 2025-04-06 PROCEDURE — 21400001 HC TELEMETRY ROOM

## 2025-04-06 PROCEDURE — 94799 UNLISTED PULMONARY SVC/PX: CPT | Mod: XB

## 2025-04-06 PROCEDURE — 36415 COLL VENOUS BLD VENIPUNCTURE: CPT | Performed by: INTERNAL MEDICINE

## 2025-04-06 PROCEDURE — 63600175 PHARM REV CODE 636 W HCPCS: Performed by: INTERNAL MEDICINE

## 2025-04-06 PROCEDURE — 99900031 HC PATIENT EDUCATION (STAT)

## 2025-04-06 PROCEDURE — 99232 SBSQ HOSP IP/OBS MODERATE 35: CPT | Mod: ,,, | Performed by: NURSE PRACTITIONER

## 2025-04-06 PROCEDURE — 25000003 PHARM REV CODE 250: Performed by: INTERNAL MEDICINE

## 2025-04-06 RX ADMIN — AMPICILLIN SODIUM AND SULBACTAM SODIUM 1.5 G: 1; .5 INJECTION, POWDER, FOR SOLUTION INTRAMUSCULAR; INTRAVENOUS at 12:04

## 2025-04-06 RX ADMIN — ACETAMINOPHEN 1000 MG: 500 TABLET ORAL at 02:04

## 2025-04-06 RX ADMIN — PANTOPRAZOLE SODIUM 40 MG: 40 INJECTION, POWDER, LYOPHILIZED, FOR SOLUTION INTRAVENOUS at 08:04

## 2025-04-06 RX ADMIN — PANTOPRAZOLE SODIUM 40 MG: 40 INJECTION, POWDER, LYOPHILIZED, FOR SOLUTION INTRAVENOUS at 09:04

## 2025-04-06 NOTE — PROGRESS NOTES
"  [facility]    Cardiology  Progress Note    Patient Name: Tao Bee  MRN: [id number]  Admission Date: 3/29/2025  Hospital Length of Stay: 8 days  Code Status: Full Code   Attending Provider: Jackie Mir MD   Consulting Provider: SUNDAR Yip  Primary Care Physician: Penny Garsia FNP  Principal Problem:<principal problem not specified>    Patient information was obtained from patient, past medical records, ER records, and primary team.     Subjective:     Chief Complaint/Reason for Consult: MARY    HPI: Mr. Bee is a 58 y/o male with a history of HLD, HTN, CKD, who is known to [facility], Dr. Molina. He presented to the ER on 3.29.25 with complaints of abdominal pain, diarrhea, and melena. He was found to have septic shock and sepsis. He was also found to have non-bleeding gastric ulcers in the antrum with a clean base; GI recommended to repeat EGD in 8 weeks. He has had positive blood cultures with E.coli. Significant labs include WBC 30.45, H&H 7.9/23.5, PLTs 487, K 3.4, BUN/Creat 65.3/5.58, Albumin 1.6. ECHO showed an echo dense structure on the anterior leaflet, cannot exclude vegetation vs calcified chordae. [facility] has been consulted for MARY.     4.4.25: NAD. VSS. No complaints of CP/SOB/Palps. "I feel okay" WBC 21.29, H&H 8.7/25.9, Plts 522, BUN/Creat 34.7/3.59, Glucose 104, Albumin 1.6  4.5.25: NAD. VSS. No complaints of CP/SOB/Palps. "I'm okay. Ready to go back to our room" WBC 19.50, H&H 8.3/25.3, PLTs 543, K 4.03, Mag 1.6  4.6.25: NAD. VSS. No complaints of CP/SOB/Palps. "I feel okay" WBC 6.27. Plts 562, H&H 8.7/26.1, Creat 2.72    PMH: HLD, HTN, CKD, Non-Healing Wounds, Vitamin D Deficiency, Neuropathy  PSH: EGD  Family History: Mother - DM II  Social History: Current Smoker (1/2 PPD). Alcohol use (28 drinks per week). Denies illicit drug use.     Previous Cardiac Diagnostics:   ECHO (4.3.25):  Left Ventricle: The left ventricle is normal in size. Increased wall " thickness. There is normal systolic function with a visually estimated ejection fraction of 60 - 65%. Right Ventricle: The right ventricle is normal in size. Systolic function is normal. TAPSE is 2.68 cm. Mitral Valve: Mildly thickened leaflets. There is a echo dense structure on the anterior leaflet, cannot exclude vegetation vs calcified chordae. There is mild regurgitation. Recommend MARY for further evaluation if clinically indicated.    Lower Ext Venous US (2.26.24):  The study quality is average. Limited unilateral study right  leg only post GSV RFA procedure. No evidence of DVT is noted from the right CFV to the distal femoral vein segment with good compression, augmentation and phasic flow obtained.The right GSV is non-compressible with no flow noted from the proximal thigh to the proximal calf consistent with successful RFA procedure.    Arterial US (1.23.24):  The study quality is average. Normal bilateral lower extremity arterial duplex ultrasound.  No plaquing is visualized in bilateral lower extremities.    Venogram (12.7.22):  Bilateral femoral venography did not reveal critical venous compression Intravenous ultrasound was performed which did not reveal critical compression greater than 50%. Therefore no stenting was performed    ECHO (10.8.20):  The study quality is average. The left ventricle is decreased in size. Global left ventricular systolic function is normal. The left ventricular ejection fraction is 70%. The left ventricle diastolic function is impaired (Grade I) with normal left atrial pressure. Noted left ventricular hypertrophy. It is mild. Dilatation of the aortic root limited to the sinus of valsalva (3.9 cms) is noted. Mild (1+) mitral regurgitation.    NPI (10.7.20):  This is a normal perfusion study, no perfusion defects noted. There is no evidence of ischemia. This scan is suggestive of low risk for future cardiovascular events. The left ventricular cavity is noted to be normal on  the stress study. The left ventricular ejection fraction was calculated to be 63% and left ventricular global function is normal. The study quality is good.       Review of patient's allergies indicates:   Allergen Reactions    Opioids - morphine analogues Hallucinations     No current facility-administered medications on file prior to encounter.     Current Outpatient Medications on File Prior to Encounter   Medication Sig    gabapentin (NEURONTIN) 600 MG tablet Take 600 mg by mouth 3 (three) times daily.     Review of Systems   Constitutional:  Positive for fatigue. Negative for fever.   Respiratory:  Negative for shortness of breath.    Cardiovascular:  Negative for chest pain, palpitations and leg swelling.   All other systems reviewed and are negative.      Objective:     Vital Signs (Most Recent):  Temp: 98.9 °F (37.2 °C) (04/06/25 0708)  Pulse: 69 (04/06/25 0708)  Resp: 17 (04/06/25 0708)  BP: 131/79 (04/06/25 0708)  SpO2: 95 % (04/06/25 0708) Vital Signs (24h Range):  Temp:  [98 °F (36.7 °C)-99.4 °F (37.4 °C)] 98.9 °F (37.2 °C)  Pulse:  [67-75] 69  Resp:  [16-18] 17  SpO2:  [95 %-100 %] 95 %  BP: (130-169)/(72-87) 131/79   Weight: 71.8 kg (158 lb 4.6 oz)  Body mass index is 24.79 kg/m².  SpO2: 95 %       Intake/Output Summary (Last 24 hours) at 4/6/2025 1042  Last data filed at 4/6/2025 0500  Gross per 24 hour   Intake 740 ml   Output 3300 ml   Net -2560 ml     Lines/Drains/Airways       Central Venous Catheter Line  Duration                  Hemodialysis Catheter 03/30/25 1233 right internal jugular 6 days              Drain  Duration                  Urethral Catheter 03/30/25 1300 Coude 16 Fr. 6 days              Peripheral Intravenous Line  Duration                  Peripheral IV - Single Lumen 03/29/25 1030 20 G Right Antecubital 8 days         Peripheral IV - Single Lumen 03/30/25 1356 20 G Right;Lateral Forearm 6 days         Peripheral IV - Single Lumen 03/31/25 0200 18 G Left;Proximal;Medial Forearm  6 days                  Significant Labs:   Chemistries:   Recent Labs   Lab 03/31/25  0209 04/01/25  0401 04/02/25  0436 04/03/25  0505 04/04/25  0357 04/05/25  0316 04/06/25  0350   *   < > 137 138 140 138 139   K 3.3*   < > 3.9 3.4* 3.6 3.7 4.1   CL 92*   < > 97* 99 104 104 104   CO2 30*   < > 29 31* 28 26 29   BUN 60.4*   < > 66.5* 65.3* 34.7* 37.9* 22.0   CREATININE 5.55*   < > 5.90* 5.58* 3.59* 4.03* 2.72*   CALCIUM 7.5*   < > 7.8* 7.8* 8.1* 8.2* 8.3*   BILITOT 0.5   < > 0.4 0.3 0.2 0.2 0.3   ALKPHOS 73   < > 73 62 70 74 67   ALT 10   < > 5 7 8 10 11   AST 23   < > 16 18 20 32 21   GLUCOSE 115*   < > 100 116* 104* 123* 95   MG 1.70  --   --   --   --  1.60  --    PHOS  --    < > 4.5 4.5 3.5  --   --     < > = values in this interval not displayed.        CBC/Anemia Labs: Coags:    Recent Labs   Lab 03/31/25  0209 04/01/25  0401 04/04/25  0357 04/05/25  0316 04/06/25  0350   WBC 39.45  39.45*   < > 21.29  21.29* 19.50* 16.27*   HGB 9.6*   < > 8.7* 8.3* 8.7*   HCT 26.4*   < > 25.9* 25.3* 26.1*      < > 522* 543* 562*   MCV 81.0   < > 87.2 89.1 87.9   RDW 15.6   < > 17.3* 17.2* 17.2*   FOLATE 4.8*  --   --   --   --     < > = values in this interval not displayed.    Recent Labs   Lab 03/30/25 2016   INR 1.4*        Significant Imaging:  Imaging Results              US Retroperitoneal Limited (Final result)  Result time 03/29/25 12:20:31      Final result by Jakob Horan MD (03/29/25 12:20:31)                   Impression:      Findings concerning for pelvicaliectasis with changes of medical renal disease.  No overt evidence for obstructive uropathy.      Electronically signed by: Jakob Horan MD  Date:    03/29/2025  Time:    12:20               Narrative:    EXAMINATION:  US RETROPERITONEAL LIMITED    CLINICAL HISTORY:  Acute kidney failure, unspecified    TECHNIQUE:  Multiple sonographic images the kidneys were obtained by department sonographer.    COMPARISON:  None    FINDINGS:  Right kidney  measures 10.9 cm in length.  Left kidney measures 10.2 cm length.  No evidence for hydronephrosis, however, pelvicaliectasis is identified.  The parenchyma demonstrates increased echogenicity with cortical thinning.    Bladder is distended and normal.                                       CT Chest Abdomen Pelvis Without Contrast (XPD) (Final result)  Result time 03/29/25 09:44:25      Final result by Haylee Correia MD (03/29/25 09:44:25)                   Impression:      Abnormal edematous appearance of the right kidney.  There is fullness at the upper pole right kidney which is poorly characterized.  Appearance could be related to mass, phlegmon, focal pyelonephritis in the acute setting.  There is perinephric stranding on the right.  No martha hydronephrosis.  Further characterization limited without contrast.      Electronically signed by: Haylee Correia  Date:    03/29/2025  Time:    09:44               Narrative:    EXAMINATION:  CT CHEST ABDOMEN PELVIS WITHOUT CONTRAST(XPD)    CLINICAL HISTORY:  Sepsis;  lower abdominal pain x3 days.  Nausea.  Diarrhea.    TECHNIQUE:  Helical acquisition through the chest, abdomen and pelvis without  IV administration of contrast. Axial, sagittal and coronal reformats interpreted.    Automated tube current modulation, weight-based exposure dosing, and/or iterative reconstruction technique utilized to reach lowest reasonably achievable exposure rate.    DLP: 329 mGy*cm    COMPARISON:  No relevant priors available for comparison at time of this dictation    FINDINGS:  BASE OF NECK: No significant abnormality.    HEART: Normal size.    THORACIC VASCULATURE: Thoracic aorta is unremarkable.    KIARA/MEDIASTINUM: No enlarged lymph nodes by size criteria. Evaluation of hilar lymphadenopathy is limited without contrast.    AIRWAYS: Patent.    LUNGS/PLEURA: Mild dependent atelectasis.    THORACIC SOFT TISSUES: Unremarkable.    LIVER: Normal attenuation. No appreciable focal  hepatic lesion.    BILIARY: Gallbladder decompressed.    PANCREAS: No inflammatory change.    SPLEEN: Normal in size    ADRENALS: No mass.    KIDNEYS/URETERS: Edematous appearance of the right kidney.  Fullness at the upper pole right kidney poorly characterized.  Right perinephric stranding.  No martha hydronephrosis.    GI TRACT/MESENTERY: Evaluation of the bowel is limited without contrast.  No evidence of bowel obstruction or inflammation. The appendix is normal. Colonic diverticulosis without acute inflammatory change.    PERITONEUM: No free fluid.No free air.    LYMPH NODES: No enlarged lymph nodes by size criteria.    ABDOMINOPELVIC VASCULATURE: Aortoiliac atherosclerosis.    BLADDER: Normal appearance given degree of distention.    REPRODUCTIVE ORGANS: Normal as visualized.    ABDOMINAL WALL: Unremarkable.    BONES: No acute osseous abnormality.  Old, healed left rib deformities.                                       X-Ray Chest AP Portable (Final result)  Result time 03/29/25 10:10:10      Final result by Wilmer Hooper MD (03/29/25 10:10:10)                   Impression:      No acute cardiopulmonary process identified.      Electronically signed by: Wilmer Hooper  Date:    03/29/2025  Time:    10:10               Narrative:    EXAMINATION:  XR CHEST AP PORTABLE    CLINICAL HISTORY:  Sepsis; abdominal pain for 3 days.  Nausea and diarrhea.    TECHNIQUE:  One view    COMPARISON:  None available.    FINDINGS:  Cardiopericardial silhouette is within normal limits. Lungs are without dense focal or segmental consolidation, congestive process, pleural effusions or pneumothorax.  Multiple old fractures of the left ribs.                                    EKG:         Telemetry:  SR    Physical Exam  Vitals and nursing note reviewed.   HENT:      Head: Normocephalic.      Nose: Nose normal.      Mouth/Throat:      Mouth: Mucous membranes are dry.   Eyes:      Extraocular Movements: Extraocular movements intact.    Cardiovascular:      Rate and Rhythm: Normal rate and regular rhythm.      Pulses: Normal pulses.      Heart sounds: Murmur heard.   Pulmonary:      Effort: Pulmonary effort is normal.      Breath sounds: No rales.      Comments: Diminished Lung sounds at bases     On NC 2 L   Abdominal:      Palpations: Abdomen is soft.   Genitourinary:     Comments: + Nguyen  Musculoskeletal:         General: Normal range of motion.      Cervical back: Normal range of motion.      Right lower leg: No edema.      Left lower leg: No edema.   Skin:     General: Skin is warm.      Comments: R CW HD Cath    Neurological:      Mental Status: He is alert and oriented to person, place, and time.   Psychiatric:         Mood and Affect: Mood normal.         Behavior: Behavior normal.         Home Medications:   Medications Ordered Prior to Encounter[1]  Current Schedule Inpatient Medications:   ampicillin-sulbactam  1.5 g Intravenous Q24H    heparin (porcine)  3,000 Units Intravenous Once    pantoprazole  40 mg Intravenous BID     Continuous Infusions:      Assessment:   Anterior Leaflet Vegetation vs Calcified Chordae     - ECHO (4.2.25): There is a echo dense structure on the anterior leaflet, cannot exclude vegetation vs calcified chordae.   Leukocytosis/Sepsis - Improving     - BC positive for E.coli   UTI/Pyelonephritis   Fever   Anemia - Slight Improvement   Non-bleeding Gastric Ulcers   Thrombocytosis - Worsening   Electrolyte Derangements     - Hypomagnesia     - Hypokalemia (Resolved)    - Hyponatremia - Resolved   Metabolic Acidosis   VHD    - Mild MR   HLD  HTN  LEANDRO vs CKD/ESRD - Improving     - on HD   Non-Healing Wounds  Venous Insuffiencey   Vitamin D Deficiency  Neuropathy  History of GI Bleed       Plan:   Plan for MARY once anesthesia is available; likely early next week  Consents Obtained (Placed on Chart)  Keep Mag > 2 and Potassium > 4  Labs in AM: CBC, CMP, and Mag    SUNDAR Yip  Cardiology  [facility]  [facility]              [1]   No current facility-administered medications on file prior to encounter.     Current Outpatient Medications on File Prior to Encounter   Medication Sig Dispense Refill    gabapentin (NEURONTIN) 600 MG tablet Take 600 mg by mouth 3 (three) times daily.

## 2025-04-06 NOTE — PROGRESS NOTES
[facility] Hospital Medicine Progress Note        Chief Complaint: Inpatient Follow-up    HPI:   59-year-old male with significant history of peripheral neuropathy admitted to hospital medicine services with complaints of lower abdominal pain, diarrhea, nausea, decreased urine output.  Borderline hypotensive, responded to fluids.  Labs were significant for electrolyte derangement, severe acute kidney injury, severe leukocytosis.  CT chest, abdomen, pelvis with edematous right kidney and also concern for mass versus phlegmon versus pyelonephritis.  Patient was initiated on empiric antibiotics, nephrology and urology services consulted.  Patient was initiated on emergent HD, he was also anemic, 2 units PRBC was transfused with HD.  After admission patient became significantly hypotensive warranting transfer to ICU for vasopressors.  Patient's blood cultures grew E coli, most likely renal source.  Urology following.  Patient also endorsed melanotic stools prior to presentation, given severe anemia gastroenterology was consulted, patient had EGD done which revealed 3 gastric ulcers not requiring any intervention, recommended PPI drip for 72 hours.  Once hemodynamics stabilized patient was downgraded back to hospital medicine services.  HD being continued.  Urology placed indwelling Nguyen.  Since he continued with fever spikes decided to repeat CT abdomen/pelvis.  This was done without contrast on 04/02 which demonstrated similar findings as admit CT.  Patient is still continued with significant high-grade temp spikes on 04/03 and therefore CT was repeated with contrast which showed abscess collection right upper pole kidney/perinephric area extending to psoas region, IR consulted for drainage, antibiotics changed to Unasyn, ID consulted, given concern for endocarditis cardiology was consulted for MARY.  Less than eight and therefore 1 unit PRBC transfusion ordered on 04/03.  Hemoglobin  improved, attempted CT-guided drainage of the abscess, fluid was too thick and therefore could not be drained, planning for conservative management with antibiotics.  Fever spikes and leukocytosis improving on antibiotics, dialyzed again for/5      Interval Hx:   Patient seen at bedside .  Comfortably laying in bed, mild right-sided intermittent abdominal discomfort, otherwise patient denies any active complaints, hemodynamics stable, no more high-grade temp spikes, no new complaints     Objective/physical exam:  General: In no acute distress, afebrile  Chest: Clear to auscultation bilaterally  Heart: S1, S2, no appreciable murmur  Abdomen: Soft, nontender, BS +  MSK: Warm, no lower extremity edema, no clubbing or cyanosis  Neurologic: Alert and oriented x4, moving all extremities with good strength     VITAL SIGNS: 24 HRS MIN & MAX LAST   Temp  Min: 98 °F (36.7 °C)  Max: 99.4 °F (37.4 °C) 98.9 °F (37.2 °C)   BP  Min: 130/72  Max: 169/87 131/79   Pulse  Min: 67  Max: 75  69   Resp  Min: 16  Max: 18 17   SpO2  Min: 95 %  Max: 100 % 95 %       Recent Labs   Lab 04/06/25  0350   WBC 16.27*   RBC 2.97*   HGB 8.7*   HCT 26.1*   MCV 87.9   MCH 29.3   MCHC 33.3   RDW 17.2*   *   MPV 9.9         Recent Labs   Lab 04/05/25  0316 04/06/25  0350    139   K 3.7 4.1    104   CO2 26 29   BUN 37.9* 22.0   CREATININE 4.03* 2.72*   CALCIUM 8.2* 8.3*   MG 1.60  --    ALBUMIN 1.6* 1.8*   ALKPHOS 74 67   ALT 10 11   AST 32 21   BILITOT 0.2 0.3          Microbiology Results (last 7 days)       Procedure Component Value Units Date/Time    Blood Culture [[id number]]  (Normal) Collected: 04/03/25 2056    Order Status: Completed Specimen: Blood from Arm, Right Updated: 04/06/25 0004     Blood Culture No Growth At 48 Hours    Blood Culture [[id number]]  (Normal) Collected: 04/03/25 2200    Order Status: Completed Specimen: Blood from Arm, Left Updated: 04/06/25 0004     Blood Culture No Growth At 48 Hours    Blood Culture  [[id number]]  (Normal) Collected: 03/30/25 2016    Order Status: Completed Specimen: Blood from Arm, Left Updated: 04/04/25 2202     Blood Culture No Growth at 5 days    Blood Culture [[id number]]  (Abnormal)  (Susceptibility) Collected: 03/30/25 2016    Order Status: Completed Specimen: Blood from Arm, Left Updated: 04/03/25 0700     Blood Culture Escherichia coli     GRAM STAIN Gram Negative Rods      Seen in gram stain of broth only      1 of 1 Pediatric bottle positive    Urine culture [[id number]]  (Abnormal)  (Susceptibility) Collected: 03/29/25 1159    Order Status: Completed Specimen: Urine Updated: 03/31/25 0933     Urine Culture >/= 100,000 colonies/ml Escherichia coli    Blood culture x two cultures. Draw prior to antibiotics. [[id number]]  (Abnormal)  (Susceptibility) Collected: 03/29/25 1037    Order Status: Completed Specimen: Blood Updated: 03/31/25 0711     Blood Culture Escherichia coli     GRAM STAIN Gram Negative Rods      Seen in gram stain of broth only      2 of 2 bottles positive    Blood culture x two cultures. Draw prior to antibiotics. [[id number]]  (Abnormal)  (Susceptibility) Collected: 03/29/25 1055    Order Status: Completed Specimen: Blood Updated: 03/31/25 0710     Blood Culture Escherichia coli     GRAM STAIN Gram Negative Rods      Seen in gram stain of broth only      2 of 2 bottles positive             Scheduled Med:   ampicillin-sulbactam  1.5 g Intravenous Q24H    heparin (porcine)  3,000 Units Intravenous Once    pantoprazole  40 mg Intravenous BID          Assessment/Plan:    Severe sepsis with septic shock-improving  E coli bacteremia leading to above, secondary to below  Acute Right pyelonephritis with right upper pole/right perinephric abscess extending to psoas muscle-failed CT-guided drainage  Rule out mitral valve endocarditis  Severe acute kidney injury/ischemic ATN secondary to sepsis requiring hemodialysis, dialyzed 4/5  Acute on chronic normocytic anemia-improved  post transfusion 4/3  Acute GI bleed secondary to multiple gastric ulcer  History of peripheral neuropathy   Bilateral consolidation-rule out Hap  Prophylaxis    No more having high-grade temp spikes, leukocytosis also much better after switching to Unasyn   MRSA PCR negative  Repeat blood cultures from 4/3 also negative  IR attempted CT-guided drainage of right upper pole/perinephric abscess extending to psoas , but was unsuccessful since fluid was very thick  Urology aware, now planning for conservative management with antibiotics and will repeat CT in case of recurrent fever spikes or any symptomatic worsening  TTE findings concerning for mitral valve endocarditis, cardiology consulted, sandra this week  Dialyzed 4/5, showing signs of renal recovery, continue to follow Nephrology recommendations  Hemoglobin appropriately improved after receiving 1 unit PRBC on 04/03  Continue IV Protonix, no more overt bleeding  DVT prophylaxis-cautiously continue subQ heparin since there is no overt bleeding          Jackie Mir MD   04/06/2025

## 2025-04-06 NOTE — PROGRESS NOTES
[facility] Inpatient Progress Note      HPI:     Patient Name: Tao Bee  Admission Date: 3/29/2025  Hospital Length of Stay: 8 days  Code Status: Full Code   Attending Physician: Jackie Mir MD   Primary Care Physician: Penny Garsia FNP  Principal Problem:<principal problem not specified>      Today patient seen and examined  Labs, recent events, imaging and medications reviewed for this hospital stay  Resting in bed. No complaints this morning.   Completed dialysis yesterday with 1 L UF.   Good urine output overnight.   MARY for next week    Review of Systems:   Constitutional: still intermittent fever  Skin:  no rashes, no itching, no new skin lesions  HEENT: no acute change in hearing or vision, tinnitus, or dysphagia  Respiratory: no worsening dyspnea  Cardiovascular: no  chest pain, palpitations, or swelling  Gastrointestional: Denies abdominal pain, nausea, vomiting, diarrhea, or constipation  Musculoskeletal: Denies myalgias, back pain, flank pain, new decreased ROM or acute focal weakness  Neurological: no seizures, no headaches  Hematological: no unusual bruising or bleeding        Medications:   Scheduled Meds:   ampicillin-sulbactam  1.5 g Intravenous Q24H    heparin (porcine)  3,000 Units Intravenous Once    pantoprazole  40 mg Intravenous BID     Continuous Infusions:          Vitals:     Vitals:    04/05/25 2334 04/06/25 0344 04/06/25 0600 04/06/25 0708   BP: 130/72 138/78  131/79   BP Location:       Patient Position:  Lying  Lying   Pulse: 75 72  69   Resp: 18 18  17   Temp: 98.5 °F (36.9 °C) 98.8 °F (37.1 °C)  98.9 °F (37.2 °C)   TempSrc: Oral Oral  Oral   SpO2: 97% 95%  95%   Weight:   71.8 kg (158 lb 4.6 oz)    Height:             I/O last 3 completed shifts:  In: 920 [P.O.:420; Other:500]  Out: 4300 [Urine:3300; Other:1000]    Intake/Output Summary (Last 24 hours) at 4/6/2025 1035  Last data filed at 4/6/2025 0500  Gross per 24 hour   Intake 740 ml   Output 3300 ml   Net -2560 ml        Physical Exam:   General: no acute distress, awake, alert  Eyes: PERRLA, EOMI, conjunctiva clear, eyelids without swelling  HENT: atraumatic, oropharynx and nasal mucosa patent  Neck:  no JVD, no thyromegaly or lymphadenopathy  Respiratory: rhonchi  Cardiovascular: RRR rub; +BILLIE, BL radial and pedal pulses felt  Edema: none  Gastrointestinal: soft, non-tender, non-distended; positive bowel sounds; no masses to palpation  Musculoskeletal: without limitation or discomfort  Integumentary: warm, dry; no rashes, wounds, or skin lesions  Neurological: oriented, appropriate, no acute deficits        Labs:     Chemistries:   Recent Labs   Lab 03/31/25  0209 04/01/25  0401 04/02/25  0436 04/03/25  0505 04/04/25  0357 04/05/25  0316 04/06/25  0350   *   < > 137 138 140 138 139   K 3.3*   < > 3.9 3.4* 3.6 3.7 4.1   CL 92*   < > 97* 99 104 104 104   CO2 30*   < > 29 31* 28 26 29   BUN 60.4*   < > 66.5* 65.3* 34.7* 37.9* 22.0   CREATININE 5.55*   < > 5.90* 5.58* 3.59* 4.03* 2.72*   CALCIUM 7.5*   < > 7.8* 7.8* 8.1* 8.2* 8.3*   BILITOT 0.5   < > 0.4 0.3 0.2 0.2 0.3   ALKPHOS 73   < > 73 62 70 74 67   ALT 10   < > 5 7 8 10 11   AST 23   < > 16 18 20 32 21   GLUCOSE 115*   < > 100 116* 104* 123* 95   MG 1.70  --   --   --   --  1.60  --    PHOS  --    < > 4.5 4.5 3.5  --   --     < > = values in this interval not displayed.        CBC/Anemia Labs: Coags:    Recent Labs   Lab 03/31/25  0209 04/01/25  0401 04/04/25  0357 04/05/25  0316 04/06/25  0350   WBC 39.45  39.45*   < > 21.29  21.29* 19.50* 16.27*   HGB 9.6*   < > 8.7* 8.3* 8.7*   HCT 26.4*   < > 25.9* 25.3* 26.1*      < > 522* 543* 562*   MCV 81.0   < > 87.2 89.1 87.9   RDW 15.6   < > 17.3* 17.2* 17.2*   FOLATE 4.8*  --   --   --   --     < > = values in this interval not displayed.    Recent Labs   Lab 03/30/25 2016   INR 1.4*          Impression:   LEANDRO-initiated on HD  E coli bacteremia  Possible renal abscess post attempted CT guided aspiration  4/4  ?endocarditis-MARY planned for next week.     Plan:     No indication for additional HD today.   Continue to monitor for renal recovery.   MARY possibly Monday.     Filomena HERNANDEZ-BC

## 2025-04-07 LAB
ALBUMIN SERPL-MCNC: 1.9 G/DL (ref 3.5–5)
ALBUMIN/GLOB SERPL: 0.6 RATIO (ref 1.1–2)
ALP SERPL-CCNC: 70 UNIT/L (ref 40–150)
ALT SERPL-CCNC: 9 UNIT/L (ref 0–55)
ANION GAP SERPL CALC-SCNC: 10 MEQ/L
AST SERPL-CCNC: 17 UNIT/L (ref 11–45)
BASOPHILS # BLD AUTO: 0.1 X10(3)/MCL
BASOPHILS NFR BLD AUTO: 0.6 %
BILIRUB SERPL-MCNC: 0.3 MG/DL
BUN SERPL-MCNC: 27.1 MG/DL (ref 8.4–25.7)
CALCIUM SERPL-MCNC: 8.1 MG/DL (ref 8.4–10.2)
CHLORIDE SERPL-SCNC: 103 MMOL/L (ref 98–107)
CO2 SERPL-SCNC: 28 MMOL/L (ref 22–29)
CREAT SERPL-MCNC: 3.46 MG/DL (ref 0.72–1.25)
CREAT/UREA NIT SERPL: 8
EOSINOPHIL # BLD AUTO: 0.2 X10(3)/MCL (ref 0–0.9)
EOSINOPHIL NFR BLD AUTO: 1.3 %
ERYTHROCYTE [DISTWIDTH] IN BLOOD BY AUTOMATED COUNT: 17.1 % (ref 11.5–17)
GFR SERPLBLD CREATININE-BSD FMLA CKD-EPI: 20 ML/MIN/1.73/M2
GLOBULIN SER-MCNC: 3.4 GM/DL (ref 2.4–3.5)
GLUCOSE SERPL-MCNC: 93 MG/DL (ref 74–100)
HCT VFR BLD AUTO: 25.9 % (ref 42–52)
HGB BLD-MCNC: 8.4 G/DL (ref 14–18)
IMM GRANULOCYTES # BLD AUTO: 0.21 X10(3)/MCL (ref 0–0.04)
IMM GRANULOCYTES NFR BLD AUTO: 1.3 %
LYMPHOCYTES # BLD AUTO: 1.9 X10(3)/MCL (ref 0.6–4.6)
LYMPHOCYTES NFR BLD AUTO: 11.9 %
MAGNESIUM SERPL-MCNC: 2.2 MG/DL (ref 1.6–2.6)
MCH RBC QN AUTO: 29.4 PG (ref 27–31)
MCHC RBC AUTO-ENTMCNC: 32.4 G/DL (ref 33–36)
MCV RBC AUTO: 90.6 FL (ref 80–94)
MONOCYTES # BLD AUTO: 1.99 X10(3)/MCL (ref 0.1–1.3)
MONOCYTES NFR BLD AUTO: 12.5 %
NEUTROPHILS # BLD AUTO: 11.56 X10(3)/MCL (ref 2.1–9.2)
NEUTROPHILS NFR BLD AUTO: 72.4 %
NRBC BLD AUTO-RTO: 0 %
PLATELET # BLD AUTO: 560 X10(3)/MCL (ref 130–400)
PMV BLD AUTO: 9.9 FL (ref 7.4–10.4)
POTASSIUM SERPL-SCNC: 4.7 MMOL/L (ref 3.5–5.1)
PROT SERPL-MCNC: 5.3 GM/DL (ref 6.4–8.3)
RBC # BLD AUTO: 2.86 X10(6)/MCL (ref 4.7–6.1)
SODIUM SERPL-SCNC: 141 MMOL/L (ref 136–145)
WBC # BLD AUTO: 15.96 X10(3)/MCL (ref 4.5–11.5)

## 2025-04-07 PROCEDURE — 25000003 PHARM REV CODE 250: Performed by: INTERNAL MEDICINE

## 2025-04-07 PROCEDURE — 63600175 PHARM REV CODE 636 W HCPCS: Performed by: INTERNAL MEDICINE

## 2025-04-07 PROCEDURE — 36415 COLL VENOUS BLD VENIPUNCTURE: CPT | Performed by: INTERNAL MEDICINE

## 2025-04-07 PROCEDURE — 97116 GAIT TRAINING THERAPY: CPT | Mod: CQ

## 2025-04-07 PROCEDURE — 21400001 HC TELEMETRY ROOM

## 2025-04-07 PROCEDURE — 85025 COMPLETE CBC W/AUTO DIFF WBC: CPT | Performed by: INTERNAL MEDICINE

## 2025-04-07 PROCEDURE — 25000003 PHARM REV CODE 250: Performed by: NURSE PRACTITIONER

## 2025-04-07 PROCEDURE — 99232 SBSQ HOSP IP/OBS MODERATE 35: CPT | Mod: ,,, | Performed by: INTERNAL MEDICINE

## 2025-04-07 PROCEDURE — 83735 ASSAY OF MAGNESIUM: CPT

## 2025-04-07 PROCEDURE — 80053 COMPREHEN METABOLIC PANEL: CPT | Performed by: INTERNAL MEDICINE

## 2025-04-07 RX ADMIN — PANTOPRAZOLE SODIUM 40 MG: 40 INJECTION, POWDER, LYOPHILIZED, FOR SOLUTION INTRAVENOUS at 09:04

## 2025-04-07 RX ADMIN — AMPICILLIN SODIUM AND SULBACTAM SODIUM 1.5 G: 1; .5 INJECTION, POWDER, FOR SOLUTION INTRAMUSCULAR; INTRAVENOUS at 01:04

## 2025-04-07 RX ADMIN — ACETAMINOPHEN 1000 MG: 500 TABLET ORAL at 05:04

## 2025-04-07 NOTE — PLAN OF CARE
Chart review.  MARY scheduled for Tuesday 4/8/25.  Will re-evaluate if there is a need for continued dialysis.

## 2025-04-07 NOTE — PT/OT/SLP PROGRESS
Physical Therapy Treatment    Patient Name:  Tao Bee   MRN:  [id number]    Recommendations:     Discharge therapy intensity: Low Intensity Therapy   Discharge Equipment Recommendations: none  Barriers to discharge: None    Assessment:     Tao Bee is a 59 y.o. male admitted with a medical diagnosis of ARF.  He presents with the following impairments/functional limitations: impaired endurance, weakness, impaired self care skills, impaired functional mobility, gait instability, impaired balance, decreased safety awareness, decreased lower extremity function, pain.    SPO2 measured 95%-98% on room air throughout session.    Rehab Prognosis: Good; patient would benefit from acute skilled PT services to address these deficits and reach maximum level of function.    Recent Surgery: Procedure(s) (LRB):  EGD (ESOPHAGOGASTRODUODENOSCOPY) (N/A) 8 Days Post-Op    Plan:     During this hospitalization, patient would benefit from acute PT services 5 x/week to address the identified rehab impairments via gait training, therapeutic activities, therapeutic exercises, neuromuscular re-education and progress toward the following goals:    Plan of Care Expires:  05/03/25    Subjective     Chief Complaint: none stated  Patient/Family Comments/goals: none stated  Pain/Comfort:         Objective:     Communicated with nurse prior to session.  Patient found HOB elevated with telemetry, peripheral IV, arambula catheter upon PT entry to room.     General Precautions: Standard, fall  Orthopedic Precautions: N/A  Braces: N/A  Respiratory Status: Room air  Blood Pressure: nt  Skin Integrity: Visible skin intact      Functional Mobility:  Bed Mobility:     Scooting: contact guard assistance  Supine to Sit: contact guard assistance  Transfers:     Sit to Stand:  contact guard assistance with rolling walker  Gait: patient amb 164ft with rolling walker with step through gait pattern with CGA.      Education:  Patient provided with verbal  education education regarding PT role/goals/POC, post-op precautions, fall prevention, safety awareness, and discharge/DME recommendations.  Understanding was verbalized.     Patient left up in chair with all lines intact, call button in reach, and friend present    GOALS:   Multidisciplinary Problems       Physical Therapy Goals          Problem: Physical Therapy    Goal Priority Disciplines Outcome Interventions   Physical Therapy Goal     PT, PT/OT Progressing    Description: Goals to be met by: 5/3/25     Patient will increase functional independence with mobility by performin. Supine to sit with [place]  2. Sit to supine with [place]  3. Sit to stand transfer with Modified [place]  4. Gait  x 200 feet with Modified [place] using Rolling Walker.   5. Ascend/descend 2 stairs with bilateral Handrails & Modified [place]                         Time Tracking:     PT Received On: 25  PT Start Time: 1453     PT Stop Time: 1516  PT Total Time (min): 23 min     Billable Minutes: Gait Training 23    Treatment Type: Treatment  PT/PTA: PTA     Number of PTA visits since last PT visit: 2     2025

## 2025-04-07 NOTE — PROGRESS NOTES
[facility] Hospital Medicine Progress Note        Chief Complaint: Inpatient Follow-up    HPI:   59-year-old male with significant history of peripheral neuropathy admitted to hospital medicine services with complaints of lower abdominal pain, diarrhea, nausea, decreased urine output.  Borderline hypotensive, responded to fluids.  Labs were significant for electrolyte derangement, severe acute kidney injury, severe leukocytosis.  CT chest, abdomen, pelvis with edematous right kidney and also concern for mass versus phlegmon versus pyelonephritis.  Patient was initiated on empiric antibiotics, nephrology and urology services consulted.  Patient was initiated on emergent HD, he was also anemic, 2 units PRBC was transfused with HD.  After admission patient became significantly hypotensive warranting transfer to ICU for vasopressors.  Patient's blood cultures grew E coli, most likely renal source.  Urology following.  Patient also endorsed melanotic stools prior to presentation, given severe anemia gastroenterology was consulted, patient had EGD done which revealed 3 gastric ulcers not requiring any intervention, recommended PPI drip for 72 hours.  Once hemodynamics stabilized patient was downgraded back to hospital medicine services.  HD being continued.  Urology placed indwelling Nguyen.  Since he continued with fever spikes decided to repeat CT abdomen/pelvis.  This was done without contrast on 04/02 which demonstrated similar findings as admit CT.  Patient is still continued with significant high-grade temp spikes on 04/03 and therefore CT was repeated with contrast which showed abscess collection right upper pole kidney/perinephric area extending to psoas region, IR consulted for drainage, antibiotics changed to Unasyn, ID consulted, given concern for endocarditis cardiology was consulted for MARY.  Less than eight and therefore 1 unit PRBC transfusion ordered on 04/03.  Hemoglobin  improved, attempted CT-guided drainage of the abscess, fluid was too thick and therefore could not be drained, planning for conservative management with antibiotics.  Fever spikes and leukocytosis improving on antibiotics, dialyzed again 4/5.      Interval Hx:   Patient seen at bedside .  He is comfortably laying in bed, no more right-sided abdominal pain, afebrile and hemodynamics are stable, no acute events overnight, Nguyen draining clear urine     Objective/physical exam:  General: In no acute distress, afebrile  Chest: Clear to auscultation bilaterally  Heart: S1, S2, no appreciable murmur  Abdomen: Soft, nontender, BS +  MSK: Warm, no lower extremity edema, no clubbing or cyanosis  Neurologic: Alert and oriented x4, moving all extremities with good strength     VITAL SIGNS: 24 HRS MIN & MAX LAST   Temp  Min: 98.3 °F (36.8 °C)  Max: 98.9 °F (37.2 °C) 98.7 °F (37.1 °C)   BP  Min: 129/77  Max: 147/81 (!) 142/82   Pulse  Min: 65  Max: 78  65   Resp  Min: 16  Max: 18 16   SpO2  Min: 95 %  Max: 99 % 97 %       Recent Labs   Lab 04/07/25  0523   WBC 15.96*   RBC 2.86*   HGB 8.4*   HCT 25.9*   MCV 90.6   MCH 29.4   MCHC 32.4*   RDW 17.1*   *   MPV 9.9         Recent Labs   Lab 04/07/25  0523      K 4.7      CO2 28   BUN 27.1*   CREATININE 3.46*   CALCIUM 8.1*   MG 2.20   ALBUMIN 1.9*   ALKPHOS 70   ALT 9   AST 17   BILITOT 0.3          Microbiology Results (last 7 days)       Procedure Component Value Units Date/Time    Blood Culture [[id number]]  (Normal) Collected: 04/03/25 2056    Order Status: Completed Specimen: Blood from Arm, Right Updated: 04/07/25 0002     Blood Culture No Growth At 72 Hours    Blood Culture [[id number]]  (Normal) Collected: 04/03/25 2200    Order Status: Completed Specimen: Blood from Arm, Left Updated: 04/07/25 0002     Blood Culture No Growth At 72 Hours    Blood Culture [[id number]]  (Normal) Collected: 03/30/25 2016    Order Status: Completed Specimen: Blood from Arm,  Left Updated: 04/04/25 2202     Blood Culture No Growth at 5 days    Blood Culture [[id number]]  (Abnormal)  (Susceptibility) Collected: 03/30/25 2016    Order Status: Completed Specimen: Blood from Arm, Left Updated: 04/03/25 0700     Blood Culture Escherichia coli     GRAM STAIN Gram Negative Rods      Seen in gram stain of broth only      1 of 1 Pediatric bottle positive    Urine culture [[id number]]  (Abnormal)  (Susceptibility) Collected: 03/29/25 1159    Order Status: Completed Specimen: Urine Updated: 03/31/25 0933     Urine Culture >/= 100,000 colonies/ml Escherichia coli             Scheduled Med:   ampicillin-sulbactam  1.5 g Intravenous Q24H    heparin (porcine)  3,000 Units Intravenous Once    pantoprazole  40 mg Intravenous BID          Assessment/Plan:    Severe sepsis with septic shock-improving  E coli bacteremia leading to above, secondary to below  Acute Right pyelonephritis with right upper pole/right perinephric abscess extending to psoas muscle-failed CT-guided drainage  Rule out mitral valve endocarditis  Severe acute kidney injury/ischemic ATN secondary to sepsis requiring hemodialysis, dialyzed 4/5  Acute on chronic normocytic anemia-improved post transfusion 4/3  Acute GI bleed secondary to multiple gastric ulcer  History of peripheral neuropathy   Bilateral consolidation-rule out Hap  Prophylaxis    Fever free for more than 48 hours   Leukocytosis also better after switching to Unasyn   MRSA PCR negative  Repeat blood cultures from 4/3 also negative  IR attempted CT-guided drainage of right upper pole/perinephric abscess extending to psoas , but was unsuccessful since fluid was very thick  Urology aware, now planning for conservative management with antibiotics and will repeat CT in case of recurrent fever spikes or any symptomatic worsening  TTE findings concerning for mitral valve endocarditis, cardiology consulted, sandra tomorrow   Dialyzed 4/5, showing signs of renal recovery, continue to  follow Nephrology recommendations, re-evaluate tomorrow for need for hemodialysis  Hemoglobin appropriately improved after receiving 1 unit PRBC on 04/03  Continue IV Protonix, no more overt bleeding  DVT prophylaxis-cautiously continue subQ heparin since there is no overt bleeding      MARY tomorrow, needs to monitor for renal recovery versus need for continued hemodialysis    Jackie Mir MD   04/07/2025

## 2025-04-07 NOTE — PROGRESS NOTES
[facility] Inpatient Progress Note      HPI:     Patient Name: Tao Bee  Admission Date: 3/29/2025  Hospital Length of Stay: 9 days  Code Status: Full Code   Attending Physician: Jackie Mir MD   Primary Care Physician: Penny Garsia FNP  Principal Problem:<principal problem not specified>      Today patient seen and examined  Labs, recent events, imaging and medications reviewed for this hospital stay  Feels the same  MARY hopefully for tomorrow( waiting  on anesthesia)    Review of Systems:   Constitutional: Denies fever, fatigue, generalized weakness, night sweats, or acute weight change  Skin: Denies wounds, no rashes, no itching, no new skin lesions  HEENT: Denies acute change in hearing or vision, tinnitus, or dysphagia  Respiratory:  Denies cough, shortness of breath, or wheezing  Cardiovascular: Denies chest pain, palpitations, or swelling  Gastrointestional: Denies abdominal pain, nausea, vomiting, diarrhea, or constipation  Genitourinary:improving urine output  Musculoskeletal: Denies myalgias, back pain, flank pain, new decreased ROM or acute focal weakness  Neurological: Denies headaches, seizures, dizziness, paresthesias or asterixis  Hematological: Denies unusual bruising or bleeding  Psychiatric: Denies hallucinations, depression, or confusion      Medications:   Scheduled Meds:   ampicillin-sulbactam  1.5 g Intravenous Q24H    heparin (porcine)  3,000 Units Intravenous Once    pantoprazole  40 mg Intravenous BID     Continuous Infusions:      Vitals:     Vitals:    04/06/25 2333 04/07/25 0333 04/07/25 0500 04/07/25 0723   BP: 137/80 (!) 145/77  (!) 142/82   BP Location:    Left arm   Patient Position:  Lying  Lying   Pulse: 78 71  65   Resp: 18 18  16   Temp: 98.8 °F (37.1 °C) 98.6 °F (37 °C)  98.7 °F (37.1 °C)   TempSrc: Oral Oral  Oral   SpO2: 98% 95%  97%   Weight:   70.9 kg (156 lb 4.9 oz)    Height:             I/O last 3 completed shifts:  In: 1080 [P.O.:1080]  Out: 4100  "[Urine:4100]    Intake/Output Summary (Last 24 hours) at 4/7/2025 0946  Last data filed at 4/7/2025 0500  Gross per 24 hour   Intake 840 ml   Output 2000 ml   Net -1160 ml       Physical Exam:   General: no acute distress, awake, alert  Eyes: PERRLA, EOMI, conjunctiva clear, eyelids without swelling  HENT: atraumatic, oropharynx and nasal mucosa patent  Neck: full ROM, no JVD, no thyromegaly or lymphadenopathy  Respiratory: rhonchi, Dec BS L base  Cardiovascular: RRR++BILLIE g2/6 LSB; BL radial and pedal pulses felt  Edema: none  Gastrointestinal: soft, non-tender, non-distended; positive bowel sounds; no masses to palpation  Genitourinary: arambula  Musculoskeletal: full ROM without limitation or discomfort  Integumentary: warm, dry; no rashes, wounds, or skin lesions  Neurological: oriented, appropriate, no acute deficits        Labs:     Chemistries:   Recent Labs   Lab 04/02/25  0436 04/03/25  0505 04/04/25  0357 04/05/25 0316 04/06/25  0350 04/07/25  0523    138 140 138 139 141   K 3.9 3.4* 3.6 3.7 4.1 4.7   CL 97* 99 104 104 104 103   CO2 29 31* 28 26 29 28   BUN 66.5* 65.3* 34.7* 37.9* 22.0 27.1*   CREATININE 5.90* 5.58* 3.59* 4.03* 2.72* 3.46*   CALCIUM 7.8* 7.8* 8.1* 8.2* 8.3* 8.1*   BILITOT 0.4 0.3 0.2 0.2 0.3 0.3   ALKPHOS 73 62 70 74 67 70   ALT 5 7 8 10 11 9   AST 16 18 20 32 21 17   GLUCOSE 100 116* 104* 123* 95 93   MG  --   --   --  1.60  --  2.20   PHOS 4.5 4.5 3.5  --   --   --         CBC/Anemia Labs: Coags:    Recent Labs   Lab 04/05/25  0316 04/06/25  0350 04/07/25  0523   WBC 19.50* 16.27* 15.96*   HGB 8.3* 8.7* 8.4*   HCT 25.3* 26.1* 25.9*   * 562* 560*   MCV 89.1 87.9 90.6   RDW 17.2* 17.2* 17.1*    No results for input(s): "PT", "INR", "APTT" in the last 168 hours.       Impression:     LEANDRO secondary to e coli bactermia/sepsis  Uremia improved  ?endocarditis for MARY in am  Possible renal abscess    Plan:   No acute indications for HD  FU CXR in view of abnormal lung clinical " exam  Labs in jacque Black

## 2025-04-07 NOTE — PROGRESS NOTES
"  [facility]    Cardiology  Progress Note    Patient Name: Tao Bee  MRN: [id number]  Admission Date: 3/29/2025  Hospital Length of Stay: 9 days  Code Status: Full Code   Attending Provider: Jackie Mir MD   Consulting Provider: SUNDAR Yip  Primary Care Physician: Penny Garsia FNP  Principal Problem:<principal problem not specified>    Patient information was obtained from patient, past medical records, ER records, and primary team.     Subjective:     Chief Complaint/Reason for Consult: MARY    HPI: Mr. Bee is a 60 y/o male with a history of HLD, HTN, CKD, who is known to [facility], Dr. Molina. He presented to the ER on 3.29.25 with complaints of abdominal pain, diarrhea, and melena. He was found to have septic shock and sepsis. He was also found to have non-bleeding gastric ulcers in the antrum with a clean base; GI recommended to repeat EGD in 8 weeks. He has had positive blood cultures with E.coli. Significant labs include WBC 30.45, H&H 7.9/23.5, PLTs 487, K 3.4, BUN/Creat 65.3/5.58, Albumin 1.6. ECHO showed an echo dense structure on the anterior leaflet, cannot exclude vegetation vs calcified chordae. [facility] has been consulted for MARY.     4.4.25: NAD. VSS. No complaints of CP/SOB/Palps. "I feel okay" WBC 21.29, H&H 8.7/25.9, Plts 522, BUN/Creat 34.7/3.59, Glucose 104, Albumin 1.6  4.5.25: NAD. VSS. No complaints of CP/SOB/Palps. "I'm okay. Ready to go back to our room" WBC 19.50, H&H 8.3/25.3, PLTs 543, K 4.03, Mag 1.6  4.6.25: NAD. VSS. No complaints of CP/SOB/Palps. "I feel okay" WBC 6.27. Plts 562, H&H 8.7/26.1, Creat 2.72  4.7.25: NAD. VSS. No complaints of CP/SOB/Palps. "I feel okay" WBC 15.96, H&H 8.4/25.9, BUN/Great 27.1/3.46     PMH: HLD, HTN, CKD, Non-Healing Wounds, Vitamin D Deficiency, Neuropathy  PSH: EGD  Family History: Mother - DM II  Social History: Current Smoker (1/2 PPD). Alcohol use (28 drinks per week). Denies illicit drug use.     Previous " Cardiac Diagnostics:   ECHO (4.3.25):  Left Ventricle: The left ventricle is normal in size. Increased wall thickness. There is normal systolic function with a visually estimated ejection fraction of 60 - 65%. Right Ventricle: The right ventricle is normal in size. Systolic function is normal. TAPSE is 2.68 cm. Mitral Valve: Mildly thickened leaflets. There is a echo dense structure on the anterior leaflet, cannot exclude vegetation vs calcified chordae. There is mild regurgitation. Recommend MARY for further evaluation if clinically indicated.    Lower Ext Venous US (2.26.24):  The study quality is average. Limited unilateral study right  leg only post GSV RFA procedure. No evidence of DVT is noted from the right CFV to the distal femoral vein segment with good compression, augmentation and phasic flow obtained.The right GSV is non-compressible with no flow noted from the proximal thigh to the proximal calf consistent with successful RFA procedure.    Arterial US (1.23.24):  The study quality is average. Normal bilateral lower extremity arterial duplex ultrasound.  No plaquing is visualized in bilateral lower extremities.    Venogram (12.7.22):  Bilateral femoral venography did not reveal critical venous compression Intravenous ultrasound was performed which did not reveal critical compression greater than 50%. Therefore no stenting was performed    ECHO (10.8.20):  The study quality is average. The left ventricle is decreased in size. Global left ventricular systolic function is normal. The left ventricular ejection fraction is 70%. The left ventricle diastolic function is impaired (Grade I) with normal left atrial pressure. Noted left ventricular hypertrophy. It is mild. Dilatation of the aortic root limited to the sinus of valsalva (3.9 cms) is noted. Mild (1+) mitral regurgitation.    NPI (10.7.20):  This is a normal perfusion study, no perfusion defects noted. There is no evidence of ischemia. This scan is  suggestive of low risk for future cardiovascular events. The left ventricular cavity is noted to be normal on the stress study. The left ventricular ejection fraction was calculated to be 63% and left ventricular global function is normal. The study quality is good.       Review of patient's allergies indicates:   Allergen Reactions    Opioids - morphine analogues Hallucinations     No current facility-administered medications on file prior to encounter.     Current Outpatient Medications on File Prior to Encounter   Medication Sig    gabapentin (NEURONTIN) 600 MG tablet Take 600 mg by mouth 3 (three) times daily.     Review of Systems   Constitutional:  Positive for fatigue. Negative for fever.   Respiratory:  Negative for shortness of breath.    Cardiovascular:  Negative for chest pain, palpitations and leg swelling.   All other systems reviewed and are negative.      Objective:     Vital Signs (Most Recent):  Temp: 98.7 °F (37.1 °C) (04/07/25 0723)  Pulse: 65 (04/07/25 0723)  Resp: 16 (04/07/25 0723)  BP: (!) 142/82 (04/07/25 0723)  SpO2: 97 % (04/07/25 0723) Vital Signs (24h Range):  Temp:  [98.3 °F (36.8 °C)-98.9 °F (37.2 °C)] 98.7 °F (37.1 °C)  Pulse:  [65-78] 65  Resp:  [16-18] 16  SpO2:  [95 %-99 %] 97 %  BP: (129-147)/(77-83) 142/82   Weight: 70.9 kg (156 lb 4.9 oz)  Body mass index is 24.48 kg/m².  SpO2: 97 %       Intake/Output Summary (Last 24 hours) at 4/7/2025 0736  Last data filed at 4/7/2025 0500  Gross per 24 hour   Intake 840 ml   Output 2650 ml   Net -1810 ml     Lines/Drains/Airways       Central Venous Catheter Line  Duration                  Hemodialysis Catheter 03/30/25 1233 right internal jugular 7 days              Drain  Duration                  Urethral Catheter 03/30/25 1300 Coude 16 Fr. 7 days              Peripheral Intravenous Line  Duration                  Peripheral IV - Single Lumen 03/29/25 1030 20 G Right Antecubital 8 days         Peripheral IV - Single Lumen 03/30/25 1356 20  "G Right;Lateral Forearm 7 days         Peripheral IV - Single Lumen 03/31/25 0200 18 G Left;Proximal;Medial Forearm 7 days                  Significant Labs:   Chemistries:   Recent Labs   Lab 04/02/25  0436 04/03/25  0505 04/04/25  0357 04/05/25  0316 04/06/25  0350 04/07/25  0523    138 140 138 139 141   K 3.9 3.4* 3.6 3.7 4.1 4.7   CL 97* 99 104 104 104 103   CO2 29 31* 28 26 29 28   BUN 66.5* 65.3* 34.7* 37.9* 22.0 27.1*   CREATININE 5.90* 5.58* 3.59* 4.03* 2.72* 3.46*   CALCIUM 7.8* 7.8* 8.1* 8.2* 8.3* 8.1*   BILITOT 0.4 0.3 0.2 0.2 0.3 0.3   ALKPHOS 73 62 70 74 67 70   ALT 5 7 8 10 11 9   AST 16 18 20 32 21 17   GLUCOSE 100 116* 104* 123* 95 93   MG  --   --   --  1.60  --  2.20   PHOS 4.5 4.5 3.5  --   --   --         CBC/Anemia Labs: Coags:    Recent Labs   Lab 04/05/25 0316 04/06/25  0350 04/07/25  0523   WBC 19.50* 16.27* 15.96*   HGB 8.3* 8.7* 8.4*   HCT 25.3* 26.1* 25.9*   * 562* 560*   MCV 89.1 87.9 90.6   RDW 17.2* 17.2* 17.1*    No results for input(s): "PT", "INR", "APTT" in the last 168 hours.       Significant Imaging:  Imaging Results              US Retroperitoneal Limited (Final result)  Result time 03/29/25 12:20:31      Final result by Jakob Horan MD (03/29/25 12:20:31)                   Impression:      Findings concerning for pelvicaliectasis with changes of medical renal disease.  No overt evidence for obstructive uropathy.      Electronically signed by: Jakob Horan MD  Date:    03/29/2025  Time:    12:20               Narrative:    EXAMINATION:  US RETROPERITONEAL LIMITED    CLINICAL HISTORY:  Acute kidney failure, unspecified    TECHNIQUE:  Multiple sonographic images the kidneys were obtained by department sonographer.    COMPARISON:  None    FINDINGS:  Right kidney measures 10.9 cm in length.  Left kidney measures 10.2 cm length.  No evidence for hydronephrosis, however, pelvicaliectasis is identified.  The parenchyma demonstrates increased echogenicity with cortical " thinning.    Bladder is distended and normal.                                       CT Chest Abdomen Pelvis Without Contrast (XPD) (Final result)  Result time 03/29/25 09:44:25      Final result by Haylee Correia MD (03/29/25 09:44:25)                   Impression:      Abnormal edematous appearance of the right kidney.  There is fullness at the upper pole right kidney which is poorly characterized.  Appearance could be related to mass, phlegmon, focal pyelonephritis in the acute setting.  There is perinephric stranding on the right.  No martha hydronephrosis.  Further characterization limited without contrast.      Electronically signed by: Haylee Correia  Date:    03/29/2025  Time:    09:44               Narrative:    EXAMINATION:  CT CHEST ABDOMEN PELVIS WITHOUT CONTRAST(XPD)    CLINICAL HISTORY:  Sepsis;  lower abdominal pain x3 days.  Nausea.  Diarrhea.    TECHNIQUE:  Helical acquisition through the chest, abdomen and pelvis without  IV administration of contrast. Axial, sagittal and coronal reformats interpreted.    Automated tube current modulation, weight-based exposure dosing, and/or iterative reconstruction technique utilized to reach lowest reasonably achievable exposure rate.    DLP: 329 mGy*cm    COMPARISON:  No relevant priors available for comparison at time of this dictation    FINDINGS:  BASE OF NECK: No significant abnormality.    HEART: Normal size.    THORACIC VASCULATURE: Thoracic aorta is unremarkable.    KIARA/MEDIASTINUM: No enlarged lymph nodes by size criteria. Evaluation of hilar lymphadenopathy is limited without contrast.    AIRWAYS: Patent.    LUNGS/PLEURA: Mild dependent atelectasis.    THORACIC SOFT TISSUES: Unremarkable.    LIVER: Normal attenuation. No appreciable focal hepatic lesion.    BILIARY: Gallbladder decompressed.    PANCREAS: No inflammatory change.    SPLEEN: Normal in size    ADRENALS: No mass.    KIDNEYS/URETERS: Edematous appearance of the right kidney.   Fullness at the upper pole right kidney poorly characterized.  Right perinephric stranding.  No martha hydronephrosis.    GI TRACT/MESENTERY: Evaluation of the bowel is limited without contrast.  No evidence of bowel obstruction or inflammation. The appendix is normal. Colonic diverticulosis without acute inflammatory change.    PERITONEUM: No free fluid.No free air.    LYMPH NODES: No enlarged lymph nodes by size criteria.    ABDOMINOPELVIC VASCULATURE: Aortoiliac atherosclerosis.    BLADDER: Normal appearance given degree of distention.    REPRODUCTIVE ORGANS: Normal as visualized.    ABDOMINAL WALL: Unremarkable.    BONES: No acute osseous abnormality.  Old, healed left rib deformities.                                       X-Ray Chest AP Portable (Final result)  Result time 03/29/25 10:10:10      Final result by Wilmer Hooper MD (03/29/25 10:10:10)                   Impression:      No acute cardiopulmonary process identified.      Electronically signed by: Wilmer Hooper  Date:    03/29/2025  Time:    10:10               Narrative:    EXAMINATION:  XR CHEST AP PORTABLE    CLINICAL HISTORY:  Sepsis; abdominal pain for 3 days.  Nausea and diarrhea.    TECHNIQUE:  One view    COMPARISON:  None available.    FINDINGS:  Cardiopericardial silhouette is within normal limits. Lungs are without dense focal or segmental consolidation, congestive process, pleural effusions or pneumothorax.  Multiple old fractures of the left ribs.                                    EKG:         Telemetry:      Physical Exam  Vitals and nursing note reviewed.   HENT:      Head: Normocephalic.      Nose: Nose normal.      Mouth/Throat:      Mouth: Mucous membranes are dry.   Eyes:      Extraocular Movements: Extraocular movements intact.   Cardiovascular:      Rate and Rhythm: Normal rate and regular rhythm.      Pulses: Normal pulses.      Heart sounds: Murmur heard.   Pulmonary:      Effort: Pulmonary effort is normal.      Breath  sounds: No rales.      Comments: Diminished Lung sounds at bases     On NC 2 L   Abdominal:      Palpations: Abdomen is soft.   Genitourinary:     Comments: + Nguyen  Musculoskeletal:         General: Normal range of motion.      Cervical back: Normal range of motion.      Right lower leg: No edema.      Left lower leg: No edema.   Skin:     General: Skin is warm.      Comments: R CW HD Cath    Neurological:      Mental Status: He is alert and oriented to person, place, and time.   Psychiatric:         Mood and Affect: Mood normal.         Behavior: Behavior normal.         Home Medications:   Medications Ordered Prior to Encounter[1]  Current Schedule Inpatient Medications:   ampicillin-sulbactam  1.5 g Intravenous Q24H    heparin (porcine)  3,000 Units Intravenous Once    pantoprazole  40 mg Intravenous BID     Continuous Infusions:    Assessment:   Anterior Leaflet Vegetation vs Calcified Chordae     - ECHO (4.2.25): There is a echo dense structure on the anterior leaflet, cannot exclude vegetation vs calcified chordae.   Leukocytosis/Sepsis - Improving     - BC positive for E.coli   UTI/Pyelonephritis   Fever   Anemia - Slight Improvement   Non-bleeding Gastric Ulcers   Thrombocytosis - Worsening   Electrolyte Derangements     - Hypomagnesia     - Hypokalemia (Resolved)    - Hyponatremia - Resolved   Metabolic Acidosis   VHD    - Mild MR   HLD  HTN  LEANDRO vs CKD/ESRD - Improving     - on HD   Non-Healing Wounds  Venous Insuffiencey   Vitamin D Deficiency  Neuropathy  History of GI Bleed       Plan:   Plan for MARY on 4.8.25  NPO after MN  Consents Obtained (Placed on Chart)/Procedure Scheduled   Keep Mag > 2 and Potassium > 4  Labs in AM: CBC, CMP, and Mag    SUNDAR Yip  Cardiology  [facility]    Physician addendum:  MARY on 4/8      Patient's cardiac care is performed as a split-shared visit with CAROL ANN d/t complicated medical management as detailed in A/P and associated high  acuity requiring physician expertise. I obtained and performed relevant components of history/exam. Medical decision-making is formulated by me. It is a pleasure to care for the patient.    Lee Del Angel MD  Cardiology               [1]   No current facility-administered medications on file prior to encounter.     Current Outpatient Medications on File Prior to Encounter   Medication Sig Dispense Refill    gabapentin (NEURONTIN) 600 MG tablet Take 600 mg by mouth 3 (three) times daily.

## 2025-04-08 ENCOUNTER — ANESTHESIA (OUTPATIENT)
Dept: CARDIOLOGY | Facility: HOSPITAL | Age: 60
DRG: 871 | End: 2025-04-08
Payer: MEDICARE

## 2025-04-08 ENCOUNTER — ANESTHESIA EVENT (OUTPATIENT)
Dept: CARDIOLOGY | Facility: HOSPITAL | Age: 60
DRG: 871 | End: 2025-04-08
Payer: MEDICARE

## 2025-04-08 VITALS
RESPIRATION RATE: 16 BRPM | DIASTOLIC BLOOD PRESSURE: 82 MMHG | SYSTOLIC BLOOD PRESSURE: 157 MMHG | OXYGEN SATURATION: 95 % | BODY MASS INDEX: 24.22 KG/M2 | WEIGHT: 154.31 LBS | TEMPERATURE: 100 F | HEIGHT: 67 IN | HEART RATE: 91 BPM

## 2025-04-08 LAB
ALBUMIN SERPL-MCNC: 2 G/DL (ref 3.5–5)
ALBUMIN/GLOB SERPL: 0.6 RATIO (ref 1.1–2)
ALP SERPL-CCNC: 64 UNIT/L (ref 40–150)
ALT SERPL-CCNC: 9 UNIT/L (ref 0–55)
ANION GAP SERPL CALC-SCNC: 9 MEQ/L
AST SERPL-CCNC: 16 UNIT/L (ref 11–45)
BASOPHILS # BLD AUTO: 0.11 X10(3)/MCL
BASOPHILS NFR BLD AUTO: 0.7 %
BILIRUB SERPL-MCNC: 0.3 MG/DL
BSA FOR ECHO PROCEDURE: 1.88 M2
BUN SERPL-MCNC: 27.2 MG/DL (ref 8.4–25.7)
CALCIUM SERPL-MCNC: 8.4 MG/DL (ref 8.4–10.2)
CHLORIDE SERPL-SCNC: 105 MMOL/L (ref 98–107)
CO2 SERPL-SCNC: 28 MMOL/L (ref 22–29)
CREAT SERPL-MCNC: 3.85 MG/DL (ref 0.72–1.25)
CREAT/UREA NIT SERPL: 7
EOSINOPHIL # BLD AUTO: 0.18 X10(3)/MCL (ref 0–0.9)
EOSINOPHIL NFR BLD AUTO: 1.1 %
ERYTHROCYTE [DISTWIDTH] IN BLOOD BY AUTOMATED COUNT: 17.2 % (ref 11.5–17)
GFR SERPLBLD CREATININE-BSD FMLA CKD-EPI: 17 ML/MIN/1.73/M2
GLOBULIN SER-MCNC: 3.5 GM/DL (ref 2.4–3.5)
GLUCOSE SERPL-MCNC: 92 MG/DL (ref 74–100)
HCT VFR BLD AUTO: 26.1 % (ref 42–52)
HGB BLD-MCNC: 8.3 G/DL (ref 14–18)
IMM GRANULOCYTES # BLD AUTO: 0.21 X10(3)/MCL (ref 0–0.04)
IMM GRANULOCYTES NFR BLD AUTO: 1.3 %
LYMPHOCYTES # BLD AUTO: 2.28 X10(3)/MCL (ref 0.6–4.6)
LYMPHOCYTES NFR BLD AUTO: 14.1 %
MCH RBC QN AUTO: 29 PG (ref 27–31)
MCHC RBC AUTO-ENTMCNC: 31.8 G/DL (ref 33–36)
MCV RBC AUTO: 91.3 FL (ref 80–94)
MONOCYTES # BLD AUTO: 1.82 X10(3)/MCL (ref 0.1–1.3)
MONOCYTES NFR BLD AUTO: 11.2 %
NEUTROPHILS # BLD AUTO: 11.59 X10(3)/MCL (ref 2.1–9.2)
NEUTROPHILS NFR BLD AUTO: 71.6 %
NRBC BLD AUTO-RTO: 0 %
PHOSPHATE SERPL-MCNC: 5.4 MG/DL (ref 2.3–4.7)
PLATELET # BLD AUTO: 584 X10(3)/MCL (ref 130–400)
PMV BLD AUTO: 9.9 FL (ref 7.4–10.4)
POTASSIUM SERPL-SCNC: 4.7 MMOL/L (ref 3.5–5.1)
PROT SERPL-MCNC: 5.5 GM/DL (ref 6.4–8.3)
RBC # BLD AUTO: 2.86 X10(6)/MCL (ref 4.7–6.1)
SODIUM SERPL-SCNC: 142 MMOL/L (ref 136–145)
WBC # BLD AUTO: 16.19 X10(3)/MCL (ref 4.5–11.5)

## 2025-04-08 PROCEDURE — 27000221 HC OXYGEN, UP TO 24 HOURS

## 2025-04-08 PROCEDURE — 99232 SBSQ HOSP IP/OBS MODERATE 35: CPT | Mod: ,,,

## 2025-04-08 PROCEDURE — 63600175 PHARM REV CODE 636 W HCPCS

## 2025-04-08 PROCEDURE — 99900035 HC TECH TIME PER 15 MIN (STAT)

## 2025-04-08 PROCEDURE — 99232 SBSQ HOSP IP/OBS MODERATE 35: CPT | Mod: ,,, | Performed by: INTERNAL MEDICINE

## 2025-04-08 PROCEDURE — 85025 COMPLETE CBC W/AUTO DIFF WBC: CPT | Performed by: INTERNAL MEDICINE

## 2025-04-08 PROCEDURE — 25000003 PHARM REV CODE 250

## 2025-04-08 PROCEDURE — 36415 COLL VENOUS BLD VENIPUNCTURE: CPT | Performed by: INTERNAL MEDICINE

## 2025-04-08 PROCEDURE — 80053 COMPREHEN METABOLIC PANEL: CPT | Performed by: INTERNAL MEDICINE

## 2025-04-08 PROCEDURE — 63600175 PHARM REV CODE 636 W HCPCS: Performed by: INTERNAL MEDICINE

## 2025-04-08 PROCEDURE — 94799 UNLISTED PULMONARY SVC/PX: CPT

## 2025-04-08 PROCEDURE — 99900031 HC PATIENT EDUCATION (STAT)

## 2025-04-08 PROCEDURE — 84100 ASSAY OF PHOSPHORUS: CPT | Performed by: INTERNAL MEDICINE

## 2025-04-08 RX ORDER — PROPOFOL 10 MG/ML
VIAL (ML) INTRAVENOUS
Status: DISCONTINUED | OUTPATIENT
Start: 2025-04-08 | End: 2025-04-08

## 2025-04-08 RX ORDER — LIDOCAINE HYDROCHLORIDE 20 MG/ML
INJECTION, SOLUTION EPIDURAL; INFILTRATION; INTRACAUDAL; PERINEURAL
Status: DISCONTINUED | OUTPATIENT
Start: 2025-04-08 | End: 2025-04-08

## 2025-04-08 RX ADMIN — LIDOCAINE HYDROCHLORIDE 60 MG: 20 INJECTION, SOLUTION EPIDURAL; INFILTRATION; INTRACAUDAL; PERINEURAL at 01:04

## 2025-04-08 RX ADMIN — PROPOFOL 50 MG: 10 INJECTION, EMULSION INTRAVENOUS at 01:04

## 2025-04-08 RX ADMIN — PANTOPRAZOLE SODIUM 40 MG: 40 INJECTION, POWDER, LYOPHILIZED, FOR SOLUTION INTRAVENOUS at 09:04

## 2025-04-08 RX ADMIN — SODIUM CHLORIDE, SODIUM GLUCONATE, SODIUM ACETATE, POTASSIUM CHLORIDE AND MAGNESIUM CHLORIDE: 526; 502; 368; 37; 30 INJECTION, SOLUTION INTRAVENOUS at 01:04

## 2025-04-08 RX ADMIN — PROPOFOL 20 MG: 10 INJECTION, EMULSION INTRAVENOUS at 01:04

## 2025-04-08 RX ADMIN — PROPOFOL 15 MG: 10 INJECTION, EMULSION INTRAVENOUS at 01:04

## 2025-04-08 RX ADMIN — PROPOFOL 40 MG: 10 INJECTION, EMULSION INTRAVENOUS at 01:04

## 2025-04-08 RX ADMIN — PROPOFOL 100 MG: 10 INJECTION, EMULSION INTRAVENOUS at 01:04

## 2025-04-08 NOTE — TRANSFER OF CARE
"Anesthesia Transfer of Care Note    Patient: Tao Bee    Procedure(s) Performed: * No procedures listed *    Patient location: Cath Lab    Anesthesia Type: MAC    Transport from OR: Transported from OR on room air with adequate spontaneous ventilation    Post pain: adequate analgesia    Post assessment: no apparent anesthetic complications    Post vital signs: stable    Level of consciousness: awake    Nausea/Vomiting: no nausea/vomiting    Complications: none    Transfer of care protocol was followed    Last vitals: Visit Vitals  BP (!) 157/82   Pulse 91   Temp 37.7 °C (99.9 °F) (Oral)   Resp 16   Ht 5' 7.01" (1.702 m)   Wt 70 kg (154 lb 5.2 oz)   SpO2 95%   BMI 24.16 kg/m²     "

## 2025-04-08 NOTE — PROGRESS NOTES
[facility] Hospital Medicine Progress Note        Chief Complaint: Inpatient Follow-up    HPI:     59-year-old male with significant history of peripheral neuropathy admitted to hospital medicine services with complaints of lower abdominal pain, diarrhea, nausea, decreased urine output.  Borderline hypotensive, responded to fluids.  Labs were significant for electrolyte derangement, severe acute kidney injury, severe leukocytosis.  CT chest, abdomen, pelvis with edematous right kidney and also concern for mass versus phlegmon versus pyelonephritis.  Patient was initiated on empiric antibiotics, nephrology and urology services consulted.  Patient was initiated on emergent HD, he was also anemic, 2 units PRBC was transfused with HD.  After admission patient became significantly hypotensive warranting transfer to ICU for vasopressors.  Patient's blood cultures grew E coli, most likely renal source.  Urology following.  Patient also endorsed melanotic stools prior to presentation, given severe anemia gastroenterology was consulted, patient had EGD done which revealed 3 gastric ulcers not requiring any intervention, recommended PPI drip for 72 hours.  Once hemodynamics stabilized patient was downgraded back to hospital medicine services.  HD being continued.  Urology placed indwelling Nguyen.  Since he continued with fever spikes decided to repeat CT abdomen/pelvis.  This was done without contrast on 04/02 which demonstrated similar findings as admit CT.  Patient is still continued with significant high-grade temp spikes on 04/03 and therefore CT was repeated with contrast which showed abscess collection right upper pole kidney/perinephric area extending to psoas region, IR consulted for drainage, antibiotics changed to Unasyn, ID consulted, given concern for endocarditis cardiology was consulted for MARY.  Less than eight and therefore 1 unit PRBC transfusion ordered on 04/03.  Hemoglobin  improved, attempted CT-guided drainage of the abscess, fluid was too thick and therefore could not be drained, planning for conservative management with antibiotics.  Fever spikes and leukocytosis improving on antibiotics, dialyzed again 4/5.  Carson scheduled for 4/8.      Interval Hx:   Patient seen at bedside .  He is upset that he is NPO.  BP slightly accelerated, carson scheduled for today, planning for dialysis prior to CARSON, no acute events overnight       Objective/physical exam:  General: In no acute distress, afebrile  Chest: Clear to auscultation bilaterally  Heart: S1, S2, no appreciable murmur  Abdomen: Soft, nontender, BS +  MSK: Warm, no lower extremity edema, no clubbing or cyanosis  Neurologic: Alert and oriented x4, moving all extremities with good strength     VITAL SIGNS: 24 HRS MIN & MAX LAST   Temp  Min: 97.5 °F (36.4 °C)  Max: 98.8 °F (37.1 °C) 98.8 °F (37.1 °C)   BP  Min: 111/61  Max: 158/84 (!) 157/87   Pulse  Min: 66  Max: 84  66   Resp  Min: 16  Max: 18 16   SpO2  Min: 95 %  Max: 99 % 99 %       Recent Labs   Lab 04/08/25  0546   WBC 16.19*   RBC 2.86*   HGB 8.3*   HCT 26.1*   MCV 91.3   MCH 29.0   MCHC 31.8*   RDW 17.2*   *   MPV 9.9         Recent Labs   Lab 04/07/25  0523 04/08/25  0546    142   K 4.7 4.7    105   CO2 28 28   BUN 27.1* 27.2*   CREATININE 3.46* 3.85*   CALCIUM 8.1* 8.4   MG 2.20  --    ALBUMIN 1.9* 2.0*   ALKPHOS 70 64   ALT 9 9   AST 17 16   BILITOT 0.3 0.3          Microbiology Results (last 7 days)       Procedure Component Value Units Date/Time    Blood Culture [[id number]]  (Normal) Collected: 04/03/25 2056    Order Status: Completed Specimen: Blood from Arm, Right Updated: 04/08/25 0009     Blood Culture No Growth At 96 Hours    Blood Culture [[id number]]  (Normal) Collected: 04/03/25 2200    Order Status: Completed Specimen: Blood from Arm, Left Updated: 04/08/25 0009     Blood Culture No Growth At 96 Hours    Blood Culture [[id number]]  (Normal)  Collected: 03/30/25 2016    Order Status: Completed Specimen: Blood from Arm, Left Updated: 04/04/25 2202     Blood Culture No Growth at 5 days    Blood Culture [[id number]]  (Abnormal)  (Susceptibility) Collected: 03/30/25 2016    Order Status: Completed Specimen: Blood from Arm, Left Updated: 04/03/25 0700     Blood Culture Escherichia coli     GRAM STAIN Gram Negative Rods      Seen in gram stain of broth only      1 of 1 Pediatric bottle positive             Scheduled Med:   ampicillin-sulbactam  1.5 g Intravenous Q24H    heparin (porcine)  3,000 Units Intravenous Once    pantoprazole  40 mg Intravenous BID          Assessment/Plan:    Severe sepsis with septic shock-improving  E coli bacteremia leading to above, secondary to below  Acute Right pyelonephritis with right upper pole/right perinephric abscess extending to psoas muscle-failed CT-guided drainage  Rule out mitral valve endocarditis  Severe acute kidney injury/ischemic ATN secondary to sepsis requiring hemodialysis, dialyzed 4/5  Interstitial edema  Acute on chronic normocytic anemia-improved post transfusion 4/3  Acute GI bleed secondary to multiple gastric ulcer  History of peripheral neuropathy   Bilateral consolidation-rule out Hap  Prophylaxis      Patient is now fever free and leukocytosis much better on Unasyn  Repeat blood cultures from 4/3 negative  MRSA PCR negative   Infectious Disease on board   Carson today   Antibiotic course and duration to be determined after CARSON  IR attempted CT-guided drainage of right upper pole/perinephric abscess extending to psoas , but was unsuccessful since fluid was very thick  Urology aware, now planning for conservative management with antibiotics and will repeat CT in case of recurrent fever spikes or any symptomatic worsening  Dialyzed 4/5, volume overloaded today with worsening renal function and therefore will be dialyzed again today prior to CARSON  Hemoglobin appropriately improved after receiving 1 unit PRBC  on 04/03  Continue IV Protonix, no more overt bleeding  DVT prophylaxis-cautiously continue subQ heparin since there is no overt bleeding    MARY today  Antibiotic course and duration to be determined after this  Yet to decide if he will need long-term dialysis    Jackie Mir MD   04/08/2025

## 2025-04-08 NOTE — PROGRESS NOTES
[facility] Nephrology Inpatient Progress Note      HPI:     Patient Name: Tao Bee  Admission Date: 3/29/2025  Hospital Length of Stay: 10 days  Code Status: Full Code   Attending Physician: Jackie Mir MD   Primary Care Physician: Penny Garsia FNP  Principal Problem:<principal problem not specified>      Today patient seen and examined  Labs, recent events, imaging and medications reviewed for this hospital stay  No fever  No worsening dyspnea  MARY for tdoay    Review of Systems:   Constitutional: Denies fever, fatigue, generalized weakness, night sweats, or acute weight change  Skin: Denies wounds, no rashes, no itching, no new skin lesions  HEENT: Denies acute change in hearing or vision, tinnitus, or dysphagia  Respiratory:  no worsening dyspnea  Cardiovascular: Denies chest pain, palpitations, or swelling  Gastrointestional: Denies abdominal pain, nausea, vomiting, diarrhea, or constipation    Musculoskeletal: Denies myalgias, back pain, flank pain, new decreased ROM or acute focal weakness  Neurological: Denies headaches, seizures, dizziness, paresthesias or asterixis  Hematological: Denies unusual bruising or bleeding  Psychiatric: Denies hallucinations, depression, or confusion      Medications:   Scheduled Meds:   ampicillin-sulbactam  1.5 g Intravenous Q24H    heparin (porcine)  3,000 Units Intravenous Once    pantoprazole  40 mg Intravenous BID     Continuous Infusions:      Vitals:     Vitals:    04/07/25 1944 04/07/25 2328 04/08/25 0323 04/08/25 0722   BP: 114/69 132/69 (!) 158/84 (!) 157/87   BP Location:       Patient Position:  Lying Lying    Pulse: 72 70 72 66   Resp:  18 18 16   Temp: 98.6 °F (37 °C) 98.5 °F (36.9 °C) 98.2 °F (36.8 °C) 98.8 °F (37.1 °C)   TempSrc: Oral Oral Oral Oral   SpO2: 95% 97% 97% 99%   Weight:   70 kg (154 lb 5.2 oz)    Height:             I/O last 3 completed shifts:  In: 680 [P.O.:680]  Out: 3500 [Urine:3500]    Intake/Output Summary (Last 24 hours) at 4/8/2025  "0926  Last data filed at 4/8/2025 0500  Gross per 24 hour   Intake 440 ml   Output 1600 ml   Net -1160 ml       Physical Exam:   General: no acute distress, awake, alert  Eyes: PERRLA, EOMI, conjunctiva clear, eyelids without swelling  HENT: atraumatic, oropharynx and nasal mucosa patent  Neck: full ROM, no JVD, no thyromegaly or lymphadenopathy  Respiratory: rhonchi  Cardiovascular: RRR +BILLIE; BL radial and pedal pulses felt  Edema: none  Gastrointestinal: soft, non-tender, non-distended; positive bowel sounds; no masses to palpation    Musculoskeletal: full ROM without limitation or discomfort  Integumentary: warm, dry; no rashes, wounds, or skin lesions  Neurological: oriented, appropriate, no acute deficits        Labs:     Chemistries:   Recent Labs   Lab 04/03/25  0505 04/04/25  0357 04/05/25  0316 04/06/25  0350 04/07/25  0523 04/08/25  0546    140 138 139 141 142   K 3.4* 3.6 3.7 4.1 4.7 4.7   CL 99 104 104 104 103 105   CO2 31* 28 26 29 28 28   BUN 65.3* 34.7* 37.9* 22.0 27.1* 27.2*   CREATININE 5.58* 3.59* 4.03* 2.72* 3.46* 3.85*   CALCIUM 7.8* 8.1* 8.2* 8.3* 8.1* 8.4   BILITOT 0.3 0.2 0.2 0.3 0.3 0.3   ALKPHOS 62 70 74 67 70 64   ALT 7 8 10 11 9 9   AST 18 20 32 21 17 16   GLUCOSE 116* 104* 123* 95 93 92   MG  --   --  1.60  --  2.20  --    PHOS 4.5 3.5  --   --   --  5.4*        CBC/Anemia Labs: Coags:    Recent Labs   Lab 04/06/25  0350 04/07/25  0523 04/08/25  0546   WBC 16.27* 15.96* 16.19*   HGB 8.7* 8.4* 8.3*   HCT 26.1* 25.9* 26.1*   * 560* 584*   MCV 87.9 90.6 91.3   RDW 17.2* 17.1* 17.2*    No results for input(s): "PT", "INR", "APTT" in the last 168 hours.       Impression:     Ecoli bactermia on antibiotics  ?renal abscess  MARY PND to RO endocarditis due to abnormal echo  Although has good urine output , no obvious signs of renal recovery  Anemia'  Pulmonary congestion    Plan:   HD and UFR today  Check MARY results  Accordingly will decide on Tunneled cath and DC planning  ( " Discussed with pt plans and he agrees)         Alirio Black

## 2025-04-08 NOTE — PROGRESS NOTES
Infectious Disease  Progress Note    PATIENT IDENTIFICATION:  Patient Name: Tao Bee : 1965 Age: 59 y.o. Sex: male  Admit Date: 3/29/2025 Hospital Day: 10  Room: 1060/1060 A        CHIEF COMPLAINT:  Follow up for E. Coli bacteremia    IMPRESSION:  Septic shock 2/2 E coli bacteremia  E coli bacteremia: Source is   Right-sided pyelonephritis with E coli  Hypoalbuminemia with albumin of 1.6  LEANDRO requiring hemodialysis  Thickened mitral valve leaflet      RECOMMENDATIONS:  Continue Unasyn for now with finalized duration TBD based on MARY results  Follow up BCX from 4/3, negative so far    ______________________________________________________________________    INTERVAL HISTORY:  No acute changes overnight  Remains afebrile  MARY today  4/3 BCX remain negative          Past Medical History:   Diagnosis Date    Neuropathy          Past Surgical History:   Procedure Laterality Date    ESOPHAGOGASTRODUODENOSCOPY N/A 3/30/2025    Procedure: EGD (ESOPHAGOGASTRODUODENOSCOPY);  Surgeon: Stan Elder MD;  Location: [facility];  Service: Endoscopy;  Laterality: N/A;         Review of patient's allergies indicates:   Allergen Reactions    Opioids - morphine analogues Hallucinations       ROS:  10 point ROS negative except as noted    No family history on file.    Social History[1]    CURRENT MEDS:     ampicillin-sulbactam  1.5 g Intravenous Q24H    epoetin katerina  20,000 Units Subcutaneous Once    heparin (porcine)  3,000 Units Intravenous Once    pantoprazole  40 mg Intravenous BID         OBJECTIVE:  VITALS: Temp:  [97.5 °F (36.4 °C)-99.9 °F (37.7 °C)] 99.9 °F (37.7 °C)  Pulse:  [66-91] 91  Resp:  [16-18] 16  SpO2:  [95 %-99 %] 95 %  BP: (111-158)/(61-87) 157/82  General appearance: Middle aged AA male seen lying in bed, he is awake and alert, non-toxic appearing, in no acute distress.  No family present  HEENT: normocephalic, atraumatic.  Sclera white, conjunctiva clear, no signs of thrush   Neck:  supple  Lungs:   breath sounds clear and equal bilaterally with no increased WOB.  Stable on room air  Cardiac:  RRR, no murmur  Abdomen:  soft, non-distended, non-tender.  Bowel sounds normal  Extremities:  No increased edema  :  No arambula, voids.  No suprapubic or flank tenderness noted  Skin:  No new rash or lesion  Psych/Neuro:  Patient is A&O x3 with pleasant mood and affect      LABS:    Recent Labs   Lab 04/06/25  0350 04/07/25  0523 04/08/25  0546   WBC 16.27* 15.96* 16.19*   HGB 8.7* 8.4* 8.3*   HCT 26.1* 25.9* 26.1*   * 560* 584*       Recent Labs   Lab 04/08/25  0546      K 4.7      CO2 28   BUN 27.2*         RADIOLOGY  Independently reviewed CXR from this AM reveals some increased interstitial markings similar to previous CXR from 4/7.  No new infiltrate or opacifications noted.          SUBMITTED BY:  Joshua Parrish, [facility]  Infectious Disease  [facility]   4/8/2025         [1]   Social History  Tobacco Use    Smoking status: Every Day     Current packs/day: 0.50     Types: Cigarettes   Substance Use Topics    Alcohol use: Yes     Alcohol/week: 28.0 standard drinks of alcohol     Types: 28 Shots of liquor per week     Comment: half a pint per day

## 2025-04-08 NOTE — PT/OT/SLP PROGRESS
"Physical Therapy      Patient Name:  Tao Bee   MRN:  [id number]    Patient not seen today secondary to patient refusal despite max encouragement. Patient repeatedly  stated "not today". Patient reported not wanting to do therapy until after procedure today  . Will follow-up as schedule allows.    "

## 2025-04-08 NOTE — ANESTHESIA PREPROCEDURE EVALUATION
04/08/2025  Tao Bee is a 59 y.o., male.  Mr. Bee is a 58 y/o male with a history of HLD, HTN, CKD, who is known to [facility], Dr. Molina. He presented to the ER on 3.29.25 with complaints of abdominal pain, diarrhea, and melena. He was found to have septic shock and sepsis. He was also found to have non-bleeding gastric ulcers in the antrum with a clean base; GI recommended to repeat EGD in 8 weeks. He has had positive blood cultures with E.coli. Significant labs include WBC 30.45, H&H 7.9/23.5, PLTs 487, K 3.4, BUN/Creat 65.3/5.58, Albumin 1.6. ECHO showed an echo dense structure on the anterior leaflet, cannot exclude vegetation vs calcified chordae. [facility] has been consulted for MARY.     Pre-op Assessment          Review of Systems      Physical Exam    Airway:  Mallampati: II / II  TM Distance: Normal    Chest/Lungs:  Normal Respiratory Rate    Heart:  Rate: Normal        Anesthesia Plan  Type of Anesthesia, risks & benefits discussed:    Anesthesia Type: MAC  Intra-op Monitoring Plan: Standard ASA Monitors  Post Op Pain Control Plan: multimodal analgesia  ASA Score: 3  Day of Surgery Review of History & Physical: H&P Update referred to the surgeon/provider.    Ready For Surgery From Anesthesia Perspective.     .

## 2025-04-08 NOTE — PROGRESS NOTES
NATALYA    I was told by the nursing staff that the patient is wanting to sign out against medical advice   Carson completed and there is concern for possible small vegetations  I went and saw the patient, patient's sister is at bedside   Patient is very adamant about leaving home today  I explicitly explained current clinical condition including renal compromise for which he is dialysis dependent, volume overload, extensive infectious process with bacteremia on admit and possible endocarditis  Explained the need for continued dialysis and also the need for long-term IV antibiotics for bacteremia and possible endocarditis  I explained that without above treatment there is risk for death due to cardiopulmonary arrest/risk for multiorgan failure  Patient is fully competent to make his own medical decisions, he is awake, alert and fully oriented   Sister at bedside also trying to convince him to stay   He adamantly refused and therefore I informed Dr. Black who came and saw the patient and explained him all the risk.  Myself, nephrologist and nursing staff requested him to stay, patient is still refusing    Patient very well aware that he will not be given any medications if he signs out against medical advice  All tubes and lines will be removed prior to DC

## 2025-04-08 NOTE — PROGRESS NOTES
Inpatient Nutrition Assessment    Admit Date: 3/29/2025   Total duration of encounter: 10 days   Patient Age: 59 y.o.    Nutrition Recommendation/Prescription     Resume Renal Diet once medically appropriate.  Resume Novasource Renal (provides 475 kcal, 22 g protein per serving) TID once diet is resumed.    Communication of Recommendations: reviewed with nurse and reviewed with patient    Nutrition Assessment     Malnutrition Assessment/Nutrition-Focused Physical Exam       Malnutrition Level: other (see comments) (Does not meet criteria) (03/31/25 1029)     Weight Loss (Malnutrition): other (see comments) (Does not meet criteria) (03/31/25 1029)              Muscle Mass (Malnutrition): mild depletion (03/31/25 1029)     Clavicle Bone Region (Muscle Loss): mild depletion                    Fluid Accumulation (Malnutrition): other (see comments) (Not present) (03/31/25 1029)        A minimum of two characteristics is recommended for diagnosis of either severe or non-severe malnutrition.    Chart Review    Reason Seen: malnutrition screening tool (MST) and follow-up    Malnutrition Screening Tool Results   Have you recently lost weight without trying?: Yes: 2-13 lbs  Have you been eating poorly because of a decreased appetite?: Yes   MST Score: 2   Diagnosis:  Septic shock secondary to pyelonephritis  Acute renal failure, status post temporary dialysis catheter placement and hemodialysis  Symptomatic anemia secondary to acute blood loss from NSAID induced ulcers    Relevant Medical History: neuropathy and alcohol abuse     Scheduled Medications:  ampicillin-sulbactam, 1.5 g, Q24H  heparin (porcine), 3,000 Units, Once  pantoprazole, 40 mg, BID    Continuous Infusions:     PRN Medications:  0.9%  NaCl infusion (for blood administration), , Q24H PRN  0.9%  NaCl infusion (for blood administration), , Q24H PRN  acetaminophen, 1,000 mg, Q6H PRN  albumin human 25%, 25 g, Q20 Min PRN  melatonin, 6 mg, Nightly PRN  morphine, 2  "mg, Q4H PRN  ondansetron, 4 mg, Q8H PRN  sodium chloride 0.9%, 10 mL, PRN  sodium chloride 0.9%, 10 mL, PRN    Calorie Containing IV Medications: no significant kcals from medications at this time    Recent Labs   Lab 04/02/25  0436 04/03/25  0505 04/04/25  0357 04/05/25  0316 04/06/25  0350 04/07/25  0523 04/08/25  0546    138 140 138 139 141 142   K 3.9 3.4* 3.6 3.7 4.1 4.7 4.7   CALCIUM 7.8* 7.8* 8.1* 8.2* 8.3* 8.1* 8.4   PHOS 4.5 4.5 3.5  --   --   --  5.4*   MG  --   --   --  1.60  --  2.20  --    CL 97* 99 104 104 104 103 105   CO2 29 31* 28 26 29 28 28   BUN 66.5* 65.3* 34.7* 37.9* 22.0 27.1* 27.2*   CREATININE 5.90* 5.58* 3.59* 4.03* 2.72* 3.46* 3.85*   EGFRNORACEVR 10 11 19 16 26 20 17   GLUCOSE 100 116* 104* 123* 95 93 92   BILITOT 0.4 0.3 0.2 0.2 0.3 0.3 0.3   ALKPHOS 73 62 70 74 67 70 64   ALT 5 7 8 10 11 9 9   AST 16 18 20 32 21 17 16   ALBUMIN 1.7* 1.6* 1.6* 1.6* 1.8* 1.9* 2.0*   WBC 36.55  36.55* 30.45  30.45* 21.29  21.29* 19.50* 16.27* 15.96* 16.19*   HGB 9.0* 7.9* 8.7* 8.3* 8.7* 8.4* 8.3*   HCT 25.6* 23.5* 25.9* 25.3* 26.1* 25.9* 26.1*     Nutrition Orders:  Diet NPO  Dietary nutrition supplements TID; Novasource Renal - Vanilla    Appetite/Oral Intake: NPO/NPO  Factors Affecting Nutritional Intake: NPO  Social Needs Impacting Access to Food: none identified  Food/Pentecostalism/Cultural Preferences: none reported  Food Allergies: no known food allergies  Last Bowel Movement: 04/07/25  Wound(s):  None documented     Comments    3/31/25: Noted wt loss and decreased appetite PTA per MST. Pt stated UBW, current wt does indicate wt loss. Pt unsure of time frame though. HD started. Pt with 100% po intake of meal this AM. Willing to have ONS sent.     4/3/25: Pt eating well per nurse; pt out for HD.    4/8/25: Pt currently NPO for testing.     Anthropometrics    Height: 5' 7.01" (170.2 cm), Height Method: Measured  Last Weight: 70 kg (154 lb 5.2 oz) (04/08/25 0323), Weight Method: Standard Scale  BMI " (Calculated): 24.2  BMI Classification: overweight (BMI 25-29.9)        Ideal Body Weight (IBW), Male: 148.06 lb     % Ideal Body Weight, Male (lb): 111.43 %                 Usual Body Weight (UBW), k.27 kg (unknown time frame per pt)  % Usual Body Weight: 97.06     Usual Weight Provided By: patient    Wt Readings from Last 5 Encounters:   25 70 kg (154 lb 5.2 oz)   23 76.7 kg (169 lb)     Weight Change(s) Since Admission:   Wt Readings from Last 1 Encounters:   25 0323 70 kg (154 lb 5.2 oz)   25 0500 70.9 kg (156 lb 4.9 oz)   25 0600 71.8 kg (158 lb 4.6 oz)   25 0500 73.9 kg (162 lb 14.7 oz)   25 0616 76.2 kg (167 lb 15.9 oz)   25 0600 77.1 kg (169 lb 15.6 oz)   25 1354 74.8 kg (164 lb 15.9 oz)   25 1353 74.8 kg (165 lb)   25 0835 74.8 kg (165 lb)   25 0808 74.8 kg (165 lb)   Admit Weight: 74.8 kg (165 lb) (25 0808), Weight Method: Stated    Estimated Needs    Weight Used For Calorie Calculations: 74.8 kg (164 lb 14.5 oz)  Energy Calorie Requirements (kcal): 2130kcal (1.4 stress factor)  Energy Need Method: Ellsworth- Jeor  Weight Used For Protein Calculations: 74.8 kg (164 lb 14.5 oz)  Protein Requirements: 90-105gm (1.2-1.4g/kg)  Fluid Requirements (mL): 1000ml + urinary output  CHO Requirement: 240gm (45% est kcal needs)     Enteral Nutrition     Patient not receiving enteral nutrition at this time.    Parenteral Nutrition     Patient not receiving parenteral nutrition support at this time.    Evaluation of Received Nutrient Intake    Calories: not meeting estimated needs  Protein: not meeting estimated needs    Patient Education     Not applicable.    Nutrition Diagnosis     PES: Unintended weight loss related to inability to consume sufficient nutrients as evidenced by decreased appetite PTA. (active)     PES:            Nutrition Interventions     Intervention(s): general/healthful diet, commercial beverage, and collaboration  with other providers  Intervention(s):      Goal: Meet greater than 80% of nutritional needs by follow-up. (goal not met)  Goal: Consume % of oral supplements by follow-up. (goal progressing)    Nutrition Goals & Monitoring     Dietitian will monitor: food and beverage intake and energy intake  Discharge planning: continue Renal  diet with Novasource Renal oral supplements  Nutrition Risk/Follow-Up: moderate (follow-up in 3-5 days)   Please consult if re-assessment needed sooner.

## 2025-04-09 LAB
BACTERIA BLD CULT: NORMAL
BACTERIA BLD CULT: NORMAL

## 2025-04-09 NOTE — DISCHARGE SUMMARY
[facility] Hospital Medicine Discharge Summary    Admit Date: 3/29/2025  Discharge Date and Time: 4/9/20256:31 AM  Admitting Physician:  Team  Discharging Physician: Jackie Mir MD.  Primary Care Physician: Penny Garsia FNP  Consults: {consultation: 71106}    Discharge Diagnoses:  ***    Hospital Course:   ***  Pt was seen and examined on the day of discharge  Vitals:  VITAL SIGNS: 24 HRS MIN & MAX LAST   Temp  Min: 98.8 °F (37.1 °C)  Max: 99.9 °F (37.7 °C) 99.9 °F (37.7 °C)   BP  Min: 157/82  Max: 157/82 (!) 157/82   Pulse  Min: 66  Max: 91  91   Resp  Min: 16  Max: 16 16   SpO2  Min: 95 %  Max: 99 % 95 %       Physical Exam:  ***    Procedures Performed: No admission procedures for hospital encounter.     Significant Diagnostic Studies: See Full reports for all details    Recent Labs   Lab 04/06/25  0350 04/07/25  0523 04/08/25  0546   WBC 16.27* 15.96* 16.19*   RBC 2.97* 2.86* 2.86*   HGB 8.7* 8.4* 8.3*   HCT 26.1* 25.9* 26.1*   MCV 87.9 90.6 91.3   MCH 29.3 29.4 29.0   MCHC 33.3 32.4* 31.8*   RDW 17.2* 17.1* 17.2*   * 560* 584*   MPV 9.9 9.9 9.9       Recent Labs   Lab 04/05/25  0316 04/06/25  0350 04/07/25  0523 04/08/25  0546    139 141 142   K 3.7 4.1 4.7 4.7    104 103 105   CO2 26 29 28 28   BUN 37.9* 22.0 27.1* 27.2*   CREATININE 4.03* 2.72* 3.46* 3.85*   CALCIUM 8.2* 8.3* 8.1* 8.4   MG 1.60  --  2.20  --    ALBUMIN 1.6* 1.8* 1.9* 2.0*   ALKPHOS 74 67 70 64   ALT 10 11 9 9   AST 32 21 17 16   BILITOT 0.2 0.3 0.3 0.3        Microbiology Results (last 7 days)       Procedure Component Value Units Date/Time    Blood Culture [[id number]]  (Normal) Collected: 03/30/25 2016    Order Status: Completed Specimen: Blood from Arm, Left Updated: 04/04/25 2202     Blood Culture No Growth at 5 days    Blood Culture [[id number]]  (Abnormal)  (Susceptibility) Collected: 03/30/25 2016    Order Status: Completed Specimen: Blood from Arm, Left Updated: 04/03/25 0700      Blood Culture Escherichia coli     GRAM STAIN Gram Negative Rods      Seen in gram stain of broth only      1 of 1 Pediatric bottle positive             Transesophageal echo (MARY)    Left Ventricle: The left ventricle is normal in size. There is normal   systolic function with a visually estimated ejection fraction of 55 - 60%.    Right Ventricle: The right ventricle is normal in size. Systolic   function is normal.    Mitral Valve: Mildly calcified subvalvular apparatus. Chordae tendinae   attached to the anterior leaflet appear calcified and likely redundant and   hypermobile. Cannot exclude small vegetations. There is mild   regurgitation.  X-Ray Chest 1 View  Narrative: EXAMINATION:  XR CHEST 1 VIEW    CPT 35307    CLINICAL HISTORY:  Sob;    COMPARISON:  April 7, 2025    FINDINGS:  Examination reveals cardiomediastinal silhouette and pleuroparenchymal changes to be essentially unchanged as compared with the previous exam  Impression: No significant change    Electronically signed by: Andres Cevallos  Date:    04/08/2025  Time:    10:20         Medication List        ASK your doctor about these medications      gabapentin 600 MG tablet  Commonly known as: NEURONTIN               Explained in detail to the patient about the discharge plan, medications, and follow-up visits. Pt understands and agrees with the treatment plan  Discharge Disposition: Left Against Medical Advice   Discharged Condition: stable  Diet-    Medications Per DC med rec  Activities as tolerated   Follow-up Information       Penny Garsia FNP Follow up.    Specialty: Family Medicine  Contact information:  [address] [place] [id number] [phone]                           For further questions contact hospitalist office    Discharge time 33 minutes    For worsening symptoms, chest pain, shortness of breath, increased abdominal pain, high grade fever, stroke or stroke like symptoms, immediately go to the nearest Emergency Room or  call 911 as soon as possible.      Jackie Hobson M.D on 4/9/2025. at 6:31 AM.

## 2025-04-09 NOTE — NURSING
Notified Dr. Mir that patient is insisting on leaving AMA and demanding to leave now. Patient educated and continues to demand to leave AMA.  Dr. Mir is on her way to the unit to speak with the patient.

## 2025-04-10 NOTE — ANESTHESIA POSTPROCEDURE EVALUATION
Anesthesia Post Evaluation    Patient: Tao Bee    Procedure(s) Performed: MARY    Final Anesthesia Type: MAC      Patient location during evaluation: Cath Lab  Patient participation: Yes- Able to Participate  Level of consciousness: awake and alert  Post-procedure vital signs: reviewed and not stable  Pain management: adequate  Airway patency: patent    PONV status at discharge: No PONV  Anesthetic complications: no      Cardiovascular status: blood pressure returned to baseline  Respiratory status: unassisted              Vitals Value Taken Time   /76 04/10/25 08:27   Temp 37 04/10/25 08:27   Pulse 74 04/10/25 08:27   Resp 18 04/10/25 08:27   SpO2 99 04/10/25 08:27         No case tracking events are documented in the log.      Pain/Ciara Score: No data recorded

## 2025-04-15 ENCOUNTER — HOSPITAL ENCOUNTER (INPATIENT)
Facility: HOSPITAL | Age: 60
LOS: 6 days | Discharge: HOME HEALTH | DRG: 871 | End: 2025-04-21
Attending: INTERNAL MEDICINE | Admitting: INTERNAL MEDICINE
Payer: MEDICARE

## 2025-04-15 DIAGNOSIS — E87.5 HYPERKALEMIA: ICD-10-CM

## 2025-04-15 DIAGNOSIS — N17.9 AKI (ACUTE KIDNEY INJURY): Primary | ICD-10-CM

## 2025-04-15 DIAGNOSIS — D50.8 OTHER IRON DEFICIENCY ANEMIA: ICD-10-CM

## 2025-04-15 DIAGNOSIS — R60.9 EDEMA, UNSPECIFIED TYPE: ICD-10-CM

## 2025-04-15 DIAGNOSIS — N30.00 ACUTE CYSTITIS WITHOUT HEMATURIA: ICD-10-CM

## 2025-04-15 LAB
ALBUMIN SERPL-MCNC: 2.7 G/DL (ref 3.5–5)
ALBUMIN/GLOB SERPL: 0.6 RATIO (ref 1.1–2)
ALP SERPL-CCNC: 73 UNIT/L (ref 40–150)
ALT SERPL-CCNC: 5 UNIT/L (ref 0–55)
ANION GAP SERPL CALC-SCNC: 10 MEQ/L
AST SERPL-CCNC: 12 UNIT/L (ref 11–45)
BACTERIA #/AREA URNS AUTO: ABNORMAL /HPF
BASOPHILS # BLD AUTO: 0.12 X10(3)/MCL
BASOPHILS NFR BLD AUTO: 0.7 %
BILIRUB SERPL-MCNC: 0.2 MG/DL
BILIRUB UR QL STRIP.AUTO: NEGATIVE
BUN SERPL-MCNC: 26.2 MG/DL (ref 8.4–25.7)
CALCIUM SERPL-MCNC: 9.1 MG/DL (ref 8.4–10.2)
CHLORIDE SERPL-SCNC: 106 MMOL/L (ref 98–107)
CLARITY UR: CLEAR
CO2 SERPL-SCNC: 22 MMOL/L (ref 22–29)
COLOR UR AUTO: COLORLESS
CREAT SERPL-MCNC: 4.55 MG/DL (ref 0.72–1.25)
CREAT/UREA NIT SERPL: 6
EOSINOPHIL # BLD AUTO: 0.51 X10(3)/MCL (ref 0–0.9)
EOSINOPHIL NFR BLD AUTO: 2.9 %
ERYTHROCYTE [DISTWIDTH] IN BLOOD BY AUTOMATED COUNT: 17.2 % (ref 11.5–17)
GFR SERPLBLD CREATININE-BSD FMLA CKD-EPI: 14 ML/MIN/1.73/M2
GLOBULIN SER-MCNC: 4.9 GM/DL (ref 2.4–3.5)
GLUCOSE SERPL-MCNC: 95 MG/DL (ref 74–100)
GLUCOSE UR QL STRIP: NORMAL
HCT VFR BLD AUTO: 32.6 % (ref 42–52)
HGB BLD-MCNC: 9.9 G/DL (ref 14–18)
HGB UR QL STRIP: ABNORMAL
IMM GRANULOCYTES # BLD AUTO: 0.24 X10(3)/MCL (ref 0–0.04)
IMM GRANULOCYTES NFR BLD AUTO: 1.4 %
KETONES UR QL STRIP: NEGATIVE
LEUKOCYTE ESTERASE UR QL STRIP: 500
LYMPHOCYTES # BLD AUTO: 3.21 X10(3)/MCL (ref 0.6–4.6)
LYMPHOCYTES NFR BLD AUTO: 18.3 %
MAGNESIUM SERPL-MCNC: 1.8 MG/DL (ref 1.6–2.6)
MCH RBC QN AUTO: 28.5 PG (ref 27–31)
MCHC RBC AUTO-ENTMCNC: 30.4 G/DL (ref 33–36)
MCV RBC AUTO: 93.9 FL (ref 80–94)
MONOCYTES # BLD AUTO: 1.62 X10(3)/MCL (ref 0.1–1.3)
MONOCYTES NFR BLD AUTO: 9.2 %
NEUTROPHILS # BLD AUTO: 11.85 X10(3)/MCL (ref 2.1–9.2)
NEUTROPHILS NFR BLD AUTO: 67.5 %
NITRITE UR QL STRIP: NEGATIVE
NRBC BLD AUTO-RTO: 0 %
PH UR STRIP: 7.5 [PH]
PLATELET # BLD AUTO: 520 X10(3)/MCL (ref 130–400)
PMV BLD AUTO: 9.7 FL (ref 7.4–10.4)
POTASSIUM SERPL-SCNC: 5.7 MMOL/L (ref 3.5–5.1)
PROT SERPL-MCNC: 7.6 GM/DL (ref 6.4–8.3)
PROT UR QL STRIP: NEGATIVE
RBC # BLD AUTO: 3.47 X10(6)/MCL (ref 4.7–6.1)
RBC #/AREA URNS AUTO: ABNORMAL /HPF
SODIUM SERPL-SCNC: 138 MMOL/L (ref 136–145)
SP GR UR STRIP.AUTO: 1.01 (ref 1–1.03)
SQUAMOUS #/AREA URNS LPF: ABNORMAL /HPF
UROBILINOGEN UR STRIP-ACNC: NORMAL
WBC # BLD AUTO: 17.55 X10(3)/MCL (ref 4.5–11.5)
WBC #/AREA URNS AUTO: >100 /HPF

## 2025-04-15 PROCEDURE — 21400001 HC TELEMETRY ROOM

## 2025-04-15 PROCEDURE — 99223 1ST HOSP IP/OBS HIGH 75: CPT | Mod: ,,, | Performed by: INTERNAL MEDICINE

## 2025-04-15 PROCEDURE — 11000001 HC ACUTE MED/SURG PRIVATE ROOM

## 2025-04-15 PROCEDURE — 25000003 PHARM REV CODE 250: Performed by: NURSE PRACTITIONER

## 2025-04-15 PROCEDURE — 96374 THER/PROPH/DIAG INJ IV PUSH: CPT

## 2025-04-15 PROCEDURE — 25000003 PHARM REV CODE 250: Performed by: INTERNAL MEDICINE

## 2025-04-15 PROCEDURE — 99285 EMERGENCY DEPT VISIT HI MDM: CPT | Mod: 25

## 2025-04-15 PROCEDURE — 85025 COMPLETE CBC W/AUTO DIFF WBC: CPT

## 2025-04-15 PROCEDURE — 87086 URINE CULTURE/COLONY COUNT: CPT

## 2025-04-15 PROCEDURE — 81001 URINALYSIS AUTO W/SCOPE: CPT

## 2025-04-15 PROCEDURE — 63600175 PHARM REV CODE 636 W HCPCS: Performed by: INTERNAL MEDICINE

## 2025-04-15 PROCEDURE — 83735 ASSAY OF MAGNESIUM: CPT

## 2025-04-15 PROCEDURE — 63600175 PHARM REV CODE 636 W HCPCS: Mod: JZ,TB | Performed by: NURSE PRACTITIONER

## 2025-04-15 PROCEDURE — 80053 COMPREHEN METABOLIC PANEL: CPT

## 2025-04-15 PROCEDURE — 87040 BLOOD CULTURE FOR BACTERIA: CPT | Performed by: INTERNAL MEDICINE

## 2025-04-15 RX ORDER — HYDRALAZINE HYDROCHLORIDE 20 MG/ML
10 INJECTION INTRAMUSCULAR; INTRAVENOUS EVERY 4 HOURS PRN
Status: DISCONTINUED | OUTPATIENT
Start: 2025-04-15 | End: 2025-04-21 | Stop reason: HOSPADM

## 2025-04-15 RX ORDER — SODIUM BICARBONATE 650 MG/1
1300 TABLET ORAL 3 TIMES DAILY
Status: DISCONTINUED | OUTPATIENT
Start: 2025-04-15 | End: 2025-04-18

## 2025-04-15 RX ORDER — LANOLIN ALCOHOL/MO/W.PET/CERES
400 CREAM (GRAM) TOPICAL ONCE
Status: COMPLETED | OUTPATIENT
Start: 2025-04-15 | End: 2025-04-15

## 2025-04-15 RX ORDER — BUMETANIDE 0.25 MG/ML
2 INJECTION, SOLUTION INTRAMUSCULAR; INTRAVENOUS 2 TIMES DAILY
Status: DISCONTINUED | OUTPATIENT
Start: 2025-04-15 | End: 2025-04-18

## 2025-04-15 RX ORDER — ACETAMINOPHEN 325 MG/1
650 TABLET ORAL EVERY 4 HOURS PRN
Status: DISCONTINUED | OUTPATIENT
Start: 2025-04-15 | End: 2025-04-21 | Stop reason: HOSPADM

## 2025-04-15 RX ORDER — ACETAMINOPHEN 325 MG/1
650 TABLET ORAL EVERY 8 HOURS PRN
Status: DISCONTINUED | OUTPATIENT
Start: 2025-04-15 | End: 2025-04-21 | Stop reason: HOSPADM

## 2025-04-15 RX ORDER — ONDANSETRON HYDROCHLORIDE 2 MG/ML
4 INJECTION, SOLUTION INTRAVENOUS EVERY 8 HOURS PRN
Status: DISCONTINUED | OUTPATIENT
Start: 2025-04-15 | End: 2025-04-21 | Stop reason: HOSPADM

## 2025-04-15 RX ADMIN — SODIUM BICARBONATE 650 MG TABLET 1300 MG: at 03:04

## 2025-04-15 RX ADMIN — SODIUM ZIRCONIUM CYCLOSILICATE 5 G: 5 POWDER, FOR SUSPENSION ORAL at 01:04

## 2025-04-15 RX ADMIN — SODIUM POLYSTYRENE SULFONATE 30 G: 15 SUSPENSION ORAL; RECTAL at 02:04

## 2025-04-15 RX ADMIN — AMPICILLIN SODIUM AND SULBACTAM SODIUM 1.5 G: 1; .5 INJECTION, POWDER, FOR SOLUTION INTRAMUSCULAR; INTRAVENOUS at 11:04

## 2025-04-15 RX ADMIN — SODIUM BICARBONATE 650 MG TABLET 1300 MG: at 09:04

## 2025-04-15 RX ADMIN — SODIUM POLYSTYRENE SULFONATE 30 G: 15 SUSPENSION ORAL; RECTAL at 09:04

## 2025-04-15 RX ADMIN — Medication 400 MG: at 01:04

## 2025-04-15 RX ADMIN — BUMETANIDE 2 MG: 0.25 INJECTION INTRAMUSCULAR; INTRAVENOUS at 09:04

## 2025-04-15 NOTE — FIRST PROVIDER EVALUATION
"Medical screening examination initiated.  I have conducted a focused provider triage encounter, findings are as follows:    Brief history of present illness:  arrived to ED due to BLE swelling. Recently admitted to hospital for melena/diarrhea and emergent dialysis performed. Patient had positive blood cultures. Signed out AMA on 04/08/25. Denies rectal bleeding or diarrhea currently.     Vitals:    04/15/25 0950 04/15/25 0951   BP:  (!) 163/90   Pulse:  75   Resp:  19   Temp:  98.7 °F (37.1 °C)   TempSrc:  Oral   SpO2:  98%   Weight: 69.9 kg (154 lb)    Height: 5' 7" (1.702 m)        Pertinent physical exam:  awake, alert, has non-labored breathing, ambulatory into triage    Brief workup plan:  labs    Preliminary workup initiated; this workup will be continued and followed by the physician or advanced practice provider that is assigned to the patient when roomed.  "

## 2025-04-15 NOTE — Clinical Note
Mini stick sheath was exchanged for peel away sheath. Catheter tip was inserted and sheath removed. Tip was verified under fluoroscopy. Ports aspirate and flush appropriately. Ports were locked with Heparin 1.9 ml each.

## 2025-04-15 NOTE — ED PROVIDER NOTES
Encounter Date: 4/15/2025    SCRIBE #1 NOTE: I, Baldo Jacques, am scribing for, and in the presence of,  Festus Romano DO. I have scribed the following portions of the note - Other sections scribed: HPI, ROS, and PE.       History     Chief Complaint   Patient presents with    Leg Swelling     Pt ambulatory to triage w/ cane and presents via POV. States was admitted here for LEANDRO Tuesday, but signed out AMA. Endorses bilateral lower extremity swelling that has worsened since leaving AMA. Denies CP and SOB at this time.      Tao Bee is a 59 y.o. male patient with a PMHx of neuropathy who presents to the ED with his sister at bedside for evaluation of worsening bilateral leg swelling which onset gradually 1 week ago. Pt was seen here on on 03/28 and was diagnosed with LEANDRO due to e.coli and possible endocarditis, requiring dialysis. Per chart, pt left AMA on 04/08 before MARY could be completed. Patient denies any chest pain, shortness of breath, cough, and fever.       The history is provided by the patient and a relative. No  was used.     Review of patient's allergies indicates:   Allergen Reactions    Opioids - morphine analogues Hallucinations     Past Medical History:   Diagnosis Date    Neuropathy      Past Surgical History:   Procedure Laterality Date    ESOPHAGOGASTRODUODENOSCOPY N/A 03/30/2025    Procedure: EGD (ESOPHAGOGASTRODUODENOSCOPY);  Surgeon: Stan Elder MD;  Location: [facility];  Service: Endoscopy;  Laterality: N/A;    LEG SURGERY Right      No family history on file.  Social History[1]  Review of Systems   Constitutional:  Negative for chills, fatigue and fever.   HENT:  Negative for ear pain, rhinorrhea, sinus pain and sore throat.    Eyes:  Negative for visual disturbance.   Respiratory:  Negative for cough and shortness of breath.    Cardiovascular:  Positive for leg swelling. Negative for chest pain and palpitations.   Gastrointestinal:  Negative for abdominal  pain, blood in stool, constipation, diarrhea, nausea and vomiting.   Endocrine: Negative for polyuria.   Genitourinary:  Negative for dysuria, flank pain, hematuria, penile discharge, penile pain, penile swelling, scrotal swelling, testicular pain and urgency.   Musculoskeletal:  Negative for back pain, myalgias and neck pain.   Skin:  Negative for rash.   Allergic/Immunologic: Negative for immunocompromised state.   Neurological:  Negative for dizziness, seizures, syncope, weakness, light-headedness and headaches.   Hematological:  Does not bruise/bleed easily.       Physical Exam     Initial Vitals [04/15/25 0951]   BP Pulse Resp Temp SpO2   (!) 163/90 75 19 98.7 °F (37.1 °C) 98 %      MAP       --         Physical Exam    Constitutional: He appears well-developed and well-nourished. He is cooperative.  Non-toxic appearance. He does not appear ill.   HENT:   Head: Normocephalic and atraumatic. Mouth/Throat: Uvula is midline, oropharynx is clear and moist and mucous membranes are normal.   Eyes: Conjunctivae, EOM and lids are normal. Pupils are equal, round, and reactive to light.   Neck: Trachea normal and phonation normal. Neck supple.    Full passive range of motion without pain.     Cardiovascular:  Normal rate, regular rhythm, normal heart sounds and normal pulses.           No murmur heard.  Pulmonary/Chest: No accessory muscle usage. No tachypnea. No respiratory distress. He has no decreased breath sounds. He has no wheezes. He has no rhonchi. He has no rales.   Abdominal: Abdomen is soft. Bowel sounds are normal. He exhibits no distension. There is no abdominal tenderness. No hernia. There is no rebound and no guarding.   Musculoskeletal:      Cervical back: Full passive range of motion without pain and neck supple.      Right lower leg: Edema present.      Left lower leg: Edema present.     Neurological: He is alert and oriented to person, place, and time. GCS eye subscore is 4. GCS verbal subscore is 5.  GCS motor subscore is 6.   Skin: Skin is warm, dry and intact. Capillary refill takes less than 2 seconds. No rash noted.   Psychiatric: He has a normal mood and affect. His speech is normal and behavior is normal. Judgment and thought content normal. Cognition and memory are normal.         ED Course   Critical Care    Date/Time: 4/15/2025 12:05 PM    Performed by: Festus Romano DO  Authorized by: Portillo Plasencia MD  Direct patient critical care time: 15 minutes  Additional history critical care time: 5 minutes  Ordering / reviewing critical care time: 5 minutes  Documentation critical care time: 5 minutes  Consulting other physicians critical care time: 5 minutes  Consult with family critical care time: 5 minutes  Total critical care time (exclusive of procedural time) : 40 minutes  Critical care time was exclusive of separately billable procedures and treating other patients.  Critical care was necessary to treat or prevent imminent or life-threatening deterioration of the following conditions: circulatory failure, sepsis, renal failure and metabolic crisis.  Critical care was time spent personally by me on the following activities: development of treatment plan with patient or surrogate, discussions with consultants, examination of patient, obtaining history from patient or surrogate, ordering and review of radiographic studies, pulse oximetry, ordering and review of laboratory studies, ordering and performing treatments and interventions, re-evaluation of patient's condition and review of old charts.        Labs Reviewed   COMPREHENSIVE METABOLIC PANEL - Abnormal       Result Value    Sodium 138      Potassium 5.7 (*)     Chloride 106      CO2 22      Glucose 95      Blood Urea Nitrogen 26.2 (*)     Creatinine 4.55 (*)     Calcium 9.1      Protein Total 7.6      Albumin 2.7 (*)     Globulin 4.9 (*)     Albumin/Globulin Ratio 0.6 (*)     Bilirubin Total 0.2      ALP 73      ALT 5      AST 12      eGFR 14       Anion Gap 10.0      BUN/Creatinine Ratio 6     URINALYSIS, REFLEX TO URINE CULTURE - Abnormal    Color, UA Colorless      Appearance, UA Clear      Specific Gravity, UA 1.009      pH, UA 7.5      Protein, UA Negative      Glucose, UA Normal      Ketones, UA Negative      Blood, UA Trace (*)     Bilirubin, UA Negative      Urobilinogen, UA Normal      Nitrites, UA Negative      Leukocyte Esterase,  (*)     RBC, UA 6-10 (*)     WBC, UA >100 (*)     Bacteria, UA Trace      Squamous Epithelial Cells, UA Trace     CBC WITH DIFFERENTIAL - Abnormal    WBC 17.55 (*)     RBC 3.47 (*)     Hgb 9.9 (*)     Hct 32.6 (*)     MCV 93.9      MCH 28.5      MCHC 30.4 (*)     RDW 17.2 (*)     Platelet 520 (*)     MPV 9.7      Neut % 67.5      Lymph % 18.3      Mono % 9.2      Eos % 2.9      Basophil % 0.7      Imm Grans % 1.4      Neut # 11.85 (*)     Lymph # 3.21      Mono # 1.62 (*)     Eos # 0.51      Baso # 0.12      Imm Gran # 0.24 (*)     NRBC% 0.0     MAGNESIUM - Normal    Magnesium Level 1.80     CULTURE, URINE   BLOOD CULTURE [facility]   BLOOD CULTURE [facility]   CBC W/ AUTO DIFFERENTIAL    Narrative:     The following orders were created for panel order CBC W/ AUTO DIFFERENTIAL.  Procedure                               Abnormality         Status                     ---------                               -----------         ------                     CBC with Differential[[id number]]       Abnormal            Final result                 Please view results for these tests on the individual orders.          Imaging Results    None          Medications   bumetanide injection 2 mg (has no administration in time range)   sodium bicarbonate tablet 1,300 mg (has no administration in time range)   sodium polystyrene sulfonate 15 gram/60 mL 0.25 g/mL oral liquid 30 g (has no administration in time range)   ondansetron injection 4 mg (has no administration in time range)   acetaminophen tablet 650 mg (has no administration in time  range)   acetaminophen tablet 650 mg (has no administration in time range)   ampicillin-sulbactam (UNASYN) 3 g in 0.9% NaCl 100 mL IVPB (MB+) (has no administration in time range)   ampicillin-sulbactam 1.5 g in 0.9% NaCl 100 mL IVPB (MB+) (0 g Intravenous Stopped 4/15/25 1254)   sodium zirconium cyclosilicate packet 5 g (5 g Oral Given 4/15/25 1320)   magnesium oxide tablet 400 mg (400 mg Oral Given 4/15/25 1319)     Medical Decision Making  59-year-old male presenting with leg swelling    Differential Diagnosis:   Judging by the patient's chief complaint and pertinent history, the patient has the following possible differential diagnoses, including but not limited to the following.  Some of these are deemed to be lower likelihood and some more likely based on my physical exam and history combined with possible lab work and/or imaging studies.   Please see the pertinent studies, and refer to the HPI.  Some of these diagnoses will take further evaluation to fully rule out, perhaps as an outpatient and the patient was encouraged to follow up when discharged for more comprehensive evaluation    Endocarditis, pyelonephritis, cystitis, sepsis, bacteremia    Problems Addressed:  Acute cystitis without hematuria: acute illness or injury with systemic symptoms that poses a threat to life or bodily functions  LEANDRO (acute kidney injury): chronic illness or injury  Hyperkalemia: acute illness or injury with systemic symptoms that poses a threat to life or bodily functions    Amount and/or Complexity of Data Reviewed  External Data Reviewed: labs, radiology and notes.  Labs: ordered. Decision-making details documented in ED Course.    Risk  Prescription drug management.  Decision regarding hospitalization.            Scribe Attestation:   Scribe #1: I performed the above scribed service and the documentation accurately describes the services I performed. I attest to the accuracy of the note.    Attending Attestation:            Physician Attestation for Scribe:  Physician Attestation Statement for Scribe #1: I, Festus Romano DO, reviewed documentation, as scribed by Baldo Jacques in my presence, and it is both accurate and complete.             ED Course as of 04/15/25 1352   Tue Apr 15, 2025   1145 NITRITE UA: Negative [MM]   1145 Leukocyte Esterase, UA(!): 500 [MM]   1145 RBC, UA(!): 6-10 [MM]   1145 WBC, UA(!): >100 [MM]   1145 Bacteria, UA: Trace [MM]   1145 Magnesium : 1.80 [MM]   1145 Sodium: 138 [MM]   1145 Potassium(!): 5.7 [MM]   1145 Chloride: 106 [MM]   1145 CO2: 22 [MM]   1145 Glucose: 95 [MM]   1145 BUN(!): 26.2 [MM]   1145 Creatinine(!): 4.55 [MM]   1145 Calcium: 9.1 [MM]   1145 BILIRUBIN TOTAL: 0.2 [MM]   1145 ALP: 73 [MM]   1145 ALT: 5 [MM]   1145 AST: 12 [MM]   1145 WBC(!): 17.55 [MM]   1145 Hemoglobin(!): 9.9 [MM]   1145 Platelet Count(!): 520  I will start patient on Unasyn and obtain blood cultures.  He left AMA a week ago after being treated with LEANDRO, dialysis , pyelonephritis, bacteremia and possible endocarditis.  Patient does agree for admission and states he will not leave against medical advice.  Patient's family states he we will be sure he stays.  Case discussed with hospitalist, who does accept patient.  I will also speak about Nephrology as he will need dialysis.  I will start Lokelma.   [MM]   1205 Discuss case with Nephrology NP, who is aware and will evaluate patient [MM]      ED Course User Index  [MM] Festus Romano DO                           Clinical Impression:  Final diagnoses:  [N17.9] LEANDRO (acute kidney injury) (Primary)  [E87.5] Hyperkalemia  [N30.00] Acute cystitis without hematuria          ED Disposition Condition    Admit Stable                    [1]   Social History  Tobacco Use    Smoking status: Former     Current packs/day: 0.50     Types: Cigarettes   Substance Use Topics    Alcohol use: Not Currently     Alcohol/week: 28.0 standard drinks of alcohol     Types: 28 Shots of  liquor per week     Comment: half a pint per day        Festus Romano, DO  04/15/25 4885

## 2025-04-15 NOTE — PROGRESS NOTES
Pharmacist Renal Dose Adjustment Note    Tao Bee is a 59 y.o. male being treated with the medication Unasyn    Patient Data:    Vital Signs (Most Recent):  Temp: 98.7 °F (37.1 °C) (04/15/25 0951)  Pulse: 74 (04/15/25 1302)  Resp: 16 (04/15/25 1302)  BP: (!) 144/94 (04/15/25 1302)  SpO2: 100 % (04/15/25 1302) Vital Signs (72h Range):  Temp:  [98.7 °F (37.1 °C)]   Pulse:  [73-79]   Resp:  [15-19]   BP: (144-165)/(90-95)   SpO2:  [98 %-100 %]      Recent Labs   Lab 04/15/25  1016   CREATININE 4.55*     Serum creatinine: 4.55 mg/dL (H) 04/15/25 1016  Estimated creatinine clearance: 16.3 mL/min (A)    Unasyn 1.5 g IVPB Q24H will be changed to Unasyn 3 g IVPB Q24H per pharmacy protocol    Pharmacist's Name: Lili Jarvis  Pharmacist's Extension: [phone]

## 2025-04-15 NOTE — PLAN OF CARE
Problem: Adult Inpatient Plan of Care  Goal: Plan of Care Review  Outcome: Progressing  Flowsheets (Taken 4/15/2025 1708)  Plan of Care Reviewed With:   spouse   patient  Goal: Patient-Specific Goal (Individualized)  Outcome: Progressing  Flowsheets (Taken 4/15/2025 1708)  Individualized Care Needs: None  Anxieties, Fears or Concerns: None  Patient/Family-Specific Goals (Include Timeframe): None  Goal: Absence of Hospital-Acquired Illness or Injury  Outcome: Progressing  Intervention: Identify and Manage Fall Risk  Flowsheets (Taken 4/15/2025 1708)  Safety Promotion/Fall Prevention:   assistive device/personal item within reach   urinal at bedside   spouse to remain at bedside   side rails raised x 2   pulse ox   room near unit station  Intervention: Prevent Skin Injury  Flowsheets (Taken 4/15/2025 1708)  Body Position: position changed independently  Goal: Optimal Comfort and Wellbeing  Outcome: Progressing  Intervention: Monitor Pain and Promote Comfort  Flowsheets (Taken 4/15/2025 1708)  Pain Management Interventions: quiet environment facilitated  Intervention: Provide Person-Centered Care  Flowsheets (Taken 4/15/2025 1708)  Trust Relationship/Rapport:   care explained before giving medications   choices provided when possible   questions answered concerning careplan and when he's getting a bed upstairs  Goal: Readiness for Transition of Care  Outcome: Progressing

## 2025-04-15 NOTE — CONSULTS
[facility] New Consult Note    Patient Name: Tao Bee  Admission Date: 4/15/2025  Hospital Length of Stay: 0 days  Code Status: Prior   Attending Physician: Portillo Plasencia MD   Primary Care Physician: Penny Garsia FNP  Principal Problem:<principal problem not specified>    HPI:    Tao Bee 59 y.o. male presented to the hospital on 4/15/2025.   Pt was admitted a few weeks back when I was on call with signs and symptoms of sepsis and he had LEANDRO and uremic ss  Pt was started on HD, Blood Cultures grew Gm-Ve rods  Pt had IV antibiotics and MARY suggested Mitral endocarditis  Pt also had suggestion for severe pyeloenephritis /renal abscess  Despite medical advice., pt signed AMA and left  He presented again with worsening lower ext edema  No dyspnea  No NV  No fever        Medical History:   Past Medical History:   Diagnosis Date    Neuropathy    Recent LEANDRO  Recent Sepsis  Recent HD requirement    Surgical History:   Past Surgical History:   Procedure Laterality Date    ESOPHAGOGASTRODUODENOSCOPY N/A 03/30/2025    Procedure: EGD (ESOPHAGOGASTRODUODENOSCOPY);  Surgeon: Stan Elder MD;  Location: [facility];  Service: Endoscopy;  Laterality: N/A;    LEG SURGERY Right        Family History:   No family history on file..     Social History:   Social History     Tobacco Use    Smoking status: Former     Current packs/day: 0.50     Types: Cigarettes    Smokeless tobacco: Not on file   Substance Use Topics    Alcohol use: Not Currently     Alcohol/week: 28.0 standard drinks of alcohol     Types: 28 Shots of liquor per week     Comment: half a pint per day       Allergies:  Review of patient's allergies indicates:   Allergen Reactions    Opioids - morphine analogues Hallucinations         Review of Systems:  Constitutional: Denies fever, fatigue, chills,   Skin: Denies wounds, rashes, no skin lesions or itching  EENT: Denies acute hearing/vision changes, tinnitus, rhinorrhea or dysphagia  Respiratory:   "Denies cough, shortness of breath, or wheezing  Cardiovascular: Denies chest pain, palpitations, +worsening lower ext edema  Gastrointestional: Denies abdominal pain, nausea, vomiting, diarrhea or constipation  Genitourinary: Denies dysuria, hematuria, foamy urine or incontinence; able to empty bladder  Musculoskeletal: Denies myalgias, back pain, flank pain, new decreased ROM or acute focal weakness  Neurological: Denies headaches, seizures, paresthesias, dizziness or asterixis  Hematological: Denies unusual bruising or bleeding  Psychiatric: Denies psychiatric concerns, hallucinations, or depression; no confusion    Medications:  Current Medications[1]     Scheduled Meds:   ampicillin-sulbactam  1.5 g Intravenous ED 1 Time    sodium zirconium cyclosilicate  5 g Oral Once     Continuous Infusions:      Objective:  BP (!) 159/93   Pulse 73   Temp 98.7 °F (37.1 °C) (Oral)   Resp 15   Ht 5' 7" (1.702 m)   Wt 69.9 kg (154 lb)   SpO2 100%   BMI 24.12 kg/m²  Body mass index is 24.12 kg/m².    No intake/output data recorded.  No intake/output data recorded.        Physical Exam:  General: no acute distress, awake, alert  Eyes: PERRLA, EOMI, conjunctiva clear, eyelids without swelling  HENT: atraumatic, oropharynx and nasal mucosa patent, no difficulty hearing  Neck: full ROM, no JVD, no thyromegaly or lymphadenopathy  Respiratory: equal, unlabored, clear to auscultation A/P  Cardiovascular: RRR , +BILLIE G2/6 LSB; BL radial and pedal pulses felt  Edema: +2 lower ext  Gastrointestinal: soft, non-tender, non-distended; positive bowel sounds;   Musculoskeletal: full ROM without limitation or discomfort  Integumentary: warm, dry; no rashes, wounds, or skin lesions  Neurological: oriented, appropriate, no acute deficits  No asterexis     Labs:  Chemistries:   Recent Labs   Lab 04/15/25  1016      K 5.7*      CO2 22   BUN 26.2*   CREATININE 4.55*   CALCIUM 9.1   BILITOT 0.2   ALKPHOS 73   ALT 5   AST 12 " "  GLUCOSE 95   MG 1.80        CBC/Anemia Labs: Coags:    Recent Labs   Lab 04/15/25  1016   WBC 17.55*   HGB 9.9*   HCT 32.6*   *   MCV 93.9   RDW 17.2*    No results for input(s): "PT", "INR", "APTT" in the last 168 hours.       Impression:  Ecoli sepsis likely Gu in origin  High concern for endocarditis by MARY  ( Pt had signed AMA)  Recent LEANDRO and Uremia needing HD  Renal function not quite recovering to baseline  Edema  Mild hyperkalemia      Plan:  Pt agrees to be admitted this time  NO ACUTE INDICATIONS FOR HD  Will given IV Bumex ( for edema and increase renal K excretion)  Will give po sodium bicarb and kayexalate  Labs in am  Will decide on HD accordingly  Agree with ID reeval  Will follow       Alirio Black      Thank you for this consult.        [1]   Current Facility-Administered Medications:     ampicillin-sulbactam 1.5 g in 0.9% NaCl 100 mL IVPB (MB+), 1.5 g, Intravenous, ED 1 Time, Festus Romano DO, Last Rate: 100 mL/hr at 04/15/25 1154, 1.5 g at 04/15/25 1154    sodium zirconium cyclosilicate packet 5 g, 5 g, Oral, Once, Festus Romano DO    Current Outpatient Medications:     gabapentin (NEURONTIN) 600 MG tablet, Take 600 mg by mouth 3 (three) times daily., Disp: , Rfl:     "

## 2025-04-15 NOTE — H&P
[facility] Hospital Medicine History & Physical Examination       Patient Name: Tao Bee  MRN: [id number]  Patient Class: IP- Inpatient   Admission Date: 04/15/2025   Admitting Service: Hospital Medicine   Length of Stay: 0  Attending Physician: Portillo Plasencia MD  Primary Care Provider: Penny Garsia FNP  Face-to-Face encounter date: 04/15/2025  Code Status: Full  Chief Complaint: Leg Swelling (Pt ambulatory to triage w/ cane and presents via POV. States was admitted here for LEANDRO Tuesday, but signed out AMA. Endorses bilateral lower extremity swelling that has worsened since leaving AMA. Denies CP and SOB at this time. )      Patient information was obtained from patient, patient's family, past medical records and ED records.    HISTORY OF PRESENT ILLNESS:   59 y.o. male with a PMHx of neuropathy, LEANDRO requiring inpatient hemodialysis, E coli bacteremia, gastric ulcers, recent right-sided pyelonephritis, right renal abscess s/p IR drainage, mitral valve endocarditis who presented to [facility] on 4/15/2025 with c/o worsening bilateral lower extremity swelling x1 week.  Denied any chest pain, shortness of breath, fever, cough.  Of note, patient was hospitalized from 03/29/2025 through 04/08/2025 with LEANDRO requiring hemodialysis, E coli bacteremia, right-sided pyelonephritis and right renal abscess requiring IR drainage, MARY was done on 04/08 and highly suggestive of mitral valve endocarditis.  He ultimately left AMA on 04/08/2025.  Cardiology recommendations were to treat as vegetation with antibiotics and repeat MARY in 6 weeks.    ED Course: Initial ED Vital Signs included /90, HR 75, RR 19, SpO2 98% on room air, temperature 98.7° F. Labs were notable for WBC 17.55, hemoglobin 9.9, hematocrit 32.6, potassium 5.7, BUN 26.2, creatinine 4.55.  Urinalysis with trace blood, 500 leukocytes, 6-10 RBCs, greater than 100 WBCs.  Blood cultures x2 and urine culture pending.  ID and  Nephrology has been consulted.  He was started on IV Unasyn in the ED.  Admitted to hospital medicine service for further medical management.    REVIEW OF SYSTEMS:   Except as documented, all other systems reviewed and negative.    PAST MEDICAL HISTORY:     Past Medical History:   Diagnosis Date    Neuropathy        PAST SURGICAL HISTORY:     Past Surgical History:   Procedure Laterality Date    ESOPHAGOGASTRODUODENOSCOPY N/A 03/30/2025    Procedure: EGD (ESOPHAGOGASTRODUODENOSCOPY);  Surgeon: Stan Elder MD;  Location: [facility];  Service: Endoscopy;  Laterality: N/A;    LEG SURGERY Right        FAMILY HISTORY:   Reviewed and negative    SOCIAL HISTORY:   Denied alcohol, tobacco or illicit drug use.     SCREENING FOR SOCIAL DRIVERS FOR HEALTH:   Patient screened for food insecurity, housing instability, transportation needs, utility difficulties, and interpersonal safety (select all that apply as identified as concern):  []Housing or Food  []Transportation Needs  []Utility Difficulties  []Interpersonal safety  [x]None    ALLERGIES:   Opioids - morphine analogues    HOME MEDICATIONS:     Prior to Admission medications    Medication Sig Start Date End Date Taking? Authorizing Provider   gabapentin (NEURONTIN) 600 MG tablet Take 600 mg by mouth 3 (three) times daily.   Yes Provider, Historical     ________________________________________________________________________  INPATIENT LIST OF MEDICATIONS   Current Medications[1]    Scheduled Meds:   ampicillin-sulbactam  1.5 g Intravenous ED 1 Time    bumetanide  2 mg Intravenous BID    magnesium oxide  400 mg Oral Once    sodium bicarbonate  1,300 mg Oral TID    sodium polystyrene sulfonate  30 g Oral Q8H    sodium zirconium cyclosilicate  5 g Oral Once     Continuous Infusions:  PRN Meds:.  Current Facility-Administered Medications:     acetaminophen, 650 mg, Oral, Q8H PRN    acetaminophen, 650 mg, Oral, Q4H PRN    ondansetron, 4 mg, Intravenous, Q8H PRN    PHYSICAL  EXAM:     VITAL SIGNS: 24 HRS MIN & MAX LAST   Temp  Min: 98.7 °F (37.1 °C)  Max: 98.7 °F (37.1 °C) 98.7 °F (37.1 °C)   BP  Min: 148/95  Max: 165/92 (!) 159/93   Pulse  Min: 73  Max: 79  73   Resp  Min: 15  Max: 19 15   SpO2  Min: 98 %  Max: 100 % 100 %       General appearance: Well-developed male in no apparent distress.  HENT: Atraumatic head. Moist mucous membranes of oral cavity.  Eyes: Normal extraocular movements.   Neck: Supple.   Lungs: Clear to auscultation bilaterally.   Heart: Regular rate and rhythm. S1 and S2 present. 2+ BLE edema.  Abdomen: Soft, non-distended, non-tender.  Extremities: No cyanosis, clubbing  Skin: No Rash.   Neuro: Motor and sensory exams grossly intact.   Psych/mental status: Awake and alert. Appropriate mood and affect. Responds appropriately to questions.     LABS AND IMAGING:     Recent Labs   Lab 04/15/25  1016   WBC 17.55*   RBC 3.47*   HGB 9.9*   HCT 32.6*   MCV 93.9   MCH 28.5   MCHC 30.4*   RDW 17.2*   *   MPV 9.7       Recent Labs   Lab 04/15/25  1016      K 5.7*      CO2 22   BUN 26.2*   CREATININE 4.55*   CALCIUM 9.1   MG 1.80   ALBUMIN 2.7*   ALKPHOS 73   ALT 5   AST 12   BILITOT 0.2       Microbiology Results (last 7 days)       Procedure Component Value Units Date/Time    Blood culture #1 **CANNOT BE ORDERED STAT** [[id number]] Collected: 04/15/25 1133    Order Status: Sent Specimen: Blood Updated: 04/15/25 1209    Blood culture #2 **CANNOT BE ORDERED STAT** [[id number]] Collected: 04/15/25 1133    Order Status: Resulted Specimen: Blood Updated: 04/15/25 1139    Urine culture [[id number]] Collected: 04/15/25 1040    Order Status: Sent Specimen: Urine Updated: 04/15/25 1119             Transesophageal echo (MARY)    Left Ventricle: The left ventricle is normal in size. There is normal   systolic function with a visually estimated ejection fraction of 55 - 60%.    Right Ventricle: The right ventricle is normal in size. Systolic   function is normal.     Mitral Valve: Mildly calcified subvalvular apparatus. Chordae tendinae   attached to the anterior leaflet appear calcified and likely redundant and   hypermobile. Cannot exclude small vegetations. There is mild   regurgitation.  X-Ray Chest 1 View  Narrative: EXAMINATION:  XR CHEST 1 VIEW    CPT 22371    CLINICAL HISTORY:  Sob;    COMPARISON:  April 7, 2025    FINDINGS:  Examination reveals cardiomediastinal silhouette and pleuroparenchymal changes to be essentially unchanged as compared with the previous exam  Impression: No significant change    Electronically signed by: Andres Cevallos  Date:    04/08/2025  Time:    10:20        ASSESSMENT:   Severe sepsis secondary to E coli bacteremia, suspected mitral valve endocarditis  Recent acute right pyelonephritis with right perinephric abscess s/p IR drainage   LEANDRO   Hyperkalemia   Leukocytosis   Normocytic anemia    History:  neuropathy, LEANDRO requiring inpatient hemodialysis, E coli bacteremia, gastric ulcers, recent right-sided pyelonephritis, right renal abscess s/p IR drainage, mitral valve endocarditis      PLAN:   Nephrology consultation  Avoid Nephrotoxins  Diuresis per Nephrology  ID consultation   Continue IV Unasyn   Follow blood and urine cultures  Resume home medications as deemed appropriate once medication reconciliation is updated  Labs in AM    VTE Prophylaxis: SCDs    Discharge Planning and Disposition: TBD    I, Aleena Phelan, KYMBERLY have reviewed and discussed the case with Portillo Plasencia MD  Please see the attending MD's addendum for further assessment and plan.    Aleena Phelan, [facility]  Department of Hospital Medicine   [facility]   04/15/2025    This note was created with the assistance of [employer] Voice Recognition Software. There may be transcription errors as a result of using this technology however minimal, and effort has been made to assure accuracy of transcription, but any obvious errors or omissions  should be clarified with the author of the document.    _______________________________________________________________________________  MD Addendum:  I, Dr.amusa lee , assumed care of this patient  For the patient encounter, I performed the substantive portion of the visit, I reviewed the NP/PA documentation, treatment plan, and medical decision making.  I had face to face time with this patient     A. History:  59 y.o. male with a PMHx of neuropathy, LEANDRO requiring inpatient hemodialysis, E coli bacteremia, gastric ulcers, recent right-sided pyelonephritis, right renal abscess s/p IR drainage, mitral valve endocarditis who presented to [facility] on 4/15/2025 with c/o worsening bilateral lower extremity swelling x1 week.  Denied any chest pain, shortness of breath, fever, cough.  Of note, patient was hospitalized from 03/29/2025 through 04/08/2025 with LEANDRO requiring hemodialysis, E coli bacteremia, right-sided pyelonephritis and right renal abscess requiring IR drainage, MARY was done on 04/08 and highly suggestive of mitral valve endocarditis.  He ultimately left AMA on 04/08/2025.  Cardiology recommendations were to treat as vegetation with antibiotics and repeat MARY in 6 weeks.    ED Course: Initial ED Vital Signs included /90, HR 75, RR 19, SpO2 98% on room air, temperature 98.7° F. Labs were notable for WBC 17.55, hemoglobin 9.9, hematocrit 32.6, potassium 5.7, BUN 26.2, creatinine 4.55.  Urinalysis with trace blood, 500 leukocytes, 6-10 RBCs, greater than 100 WBCs.  Blood cultures x2 and urine culture pending.  ID and Nephrology has been consulted.  He was started on IV Unasyn in the ED.  Admitted to hospital medicine service for further medical management.    B. Physical exam:  General appearance: Well-developed male in no apparent distress.  HENT: Atraumatic head. Moist mucous membranes of oral cavity.  Eyes: Normal extraocular movements.   Neck: Supple.   Lungs: Clear to auscultation bilaterally.    Heart: Regular rate and rhythm. S1 and S2 present. 2+ BLE edema.  Abdomen: Soft, non-distended, non-tender.  Extremities: No cyanosis, clubbing  Skin: No Rash.   Neuro: Motor and sensory exams grossly intact.   Psych/mental status: Awake and alert. Appropriate mood and affect. Responds appropriately to questions.     ASSESSMENT:   Severe sepsis secondary to E coli bacteremia, suspected mitral valve endocarditis  Recent acute right pyelonephritis with right perinephric abscess s/p IR drainage   LEANDRO   Hyperkalemia   Leukocytosis   Normocytic anemia     History:  neuropathy, LEANDRO requiring inpatient hemodialysis, E coli bacteremia, gastric ulcers, recent right-sided pyelonephritis, right renal abscess s/p IR drainage, mitral valve endocarditis        PLAN:   Admit  Telemetry monitoring  Nephrology consultation  Avoid Nephrotoxins  Diuresis per Nephrology  ID consultation   Continue IV Unasyn   Follow blood and urine cultures  Resume home medications as deemed appropriate once medication reconciliation is updated  Labs in AM     VTE Prophylaxis: SCDs     Discharge Planning and Disposition: TBD         All diagnosis and differential diagnosis have been reviewed; assessment and plan has been documented; I have personally reviewed the labs and test results that are presently available; I have reviewed the patients medication list; I have reviewed the consulting providers response and recommendations. I have reviewed or attempted to review medical records based upon their availability.    All of the patient and family questions have been addressed and answered. Patient's is agreeable to the above stated plan. I will continue to monitor closely and make adjustments to medical management as needed.      04/15/2025            [1]   Current Facility-Administered Medications:     acetaminophen tablet 650 mg, 650 mg, Oral, Q8H PRN, Aleena Phelan, AGACNP-BC    acetaminophen tablet 650 mg, 650 mg, Oral, Q4H PRN, Aleena Phelan  B, AGACNP-BC    ampicillin-sulbactam 1.5 g in 0.9% NaCl 100 mL IVPB (MB+), 1.5 g, Intravenous, ED 1 Time, Festus Romano DO, Last Rate: 100 mL/hr at 04/15/25 1154, 1.5 g at 04/15/25 1154    bumetanide injection 2 mg, 2 mg, Intravenous, BID, Filomena Almeida, ACNP    magnesium oxide tablet 400 mg, 400 mg, Oral, Once, Filomena Almeida, ACNP    ondansetron injection 4 mg, 4 mg, Intravenous, Q8H PRN, Aleena Phelan, AGACNP-BC    sodium bicarbonate tablet 1,300 mg, 1,300 mg, Oral, TID, Filomena Almeida, ACNP    sodium polystyrene sulfonate 15 gram/60 mL 0.25 g/mL oral liquid 30 g, 30 g, Oral, Q8H, Filomena Almeida, ACNP    sodium zirconium cyclosilicate packet 5 g, 5 g, Oral, Once, Festus Romano DO    Current Outpatient Medications:     gabapentin (NEURONTIN) 600 MG tablet, Take 600 mg by mouth 3 (three) times daily., Disp: , Rfl:

## 2025-04-16 PROBLEM — E87.5 HYPERKALEMIA: Status: ACTIVE | Noted: 2025-04-16

## 2025-04-16 PROBLEM — N17.9 AKI (ACUTE KIDNEY INJURY): Status: ACTIVE | Noted: 2025-04-16

## 2025-04-16 LAB
ALBUMIN SERPL-MCNC: 2.6 G/DL (ref 3.5–5)
ALBUMIN/GLOB SERPL: 0.5 RATIO (ref 1.1–2)
ALP SERPL-CCNC: 77 UNIT/L (ref 40–150)
ALT SERPL-CCNC: 6 UNIT/L (ref 0–55)
ANION GAP SERPL CALC-SCNC: 11 MEQ/L
ANION GAP SERPL CALC-SCNC: 12 MEQ/L
AST SERPL-CCNC: 12 UNIT/L (ref 11–45)
BASOPHILS # BLD AUTO: 0.1 X10(3)/MCL
BASOPHILS NFR BLD AUTO: 0.7 %
BILIRUB SERPL-MCNC: 0.2 MG/DL
BUN SERPL-MCNC: 22.7 MG/DL (ref 8.4–25.7)
BUN SERPL-MCNC: 23.5 MG/DL (ref 8.4–25.7)
CALCIUM SERPL-MCNC: 8.6 MG/DL (ref 8.4–10.2)
CALCIUM SERPL-MCNC: 9 MG/DL (ref 8.4–10.2)
CHLORIDE SERPL-SCNC: 108 MMOL/L (ref 98–107)
CHLORIDE SERPL-SCNC: 110 MMOL/L (ref 98–107)
CO2 SERPL-SCNC: 23 MMOL/L (ref 22–29)
CO2 SERPL-SCNC: 25 MMOL/L (ref 22–29)
CREAT SERPL-MCNC: 4.41 MG/DL (ref 0.72–1.25)
CREAT SERPL-MCNC: 4.56 MG/DL (ref 0.72–1.25)
CREAT/UREA NIT SERPL: 5
CREAT/UREA NIT SERPL: 5
EOSINOPHIL # BLD AUTO: 0.46 X10(3)/MCL (ref 0–0.9)
EOSINOPHIL NFR BLD AUTO: 3.4 %
ERYTHROCYTE [DISTWIDTH] IN BLOOD BY AUTOMATED COUNT: 17.2 % (ref 11.5–17)
GFR SERPLBLD CREATININE-BSD FMLA CKD-EPI: 14 ML/MIN/1.73/M2
GFR SERPLBLD CREATININE-BSD FMLA CKD-EPI: 15 ML/MIN/1.73/M2
GLOBULIN SER-MCNC: 4.9 GM/DL (ref 2.4–3.5)
GLUCOSE SERPL-MCNC: 102 MG/DL (ref 74–100)
GLUCOSE SERPL-MCNC: 88 MG/DL (ref 74–100)
HCT VFR BLD AUTO: 27.7 % (ref 42–52)
HGB BLD-MCNC: 8.8 G/DL (ref 14–18)
IMM GRANULOCYTES # BLD AUTO: 0.3 X10(3)/MCL (ref 0–0.04)
IMM GRANULOCYTES NFR BLD AUTO: 2.2 %
LYMPHOCYTES # BLD AUTO: 3.2 X10(3)/MCL (ref 0.6–4.6)
LYMPHOCYTES NFR BLD AUTO: 23.6 %
MCH RBC QN AUTO: 28.9 PG (ref 27–31)
MCHC RBC AUTO-ENTMCNC: 31.8 G/DL (ref 33–36)
MCV RBC AUTO: 91.1 FL (ref 80–94)
MONOCYTES # BLD AUTO: 1.52 X10(3)/MCL (ref 0.1–1.3)
MONOCYTES NFR BLD AUTO: 11.2 %
NEUTROPHILS # BLD AUTO: 7.96 X10(3)/MCL (ref 2.1–9.2)
NEUTROPHILS NFR BLD AUTO: 58.9 %
NRBC BLD AUTO-RTO: 0 %
PHOSPHATE SERPL-MCNC: 6.7 MG/DL (ref 2.3–4.7)
PLATELET # BLD AUTO: 564 X10(3)/MCL (ref 130–400)
PMV BLD AUTO: 9.7 FL (ref 7.4–10.4)
POTASSIUM SERPL-SCNC: 4.7 MMOL/L (ref 3.5–5.1)
POTASSIUM SERPL-SCNC: 5.4 MMOL/L (ref 3.5–5.1)
PROT SERPL-MCNC: 7.5 GM/DL (ref 6.4–8.3)
RBC # BLD AUTO: 3.04 X10(6)/MCL (ref 4.7–6.1)
SODIUM SERPL-SCNC: 144 MMOL/L (ref 136–145)
SODIUM SERPL-SCNC: 145 MMOL/L (ref 136–145)
WBC # BLD AUTO: 13.54 X10(3)/MCL (ref 4.5–11.5)

## 2025-04-16 PROCEDURE — G0545 PR VISIT INHERENT TO INPT OR OBS CARE, INFECTIOUS DISEASE: HCPCS | Mod: ,,, | Performed by: INTERNAL MEDICINE

## 2025-04-16 PROCEDURE — 99222 1ST HOSP IP/OBS MODERATE 55: CPT | Mod: ,,, | Performed by: STUDENT IN AN ORGANIZED HEALTH CARE EDUCATION/TRAINING PROGRAM

## 2025-04-16 PROCEDURE — 21400001 HC TELEMETRY ROOM

## 2025-04-16 PROCEDURE — 84100 ASSAY OF PHOSPHORUS: CPT | Performed by: NURSE PRACTITIONER

## 2025-04-16 PROCEDURE — 99233 SBSQ HOSP IP/OBS HIGH 50: CPT | Mod: ,,, | Performed by: INTERNAL MEDICINE

## 2025-04-16 PROCEDURE — 99223 1ST HOSP IP/OBS HIGH 75: CPT | Mod: ,,, | Performed by: INTERNAL MEDICINE

## 2025-04-16 PROCEDURE — 36415 COLL VENOUS BLD VENIPUNCTURE: CPT | Performed by: NURSE PRACTITIONER

## 2025-04-16 PROCEDURE — 36415 COLL VENOUS BLD VENIPUNCTURE: CPT | Performed by: INTERNAL MEDICINE

## 2025-04-16 PROCEDURE — 63600175 PHARM REV CODE 636 W HCPCS: Mod: JZ,TB | Performed by: NURSE PRACTITIONER

## 2025-04-16 PROCEDURE — 85025 COMPLETE CBC W/AUTO DIFF WBC: CPT | Performed by: NURSE PRACTITIONER

## 2025-04-16 PROCEDURE — 25000003 PHARM REV CODE 250: Performed by: NURSE PRACTITIONER

## 2025-04-16 PROCEDURE — 25000003 PHARM REV CODE 250: Performed by: INTERNAL MEDICINE

## 2025-04-16 PROCEDURE — 80053 COMPREHEN METABOLIC PANEL: CPT | Performed by: NURSE PRACTITIONER

## 2025-04-16 PROCEDURE — 63600175 PHARM REV CODE 636 W HCPCS: Performed by: NURSE PRACTITIONER

## 2025-04-16 RX ORDER — ENOXAPARIN SODIUM 100 MG/ML
30 INJECTION SUBCUTANEOUS EVERY 24 HOURS
Status: DISCONTINUED | OUTPATIENT
Start: 2025-04-17 | End: 2025-04-21 | Stop reason: HOSPADM

## 2025-04-16 RX ADMIN — SODIUM BICARBONATE 650 MG TABLET 1300 MG: at 09:04

## 2025-04-16 RX ADMIN — SODIUM POLYSTYRENE SULFONATE 30 G: 15 SUSPENSION ORAL; RECTAL at 09:04

## 2025-04-16 RX ADMIN — BUMETANIDE 2 MG: 0.25 INJECTION INTRAMUSCULAR; INTRAVENOUS at 10:04

## 2025-04-16 RX ADMIN — AMPICILLIN SODIUM AND SULBACTAM SODIUM 3 G: 2; 1 INJECTION, POWDER, FOR SOLUTION INTRAMUSCULAR; INTRAVENOUS at 01:04

## 2025-04-16 RX ADMIN — SODIUM POLYSTYRENE SULFONATE 30 G: 15 SUSPENSION ORAL; RECTAL at 05:04

## 2025-04-16 RX ADMIN — SODIUM BICARBONATE 650 MG TABLET 1300 MG: at 04:04

## 2025-04-16 RX ADMIN — BUMETANIDE 2 MG: 0.25 INJECTION INTRAMUSCULAR; INTRAVENOUS at 09:04

## 2025-04-16 NOTE — NURSING
The patient trigger alert for stool collection for c-diff. This nurse didn't order it d/t the patient having three doses of Kayexalate. This medication is contributing to his multiple bowel movements.

## 2025-04-16 NOTE — PROGRESS NOTES
[facility]rish[facility]  Hospital Medicine Progress Note        Chief Complaint: Leg Swelling (Pt ambulatory to triage w/ cane and presents via POV. States was admitted here for LEANDRO Tuesday, but signed out AMA. Endorses bilateral lower extremity swelling that has worsened since leaving AMA. Denies CP and SOB at this time. )        Patient information was obtained from patient, patient's family, past medical records and ED records.     HISTORY OF PRESENT ILLNESS:   59 y.o. male with a PMHx of neuropathy, LEANDRO requiring inpatient hemodialysis, E coli bacteremia, gastric ulcers, recent right-sided pyelonephritis, right renal abscess s/p IR drainage, mitral valve endocarditis who presented to [facility] on 4/15/2025 with c/o worsening bilateral lower extremity swelling x1 week.  Denied any chest pain, shortness of breath, fever, cough.  Of note, patient was hospitalized from 03/29/2025 through 04/08/2025 with LEANDRO requiring hemodialysis, E coli bacteremia, right-sided pyelonephritis and right renal abscess requiring IR drainage, MARY was done on 04/08 and highly suggestive of mitral valve endocarditis.  He ultimately left AMA on 04/08/2025.  Cardiology recommendations were to treat as vegetation with antibiotics and repeat MARY in 6 weeks.     ED Course: Initial ED Vital Signs included /90, HR 75, RR 19, SpO2 98% on room air, temperature 98.7° F. Labs were notable for WBC 17.55, hemoglobin 9.9, hematocrit 32.6, potassium 5.7, BUN 26.2, creatinine 4.55.  Urinalysis with trace blood, 500 leukocytes, 6-10 RBCs, greater than 100 WBCs.  Blood cultures x2 and urine culture pending.  ID and Nephrology has been consulted.  He was started on IV Unasyn in the ED.  Admitted to hospital medicine service for further medical management.      Patient currently readmitted for E coli bacteremia likely from a  source-he left AMA on admit, mitral valve infective endocarditis on IV Unasyn.  LEANDRO requiring hemodialysis on  prior admit    Today's information   Patient seen and examined at bedside, no family member at bedside   Patient reports he has no new problems  His lower extremity edema is improving slowly   He denies fever, chills, cough, chest pain, difficulty breathing   Vitals reviewed and stable on room air, afebrile   Labs significant for white cell count of 13.5, improving, hemoglobin 8.5, potassium 5.4, creatinine 4.56   UA is suggestive of an infection   Urine cultures and blood cultures pending       Case was discussed with patient's nurse and  on the floor.    Objective/physical exam:    General appearance: Well-developed male in no apparent distress.  HENT: Atraumatic head. Moist mucous membranes of oral cavity.  Eyes: Normal extraocular movements.   Neck: Supple.   Lungs: Clear to auscultation bilaterally.   Heart: Regular rate and rhythm. S1 and S2 present. 2+ BLE edema.  Abdomen: Soft, non-distended, non-tender.  Extremities: No cyanosis, clubbing  Skin: No Rash.   Neuro: Motor and sensory exams grossly intact.   Psych/mental status: Awake and alert. Appropriate mood and affect. Responds appropriately to questions.        ASSESSMENT:   E coli bacteremia- source   mitral valve infective endocarditis  Leukocytosis, improving  Recent acute right pyelonephritis with right perinephric abscess s/p IR drainage   LEANDRO -required hemodialysis on last admit  Hyperkalemia , improving  Bilateral lower extremity edema  Normocytic anemia     History:  neuropathy, LEANDRO requiring inpatient hemodialysis, E coli bacteremia, gastric ulcers, recent right-sided pyelonephritis, right renal abscess s/p IR drainage, mitral valve endocarditis        PLAN:   Follow up with Nephrology recs plans for hemodialysis, vascular consulted for access   Continue IV Bumex b.i.d. per renal recs   Avoid Nephrotoxins  Await ID input   Continue IV Unasyn 3 g q.day, renally dosed   Follow up urine and blood cultures   Daily weights, I's and O's,  elevate lower extremity on 2-3 pillows   Labs in AM     VTE Prophylaxis: SCDs     Discharge Planning and Disposition: TBD, patient will require long-term IV infusion services         VITAL SIGNS: 24 HRS MIN & MAX LAST   Temp  Min: 98.3 °F (36.8 °C)  Max: 99.2 °F (37.3 °C) 98.4 °F (36.9 °C)   BP  Min: 113/76  Max: 158/88 (!) 151/94   Pulse  Min: 79  Max: 94  94   Resp  Min: 13  Max: 18 18   SpO2  Min: 96 %  Max: 100 % 100 %     I reviewed the labs below:  Recent Labs   Lab 04/15/25  1016 04/16/25  0518   WBC 17.55* 13.54*   RBC 3.47* 3.04*   HGB 9.9* 8.8*   HCT 32.6* 27.7*   MCV 93.9 91.1   MCH 28.5 28.9   MCHC 30.4* 31.8*   RDW 17.2* 17.2*   * 564*   MPV 9.7 9.7     Recent Labs   Lab 04/15/25  1016 04/16/25  0518    144   K 5.7* 5.4*    108*   CO2 22 25   BUN 26.2* 23.5   CREATININE 4.55* 4.56*   CALCIUM 9.1 9.0   MG 1.80  --    ALBUMIN 2.7* 2.6*   ALKPHOS 73 77   ALT 5 6   AST 12 12   BILITOT 0.2 0.2     Microbiology Results (last 7 days)       Procedure Component Value Units Date/Time    Blood culture #1 **CANNOT BE ORDERED STAT** [[id number]]  (Normal) Collected: 04/15/25 1133    Order Status: Completed Specimen: Blood Updated: 04/16/25 1300     Blood Culture No Growth At 24 Hours    Blood culture #2 **CANNOT BE ORDERED STAT** [[id number]]  (Normal) Collected: 04/15/25 1133    Order Status: Completed Specimen: Blood Updated: 04/16/25 1300     Blood Culture No Growth At 24 Hours    Urine culture [[id number]] Collected: 04/15/25 1040    Order Status: Completed Specimen: Urine Updated: 04/16/25 0629     Urine Culture No Growth At 24 Hours           See below for Radiology    Intake and Output:  Intake/Output Summary (Last 24 hours) at 4/16/2025 1401  Last data filed at 4/15/2025 1407  Gross per 24 hour   Intake 120 ml   Output 190 ml   Net -70 ml         All diagnosis and differential diagnosis have been reviewed; assessment and plan has been documented; I have personally reviewed the labs and  test results that are presently available; I have reviewed the patients medication list; I have reviewed the consulting providers response and recommendations. I have reviewed or attempted to review medical records based upon their availability    All of the patient's questions have been  addressed and answered. Patient's is agreeable to the above stated plan. I will continue to monitor closely and make adjustments to medical management as needed.    Portions of this note dictated using EMR integrated voice recognition software, and may be subject to voice recognition errors not corrected at proofreading. Please contact writer for clarification if needed.   _____________________________________________________________________    Nutrition Status:  Nutrition consulted. Most recent weight and BMI monitored-     Measurements:  Wt Readings from Last 1 Encounters:   04/15/25 65.6 kg (144 lb 11.2 oz)   Body mass index is 22.66 kg/m².    Patient has been screened and assessed by RD.    Malnutrition Type:  Context:    Level:      Malnutrition Characteristic Summary:       Interventions/Recommendations (treatment strategy):         A minimum of two characteristics is recommended for diagnosis of either severe or non-severe malnutrition.     Current Med:   ampicillin-sulbactam  3 g Intravenous Q24H    bumetanide  2 mg Intravenous BID    sodium bicarbonate  1,300 mg Oral TID    sodium polystyrene sulfonate  30 g Oral TID      PRN meds:  Current Facility-Administered Medications:     acetaminophen, 650 mg, Oral, Q8H PRN    acetaminophen, 650 mg, Oral, Q4H PRN    hydrALAZINE, 10 mg, Intravenous, Q4H PRN    ondansetron, 4 mg, Intravenous, Q8H PRN    Continuous infusion:       Radiology:   I have personally reviewed the images and agree with radiologist report  X-Ray Chest 1 View  Narrative: EXAMINATION:  XR CHEST 1 VIEW    CLINICAL HISTORY:  edema;, Edema, unspecified.    COMPARISON:  April 8, 2025    FINDINGS:  No alveolar  consolidation, effusion, or pneumothorax is seen.   The thoracic aorta is normal  cardiac silhouette, central pulmonary vessels and mediastinum are normal in size and are grossly unremarkable.   visualized osseous structures are grossly unremarkable.  Impression: No acute chest disease is identified.    Electronically signed by: Andres Cevallos  Date:    04/16/2025  Time:    08:29        Portillo Plasencia MD  Department of Hospital Medicine   [facility]   04/16/2025

## 2025-04-16 NOTE — CONSULTS
INFECTIOUS DISEASE CONSULT NOTE   Admit Date: 4/15/2025  CONSULT FOR : recent E coli bacteremia with infective endocarditis  Chief Complaint:  <principal problem not specified>    HPI:      58yo man recently admitted from 3/29 to 4/9 with E coli bacteremia due to right renal abscess as well as a right perinephric abscess abutting the source muscle.  During the course of his admission he ended up having a MARY which suggested mitral valve vegetation presumed to be due to endocarditis.  Patient unfortunately left against medical advice and has not been on antibiotics since discharge.  At home he noticed increasing swelling of both legs and ultimately was convinced to come back to the hospital by his sister.  Unasyn was resumed and ID is asked to assist with management once again.  Patient had fever on discharge home and he does not feel sick, has no other complaints other than the leg swelling.    ROS:      All 14 systems reviewed and negative except as noted above.    PMHx:      Past Medical History:   Diagnosis Date    Neuropathy         PMSx:      Past Surgical History:   Procedure Laterality Date    ESOPHAGOGASTRODUODENOSCOPY N/A 03/30/2025    Procedure: EGD (ESOPHAGOGASTRODUODENOSCOPY);  Surgeon: Stan Elder MD;  Location: [facility];  Service: Endoscopy;  Laterality: N/A;    LEG SURGERY Right         Social Hx:      Social History     Socioeconomic History    Marital status: Single   Tobacco Use    Smoking status: Former     Current packs/day: 0.50     Types: Cigarettes   Substance and Sexual Activity    Alcohol use: Not Currently     Alcohol/week: 28.0 standard drinks of alcohol     Types: 28 Shots of liquor per week     Comment: half a pint per day     Social Drivers of Health     Financial Resource Strain: Low Risk  (4/16/2025)    Overall Financial Resource Strain (CARDIA)     Difficulty of Paying Living Expenses: Not hard at all   Food Insecurity: No Food Insecurity (4/16/2025)    Hunger  Vital Sign     Worried About Running Out of Food in the Last Year: Never true     Ran Out of Food in the Last Year: Never true   Transportation Needs: No Transportation Needs (4/16/2025)    PRAPARE - Transportation     Lack of Transportation (Medical): No     Lack of Transportation (Non-Medical): No   Stress: No Stress Concern Present (4/16/2025)    [employer] - Occupational Stress Questionnaire     Feeling of Stress : Not at all   Housing Stability: Low Risk  (4/16/2025)    Housing Stability Vital Sign     Unable to Pay for Housing in the Last Year: No     Number of Times Moved in the Last Year: 0     Homeless in the Last Year: No        Family Hx:      No family history on file. `  Review of patient's allergies indicates:   Allergen Reactions    Opioids - morphine analogues Hallucinations       Medications:      Current Facility-Administered Medications   Medication    acetaminophen tablet 650 mg    acetaminophen tablet 650 mg    ampicillin-sulbactam (UNASYN) 3 g in 0.9% NaCl 100 mL IVPB (MB+)    bumetanide injection 2 mg    hydrALAZINE injection 10 mg    ondansetron injection 4 mg    sodium bicarbonate tablet 1,300 mg    sodium polystyrene sulfonate 15 gram/60 mL 0.25 g/mL oral liquid 30 g       VITALS:      Vital Signs Range (Last 24H):  Temp:  [98.3 °F (36.8 °C)-99.2 °F (37.3 °C)]   Pulse:  [79-94]   Resp:  [13-18]   BP: (113-158)/(76-94)   SpO2:  [96 %-100 %]     I & O (Last 24H):    Intake/Output Summary (Last 24 hours) at 4/16/2025 1529  Last data filed at 4/16/2025 1423  Gross per 24 hour   Intake 420 ml   Output --   Net 420 ml       Physical Exam   Constitutional: Pt is alert and oriented to person, place, and time. Appears well-developed, thin but not cachectic.   Head: Normocephalic and atraumatic.   Eyes, nose and throat:  Pale moist mucosa, anicteric and acyanotic, DAVID, no oral or pharyngeal exudates, he is edentulous  Cardiovascular: Normal rate and regular rhythm.  S1  "and S2 appreciated by ascultation, no murmurs  Pulmonary/Chest: Effort normal, no crepitations or wheezes auscultated   Abdomen:  not distended, normal bowel sounds. Soft. Non-tender. No organomegaly or mass appreciated.  : No CVA tenderness  Extremities:  Mild to moderate bilateral leg edema  Skin: No rashes.  Psychiatric:  Normal mood and affect, judgment normal    Laboratory Data:    No results for input(s): "CPK", "CPKMB", "TROPONINI", "MB" in the last 24 hours. No results for input(s): "POCTGLUCOSE" in the last 24 hours.     Lab Results   Component Value Date    INR 1.4 (H) 03/30/2025       Lab Results   Component Value Date    WBC 13.54 (H) 04/16/2025    HGB 8.8 (L) 04/16/2025    HCT 27.7 (L) 04/16/2025    MCV 91.1 04/16/2025     (H) 04/16/2025       BMP  Recent Labs   Lab 04/16/25  0518      K 5.4*   *   CO2 25   BUN 23.5   CREATININE 4.56*   CALCIUM 9.0       Above lab results reviewed and interpreted by me.    Radiology:  Chest x-ray image from yesterday personally reviewed, no consolidation or effusion    CT abdomen and pelvis from 4/3 Impression:     1. Right upper pole calices are distended with fluid, underlying 4 cm abscess is possible.  Additionally there is a right perinephric collection medially abutting the psoas muscle measuring up to about 8 cm, abscess also possible.  2. Small bilateral pleural effusions with adjacent consolidation which at least partially relates to atelectasis but superimposed infection is possible.      ASSESSMENT/PLAN:       Problem List[1]    ASSESSMENT:  Recent E coli bacteremia (3/29 and 3/30) associated with right renal and perinephric abscesses.  Blood cultures have been negative since 04/03.  Presumed infective endocarditis involving mitral valve based on AMRY findings  Leukocytosis, improving.  Likely due to residual right renal abscess.  Recent acute renal failure requiring temporary dialysis    PLAN:  Continue Unasyn for now in the " hospital  Follow up pending blood cultures from yesterday  Since his serum protein/albumin has improved we can transition to ceftriaxone when ready for discharge and complete 6 weeks of antibiotic therapy for presumed mitral valve endocarditis as well as right renal and perinephric abscess    Thank you very much for the consult.  ID will follow.  Discussed with other specialties - yes, hospitalist Dr Plasencia    Family discussion - yes, sister at bedside             [1] There is no problem list on file for this patient.

## 2025-04-16 NOTE — PLAN OF CARE
Problem: Adult Inpatient Plan of Care  Goal: Plan of Care Review  Outcome: Progressing  Goal: Patient-Specific Goal (Individualized)  Outcome: Progressing  Goal: Absence of Hospital-Acquired Illness or Injury  Outcome: Progressing  Goal: Optimal Comfort and Wellbeing  Outcome: Progressing  Goal: Readiness for Transition of Care  Outcome: Progressing     Problem: Wound  Goal: Optimal Coping  Outcome: Progressing  Goal: Optimal Functional Ability  Outcome: Progressing  Goal: Absence of Infection Signs and Symptoms  Outcome: Progressing  Goal: Improved Oral Intake  Outcome: Progressing  Goal: Optimal Pain Control and Function  Outcome: Progressing  Goal: Skin Health and Integrity  Outcome: Progressing  Goal: Optimal Wound Healing  Outcome: Progressing     Problem: Fluid Volume Excess  Goal: Fluid Balance  Outcome: Progressing     Problem: Infection  Goal: Absence of Infection Signs and Symptoms  Outcome: Progressing     Problem: Hemodialysis  Goal: Safe, Effective Therapy Delivery  Outcome: Progressing  Goal: Effective Tissue Perfusion  Outcome: Progressing  Goal: Absence of Infection Signs and Symptoms  Outcome: Progressing     Problem: Acute Kidney Injury/Impairment  Goal: Fluid and Electrolyte Balance  Outcome: Progressing  Goal: Improved Oral Intake  Outcome: Progressing  Goal: Effective Renal Function  Outcome: Progressing

## 2025-04-16 NOTE — PLAN OF CARE
04/16/25 1130   Discharge Assessment   Assessment Type Discharge Planning Assessment   Confirmed/corrected address, phone number and insurance Yes   Confirmed Demographics Correct on Facesheet   Source of Information patient   Communicated MONI with patient/caregiver Date not available/Unable to determine   Reason For Admission LEANDRO, hyperkalemia, acute cystitis, edema   People in Home alone   Do you expect to return to your current living situation? Yes   Do you have help at home or someone to help you manage your care at home? Yes   Who are your caregiver(s) and their phone number(s)? sister and brother lives close and assist him as needed but he states that he is able to perform ADL   Prior to hospitilization cognitive status: Alert/Oriented   Current cognitive status: Alert/Oriented   Walking or Climbing Stairs Difficulty yes   Walking or Climbing Stairs ambulation difficulty, requires equipment;stair climbing difficulty, requires equipment;transferring difficulty, requires equipment   Mobility Management cane   Dressing/Bathing Difficulty no   Do you have any problems with: Errands/Grocery  (does not drive)   Home Accessibility stairs to enter home   Number of Stairs, Main Entrance two   Stair Railings, Main Entrance railings safe and in good condition   Equipment Currently Used at Home cane, straight   Readmission within 30 days? Yes   Patient currently being followed by outpatient case management? No   Do you currently have service(s) that help you manage your care at home? No   Do you take prescription medications? Yes   Do you have prescription coverage? Yes   Coverage medicare and medicaid fills med[facility] at [facility]   Do you have any problems affording any of your prescribed medications? No   Is the patient taking medications as prescribed? yes   Who is going to help you get home at discharge? sister   How do you get to doctors appointments? family or friend will provide   Are you on dialysis? No    Do you take coumadin? No   Discharge Plan A Home   Discharge Plan B Home Health   DME Needed Upon Discharge  none   Discharge Plan discussed with: Patient   Transition of Care Barriers None

## 2025-04-16 NOTE — PROGRESS NOTES
[facility] Nephrology Inpatient Progress Note      HPI:     Patient Name: Tao Bee  Admission Date: 4/15/2025  Hospital Length of Stay: 1 days  Code Status: Full Code   Attending Physician: Portillo Plasencia MD   Primary Care Physician: Penny Garsia FNP  Principal Problem:<principal problem not specified>      Today patient seen and examined  Labs, recent events, imaging and medications reviewed for this hospital stay  Feels okay.  Still having swelling of the lower extremities  No dyspnea  No fever    Review of Systems:   Constitutional: Denies fever, fatigue, generalized weakness, night sweats, or acute weight change  Skin: Denies wounds, no rashes, no itching, no new skin lesions  HEENT: Denies acute change in hearing or vision, tinnitus, or dysphagia  Respiratory:  Denies cough, shortness of breath, or wheezing  Cardiovascular: Denies chest pain, palpitations, still has edema  Gastrointestional: Denies abdominal pain, nausea, vomiting, diarrhea, or constipation  Genitourinary: Denies dysuria, hematuria, or incontinence; reports able to empty bladder  Musculoskeletal: Denies myalgias, back pain, flank pain, new decreased ROM or acute focal weakness  Neurological: Denies headaches, seizures, dizziness, paresthesias or asterixis  Hematological: Denies unusual bruising or bleeding  Psychiatric: Denies hallucinations, depression, or confusion      Medications:   Scheduled Meds:   ampicillin-sulbactam  3 g Intravenous Q24H    bumetanide  2 mg Intravenous BID    sodium bicarbonate  1,300 mg Oral TID     Continuous Infusions:      Vitals:     Vitals:    04/15/25 2329 04/16/25 0344 04/16/25 0400 04/16/25 0749   BP: 139/83 136/85  139/88   BP Location:    Left arm   Patient Position:    Lying   Pulse: 81 86  85   Resp: 18  16 18   Temp: 98.5 °F (36.9 °C) 99.2 °F (37.3 °C)  98.3 °F (36.8 °C)   TempSrc: Oral Oral  Oral   SpO2: 96% 98%  98%   Weight:       Height:             I/O last 3 completed shifts:  In: 120  "[P.O.:120]  Out: 400 [Urine:400]    Intake/Output Summary (Last 24 hours) at 4/16/2025 0907  Last data filed at 4/15/2025 1407  Gross per 24 hour   Intake 120 ml   Output 400 ml   Net -280 ml       Physical Exam:   General: no acute distress, awake, alert  Eyes: PERRLA, EOMI, conjunctiva clear, eyelids without swelling  HENT: atraumatic, oropharynx and nasal mucosa patent  Neck: full ROM, no JVD, no thyromegaly or lymphadenopathy  Respiratory: equal, unlabored, clear to auscultation A/P  Cardiovascular: RRR without rub; BL radial and pedal pulses felt  Edema:  +1 to +2 lower extremities  Gastrointestinal: soft, non-tender, non-distended; positive bowel sounds; no masses to palpation    Musculoskeletal: full ROM without limitation or discomfort  Integumentary: warm, dry; no rashes, wounds, or skin lesions  Neurological: oriented, appropriate, no acute deficits        Labs:     Chemistries:   Recent Labs   Lab 04/15/25  1016 04/16/25  0518    144   K 5.7* 5.4*    108*   CO2 22 25   BUN 26.2* 23.5   CREATININE 4.55* 4.56*   CALCIUM 9.1 9.0   BILITOT 0.2 0.2   ALKPHOS 73 77   ALT 5 6   AST 12 12   GLUCOSE 95 88   MG 1.80  --    PHOS  --  6.7*        CBC/Anemia Labs: Coags:    Recent Labs   Lab 04/15/25  1016 04/16/25  0518   WBC 17.55* 13.54*   HGB 9.9* 8.8*   HCT 32.6* 27.7*   * 564*   MCV 93.9 91.1   RDW 17.2* 17.2*    No results for input(s): "PT", "INR", "APTT" in the last 168 hours.       Impression:     Recent E coli bacteremia  in origin with LEANDRO requiring dialysis  Patient did sign off AMA  Questionable endocarditis  Readmitted yesterday  So far no signs of active infection  Remains with a suboptimal GFR edema and hyperkalemia  If renal function to recover to baseline it maybe while    Plan:   Patient is on IV antibiotics  As I mentioned no signs of active bacteremia  Full discussion was done with the patient  I believe the best way to control uremia fluid overload and hyperkalemia is to go " ahead and plan for dialysis  I think he will benefit from a tunneled catheter and work on discharge planning on IV antibiotics  We will set up for outpatient dialysis and monitor for signs of recovery since it may take a while    Patient is in full agreement with the plans  We will consult vascular for tunneled catheter insertion today or tomorrow  Meanwhile continue Kayexalate and IV Bumex         Alirio Black

## 2025-04-16 NOTE — CONSULTS
INTERVENTIONAL NEPHROLOGY INITIAL CONSULTATION NOTE       Patient Name: Tao Bee   1965    Patient Seen Date: 2025  Patient Seen Time: 4:28 PM     Consult requested by: Portillo Plasencia MD     Reason for consult: TDC insertion       HPI: Patient is a 59 year old male w/ a PMH of LEANDRO requiring inpatient HD, recent admission from 3/29/25 to 25 d/t E coli bacteremia 2/2 right renal and perinephric abscesses, MARY suggestive of MV IE, patient left AMA, was not on abx since discharge, readmitted when patient noted increased lower extremity edema, ampicillin/sulbactam restarted by ID, nephrology consulted for LEANDRO, IN consulted for tunneled dialysis catheter placement. BCs positive for GNR 3/30/25, repeat BCs 4/3/25 ngtd.      Review of Systems:  General:  No fatigue  Skin: No rashes  HEENT: No vision changes  CVS: No CP  RS: No SOB  GIT: No abdominal pain  Extremities: No swelling  Neurological:  No focal weakness  Psych: No depression    Past Medical History:   Diagnosis Date    Neuropathy       Past Surgical History:   Procedure Laterality Date    ESOPHAGOGASTRODUODENOSCOPY N/A 2025    Procedure: EGD (ESOPHAGOGASTRODUODENOSCOPY);  Surgeon: Stan Elder MD;  Location: [facility];  Service: Endoscopy;  Laterality: N/A;    LEG SURGERY Right       Review of patient's allergies indicates:   Allergen Reactions    Opioids - morphine analogues Hallucinations      Social History[1]   No family history on file.    Current Medications[2]    Vital Signs (24 h):  Temp:  [98.3 °F (36.8 °C)-99.2 °F (37.3 °C)] 98.4 °F (36.9 °C)  Pulse:  [79-94] 94  Resp:  [13-18] 18  SpO2:  [96 %-100 %] 100 %  BP: (113-158)/(76-94) 151/94   I/O last 3 completed shifts:  In: 120 [P.O.:120]  Out: 400 [Urine:400]  I/O this shift:  In: 420 [P.O.:420]  Out: -         Physical Exam:  General: NAD  HEENT: NC/AT, EOMI  CVS: S1/S2 present and normal. No murmur/rubs/gallop.      RS: Lung CTAB, No rales/rhonchi/wheeze.      Abdominal: Soft, NT/ND.  Extremities: No edema b/l LE  Skin: No rash, no lesions.  Neurological: No focal deficits.  Psych: Normal affect    Results:    Lab Results   Component Value Date     04/16/2025    K 5.4 (H) 04/16/2025     (H) 04/16/2025    CO2 25 04/16/2025    BUN 23.5 04/16/2025    CREATININE 4.56 (H) 04/16/2025     Lab Results   Component Value Date    WBC 13.54 (H) 04/16/2025    WBC 21.29 04/04/2025    HGB 8.8 (L) 04/16/2025     (H) 04/16/2025    MCV 91.1 04/16/2025       Assessment and Plan:      # LEANDRO  - requiring HD  - peak Cr 11.76, 3/29/25    - plan for TDC insertion tomorrow  - d/w patient pros/cons of temporary dialysis catheter insertion, tunneled dialysis catheter insertion, and potential need for AVF vs AVG creation in the future depending on whether or not patient's LEANDRO resolves  - npo past midnight  - hold heparin or enoxaparin tomorrow in am (none noted in the chart at this time)      Thank you for your consult. Please feel free to reach me with any questions.  Plan for follow-up tomorrow.    Ismael Ortez MD  Interventional Nephrology  Cell: [phone]       [1]   Social History  Tobacco Use    Smoking status: Former     Current packs/day: 0.50     Types: Cigarettes   Substance Use Topics    Alcohol use: Not Currently     Alcohol/week: 28.0 standard drinks of alcohol     Types: 28 Shots of liquor per week     Comment: half a pint per day   [2]   Current Facility-Administered Medications:     acetaminophen tablet 650 mg, 650 mg, Oral, Q8H PRN, Girish Phelanen B, AGACNP-BC    acetaminophen tablet 650 mg, 650 mg, Oral, Q4H PRN, DoAleena cali, AGACNP-BC    ampicillin-sulbactam (UNASYN) 3 g in 0.9% NaCl 100 mL IVPB (MB+), 3 g, Intravenous, Q24H, Aleena Phelan AGACNP-BC, Stopped at 04/16/25 1459    bumetanide injection 2 mg, 2 mg, Intravenous, BID, Filomena Almeida, ACNP, 2 mg at 04/16/25 1045    hydrALAZINE injection 10 mg, 10 mg, Intravenous, Q4H PRN, [place],  Aleena BISHOP, AGACNP-BC    ondansetron injection 4 mg, 4 mg, Intravenous, Q8H PRN, Aleena Phelan, LYNNP-BC    sodium bicarbonate tablet 1,300 mg, 1,300 mg, Oral, TID, Filomena Almeida, ACNP, 1,300 mg at 04/16/25 0924    sodium polystyrene sulfonate 15 gram/60 mL 0.25 g/mL oral liquid 30 g, 30 g, Oral, TID, Alirio Black MD, 30 g at 04/16/25 09

## 2025-04-17 LAB
ALBUMIN SERPL-MCNC: 2.6 G/DL (ref 3.5–5)
ALBUMIN/GLOB SERPL: 0.6 RATIO (ref 1.1–2)
ALP SERPL-CCNC: 64 UNIT/L (ref 40–150)
ALT SERPL-CCNC: 5 UNIT/L (ref 0–55)
ANION GAP SERPL CALC-SCNC: 9 MEQ/L
AST SERPL-CCNC: 12 UNIT/L (ref 11–45)
BACTERIA UR CULT: NO GROWTH
BASOPHILS # BLD AUTO: 0.11 X10(3)/MCL
BASOPHILS NFR BLD AUTO: 0.9 %
BILIRUB SERPL-MCNC: 0.3 MG/DL
BUN SERPL-MCNC: 23 MG/DL (ref 8.4–25.7)
CALCIUM SERPL-MCNC: 9.1 MG/DL (ref 8.4–10.2)
CHLORIDE SERPL-SCNC: 105 MMOL/L (ref 98–107)
CO2 SERPL-SCNC: 26 MMOL/L (ref 22–29)
CREAT SERPL-MCNC: 4.57 MG/DL (ref 0.72–1.25)
CREAT/UREA NIT SERPL: 5
EOSINOPHIL # BLD AUTO: 0.42 X10(3)/MCL (ref 0–0.9)
EOSINOPHIL NFR BLD AUTO: 3.3 %
ERYTHROCYTE [DISTWIDTH] IN BLOOD BY AUTOMATED COUNT: 17.2 % (ref 11.5–17)
GFR SERPLBLD CREATININE-BSD FMLA CKD-EPI: 14 ML/MIN/1.73/M2
GLOBULIN SER-MCNC: 4.6 GM/DL (ref 2.4–3.5)
GLUCOSE SERPL-MCNC: 92 MG/DL (ref 74–100)
HCT VFR BLD AUTO: 29.4 % (ref 42–52)
HGB BLD-MCNC: 9.1 G/DL (ref 14–18)
IMM GRANULOCYTES # BLD AUTO: 0.27 X10(3)/MCL (ref 0–0.04)
IMM GRANULOCYTES NFR BLD AUTO: 2.1 %
LYMPHOCYTES # BLD AUTO: 3.22 X10(3)/MCL (ref 0.6–4.6)
LYMPHOCYTES NFR BLD AUTO: 25.3 %
MCH RBC QN AUTO: 28.5 PG (ref 27–31)
MCHC RBC AUTO-ENTMCNC: 31 G/DL (ref 33–36)
MCV RBC AUTO: 92.2 FL (ref 80–94)
MONOCYTES # BLD AUTO: 1.51 X10(3)/MCL (ref 0.1–1.3)
MONOCYTES NFR BLD AUTO: 11.9 %
NEUTROPHILS # BLD AUTO: 7.18 X10(3)/MCL (ref 2.1–9.2)
NEUTROPHILS NFR BLD AUTO: 56.5 %
NRBC BLD AUTO-RTO: 0 %
PHOSPHATE SERPL-MCNC: 6.8 MG/DL (ref 2.3–4.7)
PLATELET # BLD AUTO: 577 X10(3)/MCL (ref 130–400)
PMV BLD AUTO: 9.5 FL (ref 7.4–10.4)
POTASSIUM SERPL-SCNC: 5 MMOL/L (ref 3.5–5.1)
PROT SERPL-MCNC: 7.2 GM/DL (ref 6.4–8.3)
PTH-INTACT SERPL-MCNC: 105.4 PG/ML (ref 8.7–77)
RBC # BLD AUTO: 3.19 X10(6)/MCL (ref 4.7–6.1)
SODIUM SERPL-SCNC: 140 MMOL/L (ref 136–145)
WBC # BLD AUTO: 12.71 X10(3)/MCL (ref 4.5–11.5)

## 2025-04-17 PROCEDURE — 77001 FLUOROGUIDE FOR VEIN DEVICE: CPT | Mod: 26,,, | Performed by: STUDENT IN AN ORGANIZED HEALTH CARE EDUCATION/TRAINING PROGRAM

## 2025-04-17 PROCEDURE — 63600175 PHARM REV CODE 636 W HCPCS: Mod: JZ,TB | Performed by: NURSE PRACTITIONER

## 2025-04-17 PROCEDURE — C1750 CATH, HEMODIALYSIS,LONG-TERM: HCPCS | Performed by: STUDENT IN AN ORGANIZED HEALTH CARE EDUCATION/TRAINING PROGRAM

## 2025-04-17 PROCEDURE — 77001 FLUOROGUIDE FOR VEIN DEVICE: CPT | Performed by: STUDENT IN AN ORGANIZED HEALTH CARE EDUCATION/TRAINING PROGRAM

## 2025-04-17 PROCEDURE — 11000001 HC ACUTE MED/SURG PRIVATE ROOM

## 2025-04-17 PROCEDURE — 84100 ASSAY OF PHOSPHORUS: CPT | Performed by: INTERNAL MEDICINE

## 2025-04-17 PROCEDURE — 63600175 PHARM REV CODE 636 W HCPCS: Performed by: STUDENT IN AN ORGANIZED HEALTH CARE EDUCATION/TRAINING PROGRAM

## 2025-04-17 PROCEDURE — 83970 ASSAY OF PARATHORMONE: CPT | Performed by: INTERNAL MEDICINE

## 2025-04-17 PROCEDURE — 5A1D70Z PERFORMANCE OF URINARY FILTRATION, INTERMITTENT, LESS THAN 6 HOURS PER DAY: ICD-10-PCS | Performed by: STUDENT IN AN ORGANIZED HEALTH CARE EDUCATION/TRAINING PROGRAM

## 2025-04-17 PROCEDURE — 99152 MOD SED SAME PHYS/QHP 5/>YRS: CPT | Mod: ,,, | Performed by: STUDENT IN AN ORGANIZED HEALTH CARE EDUCATION/TRAINING PROGRAM

## 2025-04-17 PROCEDURE — 36415 COLL VENOUS BLD VENIPUNCTURE: CPT | Performed by: INTERNAL MEDICINE

## 2025-04-17 PROCEDURE — 99152 MOD SED SAME PHYS/QHP 5/>YRS: CPT | Performed by: STUDENT IN AN ORGANIZED HEALTH CARE EDUCATION/TRAINING PROGRAM

## 2025-04-17 PROCEDURE — 02HV33Z INSERTION OF INFUSION DEVICE INTO SUPERIOR VENA CAVA, PERCUTANEOUS APPROACH: ICD-10-PCS | Performed by: STUDENT IN AN ORGANIZED HEALTH CARE EDUCATION/TRAINING PROGRAM

## 2025-04-17 PROCEDURE — 25000003 PHARM REV CODE 250: Performed by: NURSE PRACTITIONER

## 2025-04-17 PROCEDURE — 76937 US GUIDE VASCULAR ACCESS: CPT | Performed by: STUDENT IN AN ORGANIZED HEALTH CARE EDUCATION/TRAINING PROGRAM

## 2025-04-17 PROCEDURE — 63600175 PHARM REV CODE 636 W HCPCS: Performed by: INTERNAL MEDICINE

## 2025-04-17 PROCEDURE — 63600175 PHARM REV CODE 636 W HCPCS: Performed by: NURSE PRACTITIONER

## 2025-04-17 PROCEDURE — 85025 COMPLETE CBC W/AUTO DIFF WBC: CPT | Performed by: INTERNAL MEDICINE

## 2025-04-17 PROCEDURE — 25000003 PHARM REV CODE 250

## 2025-04-17 PROCEDURE — 36558 INSERT TUNNELED CV CATH: CPT | Mod: LT | Performed by: STUDENT IN AN ORGANIZED HEALTH CARE EDUCATION/TRAINING PROGRAM

## 2025-04-17 PROCEDURE — 99232 SBSQ HOSP IP/OBS MODERATE 35: CPT | Mod: ,,,

## 2025-04-17 PROCEDURE — 21400001 HC TELEMETRY ROOM

## 2025-04-17 PROCEDURE — 0JH60XZ INSERTION OF TUNNELED VASCULAR ACCESS DEVICE INTO CHEST SUBCUTANEOUS TISSUE AND FASCIA, OPEN APPROACH: ICD-10-PCS | Performed by: STUDENT IN AN ORGANIZED HEALTH CARE EDUCATION/TRAINING PROGRAM

## 2025-04-17 PROCEDURE — 80053 COMPREHEN METABOLIC PANEL: CPT | Performed by: INTERNAL MEDICINE

## 2025-04-17 PROCEDURE — 90935 HEMODIALYSIS ONE EVALUATION: CPT

## 2025-04-17 PROCEDURE — 36558 INSERT TUNNELED CV CATH: CPT | Mod: LT,,, | Performed by: STUDENT IN AN ORGANIZED HEALTH CARE EDUCATION/TRAINING PROGRAM

## 2025-04-17 PROCEDURE — 76937 US GUIDE VASCULAR ACCESS: CPT | Mod: 26,,, | Performed by: STUDENT IN AN ORGANIZED HEALTH CARE EDUCATION/TRAINING PROGRAM

## 2025-04-17 DEVICE — PRECISION HSI CHRONIC CATHETER KIT,SYMMETRICAL TIP, HEPARIN COATING, SILVER ION SLEEVE AND TAL VENATRAC STYLET,14.5 FR/CH (4.8 MM) X 23 CM
Type: IMPLANTABLE DEVICE | Site: CHEST  WALL | Status: FUNCTIONAL
Brand: PALINDROME

## 2025-04-17 RX ORDER — MIDAZOLAM HYDROCHLORIDE 1 MG/ML
INJECTION INTRAMUSCULAR; INTRAVENOUS
Status: DISCONTINUED | OUTPATIENT
Start: 2025-04-17 | End: 2025-04-17 | Stop reason: HOSPADM

## 2025-04-17 RX ORDER — MUPIROCIN 20 MG/G
OINTMENT TOPICAL 2 TIMES DAILY
Status: DISCONTINUED | OUTPATIENT
Start: 2025-04-18 | End: 2025-04-21 | Stop reason: HOSPADM

## 2025-04-17 RX ORDER — FENTANYL CITRATE 50 UG/ML
INJECTION, SOLUTION INTRAMUSCULAR; INTRAVENOUS
Status: DISCONTINUED | OUTPATIENT
Start: 2025-04-17 | End: 2025-04-17 | Stop reason: HOSPADM

## 2025-04-17 RX ORDER — LIDOCAINE HYDROCHLORIDE AND EPINEPHRINE 10; 10 UG/ML; MG/ML
INJECTION, SOLUTION INFILTRATION; PERINEURAL
Status: DISCONTINUED | OUTPATIENT
Start: 2025-04-17 | End: 2025-04-17 | Stop reason: HOSPADM

## 2025-04-17 RX ORDER — SEVELAMER CARBONATE 800 MG/1
800 TABLET, FILM COATED ORAL
Status: DISCONTINUED | OUTPATIENT
Start: 2025-04-17 | End: 2025-04-18

## 2025-04-17 RX ADMIN — BUMETANIDE 2 MG: 0.25 INJECTION INTRAMUSCULAR; INTRAVENOUS at 11:04

## 2025-04-17 RX ADMIN — ENOXAPARIN SODIUM 30 MG: 30 INJECTION SUBCUTANEOUS at 04:04

## 2025-04-17 RX ADMIN — SODIUM BICARBONATE 650 MG TABLET 1300 MG: at 04:04

## 2025-04-17 RX ADMIN — AMPICILLIN SODIUM AND SULBACTAM SODIUM 3 G: 2; 1 INJECTION, POWDER, FOR SOLUTION INTRAMUSCULAR; INTRAVENOUS at 04:04

## 2025-04-17 RX ADMIN — SEVELAMER CARBONATE 800 MG: 800 TABLET, FILM COATED ORAL at 11:04

## 2025-04-17 RX ADMIN — SEVELAMER CARBONATE 800 MG: 800 TABLET, FILM COATED ORAL at 04:04

## 2025-04-17 RX ADMIN — SODIUM BICARBONATE 650 MG TABLET 1300 MG: at 09:04

## 2025-04-17 RX ADMIN — ACETAMINOPHEN 650 MG: 325 TABLET, FILM COATED ORAL at 02:04

## 2025-04-17 RX ADMIN — BUMETANIDE 2 MG: 0.25 INJECTION INTRAMUSCULAR; INTRAVENOUS at 09:04

## 2025-04-17 NOTE — PLAN OF CARE
Problem: Adult Inpatient Plan of Care  Goal: Plan of Care Review  Outcome: Progressing  Goal: Absence of Hospital-Acquired Illness or Injury  Outcome: Progressing  Goal: Optimal Comfort and Wellbeing  Outcome: Progressing  Goal: Readiness for Transition of Care  Outcome: Progressing     Problem: Wound  Goal: Optimal Coping  Outcome: Progressing  Goal: Optimal Functional Ability  Outcome: Progressing  Goal: Absence of Infection Signs and Symptoms  Outcome: Progressing  Goal: Improved Oral Intake  Outcome: Progressing  Goal: Optimal Pain Control and Function  Outcome: Progressing  Goal: Skin Health and Integrity  Outcome: Progressing  Goal: Optimal Wound Healing  Outcome: Progressing     Problem: Fluid Volume Excess  Goal: Fluid Balance  Outcome: Progressing     Problem: Infection  Goal: Absence of Infection Signs and Symptoms  Outcome: Progressing     Problem: Hemodialysis  Goal: Safe, Effective Therapy Delivery  Outcome: Progressing  Goal: Effective Tissue Perfusion  Outcome: Progressing  Goal: Absence of Infection Signs and Symptoms  Outcome: Progressing     Problem: Acute Kidney Injury/Impairment  Goal: Fluid and Electrolyte Balance  Outcome: Progressing  Goal: Improved Oral Intake  Outcome: Progressing  Goal: Effective Renal Function  Outcome: Progressing

## 2025-04-17 NOTE — PROCEDURES
INTERVENTIONAL NEPHROLOGY PROCEDURE NOTE:   TUNNELED DIALYSIS CATHETER (TDC) INSERTION       Patient Name: Tao Bee  SONA 1965    Procedure Date:    2025    Performing Physician:   Dr. Laws    Access History: Pt is with ESRD requiring HD.    Pre-Op Diagnosis: N18.6 End Stage Renal Disease (ESRD).  Post-Op Diagnosis: Same    Procedure: Tunneled dialysis catheter insertion.    Indication: ESRD requiring HD.    Anatomical Site: left internal jugular (LIJ)/left chest wall (LCW)    Informed Consent:  The patient was evaluated in the pre-operative area with assessment including the American Society of Anesthesia risk classification. The procedure is discussed in detail including risks, benefits alternatives and options and the patient agrees to proceed. Informed consent was obtained from the patient.     Maximum sterile barrier technique: The patient was prepped and draped using chlorhexidine prep and maximum sterile barrier technique.    Sedation Note:  Risks and benefits of sedation were reviewed with the patient or surrogate, including bleeding, infection, nausea, vomiting, dizziness, instability, damage to a nerve, damage to a blood vessel, cellulitis, reaction to medications, amnesia, loss of consciousness, respiratory arrest, cardiac arrest.     The patient received the following medications: Versed 2 mg IV and Fentanyl 50 mcg IV; patient did remain alert, responsive, and conversational throughout the procedure. I was personally responsible for supervising the administration of moderate sedation services during the procedure performed and I affirm all the guidelines and requirements described in the CPT 2025 section on moderate sedation were followed, including the use of an independent trained observer who had no other duties during the procedure. Start sedation time was 0920 and stop time was 0940. The total face-to-face time was 20 minutes.    Procedure Steps:   The patient was prepped  "and draped in sterile fashion. Procedure ultrasound revealed patent left internal jugular vein. Local anesthesia was administered by injecting 1% lidocaine at the intended site of cannulation. With use of live ultrasonographic visualization, the vessel was cannulated with a  21 g mini-stick needle (image saved to chart). The 0.018" mini-stick guide wire was introduced being careful not to bend the tip. Then by using Seldinger technique, the needle was exchanged for the mini-stick sheath. The inner cannula and mini-stick wire were removed and a J wire was inserted through the sheath. The wire was guided with fluoroscopic guidance, with the tip parked in the inferior vena cava. At this time a cut down on the vessel was made extending the venotomy laterally to the expected tunnel.    The catheter exit site and tunnel was approximated and infiltrated with lidocaine. A stab incision was made at the exit site. The 23 cm (cuff-to-tip) tunneled dialysis catheter assembly with tunneler was then tunneled up through the exit site to the lateral aspect of the venotomy and the catheter pulled through the tunnel. The cuff was placed approximately 1.5 centimeters inside the tunnel. The 12 Fr dilator was passed freely over the wire; it was removed and replaced sequentially with 14 Fr and 16 Fr dilators. The permacath was then inserted via pull-away sheath method. The placement of the catheter tip (at the junction of the SVC and right atrium) and the top of the permacath was confirmed with fluoroscopy.     Catheter appeared to function well with various tests utilizing a 10 cc syringe. The catheter limbs were then flushed with saline, followed by instillation of appropriate amounts of heparin as marked by the catheter hub. Venotomy site and tunnel exit site was closed with monoderm suture, being careful to not curtis the catheter. Catheter hub was fixed to the chest wall using silk suture.      ASSESSMENT/PLAN:  - Successful " placement of left internal jugular (LIJ)/left chest wall (LCW) 23 cm (cuff-to-tip) TDC ([employer] Capsearchrome).    EBL: <10 cc    Complications: None    Orders to the dialysis unit: OK to use tunneled dialysis catheter for HD.    Thank you for allowing me the opportunity in taking care of this patient. Please reach me with any questions.    Kyrie Laws DO  Interventional Nephrology  Cell: [phone]

## 2025-04-17 NOTE — PROGRESS NOTES
Dialysis Coordinator met with patient after notification that outpatient dialysis services would need to be arranged prior to discharge.       Patient has chosen [facility] dialysis center.   [facility] [place] [address]      New Start education was provided to patient and his sister Princess Gonzalez .Navigator, Making the Right Treatment Choice and Vascular Access educational materials were left for home review.    Dialysis Coordinator will continue to follow through out admission and notify Nephrology and Case Management when a chair time has been received.    Jenny Smart  Dialysis Coordinator  [facility]  [phone]

## 2025-04-17 NOTE — NURSING
04/17/25 1529   Handoff Report   Given To CARLOS Cerda rn   Vital Signs   Temp 97.7 °F (36.5 °C)   Temp Source Oral   Pulse 95   BP (!) 127/90   Tunneled Central Line - Double Lumen  04/17/25 0930 Internal Jugular Left   Placement Date/Time: 04/17/25 0930   Present Prior to Hospital Arrival?: No  Inserted by: MD  Hand Hygiene: Performed  Barrier Precautions: Performed  Location: Internal Jugular Left  : [employer]  Lot Number: [id number]  Catheter Secured At...   Line Necessity Review CRRT/HD   Site Assessment No drainage;No swelling;No redness;No warmth   Dressing Type CHG impregnated dressing/sponge   Dressing Status Clean;Dry;Intact   Dressing Intervention First dressing   Date on Dressing 04/17/25   Dressing Due to be Changed 04/24/25   Distal Patency/Care normal saline locked   Proximal 1 Patency/Care normal saline locked   Post-Hemodialysis Assessment   Blood Volume Processed (Liters) 72 L   Dialyzer Clearance Lightly streaked   Duration of Treatment 180 minutes   Total UF (mL) 2500 mL   Net Fluid Removal 2000   Patient Response to Treatment Tolerated well. No issues or complaints. Vitals stable for duration of tx.  Net uf of 2 liters.  CVC with good flows.   Post-Hemodialysis Comments Deaccessed per mireille morgan

## 2025-04-17 NOTE — PLAN OF CARE
INTERVENTIONAL NEPHROLOGY PLAN OF CARE UPDATE         Patient Name: Tao Bee  SONA 1965    Date: 2025      Due to scheduling and current case load, I was asked to place a tunneled dialysis catheter instead of Dr. Ortez.    I introduced myself and my team to the pt at bedside. I explained the situation and that I would be the primary provider to place the catheter for him today. The pt denies issues with the plan and gives consent to proceed with my care.    Risks and benefits of tunneled dialysis catheter insertion. and intravenous conscious sedation was reviewed with the patient. The patient agrees to proceed with the intended procedure. Consents for both intravenous conscious sedation and procedure were signed and placed within the chart.     Please reach me with any questions.    Kyrie Laws,   Interventional Nephrology  Cell: [phone]

## 2025-04-17 NOTE — H&P
INTERVENTIONAL NEPHROLOGY HISTORY & PHYSICAL UPDATE         Patient Name: Tao Bee  SONA 1965    Date: 2025      History & Physical reviewed. Updates are as follows:  - None.  - TDC insertion today.    Please reach me with any questions.    Ismael Ortez MD  Interventional Nephrology  Cell: [phone]

## 2025-04-17 NOTE — PROGRESS NOTES
Pharmacist Renal Dose Adjustment Note    Tao Bee is a 59 y.o. male being treated with the medication enoxaparin    Patient Data:    Vital Signs (Most Recent):  Temp: 98.3 °F (36.8 °C) (04/16/25 2105)  Pulse: 91 (04/16/25 2105)  Resp: 18 (04/16/25 2105)  BP: 129/79 (04/16/25 2107)  SpO2: 98 % (04/16/25 2105) Vital Signs (72h Range):  Temp:  [98.3 °F (36.8 °C)-99.2 °F (37.3 °C)]   Pulse:  []   Resp:  [13-19]   BP: (113-165)/(76-95)   SpO2:  [96 %-100 %]      Recent Labs   Lab 04/15/25  1016 04/16/25  0518 04/16/25  1608   CREATININE 4.55* 4.56* 4.41*     Serum creatinine: 4.41 mg/dL (H) 04/16/25 1608  Estimated creatinine clearance: 16.7 mL/min (A)    Medication: enoxaparin 40mg subQ changed to 30mg subQ daily in accordance with renal function and [facility] Renal Dosing Guidelines.       Pharmacist's Name: Sydney Mckoy  Pharmacist's Extension: [phone]

## 2025-04-17 NOTE — PLAN OF CARE
Problem: Adult Inpatient Plan of Care  Goal: Plan of Care Review  Outcome: Progressing  Goal: Patient-Specific Goal (Individualized)  Outcome: Progressing  Goal: Absence of Hospital-Acquired Illness or Injury  Outcome: Progressing  Goal: Optimal Comfort and Wellbeing  Outcome: Progressing  Goal: Readiness for Transition of Care  Outcome: Progressing     Problem: Wound  Goal: Optimal Coping  Outcome: Progressing  Goal: Optimal Functional Ability  Outcome: Progressing  Goal: Absence of Infection Signs and Symptoms  Outcome: Progressing  Goal: Improved Oral Intake  Outcome: Progressing  Goal: Optimal Pain Control and Function  Outcome: Progressing  Goal: Skin Health and Integrity  Outcome: Progressing     Problem: Fluid Volume Excess  Goal: Fluid Balance  Outcome: Progressing     Problem: Infection  Goal: Absence of Infection Signs and Symptoms  Outcome: Progressing     Problem: Hemodialysis  Goal: Safe, Effective Therapy Delivery  Outcome: Progressing  Goal: Effective Tissue Perfusion  Outcome: Progressing  Goal: Absence of Infection Signs and Symptoms  Outcome: Progressing     Problem: Acute Kidney Injury/Impairment  Goal: Fluid and Electrolyte Balance  Outcome: Progressing  Goal: Improved Oral Intake  Outcome: Progressing  Goal: Effective Renal Function  Outcome: Progressing

## 2025-04-17 NOTE — PROGRESS NOTES
[facility] Progress Note      HPI:     Patient Name: Tao Bee  Admission Date: 4/15/2025  Hospital Length of Stay: 2 days  Code Status: Full Code   Attending Physician: Portillo Plasencia MD   Primary Care Physician: Penny Garsia FNP  Principal Problem:<principal problem not specified>      Today patient seen and examined  Labs, recent events, imaging and medications reviewed for this hospital stay  Other than hunger, patient feels fine  Swelling is better after IV bumex  Reports good UO  No dyspnea    Review of Systems:   Constitutional: Denies fever, fatigue, generalized weakness, night sweats, or acute weight change; +hungry  Skin: Denies wounds, no rashes, no itching, no new skin lesions  HEENT: Denies acute change in hearing or vision, tinnitus, or dysphagia  Respiratory:  Denies cough, shortness of breath, or wheezing  Cardiovascular: Denies chest pain, palpitations, or swelling  Gastrointestional: Denies abdominal pain, nausea, vomiting, diarrhea, or constipation  Genitourinary: Denies dysuria, hematuria, or incontinence; reports able to empty bladder  Musculoskeletal: Denies myalgias, back pain, flank pain, new decreased ROM or acute focal weakness  Neurological: Denies headaches, seizures, dizziness, paresthesias or asterixis  Hematological: Denies unusual bruising or bleeding  Psychiatric: Denies hallucinations, depression, or confusion      Medications:   Scheduled Meds:   ampicillin-sulbactam  3 g Intravenous Q24H    bumetanide  2 mg Intravenous BID    enoxparin  30 mg Subcutaneous Q24H (prophylaxis, 1700)    sodium bicarbonate  1,300 mg Oral TID     Continuous Infusions:      Vitals:     Vitals:    04/17/25 0413 04/17/25 0430 04/17/25 0523 04/17/25 0804   BP: 131/82   135/87   BP Location:    Left arm   Patient Position:    Lying   Pulse: 90   78   Resp:  17  18   Temp: 98.3 °F (36.8 °C)   98.7 °F (37.1 °C)   TempSrc: Oral   Oral   SpO2: 98%   98%   Weight:   62.1 kg (137 lb)   "  Height:             I/O last 3 completed shifts:  In: 660 [P.O.:660]  Out: -     Intake/Output Summary (Last 24 hours) at 4/17/2025 0912  Last data filed at 4/16/2025 1832  Gross per 24 hour   Intake 660 ml   Output --   Net 660 ml       Physical Exam:   General: no acute distress, awake, alert  Eyes: PERRLA, EOMI, conjunctiva clear, eyelids without swelling  HENT: atraumatic, oropharynx and nasal mucosa patent  Neck: full ROM, no JVD, no thyromegaly or lymphadenopathy  Respiratory: equal, unlabored, clear to auscultation A/P  Cardiovascular: RRR without rub; BL radial and pedal pulses felt  Edema: trace  Gastrointestinal: soft, non-tender, non-distended  Musculoskeletal: full ROM without limitation or discomfort  Integumentary: warm, dry; no rashes, wounds, or skin lesions  Neurological: oriented, appropriate, no acute deficits      Labs:     Chemistries:   Recent Labs   Lab 04/15/25  1016 04/16/25  0518 04/16/25  1608 04/17/25  0550    144 145 140   K 5.7* 5.4* 4.7 5.0    108* 110* 105   CO2 22 25 23 26   BUN 26.2* 23.5 22.7 23.0   CREATININE 4.55* 4.56* 4.41* 4.57*   CALCIUM 9.1 9.0 8.6 9.1   BILITOT 0.2 0.2  --  0.3   ALKPHOS 73 77  --  64   ALT 5 6  --  5   AST 12 12  --  12   GLUCOSE 95 88 102* 92   MG 1.80  --   --   --    PHOS  --  6.7*  --  6.8*        CBC/Anemia Labs: Coags:    Recent Labs   Lab 04/15/25  1016 04/16/25  0518 04/17/25  0550   WBC 17.55* 13.54* 12.71*   HGB 9.9* 8.8* 9.1*   HCT 32.6* 27.7* 29.4*   * 564* 577*   MCV 93.9 91.1 92.2   RDW 17.2* 17.2* 17.2*    No results for input(s): "PT", "INR", "APTT" in the last 168 hours.       Impression:     Recent E coli bacteremia,  in origin, with LEANDRO requiring dialysis  Patient did sign off AMA    LEANDRO not recovering at this point  Hyperphosphatemia  Metabolic acidosis on sodium bicarb  K stable today, has been hyperkalemic requiring kayexalate  Fluid status better today on IV bumex  Anemia stable    Plan:     Plans for tunneled " cath insertion today followed by hemodialysis (TTS)  Patient in agreement with plan  Start renvela 800 mg TID with meals  Labs in AM    SUNDAR Castellon

## 2025-04-17 NOTE — PROGRESS NOTES
[facility]  Hospital Medicine Progress Note        Chief Complaint: Inpatient Follow-up for     HPI:   59 y.o. male with a PMHx of neuropathy, LEANDRO requiring inpatient hemodialysis, E coli bacteremia, gastric ulcers, recent right-sided pyelonephritis, right renal abscess s/p IR drainage, mitral valve endocarditis who presented to [facility] on 4/15/2025 with c/o worsening bilateral lower extremity swelling x1 week.  Denied any chest pain, shortness of breath, fever, cough.  Of note, patient was hospitalized from 03/29/2025 through 04/08/2025 with LEANDRO requiring hemodialysis, E coli bacteremia, right-sided pyelonephritis and right renal abscess requiring IR drainage, MARY was done on 04/08 and highly suggestive of mitral valve endocarditis.  He ultimately left AMA on 04/08/2025.  Cardiology recommendations were to treat as vegetation with antibiotics and repeat MARY in 6 weeks.     ED Course: Initial ED Vital Signs included /90, HR 75, RR 19, SpO2 98% on room air, temperature 98.7° F. Labs were notable for WBC 17.55, hemoglobin 9.9, hematocrit 32.6, potassium 5.7, BUN 26.2, creatinine 4.55.  Urinalysis with trace blood, 500 leukocytes, 6-10 RBCs, greater than 100 WBCs.  Blood cultures x2 and urine culture pending.  ID and Nephrology has been consulted.  He was started on IV Unasyn in the ED.  Admitted to hospital medicine service for further medical management.        Patient currently readmitted for E coli bacteremia likely from a  source-he left AMA on admit, mitral valve infective endocarditis on IV Unasyn.  LEANDRO requiring hemodialysis on prior admit     Interval Hx:   Patient was seen and evaluated at bedside in the Dialysis Center, oxygenating well on room air.  No complaints at this time.  Patient believes that he can go and administer IV antibodies on his own-this is very hard to believe and likely will need placement unless patient is able to receive his antibiotic regimen on  dialysis for the next 6 weeks.  Awaiting further ID recommendations.  Case management following for dialysis arrangement.    Case was discussed with patient's nurse and  on the floor.    Objective/physical exam:  General appearance: Well-developed male in no apparent distress.  HENT: Atraumatic head. Moist mucous membranes of oral cavity.  Left internal jugular tunneled cath in place  Eyes: Normal extraocular movements.   Neck: Supple.   Lungs: Clear to auscultation bilaterally.   Heart: Regular rate and rhythm. S1 and S2 present. 2+ BLE edema.  Abdomen: Soft, non-distended, non-tender.    VITAL SIGNS: 24 HRS MIN & MAX LAST   Temp  Min: 97.7 °F (36.5 °C)  Max: 99 °F (37.2 °C) 98.4 °F (36.9 °C)   BP  Min: 116/82  Max: 147/83 116/82   Pulse  Min: 78  Max: 104  85   Resp  Min: 17  Max: 19 18   SpO2  Min: 96 %  Max: 98 % 98 %     I have reviewed the following labs:  Recent Labs   Lab 04/15/25  1016 04/16/25  0518 04/17/25  0550   WBC 17.55* 13.54* 12.71*   RBC 3.47* 3.04* 3.19*   HGB 9.9* 8.8* 9.1*   HCT 32.6* 27.7* 29.4*   MCV 93.9 91.1 92.2   MCH 28.5 28.9 28.5   MCHC 30.4* 31.8* 31.0*   RDW 17.2* 17.2* 17.2*   * 564* 577*   MPV 9.7 9.7 9.5     Recent Labs   Lab 04/15/25  1016 04/16/25  0518 04/16/25  1608 04/17/25  0550    144 145 140   K 5.7* 5.4* 4.7 5.0    108* 110* 105   CO2 22 25 23 26   BUN 26.2* 23.5 22.7 23.0   CREATININE 4.55* 4.56* 4.41* 4.57*   CALCIUM 9.1 9.0 8.6 9.1   MG 1.80  --   --   --    ALBUMIN 2.7* 2.6*  --  2.6*   ALKPHOS 73 77  --  64   ALT 5 6  --  5   AST 12 12  --  12   BILITOT 0.2 0.2  --  0.3     Microbiology Results (last 7 days)       Procedure Component Value Units Date/Time    Blood culture #1 **CANNOT BE ORDERED STAT** [[id number]]  (Normal) Collected: 04/15/25 1133    Order Status: Completed Specimen: Blood Updated: 04/17/25 1300     Blood Culture No Growth At 48 Hours    Blood culture #2 **CANNOT BE ORDERED STAT** [[id number]]  (Normal) Collected:  04/15/25 1133    Order Status: Completed Specimen: Blood Updated: 04/17/25 1300     Blood Culture No Growth At 48 Hours    Urine culture [[id number]] Collected: 04/15/25 1040    Order Status: Completed Specimen: Urine Updated: 04/17/25 0824     Urine Culture No Growth             See below for Radiology    Assessment/Plan:  E coli bacteremia- source   mitral valve infective endocarditis  Leukocytosis, improving  Recent acute right pyelonephritis with right perinephric abscess s/p IR drainage   LEANDRO -required hemodialysis on last admit  Hyperkalemia , improving  Bilateral lower extremity edema  Normocytic anemia     History:  neuropathy, LEANDRO requiring inpatient hemodialysis, E coli bacteremia, gastric ulcers, recent right-sided pyelonephritis, right renal abscess s/p IR drainage, mitral valve endocarditis        PLAN:   Tunneled clamp placed in left IJ 4/16  Continue IV Bumex b.i.d. per renal recs   Avoid Nephrotoxins  Continue IV Unasyn 3 g q.day, renally dosed   ID recommends Rocephin at discharge for 6 weeks  Blood cultures and urine cultures show no growth at 48 hours  Daily weights, I's and O's, elevate lower extremity on 2-3 pillows   Labs in AM       VTE prophylaxis:  SCDs    Patient condition:  Stable    Anticipated discharge and Disposition:   TBD      All diagnosis and differential diagnosis have been reviewed; assessment and plan has been documented; I have personally reviewed the labs and test results that are presently available; I have reviewed the patients medication list; I have reviewed the consulting providers response and recommendations. I have reviewed or attempted to review medical records based upon their availability    All of the patient's questions have been  addressed and answered. Patient's is agreeable to the above stated plan. I will continue to monitor closely and make adjustments to medical management as needed.    Portions of this note dictated using EMR integrated voice recognition  software, and may be subject to voice recognition errors not corrected at proofreading. Please contact writer for clarification if needed.   _____________________________________________________________________    Malnutrition Status:  Nutrition consulted. Most recent weight and BMI monitored-     Measurements:  Wt Readings from Last 1 Encounters:   04/17/25 62.1 kg (137 lb)   Body mass index is 21.46 kg/m².    Patient has been screened and assessed by RD.    Malnutrition Type:  Context:    Level:      Malnutrition Characteristic Summary:       Interventions/Recommendations (treatment strategy):        Scheduled Med:   ampicillin-sulbactam  3 g Intravenous Q24H    bumetanide  2 mg Intravenous BID    enoxparin  30 mg Subcutaneous Q24H (prophylaxis, 1700)    [START ON 4/18/2025] mupirocin   Nasal BID    sevelamer carbonate  800 mg Oral TID WM    sodium bicarbonate  1,300 mg Oral TID      Continuous Infusions:     PRN Meds:    Current Facility-Administered Medications:     acetaminophen, 650 mg, Oral, Q8H PRN    acetaminophen, 650 mg, Oral, Q4H PRN    hydrALAZINE, 10 mg, Intravenous, Q4H PRN    ondansetron, 4 mg, Intravenous, Q8H PRN     Radiology:  I have personally reviewed the following imaging and agree with the radiologist.     Cardiac catheterization  Procedure performed in the Invasive Lab    - See Procedure Log link below for nursing documentation    - See OpNote on Surgeries Tab for physician findings    - See Imaging Tab for radiologist dictation      Ok Belcher MD  Department of Hospital Medicine   [facility]   04/17/2025

## 2025-04-18 LAB
ALBUMIN SERPL-MCNC: 2.7 G/DL (ref 3.5–5)
ALBUMIN/GLOB SERPL: 0.5 RATIO (ref 1.1–2)
ALP SERPL-CCNC: 63 UNIT/L (ref 40–150)
ALT SERPL-CCNC: 6 UNIT/L (ref 0–55)
ANION GAP SERPL CALC-SCNC: 9 MEQ/L
AST SERPL-CCNC: 12 UNIT/L (ref 11–45)
BASOPHILS # BLD AUTO: 0.1 X10(3)/MCL
BASOPHILS NFR BLD AUTO: 0.7 %
BILIRUB SERPL-MCNC: 0.3 MG/DL
BUN SERPL-MCNC: 16.8 MG/DL (ref 8.4–25.7)
CALCIUM SERPL-MCNC: 9.2 MG/DL (ref 8.4–10.2)
CHLORIDE SERPL-SCNC: 103 MMOL/L (ref 98–107)
CO2 SERPL-SCNC: 26 MMOL/L (ref 22–29)
CREAT SERPL-MCNC: 3.32 MG/DL (ref 0.72–1.25)
CREAT/UREA NIT SERPL: 5
EOSINOPHIL # BLD AUTO: 0.38 X10(3)/MCL (ref 0–0.9)
EOSINOPHIL NFR BLD AUTO: 2.8 %
ERYTHROCYTE [DISTWIDTH] IN BLOOD BY AUTOMATED COUNT: 16.7 % (ref 11.5–17)
GFR SERPLBLD CREATININE-BSD FMLA CKD-EPI: 21 ML/MIN/1.73/M2
GLOBULIN SER-MCNC: 5 GM/DL (ref 2.4–3.5)
GLUCOSE SERPL-MCNC: 119 MG/DL (ref 74–100)
HCT VFR BLD AUTO: 31 % (ref 42–52)
HGB BLD-MCNC: 9.7 G/DL (ref 14–18)
IMM GRANULOCYTES # BLD AUTO: 0.33 X10(3)/MCL (ref 0–0.04)
IMM GRANULOCYTES NFR BLD AUTO: 2.5 %
LYMPHOCYTES # BLD AUTO: 3.21 X10(3)/MCL (ref 0.6–4.6)
LYMPHOCYTES NFR BLD AUTO: 24 %
MAGNESIUM SERPL-MCNC: 1.6 MG/DL (ref 1.6–2.6)
MCH RBC QN AUTO: 28.8 PG (ref 27–31)
MCHC RBC AUTO-ENTMCNC: 31.3 G/DL (ref 33–36)
MCV RBC AUTO: 92 FL (ref 80–94)
MONOCYTES # BLD AUTO: 1.57 X10(3)/MCL (ref 0.1–1.3)
MONOCYTES NFR BLD AUTO: 11.7 %
NEUTROPHILS # BLD AUTO: 7.78 X10(3)/MCL (ref 2.1–9.2)
NEUTROPHILS NFR BLD AUTO: 58.3 %
NRBC BLD AUTO-RTO: 0 %
PHOSPHATE SERPL-MCNC: 5.7 MG/DL (ref 2.3–4.7)
PLATELET # BLD AUTO: 549 X10(3)/MCL (ref 130–400)
PMV BLD AUTO: 9.3 FL (ref 7.4–10.4)
POTASSIUM SERPL-SCNC: 3.8 MMOL/L (ref 3.5–5.1)
PROT SERPL-MCNC: 7.7 GM/DL (ref 6.4–8.3)
RBC # BLD AUTO: 3.37 X10(6)/MCL (ref 4.7–6.1)
SODIUM SERPL-SCNC: 138 MMOL/L (ref 136–145)
WBC # BLD AUTO: 13.37 X10(3)/MCL (ref 4.5–11.5)

## 2025-04-18 PROCEDURE — 80053 COMPREHEN METABOLIC PANEL: CPT

## 2025-04-18 PROCEDURE — 25000003 PHARM REV CODE 250

## 2025-04-18 PROCEDURE — 63600175 PHARM REV CODE 636 W HCPCS: Performed by: INTERNAL MEDICINE

## 2025-04-18 PROCEDURE — 63600175 PHARM REV CODE 636 W HCPCS: Performed by: NURSE PRACTITIONER

## 2025-04-18 PROCEDURE — 21400001 HC TELEMETRY ROOM

## 2025-04-18 PROCEDURE — 25000003 PHARM REV CODE 250: Performed by: INTERNAL MEDICINE

## 2025-04-18 PROCEDURE — 11000001 HC ACUTE MED/SURG PRIVATE ROOM

## 2025-04-18 PROCEDURE — 97166 OT EVAL MOD COMPLEX 45 MIN: CPT

## 2025-04-18 PROCEDURE — 36415 COLL VENOUS BLD VENIPUNCTURE: CPT

## 2025-04-18 PROCEDURE — 25000003 PHARM REV CODE 250: Performed by: NURSE PRACTITIONER

## 2025-04-18 PROCEDURE — 83735 ASSAY OF MAGNESIUM: CPT | Performed by: STUDENT IN AN ORGANIZED HEALTH CARE EDUCATION/TRAINING PROGRAM

## 2025-04-18 PROCEDURE — 63600175 PHARM REV CODE 636 W HCPCS: Mod: JZ,TB | Performed by: NURSE PRACTITIONER

## 2025-04-18 PROCEDURE — 97161 PT EVAL LOW COMPLEX 20 MIN: CPT

## 2025-04-18 PROCEDURE — 84100 ASSAY OF PHOSPHORUS: CPT | Performed by: STUDENT IN AN ORGANIZED HEALTH CARE EDUCATION/TRAINING PROGRAM

## 2025-04-18 PROCEDURE — 85025 COMPLETE CBC W/AUTO DIFF WBC: CPT

## 2025-04-18 RX ORDER — SODIUM BICARBONATE 650 MG/1
650 TABLET ORAL 2 TIMES DAILY
Status: DISCONTINUED | OUTPATIENT
Start: 2025-04-18 | End: 2025-04-21 | Stop reason: HOSPADM

## 2025-04-18 RX ORDER — TALC
6 POWDER (GRAM) TOPICAL NIGHTLY PRN
Status: DISCONTINUED | OUTPATIENT
Start: 2025-04-18 | End: 2025-04-21 | Stop reason: HOSPADM

## 2025-04-18 RX ADMIN — Medication 6 MG: at 08:04

## 2025-04-18 RX ADMIN — SODIUM BICARBONATE 650 MG TABLET 650 MG: at 08:04

## 2025-04-18 RX ADMIN — AMPICILLIN SODIUM AND SULBACTAM SODIUM 3 G: 2; 1 INJECTION, POWDER, FOR SOLUTION INTRAMUSCULAR; INTRAVENOUS at 04:04

## 2025-04-18 RX ADMIN — BUMETANIDE 2 MG: 0.25 INJECTION INTRAMUSCULAR; INTRAVENOUS at 08:04

## 2025-04-18 RX ADMIN — SEVELAMER CARBONATE 800 MG: 800 TABLET, FILM COATED ORAL at 09:04

## 2025-04-18 RX ADMIN — SODIUM BICARBONATE 650 MG TABLET 1300 MG: at 09:04

## 2025-04-18 RX ADMIN — MUPIROCIN: 20 OINTMENT TOPICAL at 08:04

## 2025-04-18 RX ADMIN — ACETAMINOPHEN 650 MG: 325 TABLET, FILM COATED ORAL at 03:04

## 2025-04-18 RX ADMIN — ACETAMINOPHEN 650 MG: 325 TABLET, FILM COATED ORAL at 04:04

## 2025-04-18 RX ADMIN — ACETAMINOPHEN 650 MG: 325 TABLET, FILM COATED ORAL at 09:04

## 2025-04-18 RX ADMIN — ACETAMINOPHEN 650 MG: 325 TABLET, FILM COATED ORAL at 08:04

## 2025-04-18 RX ADMIN — ENOXAPARIN SODIUM 30 MG: 30 INJECTION SUBCUTANEOUS at 04:04

## 2025-04-18 NOTE — PROGRESS NOTES
[facility]  Hospital Medicine Progress Note        Chief Complaint: Inpatient Follow-up for     HPI:   59 y.o. male with a PMHx of neuropathy, LEANDRO requiring inpatient hemodialysis, E coli bacteremia, gastric ulcers, recent right-sided pyelonephritis, right renal abscess s/p IR drainage, mitral valve endocarditis who presented to [facility] on 4/15/2025 with c/o worsening bilateral lower extremity swelling x1 week.  Denied any chest pain, shortness of breath, fever, cough.  Of note, patient was hospitalized from 03/29/2025 through 04/08/2025 with LEANDRO requiring hemodialysis, E coli bacteremia, right-sided pyelonephritis and right renal abscess requiring IR drainage, MARY was done on 04/08 and highly suggestive of mitral valve endocarditis.  He ultimately left AMA on 04/08/2025.  Cardiology recommendations were to treat as vegetation with antibiotics and repeat MARY in 6 weeks.     ED Course: Initial ED Vital Signs included /90, HR 75, RR 19, SpO2 98% on room air, temperature 98.7° F. Labs were notable for WBC 17.55, hemoglobin 9.9, hematocrit 32.6, potassium 5.7, BUN 26.2, creatinine 4.55.  Urinalysis with trace blood, 500 leukocytes, 6-10 RBCs, greater than 100 WBCs.  Blood cultures x2 and urine culture pending.  ID and Nephrology has been consulted.  He was started on IV Unasyn in the ED.  Admitted to hospital medicine service for further medical management.        Patient currently readmitted for E coli bacteremia likely from a  source-he left AMA on admit, mitral valve infective endocarditis on IV Unasyn.  LEANDRO requiring hemodialysis on prior admit     Interval Hx:   Patient was seen and evaluated at bedside, oxygenating well on room air.  No complaints at this time.     Patient believes that he can administer IV antibiotics on his own for the next 6 weeks.  Awaiting further ID recommendations.  Case management following for dialysis arrangement and to set up abx.    Case was  discussed with patient's nurse and  on the floor.    Objective/physical exam:  General appearance: Well-developed male in no apparent distress.  HENT: Atraumatic head. Moist mucous membranes of oral cavity.  Left internal jugular tunneled cath in place  Eyes: Normal extraocular movements.   Neck: Supple.   Lungs: Clear to auscultation bilaterally.   Heart: Regular rate and rhythm. S1 and S2 present. 2+ BLE edema.  Abdomen: Soft, non-distended, non-tender.    VITAL SIGNS: 24 HRS MIN & MAX LAST   Temp  Min: 98 °F (36.7 °C)  Max: 98.9 °F (37.2 °C) 98.6 °F (37 °C)   BP  Min: 104/66  Max: 126/82 124/81   Pulse  Min: 90  Max: 111  90   Resp  Min: 17  Max: 18 18   SpO2  Min: 96 %  Max: 98 % 97 %     I have reviewed the following labs:  Recent Labs   Lab 04/16/25  0518 04/17/25  0550 04/18/25  0354   WBC 13.54* 12.71* 13.37*   RBC 3.04* 3.19* 3.37*   HGB 8.8* 9.1* 9.7*   HCT 27.7* 29.4* 31.0*   MCV 91.1 92.2 92.0   MCH 28.9 28.5 28.8   MCHC 31.8* 31.0* 31.3*   RDW 17.2* 17.2* 16.7   * 577* 549*   MPV 9.7 9.5 9.3     Recent Labs   Lab 04/15/25  1016 04/16/25  0518 04/16/25  1608 04/17/25  0550 04/18/25  0354    144 145 140 138   K 5.7* 5.4* 4.7 5.0 3.8    108* 110* 105 103   CO2 22 25 23 26 26   BUN 26.2* 23.5 22.7 23.0 16.8   CREATININE 4.55* 4.56* 4.41* 4.57* 3.32*   CALCIUM 9.1 9.0 8.6 9.1 9.2   MG 1.80  --   --   --  1.60   ALBUMIN 2.7* 2.6*  --  2.6* 2.7*   ALKPHOS 73 77  --  64 63   ALT 5 6  --  5 6   AST 12 12  --  12 12   BILITOT 0.2 0.2  --  0.3 0.3     Microbiology Results (last 7 days)       Procedure Component Value Units Date/Time    Blood culture #1 **CANNOT BE ORDERED STAT** [[id number]]  (Normal) Collected: 04/15/25 1133    Order Status: Completed Specimen: Blood Updated: 04/18/25 1300     Blood Culture No Growth At 72 Hours    Blood culture #2 **CANNOT BE ORDERED STAT** [[id number]]  (Normal) Collected: 04/15/25 1133    Order Status: Completed Specimen: Blood Updated: 04/18/25  1201     Blood Culture No Growth At 72 Hours    Urine culture [[id number]] Collected: 04/15/25 1040    Order Status: Completed Specimen: Urine Updated: 04/17/25 0824     Urine Culture No Growth             See below for Radiology    Assessment/Plan:  E coli bacteremia- source   mitral valve infective endocarditis  Leukocytosis, improving  Recent acute right pyelonephritis with right perinephric abscess s/p IR drainage   LEANDRO -required hemodialysis on last admit  Hyperkalemia , improving  Bilateral lower extremity edema  Normocytic anemia     History:  neuropathy, LEANDRO requiring inpatient hemodialysis, E coli bacteremia, gastric ulcers, recent right-sided pyelonephritis, right renal abscess s/p IR drainage, mitral valve endocarditis        PLAN:   Tunneled cath placed in left IJ 4/16  Continue IV Bumex b.i.d. per renal recs   Avoid Nephrotoxins  Continue IV Unasyn 3 g q.day, renally dosed   ID recommends Rocephin at discharge for 6 weeks  Awaiting further recs and dosage from ID  Blood cultures and urine cultures show no growth at 48 hours  Daily weights, I's and O's, elevate lower extremity on 2-3 pillows   Labs in AM       VTE prophylaxis:  SCDs    Patient condition:  Stable    Anticipated discharge and Disposition:   home with home health.       All diagnosis and differential diagnosis have been reviewed; assessment and plan has been documented; I have personally reviewed the labs and test results that are presently available; I have reviewed the patients medication list; I have reviewed the consulting providers response and recommendations. I have reviewed or attempted to review medical records based upon their availability    All of the patient's questions have been  addressed and answered. Patient's is agreeable to the above stated plan. I will continue to monitor closely and make adjustments to medical management as needed.    Portions of this note dictated using EMR integrated voice recognition software, and  may be subject to voice recognition errors not corrected at proofreading. Please contact writer for clarification if needed.   _____________________________________________________________________    Malnutrition Status:  Nutrition consulted. Most recent weight and BMI monitored-     Measurements:  Wt Readings from Last 1 Encounters:   04/18/25 60.6 kg (133 lb 9.6 oz)   Body mass index is 20.92 kg/m².    Patient has been screened and assessed by RD.    Malnutrition Type:  Context:    Level:      Malnutrition Characteristic Summary:       Interventions/Recommendations (treatment strategy):        Scheduled Med:   ampicillin-sulbactam  3 g Intravenous Q24H    enoxparin  30 mg Subcutaneous Q24H (prophylaxis, 1700)    mupirocin   Nasal BID    sodium bicarbonate  650 mg Oral BID      Continuous Infusions:     PRN Meds:    Current Facility-Administered Medications:     acetaminophen, 650 mg, Oral, Q8H PRN    acetaminophen, 650 mg, Oral, Q4H PRN    hydrALAZINE, 10 mg, Intravenous, Q4H PRN    ondansetron, 4 mg, Intravenous, Q8H PRN     Radiology:  I have personally reviewed the following imaging and agree with the radiologist.     Cardiac catheterization  Procedure performed in the Invasive Lab    - See Procedure Log link below for nursing documentation    - See OpNote on Surgeries Tab for physician findings    - See Imaging Tab for radiologist dictation      Ok Belcher MD  Department of Hospital Medicine   [facility]   04/18/2025

## 2025-04-18 NOTE — PT/OT/SLP EVAL
Physical Therapy Evaluation and Discharge Note    Patient Name:  Tao Bee   MRN:  [id number]    Recommendations:     Discharge therapy intensity: No Therapy Indicated   Discharge Equipment Recommendations: none   Barriers to discharge: Ongoing medical needs    Assessment:     Tao Bee is a 59 y.o. male admitted with a medical diagnosis of ecoli bacteremia, mitral valve infective endocarditis, leukocytosis, s/p tunneled cath placement, recent R pyelonephritis c R perinephric abscess s/p IR drainage.  Pt has been ambulating in room I since admission, states he does not need therapy and he feels comfortable mobilizing. At this time, patient is functioning at their prior level of function and does not require further acute PT services.     Recent Surgery: Procedure(s) (LRB):  Insertion, Catheter, Central Venous, Hemodialysis (N/A) 1 Day Post-Op    Plan:     During this hospitalization, patient does not require further acute PT services.  Please re-consult if situation changes.      Subjective     Chief Complaint: none noted   Patient/Family Comments/goals: to go home  Pain/Comfort:  Pain Rating 1: 0/10    Patients cultural, spiritual, Buddhism conflicts given the current situation:      Living Environment:  Pt lives alone in a SLH with 2 steps to enter with rails. Pt states family lives close by and can stay with him or check on him as much as needed. NO falls.   Prior to admission, patients level of function was Adelita with SC.  Equipment used at home: cane, straight.  DME owned (not currently used): none.  Upon discharge, patient will have assistance from family.    Objective:     Communicated with nsg prior to session.  Patient found HOB elevated with peripheral IV upon PT entry to room.    General Precautions: Standard,      Orthopedic Precautions:N/A   Braces: N/A  Respiratory Status: Room air    Exams:  RLE Strength: WFL  LLE Strength: WFL    Functional Mobility:  Bed Mobility:     Supine to Sit:  independence  Sit to Supine: independence  Transfers:     Sit to Stand:  modified independence with straight cane  Toilet Transfer: modified independence with  straight cane  using  Step Transfer  Gait: Pt ambulated to/from bathroom and then around room with cane with supervision. Antalgic stepping pattern but no LOB present.     AM-PAC 6 CLICK MOBILITY  Total Score:24       Treatment and Education:    Patient provided with verbal education education regarding PT role/goals/POC and discharge/DME recommendations.  Understanding was verbalized.     Patient left sitting edge of bed with all lines intact and call button in reach.    GOALS:   Multidisciplinary Problems       Physical Therapy Goals       Not on file                    History:     Past Medical History:   Diagnosis Date    Neuropathy        Past Surgical History:   Procedure Laterality Date    ESOPHAGOGASTRODUODENOSCOPY N/A 03/30/2025    Procedure: EGD (ESOPHAGOGASTRODUODENOSCOPY);  Surgeon: Stan Elder MD;  Location: [facility];  Service: Endoscopy;  Laterality: N/A;    LEG SURGERY Right        Time Tracking:     PT Received On: 04/18/25  PT Start Time: 0834     PT Stop Time: 0842  PT Total Time (min): 8 min     Billable Minutes: Evaluation 8      04/18/2025

## 2025-04-18 NOTE — PT/OT/SLP EVAL
Occupational Therapy   Evaluation and Discharge Note    Name: Tao Bee  MRN: [id number]    Recommendations:     Discharge therapy intensity: No Therapy Indicated   Discharge Equipment Recommendations: none  Barriers to discharge:  None    Assessment:     Tao Bee is a 59 y.o. male with a medical diagnosis of  ecoli bacteremia, mitral valve infective endocarditis, leukocytosis, s/p tunneled cath placement, recent R pyelonephritis c R perinephric abscess s/p IR drainage. On eval, patient presents at functional baseline. Skilled OT services are not warranted at this time.    Plan:     OT to sign off as acute OT services are not warranted at this time.  Please re-consult if situation changes during this hospitalization.    Plan of Care Reviewed with: patient    Subjective     Chief Complaint: none stated  Patient/Family Comments/goals: none stated    Occupational Profile:  Living Environment: lives in a SLH, 2 REGIS; walk in shower with built in chair  Previous level of function: Amauri  Roles and Routines: father  Equipment Used at Home: cane, straight  Assistance upon Discharge: family    Pain/Comfort:  Pain Rating 1: 0/10    Patients cultural, spiritual, Orthodoxy conflicts given the current situation: no    Objective:     OT communicated with NSG prior to session.      Patient was found HOB elevated with peripheral IV upon OT entry to room.    General Precautions: Standard  Orthopedic Precautions: N/A  Braces: N/A    Vital Signs: Respiratory Status: on room air    Bed Mobility:    Patient completed Supine to Sit with independence  Patient completed Sit to Supine with independence    Functional Mobility/Transfers:  Patient completed Sit <> Stand Transfer with modified independence  with  straight cane   Patient completed Toilet Transfer Step Transfer technique with modified independence with  straight cane  Functional Mobility: ambulating to/from bathroom with Amauri using single cane    Activities of Daily  Living:  Lower Body Dressing: independence to doff/don socks    [facility] 6 Click ADL:  AMPA Total Score: 24    Functional Cognition:  Orientation: oriented to Person, Place, Time, and Situation    Visual Perceptual Skills:  Intact    Upper Extremity Function:  Right Upper Extremity:   WFL    Left Upper Extremity:  WFL    Therapeutic Positioning  Risk for acquired pressure injuries is decreased due to intact sensation, continence, and ability to mobilize independently .    OT interventions performed during the course of today's session in an effort to prevent and/or reduce acquired pressure injuries:   Education was provided on benefits of and recommendations for therapeutic positioning    Skin assessment: all bony prominences were assessed    Findings: no redness or breakdown noted    OT recommendations for therapeutic positioning throughout hospitalization:   Follow [facility] Pressure Injury Prevention Protocol    Patient Education:  Patient provided with verbal education education regarding OT role/goals/POC, fall prevention, safety awareness, Discharge/DME recommendations, and pressure ulcer prevention.  Understanding was verbalized.     Patient left HOB elevated with all lines intact and call button in reach.    History:     Past Medical History:   Diagnosis Date    Neuropathy          Past Surgical History:   Procedure Laterality Date    ESOPHAGOGASTRODUODENOSCOPY N/A 03/30/2025    Procedure: EGD (ESOPHAGOGASTRODUODENOSCOPY);  Surgeon: Stan Elder MD;  Location: [facility];  Service: Endoscopy;  Laterality: N/A;    LEG SURGERY Right        Time Tracking:     OT Date of Treatment:    OT Start Time: 0834  OT Stop Time: 0842  OT Total Time (min): 8 min    Billable Minutes:Evaluation mod    4/18/2025

## 2025-04-19 PROBLEM — D63.1 ANEMIA OF CHRONIC RENAL FAILURE: Status: ACTIVE | Noted: 2025-04-19

## 2025-04-19 PROBLEM — D64.9 ANEMIA: Status: ACTIVE | Noted: 2025-04-19

## 2025-04-19 PROBLEM — N18.9 ANEMIA OF CHRONIC RENAL FAILURE: Status: ACTIVE | Noted: 2025-04-19

## 2025-04-19 LAB
ANION GAP SERPL CALC-SCNC: 13 MEQ/L
BASOPHILS # BLD AUTO: 0.09 X10(3)/MCL
BASOPHILS NFR BLD AUTO: 0.6 %
BUN SERPL-MCNC: 21.9 MG/DL (ref 8.4–25.7)
CALCIUM SERPL-MCNC: 9.2 MG/DL (ref 8.4–10.2)
CHLORIDE SERPL-SCNC: 101 MMOL/L (ref 98–107)
CO2 SERPL-SCNC: 24 MMOL/L (ref 22–29)
CREAT SERPL-MCNC: 4.31 MG/DL (ref 0.72–1.25)
CREAT/UREA NIT SERPL: 5
EOSINOPHIL # BLD AUTO: 0.32 X10(3)/MCL (ref 0–0.9)
EOSINOPHIL NFR BLD AUTO: 2.3 %
ERYTHROCYTE [DISTWIDTH] IN BLOOD BY AUTOMATED COUNT: 16.5 % (ref 11.5–17)
FERRITIN SERPL-MCNC: 246.12 NG/ML (ref 21.81–274.66)
GFR SERPLBLD CREATININE-BSD FMLA CKD-EPI: 15 ML/MIN/1.73/M2
GLUCOSE SERPL-MCNC: 90 MG/DL (ref 74–100)
HCT VFR BLD AUTO: 29.6 % (ref 42–52)
HGB BLD-MCNC: 9.1 G/DL (ref 14–18)
IMM GRANULOCYTES # BLD AUTO: 0.32 X10(3)/MCL (ref 0–0.04)
IMM GRANULOCYTES NFR BLD AUTO: 2.3 %
IRON SATN MFR SERPL: 18 % (ref 20–50)
IRON SERPL-MCNC: 45 UG/DL (ref 65–175)
LYMPHOCYTES # BLD AUTO: 3.28 X10(3)/MCL (ref 0.6–4.6)
LYMPHOCYTES NFR BLD AUTO: 23.1 %
MAGNESIUM SERPL-MCNC: 1.7 MG/DL (ref 1.6–2.6)
MCH RBC QN AUTO: 28.3 PG (ref 27–31)
MCHC RBC AUTO-ENTMCNC: 30.7 G/DL (ref 33–36)
MCV RBC AUTO: 92.2 FL (ref 80–94)
MONOCYTES # BLD AUTO: 1.69 X10(3)/MCL (ref 0.1–1.3)
MONOCYTES NFR BLD AUTO: 11.9 %
NEUTROPHILS # BLD AUTO: 8.49 X10(3)/MCL (ref 2.1–9.2)
NEUTROPHILS NFR BLD AUTO: 59.8 %
NRBC BLD AUTO-RTO: 0 %
PHOSPHATE SERPL-MCNC: 6.6 MG/DL (ref 2.3–4.7)
PLATELET # BLD AUTO: 505 X10(3)/MCL (ref 130–400)
PMV BLD AUTO: 9.3 FL (ref 7.4–10.4)
POCT GLUCOSE: 122 MG/DL (ref 70–110)
POCT GLUCOSE: 97 MG/DL (ref 70–110)
POTASSIUM SERPL-SCNC: 4 MMOL/L (ref 3.5–5.1)
RBC # BLD AUTO: 3.21 X10(6)/MCL (ref 4.7–6.1)
SODIUM SERPL-SCNC: 138 MMOL/L (ref 136–145)
TIBC SERPL-MCNC: 207 UG/DL (ref 60–240)
TIBC SERPL-MCNC: 252 UG/DL (ref 250–450)
TRANSFERRIN SERPL-MCNC: 239 MG/DL (ref 174–364)
WBC # BLD AUTO: 14.19 X10(3)/MCL (ref 4.5–11.5)

## 2025-04-19 PROCEDURE — 21400001 HC TELEMETRY ROOM

## 2025-04-19 PROCEDURE — 11000001 HC ACUTE MED/SURG PRIVATE ROOM

## 2025-04-19 PROCEDURE — 85025 COMPLETE CBC W/AUTO DIFF WBC: CPT | Performed by: STUDENT IN AN ORGANIZED HEALTH CARE EDUCATION/TRAINING PROGRAM

## 2025-04-19 PROCEDURE — 90935 HEMODIALYSIS ONE EVALUATION: CPT | Mod: ,,,

## 2025-04-19 PROCEDURE — 82728 ASSAY OF FERRITIN: CPT

## 2025-04-19 PROCEDURE — 25000003 PHARM REV CODE 250: Performed by: INTERNAL MEDICINE

## 2025-04-19 PROCEDURE — 83735 ASSAY OF MAGNESIUM: CPT | Performed by: STUDENT IN AN ORGANIZED HEALTH CARE EDUCATION/TRAINING PROGRAM

## 2025-04-19 PROCEDURE — 63600175 PHARM REV CODE 636 W HCPCS

## 2025-04-19 PROCEDURE — 25000003 PHARM REV CODE 250

## 2025-04-19 PROCEDURE — 63600175 PHARM REV CODE 636 W HCPCS: Performed by: INTERNAL MEDICINE

## 2025-04-19 PROCEDURE — 25000003 PHARM REV CODE 250: Performed by: NURSE PRACTITIONER

## 2025-04-19 PROCEDURE — 83550 IRON BINDING TEST: CPT

## 2025-04-19 PROCEDURE — G0545 PR VISIT INHERENT TO INPT OR OBS CARE, INFECTIOUS DISEASE: HCPCS | Mod: ,,, | Performed by: INTERNAL MEDICINE

## 2025-04-19 PROCEDURE — 02HV33Z INSERTION OF INFUSION DEVICE INTO SUPERIOR VENA CAVA, PERCUTANEOUS APPROACH: ICD-10-PCS | Performed by: STUDENT IN AN ORGANIZED HEALTH CARE EDUCATION/TRAINING PROGRAM

## 2025-04-19 PROCEDURE — 80048 BASIC METABOLIC PNL TOTAL CA: CPT | Performed by: STUDENT IN AN ORGANIZED HEALTH CARE EDUCATION/TRAINING PROGRAM

## 2025-04-19 PROCEDURE — 80100016 HC MAINTENANCE HEMODIALYSIS

## 2025-04-19 PROCEDURE — 99233 SBSQ HOSP IP/OBS HIGH 50: CPT | Mod: ,,, | Performed by: INTERNAL MEDICINE

## 2025-04-19 PROCEDURE — 36415 COLL VENOUS BLD VENIPUNCTURE: CPT | Performed by: STUDENT IN AN ORGANIZED HEALTH CARE EDUCATION/TRAINING PROGRAM

## 2025-04-19 PROCEDURE — 36569 INSJ PICC 5 YR+ W/O IMAGING: CPT

## 2025-04-19 PROCEDURE — 84100 ASSAY OF PHOSPHORUS: CPT | Performed by: STUDENT IN AN ORGANIZED HEALTH CARE EDUCATION/TRAINING PROGRAM

## 2025-04-19 PROCEDURE — C1751 CATH, INF, PER/CENT/MIDLINE: HCPCS

## 2025-04-19 RX ORDER — SODIUM CHLORIDE 0.9 % (FLUSH) 0.9 %
10 SYRINGE (ML) INJECTION EVERY 12 HOURS PRN
Status: DISCONTINUED | OUTPATIENT
Start: 2025-04-19 | End: 2025-04-21 | Stop reason: HOSPADM

## 2025-04-19 RX ORDER — CEFTRIAXONE 2 G/1
2 INJECTION, POWDER, FOR SOLUTION INTRAMUSCULAR; INTRAVENOUS
Status: DISCONTINUED | OUTPATIENT
Start: 2025-04-19 | End: 2025-04-21 | Stop reason: HOSPADM

## 2025-04-19 RX ORDER — SEVELAMER CARBONATE 800 MG/1
800 TABLET, FILM COATED ORAL
Status: DISCONTINUED | OUTPATIENT
Start: 2025-04-19 | End: 2025-04-21 | Stop reason: HOSPADM

## 2025-04-19 RX ADMIN — ACETAMINOPHEN 650 MG: 325 TABLET, FILM COATED ORAL at 01:04

## 2025-04-19 RX ADMIN — CEFTRIAXONE SODIUM 2 G: 2 INJECTION, POWDER, FOR SOLUTION INTRAMUSCULAR; INTRAVENOUS at 01:04

## 2025-04-19 RX ADMIN — ENOXAPARIN SODIUM 30 MG: 30 INJECTION SUBCUTANEOUS at 04:04

## 2025-04-19 RX ADMIN — MUPIROCIN: 20 OINTMENT TOPICAL at 08:04

## 2025-04-19 RX ADMIN — SODIUM CHLORIDE 125 MG: 9 INJECTION, SOLUTION INTRAVENOUS at 11:04

## 2025-04-19 RX ADMIN — SODIUM BICARBONATE 650 MG TABLET 650 MG: at 08:04

## 2025-04-19 RX ADMIN — SEVELAMER CARBONATE 800 MG: 800 TABLET, FILM COATED ORAL at 04:04

## 2025-04-19 RX ADMIN — ACETAMINOPHEN 650 MG: 325 TABLET, FILM COATED ORAL at 08:04

## 2025-04-19 RX ADMIN — ACETAMINOPHEN 650 MG: 325 TABLET, FILM COATED ORAL at 06:04

## 2025-04-19 RX ADMIN — SEVELAMER CARBONATE 800 MG: 800 TABLET, FILM COATED ORAL at 11:04

## 2025-04-19 NOTE — PLAN OF CARE
Problem: Adult Inpatient Plan of Care  Goal: Plan of Care Review  4/18/2025 1945 by Shwetha Villarreal LPN  Outcome: Progressing  4/18/2025 1945 by Shwetha Villarreal LPN  Outcome: Progressing  Goal: Patient-Specific Goal (Individualized)  4/18/2025 1945 by Shwetha Villarreal LPN  Outcome: Progressing  4/18/2025 1945 by Shwetha Villarreal LPN  Outcome: Progressing  Goal: Absence of Hospital-Acquired Illness or Injury  4/18/2025 1945 by Shwetha Villarreal LPN  Outcome: Progressing  4/18/2025 1945 by Shwetha Villarreal LPN  Outcome: Progressing  Goal: Optimal Comfort and Wellbeing  4/18/2025 1945 by Shwetha Villarreal LPN  Outcome: Progressing  4/18/2025 1945 by Shwetha Villarreal LPN  Outcome: Progressing  Goal: Readiness for Transition of Care  4/18/2025 1945 by Shwetha Villarreal LPN  Outcome: Progressing  4/18/2025 1945 by Shwetha Villarreal LPN  Outcome: Progressing

## 2025-04-19 NOTE — TREATMENT PLAN
OUTPATIENT IV ANTIBIOTIC TREATMENT PLAN    Diagnosis: E coli bacteremia with presumed mitral valve endocarditis due to abnormal MARY    Antibiotics:  Ceftriaxone 2g IV daily, last dose 5/14/25    Lab Monitoring Every Monday (results to be faxed to the [facility], attn KYMBERLY Murrieta): CBC, CMP    Follow-up: with KYMBERLY Murrieta at the [facility] within 1 week of discharge      Vee Vigil MD  Infectious Disease  [facility]

## 2025-04-19 NOTE — PROGRESS NOTES
Renal _ HD   Pt seen dialyzing  No acute complaints  Ready to go home    Noted continued iron deficiency  IV iron x 1    hyperphosphatemia    Pt aaox4  No complaints  Afebrile  RRR  Abd soft, nontender  No edema    Bumex dc'd    Plans for DC with IV antibiotics  Also needs HD outpatient

## 2025-04-19 NOTE — PROGRESS NOTES
[facility]  Hospital Medicine Progress Note        Chief Complaint: Inpatient Follow-up for     HPI:   59 y.o. male with a PMHx of neuropathy, LEANDRO requiring inpatient hemodialysis, E coli bacteremia, gastric ulcers, recent right-sided pyelonephritis, right renal abscess s/p IR drainage, mitral valve endocarditis who presented to [facility] on 4/15/2025 with c/o worsening bilateral lower extremity swelling x1 week.  Denied any chest pain, shortness of breath, fever, cough.  Of note, patient was hospitalized from 03/29/2025 through 04/08/2025 with LEANDRO requiring hemodialysis, E coli bacteremia, right-sided pyelonephritis and right renal abscess requiring IR drainage, MARY was done on 04/08 and highly suggestive of mitral valve endocarditis.  He ultimately left AMA on 04/08/2025.  Cardiology recommendations were to treat as vegetation with antibiotics and repeat MARY in 6 weeks.     ED Course: Initial ED Vital Signs included /90, HR 75, RR 19, SpO2 98% on room air, temperature 98.7° F. Labs were notable for WBC 17.55, hemoglobin 9.9, hematocrit 32.6, potassium 5.7, BUN 26.2, creatinine 4.55.  Urinalysis with trace blood, 500 leukocytes, 6-10 RBCs, greater than 100 WBCs.  Blood cultures x2 and urine culture pending.  ID and Nephrology has been consulted.  He was started on IV Unasyn in the ED.  Admitted to hospital medicine service for further medical management.        Patient currently readmitted for E coli bacteremia likely from a  source-he left AMA on admit, mitral valve infective endocarditis on IV Unasyn.  LEANDRO requiring hemodialysis on prior admit     Interval Hx:   Patient was seen and evaluated at bedside, oxygenating well on room air.  No complaints at this time.     Patient believes that he can administer IV antibiotics on his own for the next 6 weeks.  Awaiting on PICC line.  ID recommends ceftriaxone 2 g IV daily until 5/14/25 with weekly CBC and CMPs.  Case management  following for dialysis arrangement and to set up abx at home.    Case was discussed with patient's nurse and  on the floor.    Objective/physical exam:  General appearance: Well-developed male in no apparent distress.  HENT: Atraumatic head. Moist mucous membranes of oral cavity.  Left internal jugular tunneled cath in place  Eyes: Normal extraocular movements.   Neck: Supple.   Lungs: Clear to auscultation bilaterally.   Heart: Regular rate and rhythm. S1 and S2 present. 2+ BLE edema.  Abdomen: Soft, non-distended, non-tender.    VITAL SIGNS: 24 HRS MIN & MAX LAST   Temp  Min: 98 °F (36.7 °C)  Max: 98.6 °F (37 °C) 98.1 °F (36.7 °C)   BP  Min: 105/68  Max: 124/81 112/78   Pulse  Min: 83  Max: 99  87   Resp  Min: 16  Max: 18 18   SpO2  Min: 95 %  Max: 97 % 95 %     I have reviewed the following labs:  Recent Labs   Lab 04/17/25  0550 04/18/25  0354 04/19/25  0238   WBC 12.71* 13.37* 14.19*   RBC 3.19* 3.37* 3.21*   HGB 9.1* 9.7* 9.1*   HCT 29.4* 31.0* 29.6*   MCV 92.2 92.0 92.2   MCH 28.5 28.8 28.3   MCHC 31.0* 31.3* 30.7*   RDW 17.2* 16.7 16.5   * 549* 505*   MPV 9.5 9.3 9.3     Recent Labs   Lab 04/15/25  1016 04/16/25  0518 04/16/25  1608 04/17/25  0550 04/18/25  0354 04/19/25  0238    144   < > 140 138 138   K 5.7* 5.4*   < > 5.0 3.8 4.0    108*   < > 105 103 101   CO2 22 25   < > 26 26 24   BUN 26.2* 23.5   < > 23.0 16.8 21.9   CREATININE 4.55* 4.56*   < > 4.57* 3.32* 4.31*   CALCIUM 9.1 9.0   < > 9.1 9.2 9.2   MG 1.80  --   --   --  1.60 1.70   ALBUMIN 2.7* 2.6*  --  2.6* 2.7*  --    ALKPHOS 73 77  --  64 63  --    ALT 5 6  --  5 6  --    AST 12 12  --  12 12  --    BILITOT 0.2 0.2  --  0.3 0.3  --     < > = values in this interval not displayed.     Microbiology Results (last 7 days)       Procedure Component Value Units Date/Time    Blood culture #1 **CANNOT BE ORDERED STAT** [[id number]]  (Normal) Collected: 04/15/25 1133    Order Status: Completed Specimen: Blood Updated:  04/19/25 1300     Blood Culture No Growth At 96 Hours    Blood culture #2 **CANNOT BE ORDERED STAT** [[id number]]  (Normal) Collected: 04/15/25 1133    Order Status: Completed Specimen: Blood Updated: 04/19/25 1300     Blood Culture No Growth At 96 Hours    Urine culture [[id number]] Collected: 04/15/25 1040    Order Status: Completed Specimen: Urine Updated: 04/17/25 0824     Urine Culture No Growth             See below for Radiology    Assessment/Plan:  E coli bacteremia- source   mitral valve infective endocarditis  Leukocytosis, improving  Recent acute right pyelonephritis with right perinephric abscess s/p IR drainage   LEANDRO -required hemodialysis on last admit  Hyperkalemia , improving  Bilateral lower extremity edema  Normocytic anemia     History:  neuropathy, LEANDRO requiring inpatient hemodialysis, E coli bacteremia, gastric ulcers, recent right-sided pyelonephritis, right renal abscess s/p IR drainage, mitral valve endocarditis     PLAN:   Tunneled cath placed in left IJ 4/16  Continue IV Bumex b.i.d. per renal recs   Avoid Nephrotoxins  Continue IV Rocephin 2 g IV daily until 05/14/2025 with weekly CBC and CMP  Follow up with ID in 1 week post discharge.  Orders for a PICC line placed, awaiting PICC placement  Awaiting dialysis placement approval  Case management following       VTE prophylaxis:  SCDs    Patient condition:  Stable    Anticipated discharge and Disposition:   home with home health.       All diagnosis and differential diagnosis have been reviewed; assessment and plan has been documented; I have personally reviewed the labs and test results that are presently available; I have reviewed the patients medication list; I have reviewed the consulting providers response and recommendations. I have reviewed or attempted to review medical records based upon their availability    All of the patient's questions have been  addressed and answered. Patient's is agreeable to the above stated plan. I will  continue to monitor closely and make adjustments to medical management as needed.    Portions of this note dictated using EMR integrated voice recognition software, and may be subject to voice recognition errors not corrected at proofreading. Please contact writer for clarification if needed.   _____________________________________________________________________    Malnutrition Status:  Nutrition consulted. Most recent weight and BMI monitored-     Measurements:  Wt Readings from Last 1 Encounters:   04/19/25 61 kg (134 lb 6.4 oz)   Body mass index is 21.05 kg/m².    Patient has been screened and assessed by RD.    Malnutrition Type:  Context:    Level:      Malnutrition Characteristic Summary:       Interventions/Recommendations (treatment strategy):        Scheduled Med:   cefTRIAXone (Rocephin) IV (PEDS and ADULTS)  2 g Intravenous Q24H    enoxparin  30 mg Subcutaneous Q24H (prophylaxis, 1700)    mupirocin   Nasal BID    sevelamer carbonate  800 mg Oral TID WM    sodium bicarbonate  650 mg Oral BID      Continuous Infusions:     PRN Meds:    Current Facility-Administered Medications:     acetaminophen, 650 mg, Oral, Q8H PRN    acetaminophen, 650 mg, Oral, Q4H PRN    hydrALAZINE, 10 mg, Intravenous, Q4H PRN    melatonin, 6 mg, Oral, Nightly PRN    ondansetron, 4 mg, Intravenous, Q8H PRN     Radiology:  I have personally reviewed the following imaging and agree with the radiologist.     Cardiac catheterization  Procedure performed in the Invasive Lab    - See Procedure Log link below for nursing documentation    - See OpNote on Surgeries Tab for physician findings    - See Imaging Tab for radiologist dictation      Ok Belcher MD  Department of Hospital Medicine   [facility]   04/19/2025

## 2025-04-19 NOTE — NURSING
04/19/25 1136   Post-Hemodialysis Assessment   Rinseback Volume (mL) 500 mL   Blood Volume Processed (Liters) 84 L   Dialyzer Clearance Moderately streaked   Duration of Treatment 210 minutes   Additional Fluid Intake (mL) 0 mL   Total UF (mL) 3000 mL   Net Fluid Removal 2500   Patient Response to Treatment Pt tolerated well with no issues. New caps applied.

## 2025-04-19 NOTE — PLAN OF CARE
Possible discharge home today with IV antibiotics.  Referral sent to Em via epic . Spoke with Cody and she will virtually teach the patient the IV administration today.  The patient stated that he has had [facility] in the past. Referral sent via epic.  Spoke to Ayleen and she stated that the patient is current and they will be able to re-admit him with the IV antibiotic.     1632 Cody with [facility] taught the patient.

## 2025-04-19 NOTE — PROGRESS NOTES
Progress Note                                                           Infectious disease   Admit Date: 4/15/2025    SUBJECTIVE:     Follow-up For:  <principal problem not specified>    HPI/Interval history: patient doing well, no fever. No flank pain. Resumed dialysis this admission. Approved for dc with home health today.      OBJECTIVE:     Vital Signs Range (Last 24H):  Temp:  [98 °F (36.7 °C)-98.6 °F (37 °C)]   Pulse:  [83-99]   Resp:  [16-18]   BP: (105-124)/(68-81)   SpO2:  [95 %-97 %]     Physical Exam:  Constitutional:  No acute distress, AAO x , in great spirits   HEENT: no oral exudates or ulcers   Skin: No rashes, ulcers or new lesions.    Laboratory:  CBC:   Recent Labs   Lab 04/19/25  0238   WBC 14.19*   RBC 3.21*   HGB 9.1*   HCT 29.6*   *   MCV 92.2   MCH 28.3   MCHC 30.7*     BMP:   Recent Labs   Lab 04/19/25  0238      K 4.0      CO2 24   BUN 21.9   CREATININE 4.31*   CALCIUM 9.2   MG 1.70     CMP:   Recent Labs   Lab 04/18/25  0354 04/19/25  0238   CALCIUM 9.2 9.2   ALBUMIN 2.7*  --     138   K 3.8 4.0   CO2 26 24    101   BUN 16.8 21.9   CREATININE 3.32* 4.31*   ALKPHOS 63  --    ALT 6  --    AST 12  --    BILITOT 0.3  --      Microbiology Results (last 7 days)       Procedure Component Value Units Date/Time    Blood culture #1 **CANNOT BE ORDERED STAT** [[id number]]  (Normal) Collected: 04/15/25 1133    Order Status: Completed Specimen: Blood Updated: 04/18/25 1300     Blood Culture No Growth At 72 Hours    Blood culture #2 **CANNOT BE ORDERED STAT** [[id number]]  (Normal) Collected: 04/15/25 1133    Order Status: Completed Specimen: Blood Updated: 04/18/25 1201     Blood Culture No Growth At 72 Hours    Urine culture [[id number]] Collected: 04/15/25 1040    Order Status: Completed Specimen: Urine Updated: 04/17/25 0824     Urine Culture No Growth            Labs: I personally reviewed and interpreted the above lab results.    Diagnostic  Results:      ASSESSMENT/PLAN:     Active Hospital Problems    Diagnosis  POA    Anemia of chronic renal failure [N18.9, D63.1]  Unknown    Anemia [D64.9]  Unknown    LEANDRO (acute kidney injury) [N17.9]  Yes    Hyperkalemia [E87.5]  Yes      Resolved Hospital Problems   No resolved problems to display.       ASSESSMENT:  Recent E coli bacteremia (3/29 and 3/30) associated with right renal and perinephric abscesses.  Blood cultures have been negative since 04/03 (negative also 4/15 after being off antibiotics for a few days).  Presumed infective endocarditis involving mitral valve based on MARY findings  Leukocytosis, improved from admission, but not yet resolved.  Likely due to residual right renal abscess.  Recent acute renal failure still requiring dialysis     PLAN:  In preparation for dc home today, switch from Unasyn to ceftriaxone 2g IV daily, to be continued through 5/14/25  F/U in ID clinic in 1 week - please see separate note for outpatient IV antibiotic Tx plan     ID will sign off now.  Discussed with other specialties - yes, hospitalist Dr Belcher     Family discussion - yes, sister at bedside

## 2025-04-20 LAB
ALBUMIN SERPL-MCNC: 2.9 G/DL (ref 3.5–5)
ALBUMIN/GLOB SERPL: 0.5 RATIO (ref 1.1–2)
ALP SERPL-CCNC: 63 UNIT/L (ref 40–150)
ALT SERPL-CCNC: 5 UNIT/L (ref 0–55)
ANION GAP SERPL CALC-SCNC: 10 MEQ/L
AST SERPL-CCNC: 14 UNIT/L (ref 11–45)
BACTERIA BLD CULT: NORMAL
BACTERIA BLD CULT: NORMAL
BASOPHILS # BLD AUTO: 0.11 X10(3)/MCL
BASOPHILS NFR BLD AUTO: 0.8 %
BILIRUB SERPL-MCNC: 0.2 MG/DL
BUN SERPL-MCNC: 19.1 MG/DL (ref 8.4–25.7)
CALCIUM SERPL-MCNC: 9.6 MG/DL (ref 8.4–10.2)
CHLORIDE SERPL-SCNC: 102 MMOL/L (ref 98–107)
CO2 SERPL-SCNC: 24 MMOL/L (ref 22–29)
CREAT SERPL-MCNC: 3.65 MG/DL (ref 0.72–1.25)
CREAT/UREA NIT SERPL: 5
EOSINOPHIL # BLD AUTO: 0.31 X10(3)/MCL (ref 0–0.9)
EOSINOPHIL NFR BLD AUTO: 2.2 %
ERYTHROCYTE [DISTWIDTH] IN BLOOD BY AUTOMATED COUNT: 16.4 % (ref 11.5–17)
GFR SERPLBLD CREATININE-BSD FMLA CKD-EPI: 18 ML/MIN/1.73/M2
GLOBULIN SER-MCNC: 5.3 GM/DL (ref 2.4–3.5)
GLUCOSE SERPL-MCNC: 94 MG/DL (ref 74–100)
HCT VFR BLD AUTO: 31.4 % (ref 42–52)
HGB BLD-MCNC: 10 G/DL (ref 14–18)
IMM GRANULOCYTES # BLD AUTO: 0.44 X10(3)/MCL (ref 0–0.04)
IMM GRANULOCYTES NFR BLD AUTO: 3.1 %
LYMPHOCYTES # BLD AUTO: 3.22 X10(3)/MCL (ref 0.6–4.6)
LYMPHOCYTES NFR BLD AUTO: 23 %
MCH RBC QN AUTO: 28.6 PG (ref 27–31)
MCHC RBC AUTO-ENTMCNC: 31.8 G/DL (ref 33–36)
MCV RBC AUTO: 89.7 FL (ref 80–94)
MONOCYTES # BLD AUTO: 1.83 X10(3)/MCL (ref 0.1–1.3)
MONOCYTES NFR BLD AUTO: 13.1 %
NEUTROPHILS # BLD AUTO: 8.09 X10(3)/MCL (ref 2.1–9.2)
NEUTROPHILS NFR BLD AUTO: 57.8 %
NRBC BLD AUTO-RTO: 0 %
PLATELET # BLD AUTO: 497 X10(3)/MCL (ref 130–400)
PMV BLD AUTO: 9.7 FL (ref 7.4–10.4)
POTASSIUM SERPL-SCNC: 4.4 MMOL/L (ref 3.5–5.1)
PROT SERPL-MCNC: 8.2 GM/DL (ref 6.4–8.3)
RBC # BLD AUTO: 3.5 X10(6)/MCL (ref 4.7–6.1)
SODIUM SERPL-SCNC: 136 MMOL/L (ref 136–145)
WBC # BLD AUTO: 14 X10(3)/MCL (ref 4.5–11.5)

## 2025-04-20 PROCEDURE — 25000003 PHARM REV CODE 250: Performed by: NURSE PRACTITIONER

## 2025-04-20 PROCEDURE — 21400001 HC TELEMETRY ROOM

## 2025-04-20 PROCEDURE — 25000003 PHARM REV CODE 250: Performed by: INTERNAL MEDICINE

## 2025-04-20 PROCEDURE — 85025 COMPLETE CBC W/AUTO DIFF WBC: CPT

## 2025-04-20 PROCEDURE — 25000003 PHARM REV CODE 250

## 2025-04-20 PROCEDURE — 11000001 HC ACUTE MED/SURG PRIVATE ROOM

## 2025-04-20 PROCEDURE — 80053 COMPREHEN METABOLIC PANEL: CPT

## 2025-04-20 PROCEDURE — 63600175 PHARM REV CODE 636 W HCPCS: Performed by: INTERNAL MEDICINE

## 2025-04-20 PROCEDURE — 36415 COLL VENOUS BLD VENIPUNCTURE: CPT

## 2025-04-20 RX ADMIN — ENOXAPARIN SODIUM 30 MG: 30 INJECTION SUBCUTANEOUS at 05:04

## 2025-04-20 RX ADMIN — MUPIROCIN: 20 OINTMENT TOPICAL at 07:04

## 2025-04-20 RX ADMIN — ACETAMINOPHEN 650 MG: 325 TABLET, FILM COATED ORAL at 05:04

## 2025-04-20 RX ADMIN — ACETAMINOPHEN 650 MG: 325 TABLET, FILM COATED ORAL at 07:04

## 2025-04-20 RX ADMIN — SODIUM BICARBONATE 650 MG TABLET 650 MG: at 07:04

## 2025-04-20 RX ADMIN — SEVELAMER CARBONATE 800 MG: 800 TABLET, FILM COATED ORAL at 05:04

## 2025-04-20 RX ADMIN — SEVELAMER CARBONATE 800 MG: 800 TABLET, FILM COATED ORAL at 12:04

## 2025-04-20 RX ADMIN — CEFTRIAXONE SODIUM 2 G: 2 INJECTION, POWDER, FOR SOLUTION INTRAMUSCULAR; INTRAVENOUS at 05:04

## 2025-04-20 NOTE — PROGRESS NOTES
[facility]  Hospital Medicine Progress Note        Chief Complaint: Inpatient Follow-up for     HPI:   59 y.o. male with a PMHx of neuropathy, LEANDRO requiring inpatient hemodialysis, E coli bacteremia, gastric ulcers, recent right-sided pyelonephritis, right renal abscess s/p IR drainage, mitral valve endocarditis who presented to [facility] on 4/15/2025 with c/o worsening bilateral lower extremity swelling x1 week.  Denied any chest pain, shortness of breath, fever, cough.  Of note, patient was hospitalized from 03/29/2025 through 04/08/2025 with LEANDRO requiring hemodialysis, E coli bacteremia, right-sided pyelonephritis and right renal abscess requiring IR drainage, MARY was done on 04/08 and highly suggestive of mitral valve endocarditis.  He ultimately left AMA on 04/08/2025.  Cardiology recommendations were to treat as vegetation with antibiotics and repeat MARY in 6 weeks.     ED Course: Initial ED Vital Signs included /90, HR 75, RR 19, SpO2 98% on room air, temperature 98.7° F. Labs were notable for WBC 17.55, hemoglobin 9.9, hematocrit 32.6, potassium 5.7, BUN 26.2, creatinine 4.55.  Urinalysis with trace blood, 500 leukocytes, 6-10 RBCs, greater than 100 WBCs.  Blood cultures x2 and urine culture pending.  ID and Nephrology has been consulted.  He was started on IV Unasyn in the ED.  Admitted to hospital medicine service for further medical management.        Patient currently readmitted for E coli bacteremia likely from a  source-he left AMA on admit, mitral valve infective endocarditis on IV Unasyn.  LEANDRO requiring hemodialysis on prior admit     Interval Hx:   Patient was seen and evaluated at bedside, oxygenating well on room air.  Dialysis as per nephrology.    Patient believes that he can administer IV antibiotics on his own for the next 6 weeks.  PICC line placed.  ID recommends ceftriaxone 2 g IV daily until 5/14/25 with weekly CBC and CMPs.  Case management following for  dialysis arrangement and to set up abx at home.    Case was discussed with patient's nurse and  on the floor.    Objective/physical exam:  General appearance: Well-developed male in no apparent distress.  HENT: Atraumatic head. Moist mucous membranes of oral cavity.  Left internal jugular tunneled cath in place  Eyes: Normal extraocular movements.   Neck: Supple.   Lungs: Clear to auscultation bilaterally.   Heart: Regular rate and rhythm. S1 and S2 present. 2+ BLE edema.  Abdomen: Soft, non-distended, non-tender.    VITAL SIGNS: 24 HRS MIN & MAX LAST   Temp  Min: 98 °F (36.7 °C)  Max: 98.3 °F (36.8 °C) 98.2 °F (36.8 °C)   BP  Min: 100/68  Max: 115/81 106/72   Pulse  Min: 89  Max: 104  92   Resp  Min: 18  Max: 18 18   SpO2  Min: 96 %  Max: 99 % 99 %     I have reviewed the following labs:  Recent Labs   Lab 04/18/25  0354 04/19/25  0238 04/20/25  0306   WBC 13.37* 14.19* 14.00*   RBC 3.37* 3.21* 3.50*   HGB 9.7* 9.1* 10.0*   HCT 31.0* 29.6* 31.4*   MCV 92.0 92.2 89.7   MCH 28.8 28.3 28.6   MCHC 31.3* 30.7* 31.8*   RDW 16.7 16.5 16.4   * 505* 497*   MPV 9.3 9.3 9.7     Recent Labs   Lab 04/15/25  1016 04/16/25  0518 04/17/25  0550 04/18/25  0354 04/19/25  0238 04/20/25  0306      < > 140 138 138 136   K 5.7*   < > 5.0 3.8 4.0 4.4      < > 105 103 101 102   CO2 22   < > 26 26 24 24   BUN 26.2*   < > 23.0 16.8 21.9 19.1   CREATININE 4.55*   < > 4.57* 3.32* 4.31* 3.65*   CALCIUM 9.1   < > 9.1 9.2 9.2 9.6   MG 1.80  --   --  1.60 1.70  --    ALBUMIN 2.7*   < > 2.6* 2.7*  --  2.9*   ALKPHOS 73   < > 64 63  --  63   ALT 5   < > 5 6  --  5   AST 12   < > 12 12  --  14   BILITOT 0.2   < > 0.3 0.3  --  0.2    < > = values in this interval not displayed.     Microbiology Results (last 7 days)       Procedure Component Value Units Date/Time    Blood culture #1 **CANNOT BE ORDERED STAT** [[id number]]  (Normal) Collected: 04/15/25 1133    Order Status: Completed Specimen: Blood Updated: 04/20/25  1301     Blood Culture No Growth at 5 days    Blood culture #2 **CANNOT BE ORDERED STAT** [[id number]]  (Normal) Collected: 04/15/25 1133    Order Status: Completed Specimen: Blood Updated: 04/20/25 1201     Blood Culture No Growth at 5 days    Urine culture [[id number]] Collected: 04/15/25 1040    Order Status: Completed Specimen: Urine Updated: 04/17/25 0824     Urine Culture No Growth             See below for Radiology    Assessment/Plan:  E coli bacteremia- source   mitral valve infective endocarditis  Leukocytosis, improving  Recent acute right pyelonephritis with right perinephric abscess s/p IR drainage   LEANDRO -required hemodialysis on last admit  Hyperkalemia , improving  Bilateral lower extremity edema  Normocytic anemia     History:  neuropathy, LEANDRO requiring inpatient hemodialysis, E coli bacteremia, gastric ulcers, recent right-sided pyelonephritis, right renal abscess s/p IR drainage, mitral valve endocarditis     PLAN:   Tunneled cath placed in left IJ 4/16  Continue IV Bumex b.i.d. per renal recs   Avoid Nephrotoxins  Continue IV Rocephin 2 g IV daily until 05/14/2025 with weekly CBC and CMP  Follow up with ID in 1 week f/u after discharge.  PICC line placed, 4/19  Awaiting dialysis placement approval  Case management following       VTE prophylaxis:  SCDs    Patient condition:  Stable    Anticipated discharge and Disposition:   home with home health.       All diagnosis and differential diagnosis have been reviewed; assessment and plan has been documented; I have personally reviewed the labs and test results that are presently available; I have reviewed the patients medication list; I have reviewed the consulting providers response and recommendations. I have reviewed or attempted to review medical records based upon their availability    All of the patient's questions have been  addressed and answered. Patient's is agreeable to the above stated plan. I will continue to monitor closely and make  adjustments to medical management as needed.    Portions of this note dictated using EMR integrated voice recognition software, and may be subject to voice recognition errors not corrected at proofreading. Please contact writer for clarification if needed.   _____________________________________________________________________    Malnutrition Status:  Nutrition consulted. Most recent weight and BMI monitored-     Measurements:  Wt Readings from Last 1 Encounters:   04/19/25 61 kg (134 lb 6.4 oz)   Body mass index is 21.05 kg/m².    Patient has been screened and assessed by RD.    Malnutrition Type:  Context:    Level:      Malnutrition Characteristic Summary:       Interventions/Recommendations (treatment strategy):        Scheduled Med:   cefTRIAXone (Rocephin) IV (PEDS and ADULTS)  2 g Intravenous Q24H    enoxparin  30 mg Subcutaneous Q24H (prophylaxis, 1700)    mupirocin   Nasal BID    sevelamer carbonate  800 mg Oral TID WM    sodium bicarbonate  650 mg Oral BID      Continuous Infusions:     PRN Meds:    Current Facility-Administered Medications:     acetaminophen, 650 mg, Oral, Q8H PRN    acetaminophen, 650 mg, Oral, Q4H PRN    hydrALAZINE, 10 mg, Intravenous, Q4H PRN    melatonin, 6 mg, Oral, Nightly PRN    ondansetron, 4 mg, Intravenous, Q8H PRN    Flushing PICC/Midline Protocol, , , Until Discontinued **AND** sodium chloride 0.9%, 10 mL, Intravenous, Q12H PRN     Radiology:  I have personally reviewed the following imaging and agree with the radiologist.     X-Ray Chest 1 View for Line/Tube Placement  Narrative: EXAMINATION:  XR CHEST 1 VIEW FOR LINE/TUBE PLACEMENT    CLINICAL HISTORY:  picc placement;    TECHNIQUE:  One view    COMPARISON:  April 16, 2025.    FINDINGS:  Right PICC line terminates within the superior vena cava.  Left larger caliber central venous catheter also terminates within the superior vena cava.  Cardiopericardial silhouette is within normal limits. Lungs are without dense focal or  segmental consolidation, congestive process, pleural effusions or pneumothorax.  Old fractures of the left ribs.  Impression: Optimal placement right PICC line.    Electronically signed by: Wilmer Hooper  Date:    04/19/2025  Time:    20:14      Ok Belcher MD  Department of Hospital Medicine   [facility]   04/20/2025

## 2025-04-20 NOTE — PLAN OF CARE
Problem: Adult Inpatient Plan of Care  Goal: Plan of Care Review  Outcome: Progressing  Goal: Patient-Specific Goal (Individualized)  Outcome: Progressing  Goal: Absence of Hospital-Acquired Illness or Injury  Outcome: Progressing  Goal: Optimal Comfort and Wellbeing  Outcome: Progressing  Goal: Readiness for Transition of Care  Outcome: Progressing     Problem: Wound  Goal: Optimal Coping  Outcome: Progressing  Goal: Optimal Functional Ability  Outcome: Progressing  Goal: Absence of Infection Signs and Symptoms  Outcome: Progressing

## 2025-04-20 NOTE — PROCEDURES
"Tao Bee is a 59 y.o. male patient.    Temp: 98.3 °F (36.8 °C) (04/19/25 1533)  Pulse: 89 (04/19/25 1533)  Resp: 18 (04/19/25 0805)  BP: 114/76 (04/19/25 1533)  SpO2: 97 % (04/19/25 1533)  Weight: 61 kg (134 lb 6.4 oz) (04/19/25 0544)  Height: 5' 7" (170.2 cm) (04/15/25 2130)    PICC  Date/Time: 4/19/2025 7:45 PM  Performed by: August Pink RN  Consent Done: Yes  Time out: Immediately prior to procedure a time out was called to verify the correct patient, procedure, equipment, support staff and site/side marked as required  Indications: med administration and vascular access  Anesthesia: local infiltration  Local anesthetic: lidocaine 1% without epinephrine    Preparation: skin prepped with ChloraPrep  Skin prep agent dried: skin prep agent completely dried prior to procedure  Sterile barriers: all five maximum sterile barriers used - cap, mask, sterile gown, sterile gloves, and large sterile sheet  Hand hygiene: hand hygiene performed prior to central venous catheter insertion  Location details: right brachial  Catheter type: double lumen  Catheter size: 5 Fr  Catheter Length: 34cm    Ultrasound guidance: yes  Vessel Caliber: patent, compressibility normal  Needle advanced into vessel with real time Ultrasound guidance.  Guidewire confirmed in vessel.  Sterile sheath used.  Number of attempts: 1  Post-procedure: blood return through all ports, chlorhexidine patch and sterile dressing applied            Name ALEJANDRINA Pink RN   4/19/2025    "

## 2025-04-21 VITALS
RESPIRATION RATE: 18 BRPM | SYSTOLIC BLOOD PRESSURE: 128 MMHG | HEIGHT: 67 IN | WEIGHT: 131.63 LBS | OXYGEN SATURATION: 98 % | HEART RATE: 94 BPM | DIASTOLIC BLOOD PRESSURE: 90 MMHG | BODY MASS INDEX: 20.66 KG/M2 | TEMPERATURE: 98 F

## 2025-04-21 LAB
ANION GAP SERPL CALC-SCNC: 10 MEQ/L
BASOPHILS # BLD AUTO: 0.08 X10(3)/MCL
BASOPHILS NFR BLD AUTO: 0.6 %
BUN SERPL-MCNC: 30.9 MG/DL (ref 8.4–25.7)
CALCIUM SERPL-MCNC: 10.2 MG/DL (ref 8.4–10.2)
CHLORIDE SERPL-SCNC: 103 MMOL/L (ref 98–107)
CO2 SERPL-SCNC: 23 MMOL/L (ref 22–29)
CREAT SERPL-MCNC: 4.22 MG/DL (ref 0.72–1.25)
CREAT/UREA NIT SERPL: 7
EOSINOPHIL # BLD AUTO: 0.27 X10(3)/MCL (ref 0–0.9)
EOSINOPHIL NFR BLD AUTO: 2 %
ERYTHROCYTE [DISTWIDTH] IN BLOOD BY AUTOMATED COUNT: 16.3 % (ref 11.5–17)
GFR SERPLBLD CREATININE-BSD FMLA CKD-EPI: 15 ML/MIN/1.73/M2
GLUCOSE SERPL-MCNC: 87 MG/DL (ref 74–100)
HCT VFR BLD AUTO: 30.5 % (ref 42–52)
HGB BLD-MCNC: 9.8 G/DL (ref 14–18)
IMM GRANULOCYTES # BLD AUTO: 0.35 X10(3)/MCL (ref 0–0.04)
IMM GRANULOCYTES NFR BLD AUTO: 2.5 %
LYMPHOCYTES # BLD AUTO: 3.5 X10(3)/MCL (ref 0.6–4.6)
LYMPHOCYTES NFR BLD AUTO: 25.3 %
MAGNESIUM SERPL-MCNC: 2 MG/DL (ref 1.6–2.6)
MCH RBC QN AUTO: 28.9 PG (ref 27–31)
MCHC RBC AUTO-ENTMCNC: 32.1 G/DL (ref 33–36)
MCV RBC AUTO: 90 FL (ref 80–94)
MONOCYTES # BLD AUTO: 1.92 X10(3)/MCL (ref 0.1–1.3)
MONOCYTES NFR BLD AUTO: 13.9 %
NEUTROPHILS # BLD AUTO: 7.69 X10(3)/MCL (ref 2.1–9.2)
NEUTROPHILS NFR BLD AUTO: 55.7 %
NRBC BLD AUTO-RTO: 0 %
PHOSPHATE SERPL-MCNC: 6.7 MG/DL (ref 2.3–4.7)
PLATELET # BLD AUTO: 453 X10(3)/MCL (ref 130–400)
PMV BLD AUTO: 9.5 FL (ref 7.4–10.4)
POTASSIUM SERPL-SCNC: 4.2 MMOL/L (ref 3.5–5.1)
RBC # BLD AUTO: 3.39 X10(6)/MCL (ref 4.7–6.1)
SODIUM SERPL-SCNC: 136 MMOL/L (ref 136–145)
WBC # BLD AUTO: 13.81 X10(3)/MCL (ref 4.5–11.5)

## 2025-04-21 PROCEDURE — 36415 COLL VENOUS BLD VENIPUNCTURE: CPT | Performed by: STUDENT IN AN ORGANIZED HEALTH CARE EDUCATION/TRAINING PROGRAM

## 2025-04-21 PROCEDURE — 25000003 PHARM REV CODE 250: Performed by: INTERNAL MEDICINE

## 2025-04-21 PROCEDURE — 80048 BASIC METABOLIC PNL TOTAL CA: CPT | Performed by: STUDENT IN AN ORGANIZED HEALTH CARE EDUCATION/TRAINING PROGRAM

## 2025-04-21 PROCEDURE — 25000003 PHARM REV CODE 250: Performed by: NURSE PRACTITIONER

## 2025-04-21 PROCEDURE — 84100 ASSAY OF PHOSPHORUS: CPT | Performed by: STUDENT IN AN ORGANIZED HEALTH CARE EDUCATION/TRAINING PROGRAM

## 2025-04-21 PROCEDURE — 85025 COMPLETE CBC W/AUTO DIFF WBC: CPT | Performed by: STUDENT IN AN ORGANIZED HEALTH CARE EDUCATION/TRAINING PROGRAM

## 2025-04-21 PROCEDURE — 99231 SBSQ HOSP IP/OBS SF/LOW 25: CPT | Mod: ,,, | Performed by: INTERNAL MEDICINE

## 2025-04-21 PROCEDURE — 25000003 PHARM REV CODE 250

## 2025-04-21 PROCEDURE — 83735 ASSAY OF MAGNESIUM: CPT | Performed by: STUDENT IN AN ORGANIZED HEALTH CARE EDUCATION/TRAINING PROGRAM

## 2025-04-21 RX ORDER — SEVELAMER CARBONATE 800 MG/1
800 TABLET, FILM COATED ORAL
Qty: 90 TABLET | Refills: 11 | Status: SHIPPED | OUTPATIENT
Start: 2025-04-21 | End: 2026-04-21

## 2025-04-21 RX ORDER — CEFTRIAXONE 2 G/1
2 INJECTION, POWDER, FOR SOLUTION INTRAMUSCULAR; INTRAVENOUS DAILY
Qty: 46 G | Refills: 0 | Status: SHIPPED | OUTPATIENT
Start: 2025-04-21 | End: 2025-05-14

## 2025-04-21 RX ORDER — SODIUM BICARBONATE 650 MG/1
650 TABLET ORAL 2 TIMES DAILY
Qty: 60 TABLET | Refills: 11 | Status: ON HOLD | OUTPATIENT
Start: 2025-04-21 | End: 2025-04-26 | Stop reason: HOSPADM

## 2025-04-21 RX ADMIN — SEVELAMER CARBONATE 800 MG: 800 TABLET, FILM COATED ORAL at 10:04

## 2025-04-21 RX ADMIN — SODIUM BICARBONATE 650 MG TABLET 650 MG: at 10:04

## 2025-04-21 RX ADMIN — ACETAMINOPHEN 650 MG: 325 TABLET, FILM COATED ORAL at 06:04

## 2025-04-21 NOTE — PLAN OF CARE
04/21/25 1208   Final Note   Assessment Type Discharge Planning Assessment   Anticipated Discharge Disposition Home-Health   Hospital Resources/Appts/Education Provided Appointments scheduled and added to AVS;Post-Acute resouces added to AVS   Post-Acute Status   Post-Acute Authorization Home Health   Home Health Status Set-up Complete/Auth obtained   Discharge Delays None known at this time     Pt will dc home with [facility]  and samra for IV abx therapy

## 2025-04-21 NOTE — PROGRESS NOTES
Renal  Pt seen and examined  Meds and labs and events reviewed  Afeb  Hemod stable  Lungs clear  RRR  Abd soft  No edema    Labs reviewed    Impression  1- LEANDRO, ?ESRD  2- Ecoli bactermia  in origin  3- ?endocarditis    P  Pt will go to [facility]  Ok to DC from my standpoint  Cont IV antibiotics as recommended by ID

## 2025-04-21 NOTE — CARE UPDATE
[id number] Rec call from Coty with [facility] dialysis [place] asking when pt will be dc. I informed Coty that pt will be going home on IV abx and she asked I speak with pt's nurse at the dialysis center. Left a message for the nurse.   Pt's dialysis is set up for MWF at 5:20pm. If pt receives is IV abx in the morning then pt should be OK to have dialysis at his schedule time. Wait to hear from pt's nurse at the dialysis center    0403 Rec call from Keven who reports pt's dialysis center is ready for pt. Pt can dc today. Will inform MD

## 2025-04-21 NOTE — PROGRESS NOTES
Dialysis coordinate confirmed with  outpatient dialysis schedule.     Schedule is:  Time:5:20  Days: MWF  Clinic: [facility]    Once patient discharge he will resume schedule. Outpatient dialysis center aware of admission. Dialysis Coordinator will continue to follow and assist as needed, and will notify outpatient center when ready for discharge and care coordination.    Jenny Smart  Dialysis Coordinator   [facility]  [phone]

## 2025-04-21 NOTE — DISCHARGE SUMMARY
[facility] Hospital Medicine Discharge Summary    Admit Date: 4/15/2025  Discharge Date and Time: 4/21/202512:35 PM  Admitting Physician:  Team  Discharging Physician: Ok Belcher MD.  Primary Care Physician: Penny Garsia FNP  Consults: Infectious Disease and Nephrology    Discharge Diagnoses:  E coli bacteremia- source   mitral valve infective endocarditis  Leukocytosis, improving  Recent acute right pyelonephritis with right perinephric abscess s/p IR drainage   LEANDRO -required hemodialysis on last admit  Hyperkalemia , improving  Bilateral lower extremity edema  Normocytic anemia     History:  neuropathy, LEANDRO requiring inpatient hemodialysis, E coli bacteremia, gastric ulcers, recent right-sided pyelonephritis, right renal abscess s/p IR drainage, mitral valve endocarditis    Hospital Course:   59 y.o. male with a PMHx of neuropathy, LEANDRO requiring inpatient hemodialysis, E coli bacteremia, gastric ulcers, recent right-sided pyelonephritis, right renal abscess s/p IR drainage, mitral valve endocarditis who presented to [facility] on 4/15/2025 with c/o worsening bilateral lower extremity swelling x1 week.  Denied any chest pain, shortness of breath, fever, cough.  Of note, patient was hospitalized from 03/29/2025 through 04/08/2025 with LEANDRO requiring hemodialysis, E coli bacteremia, right-sided pyelonephritis and right renal abscess requiring IR drainage, MARY was done on 04/08 and highly suggestive of mitral valve endocarditis.  He ultimately left AMA on 04/08/2025.  Cardiology recommendations were to treat as vegetation with antibiotics and repeat MARY in 6 weeks.     ED Course: Initial ED Vital Signs included /90, HR 75, RR 19, SpO2 98% on room air, temperature 98.7° F. Labs were notable for WBC 17.55, hemoglobin 9.9, hematocrit 32.6, potassium 5.7, BUN 26.2, creatinine 4.55.  Urinalysis with trace blood, 500 leukocytes, 6-10 RBCs, greater than 100 WBCs.  Blood cultures x2 and  urine culture pending.  ID and Nephrology has been consulted.  He was started on IV Unasyn in the ED.  Admitted to hospital medicine service for further medical management.     Patient currently readmitted for E coli bacteremia likely from a  source-he left AMA on admit, mitral valve infective endocarditis on IV Unasyn.  LEANDRO requiring hemodialysis on prior admit.  Repeat cultures did not show any growth of E coli during this admission.  Antibiotics were ultimately changed by Infectious Disease to Rocephin 2 g IV daily with the end date of 05/14/2025.  I infectious Disease mentioned weekly CBC and CMP with a follow up in clinic 1 week after discharge.  PICC line was placed 4/19.  Antibiotic approval was given this weekend.  Dialysis approval was done today 4/21.  Patient has verbalized his understanding of this hospital stay and is safe for discharge with follow up with Nephrology and ID.  Patient will be receiving dialysis on MWF schedule.  Patient will also be receiving home health, and prescription for sodium bicarbonate pills and sevelamer carbonate.    Pt was seen and examined on the day of discharge  Vitals:  VITAL SIGNS: 24 HRS MIN & MAX LAST   Temp  Min: 97.3 °F (36.3 °C)  Max: 98.6 °F (37 °C) 98.1 °F (36.7 °C)   BP  Min: 110/72  Max: 129/87 (!) 128/90   Pulse  Min: 86  Max: 102  94   Resp  Min: 18  Max: 18 18   SpO2  Min: 97 %  Max: 99 % 98 %       Physical Exam:  General appearance: Well-developed male in no apparent distress.  HENT: Atraumatic head. Moist mucous membranes of oral cavity.  Left internal jugular tunneled cath in place  Eyes: Normal extraocular movements.   Neck: Supple.   Lungs: Clear to auscultation bilaterally.   Heart: Regular rate and rhythm. S1 and S2 present. 2+ BLE edema.  Abdomen: Soft, non-distended, non-tender.    Procedures Performed: No admission procedures for hospital encounter.     Significant Diagnostic Studies: See Full reports for all details    Recent Labs   Lab  04/19/25  0238 04/20/25  0306 04/21/25  0520   WBC 14.19* 14.00* 13.81*   RBC 3.21* 3.50* 3.39*   HGB 9.1* 10.0* 9.8*   HCT 29.6* 31.4* 30.5*   MCV 92.2 89.7 90.0   MCH 28.3 28.6 28.9   MCHC 30.7* 31.8* 32.1*   RDW 16.5 16.4 16.3   * 497* 453*   MPV 9.3 9.7 9.5       Recent Labs   Lab 04/17/25  0550 04/18/25  0354 04/19/25  0238 04/20/25  0306 04/21/25  0520    138 138 136 136   K 5.0 3.8 4.0 4.4 4.2    103 101 102 103   CO2 26 26 24 24 23   BUN 23.0 16.8 21.9 19.1 30.9*   CREATININE 4.57* 3.32* 4.31* 3.65* 4.22*   CALCIUM 9.1 9.2 9.2 9.6 10.2   MG  --  1.60 1.70  --  2.00   ALBUMIN 2.6* 2.7*  --  2.9*  --    ALKPHOS 64 63  --  63  --    ALT 5 6  --  5  --    AST 12 12  --  14  --    BILITOT 0.3 0.3  --  0.2  --         Microbiology Results (last 7 days)       Procedure Component Value Units Date/Time    Blood culture #1 **CANNOT BE ORDERED STAT** [[id number]]  (Normal) Collected: 04/15/25 1133    Order Status: Completed Specimen: Blood Updated: 04/20/25 1301     Blood Culture No Growth at 5 days    Blood culture #2 **CANNOT BE ORDERED STAT** [[id number]]  (Normal) Collected: 04/15/25 1133    Order Status: Completed Specimen: Blood Updated: 04/20/25 1201     Blood Culture No Growth at 5 days    Urine culture [[id number]] Collected: 04/15/25 1040    Order Status: Completed Specimen: Urine Updated: 04/17/25 0824     Urine Culture No Growth             Cardiac catheterization  Procedure performed in the Invasive Lab    - See Procedure Log link below for nursing documentation    - See OpNote on Surgeries Tab for physician findings    - See Imaging Tab for radiologist dictation         Medication List        START taking these medications      cefTRIAXone 2 gram injection  Commonly known as: ROCEPHIN  Inject 2 g into the vein once daily. for 23 days     sevelamer carbonate 800 mg Tab  Commonly known as: RENVELA  Take 1 tablet (800 mg total) by mouth 3 (three) times daily with meals.     sodium  bicarbonate 650 MG tablet  Take 1 tablet (650 mg total) by mouth 2 (two) times daily.            CONTINUE taking these medications      gabapentin 600 MG tablet  Commonly known as: NEURONTIN               Where to Get Your Medications        These medications were sent to [facility] [address] [place] [id number]      Phone: [phone]   cefTRIAXone 2 gram injection  sevelamer carbonate 800 mg Tab  sodium bicarbonate 650 MG tablet          Explained in detail to the patient about the discharge plan, medications, and follow-up visits. Pt understands and agrees with the treatment plan  Discharge Disposition: Left Against Medical Advice   Discharged Condition: stable  Diet-   Dietary Orders (From admission, onward)       Start     Ordered    04/17/25 1013  Diet Renal On Dialysis Standard Tray  Diet effective now        Question:  Tray type:  Answer:  Standard Tray    04/17/25 1012                   Medications Per DC med rec  Activities as tolerated   Follow-up Information       [facility].    Specialty: Home Health Services  Why: Your home health agency will contact you and christian an appt for you  Contact information:  [address] [facility] [address]  [phone]                           For further questions contact hospitalist office    Discharge time 33 minutes    For worsening symptoms, chest pain, shortness of breath, increased abdominal pain, high grade fever, stroke or stroke like symptoms, immediately go to the nearest Emergency Room or call 911 as soon as possible.      Ok Cardoza M.D, on 4/21/2025. at 12:35 PM.

## 2025-04-21 NOTE — CARE UPDATE
[id number] DC clinicals sent to [facility] thru [facility]. Pt will dc home today

## 2025-04-24 ENCOUNTER — HOSPITAL ENCOUNTER (INPATIENT)
Facility: HOSPITAL | Age: 60
LOS: 2 days | Discharge: HOME HEALTH | DRG: 314 | End: 2025-04-26
Attending: EMERGENCY MEDICINE | Admitting: INTERNAL MEDICINE
Payer: MEDICARE

## 2025-04-24 ENCOUNTER — PATIENT OUTREACH (OUTPATIENT)
Dept: ADMINISTRATIVE | Facility: CLINIC | Age: 60
End: 2025-04-24
Payer: MEDICARE

## 2025-04-24 DIAGNOSIS — D72.9 ABNORMAL WHITE BLOOD CELL (WBC): ICD-10-CM

## 2025-04-24 DIAGNOSIS — R07.9 CHEST PAIN: ICD-10-CM

## 2025-04-24 DIAGNOSIS — Z78.9 PROBLEM WITH VASCULAR ACCESS: Primary | ICD-10-CM

## 2025-04-24 DIAGNOSIS — Z95.828 STATUS POST PICC CENTRAL LINE PLACEMENT: ICD-10-CM

## 2025-04-24 DIAGNOSIS — N30.01 ACUTE CYSTITIS WITH HEMATURIA: ICD-10-CM

## 2025-04-24 DIAGNOSIS — N18.6 ESRD (END STAGE RENAL DISEASE): ICD-10-CM

## 2025-04-24 LAB
ACCEPTIBLE SP GR UR QL: 1.01 (ref 1–1.03)
ALBUMIN SERPL-MCNC: 3.2 G/DL (ref 3.5–5)
ALBUMIN/GLOB SERPL: 0.7 RATIO (ref 1.1–2)
ALP SERPL-CCNC: 61 UNIT/L (ref 40–150)
ALT SERPL-CCNC: <5 UNIT/L (ref 0–55)
AMPHET UR QL SCN: NEGATIVE
ANION GAP SERPL CALC-SCNC: 7 MEQ/L
APTT PPP: 37.3 SECONDS (ref 23.2–33.7)
AST SERPL-CCNC: 13 UNIT/L (ref 11–45)
BACTERIA #/AREA URNS AUTO: ABNORMAL /HPF
BARBITURATE SCN PRESENT UR: NEGATIVE
BASOPHILS # BLD AUTO: 0.08 X10(3)/MCL
BASOPHILS NFR BLD AUTO: 0.4 %
BENZODIAZ UR QL SCN: NEGATIVE
BILIRUB SERPL-MCNC: 0.3 MG/DL
BILIRUB UR QL STRIP.AUTO: NEGATIVE
BUN SERPL-MCNC: 28.2 MG/DL (ref 8.4–25.7)
CALCIUM SERPL-MCNC: 10 MG/DL (ref 8.4–10.2)
CANNABINOIDS UR QL SCN: NEGATIVE
CHLORIDE SERPL-SCNC: 101 MMOL/L (ref 98–107)
CLARITY UR: ABNORMAL
CO2 SERPL-SCNC: 29 MMOL/L (ref 22–29)
COCAINE UR QL SCN: POSITIVE
COLOR UR AUTO: ABNORMAL
CREAT SERPL-MCNC: 3.98 MG/DL (ref 0.72–1.25)
CREAT/UREA NIT SERPL: 7
EOSINOPHIL # BLD AUTO: 0.52 X10(3)/MCL (ref 0–0.9)
EOSINOPHIL NFR BLD AUTO: 2.4 %
EPI CELLS #/AREA URNS HPF: ABNORMAL /HPF
ERYTHROCYTE [DISTWIDTH] IN BLOOD BY AUTOMATED COUNT: 16.4 % (ref 11.5–17)
FENTANYL UR QL SCN: NEGATIVE
GFR SERPLBLD CREATININE-BSD FMLA CKD-EPI: 17 ML/MIN/1.73/M2
GLOBULIN SER-MCNC: 4.9 GM/DL (ref 2.4–3.5)
GLUCOSE SERPL-MCNC: 120 MG/DL (ref 74–100)
GLUCOSE UR QL STRIP: NEGATIVE
HCT VFR BLD AUTO: 30.9 % (ref 42–52)
HGB BLD-MCNC: 9.5 G/DL (ref 14–18)
HGB UR QL STRIP: ABNORMAL
IMM GRANULOCYTES # BLD AUTO: 0.15 X10(3)/MCL (ref 0–0.04)
IMM GRANULOCYTES NFR BLD AUTO: 0.7 %
INR PPP: 1.1
KETONES UR QL STRIP: NEGATIVE
LACTATE SERPL-SCNC: 1 MMOL/L (ref 0.5–2.2)
LEUKOCYTE ESTERASE UR QL STRIP: ABNORMAL
LYMPHOCYTES # BLD AUTO: 3.8 X10(3)/MCL (ref 0.6–4.6)
LYMPHOCYTES NFR BLD AUTO: 17.7 %
MAGNESIUM SERPL-MCNC: 2 MG/DL (ref 1.6–2.6)
MCH RBC QN AUTO: 28.4 PG (ref 27–31)
MCHC RBC AUTO-ENTMCNC: 30.7 G/DL (ref 33–36)
MCV RBC AUTO: 92.2 FL (ref 80–94)
MDMA UR QL SCN: POSITIVE
MONOCYTES # BLD AUTO: 1.58 X10(3)/MCL (ref 0.1–1.3)
MONOCYTES NFR BLD AUTO: 7.4 %
NEUTROPHILS # BLD AUTO: 15.36 X10(3)/MCL (ref 2.1–9.2)
NEUTROPHILS NFR BLD AUTO: 71.4 %
NITRITE UR QL STRIP: NEGATIVE
NRBC BLD AUTO-RTO: 0 %
OPIATES UR QL SCN: NEGATIVE
PCP UR QL: NEGATIVE
PH UR STRIP: 6 [PH]
PH UR: 6 [PH] (ref 3–11)
PLATELET # BLD AUTO: 307 X10(3)/MCL (ref 130–400)
PMV BLD AUTO: 10 FL (ref 7.4–10.4)
POTASSIUM SERPL-SCNC: 4.4 MMOL/L (ref 3.5–5.1)
PROT SERPL-MCNC: 8.1 GM/DL (ref 6.4–8.3)
PROT UR QL STRIP: ABNORMAL
PROTHROMBIN TIME: 14.6 SECONDS (ref 12.5–14.5)
RBC # BLD AUTO: 3.35 X10(6)/MCL (ref 4.7–6.1)
RBC #/AREA URNS AUTO: ABNORMAL /HPF
SODIUM SERPL-SCNC: 137 MMOL/L (ref 136–145)
SP GR UR STRIP.AUTO: 1.01 (ref 1–1.03)
SQUAMOUS #/AREA URNS LPF: ABNORMAL /HPF
UROBILINOGEN UR STRIP-ACNC: 0.2
WBC # BLD AUTO: 21.49 X10(3)/MCL (ref 4.5–11.5)
WBC #/AREA URNS AUTO: >100 /HPF
WBC CLUMPS UR QL AUTO: ABNORMAL

## 2025-04-24 PROCEDURE — 25000003 PHARM REV CODE 250: Performed by: NURSE PRACTITIONER

## 2025-04-24 PROCEDURE — 81001 URINALYSIS AUTO W/SCOPE: CPT

## 2025-04-24 PROCEDURE — 36569 INSJ PICC 5 YR+ W/O IMAGING: CPT

## 2025-04-24 PROCEDURE — 85610 PROTHROMBIN TIME: CPT

## 2025-04-24 PROCEDURE — 87086 URINE CULTURE/COLONY COUNT: CPT

## 2025-04-24 PROCEDURE — C1751 CATH, INF, PER/CENT/MIDLINE: HCPCS

## 2025-04-24 PROCEDURE — 85730 THROMBOPLASTIN TIME PARTIAL: CPT

## 2025-04-24 PROCEDURE — 85025 COMPLETE CBC W/AUTO DIFF WBC: CPT

## 2025-04-24 PROCEDURE — 80307 DRUG TEST PRSMV CHEM ANLYZR: CPT | Performed by: PHYSICIAN ASSISTANT

## 2025-04-24 PROCEDURE — 11000001 HC ACUTE MED/SURG PRIVATE ROOM

## 2025-04-24 PROCEDURE — 63600175 PHARM REV CODE 636 W HCPCS: Performed by: NURSE PRACTITIONER

## 2025-04-24 PROCEDURE — 80053 COMPREHEN METABOLIC PANEL: CPT

## 2025-04-24 PROCEDURE — 99285 EMERGENCY DEPT VISIT HI MDM: CPT

## 2025-04-24 PROCEDURE — 25000003 PHARM REV CODE 250: Performed by: EMERGENCY MEDICINE

## 2025-04-24 PROCEDURE — 87040 BLOOD CULTURE FOR BACTERIA: CPT | Performed by: PHYSICIAN ASSISTANT

## 2025-04-24 PROCEDURE — 63600175 PHARM REV CODE 636 W HCPCS: Mod: JZ,TB | Performed by: PHYSICIAN ASSISTANT

## 2025-04-24 PROCEDURE — 83735 ASSAY OF MAGNESIUM: CPT

## 2025-04-24 PROCEDURE — 02HV33Z INSERTION OF INFUSION DEVICE INTO SUPERIOR VENA CAVA, PERCUTANEOUS APPROACH: ICD-10-PCS | Performed by: INTERNAL MEDICINE

## 2025-04-24 PROCEDURE — 83605 ASSAY OF LACTIC ACID: CPT | Performed by: PHYSICIAN ASSISTANT

## 2025-04-24 RX ORDER — IBUPROFEN 200 MG
24 TABLET ORAL
Status: DISCONTINUED | OUTPATIENT
Start: 2025-04-24 | End: 2025-04-26 | Stop reason: HOSPADM

## 2025-04-24 RX ORDER — CEFTRIAXONE 2 G/1
2 INJECTION, POWDER, FOR SOLUTION INTRAMUSCULAR; INTRAVENOUS DAILY
Status: DISCONTINUED | OUTPATIENT
Start: 2025-04-25 | End: 2025-04-26 | Stop reason: HOSPADM

## 2025-04-24 RX ORDER — GABAPENTIN 300 MG/1
600 CAPSULE ORAL 3 TIMES DAILY
Status: DISCONTINUED | OUTPATIENT
Start: 2025-04-24 | End: 2025-04-26 | Stop reason: HOSPADM

## 2025-04-24 RX ORDER — GLUCAGON 1 MG
1 KIT INJECTION
Status: DISCONTINUED | OUTPATIENT
Start: 2025-04-24 | End: 2025-04-26 | Stop reason: HOSPADM

## 2025-04-24 RX ORDER — ACETAMINOPHEN 325 MG/1
650 TABLET ORAL EVERY 4 HOURS PRN
Status: DISCONTINUED | OUTPATIENT
Start: 2025-04-24 | End: 2025-04-26 | Stop reason: HOSPADM

## 2025-04-24 RX ORDER — NALOXONE HCL 0.4 MG/ML
0.02 VIAL (ML) INJECTION
Status: DISCONTINUED | OUTPATIENT
Start: 2025-04-24 | End: 2025-04-26 | Stop reason: HOSPADM

## 2025-04-24 RX ORDER — SODIUM BICARBONATE 650 MG/1
650 TABLET ORAL 2 TIMES DAILY
Status: DISCONTINUED | OUTPATIENT
Start: 2025-04-24 | End: 2025-04-26

## 2025-04-24 RX ORDER — CEFTRIAXONE 1 G/1
1 INJECTION, POWDER, FOR SOLUTION INTRAMUSCULAR; INTRAVENOUS
Status: COMPLETED | OUTPATIENT
Start: 2025-04-24 | End: 2025-04-24

## 2025-04-24 RX ORDER — HEPARIN SODIUM 5000 [USP'U]/ML
5000 INJECTION, SOLUTION INTRAVENOUS; SUBCUTANEOUS EVERY 12 HOURS
Status: DISCONTINUED | OUTPATIENT
Start: 2025-04-24 | End: 2025-04-25

## 2025-04-24 RX ORDER — PROCHLORPERAZINE EDISYLATE 5 MG/ML
5 INJECTION INTRAMUSCULAR; INTRAVENOUS EVERY 6 HOURS PRN
Status: DISCONTINUED | OUTPATIENT
Start: 2025-04-24 | End: 2025-04-26 | Stop reason: HOSPADM

## 2025-04-24 RX ORDER — SODIUM CHLORIDE 0.9 % (FLUSH) 0.9 %
10 SYRINGE (ML) INJECTION EVERY 12 HOURS PRN
Status: DISCONTINUED | OUTPATIENT
Start: 2025-04-24 | End: 2025-04-26 | Stop reason: HOSPADM

## 2025-04-24 RX ORDER — ACETAMINOPHEN 500 MG
1000 TABLET ORAL EVERY 6 HOURS PRN
Status: DISCONTINUED | OUTPATIENT
Start: 2025-04-24 | End: 2025-04-26 | Stop reason: HOSPADM

## 2025-04-24 RX ORDER — SODIUM CHLORIDE 0.9 % (FLUSH) 0.9 %
10 SYRINGE (ML) INJECTION
Status: DISCONTINUED | OUTPATIENT
Start: 2025-04-24 | End: 2025-04-26 | Stop reason: HOSPADM

## 2025-04-24 RX ORDER — MUPIROCIN 20 MG/G
OINTMENT TOPICAL 2 TIMES DAILY
Status: DISCONTINUED | OUTPATIENT
Start: 2025-04-24 | End: 2025-04-26 | Stop reason: HOSPADM

## 2025-04-24 RX ORDER — SEVELAMER CARBONATE 800 MG/1
800 TABLET, FILM COATED ORAL
Status: DISCONTINUED | OUTPATIENT
Start: 2025-04-24 | End: 2025-04-26 | Stop reason: HOSPADM

## 2025-04-24 RX ORDER — IBUPROFEN 200 MG
16 TABLET ORAL
Status: DISCONTINUED | OUTPATIENT
Start: 2025-04-24 | End: 2025-04-26 | Stop reason: HOSPADM

## 2025-04-24 RX ORDER — ONDANSETRON HYDROCHLORIDE 2 MG/ML
4 INJECTION, SOLUTION INTRAVENOUS EVERY 4 HOURS PRN
Status: DISCONTINUED | OUTPATIENT
Start: 2025-04-24 | End: 2025-04-26 | Stop reason: HOSPADM

## 2025-04-24 RX ADMIN — SEVELAMER CARBONATE 800 MG: 800 TABLET, FILM COATED ORAL at 05:04

## 2025-04-24 RX ADMIN — HEPARIN SODIUM 5000 UNITS: 5000 INJECTION, SOLUTION INTRAVENOUS; SUBCUTANEOUS at 08:04

## 2025-04-24 RX ADMIN — GABAPENTIN 600 MG: 300 CAPSULE ORAL at 08:04

## 2025-04-24 RX ADMIN — ALTEPLASE 1.9 MG: 2.2 INJECTION, POWDER, LYOPHILIZED, FOR SOLUTION INTRAVENOUS at 02:04

## 2025-04-24 RX ADMIN — SODIUM BICARBONATE 650 MG TABLET 650 MG: at 08:04

## 2025-04-24 RX ADMIN — CEFTRIAXONE SODIUM 1 G: 1 INJECTION, POWDER, FOR SOLUTION INTRAMUSCULAR; INTRAVENOUS at 03:04

## 2025-04-24 RX ADMIN — ACETAMINOPHEN 1000 MG: 500 TABLET ORAL at 08:04

## 2025-04-24 RX ADMIN — MUPIROCIN: 20 OINTMENT TOPICAL at 08:04

## 2025-04-24 NOTE — ED PROVIDER NOTES
Encounter Date: 4/24/2025       History     Chief Complaint   Patient presents with    Vascular Access Problem     Patient reports unable to dialyze yesterday due to left chest wall catheter being clogged. Dialysis MWF, last run on Monday. Patient also reports right arm PICC line came out yesterday. Patient on outpatient abx. Denies chest pain and SOB.     See [facility] for details.      The history is provided by the patient, medical records and a relative. No  was used.     Review of patient's allergies indicates:   Allergen Reactions    Opioids - morphine analogues Hallucinations     Past Medical History:   Diagnosis Date    Neuropathy      Past Surgical History:   Procedure Laterality Date    ESOPHAGOGASTRODUODENOSCOPY N/A 03/30/2025    Procedure: EGD (ESOPHAGOGASTRODUODENOSCOPY);  Surgeon: Stan Elder MD;  Location: [facility] OR;  Service: Endoscopy;  Laterality: N/A;    INSERTION OF TUNNELED CENTRAL VENOUS HEMODIALYSIS CATHETER N/A 4/17/2025    Procedure: Insertion, Catheter, Central Venous, Hemodialysis;  Surgeon: Kyrie Laws DO;  Location: [facility] CATH LAB;  Service: Nephrology;  Laterality: N/A;    LEG SURGERY Right      No family history on file.  Social History[1]  Review of Systems   Constitutional: Negative.  Negative for activity change, appetite change, diaphoresis, fatigue and fever.   HENT:  Negative for rhinorrhea and sinus pressure.    Eyes: Negative.    Respiratory: Negative.  Negative for chest tightness.    Cardiovascular:  Negative for chest pain.   Gastrointestinal: Negative.  Negative for abdominal distention and abdominal pain.   Endocrine: Negative.    Genitourinary: Negative.    Musculoskeletal: Negative.  Negative for arthralgias.   Allergic/Immunologic: Negative.    Neurological:  Negative for dizziness and headaches.   Hematological: Negative.    Psychiatric/Behavioral: Negative.     All other systems reviewed and are negative.      Physical Exam     Initial Vitals  [04/24/25 1118]   BP Pulse Resp Temp SpO2   119/78 92 16 98.7 °F (37.1 °C) 98 %      MAP       --         Physical Exam    Nursing note and vitals reviewed.  Constitutional: He appears well-developed and well-nourished. He is cooperative. No distress.   HENT:   Head: Atraumatic. Not macrocephalic.   Right Ear: Tympanic membrane normal. Tympanic membrane is not erythematous.   Left Ear: Tympanic membrane normal. Tympanic membrane is not erythematous.   Nose: No mucosal edema. Right sinus exhibits no frontal sinus tenderness. Left sinus exhibits no frontal sinus tenderness. Mouth/Throat: Mucous membranes are normal.   Cardiovascular:  Normal rate.           Pulmonary/Chest: Effort normal. No respiratory distress. He has no decreased breath sounds. He has no wheezes. He has no rhonchi. He has no rales.   Abdominal: Abdomen is soft. He exhibits no distension. There is no abdominal tenderness. There is no rebound and no guarding.   Musculoskeletal:         General: Normal range of motion.     Lymphadenopathy:        Head (right side): No submental adenopathy present.        Head (left side): No submental adenopathy present.   Neurological: He is alert and oriented to person, place, and time. He has normal strength. GCS score is 15. GCS eye subscore is 4. GCS verbal subscore is 5. GCS motor subscore is 6.   Skin: Skin is warm.   Psychiatric: He has a normal mood and affect. His behavior is normal. Judgment and thought content normal.         ED Course   Procedures  Labs Reviewed   COMPREHENSIVE METABOLIC PANEL - Abnormal       Result Value    Sodium 137      Potassium 4.4      Chloride 101      CO2 29      Glucose 120 (*)     Blood Urea Nitrogen 28.2 (*)     Creatinine 3.98 (*)     Calcium 10.0      Protein Total 8.1      Albumin 3.2 (*)     Globulin 4.9 (*)     Albumin/Globulin Ratio 0.7 (*)     Bilirubin Total 0.3      ALP 61      ALT <5      AST 13      eGFR 17      Anion Gap 7.0      BUN/Creatinine Ratio 7      URINALYSIS, REFLEX TO URINE CULTURE - Abnormal    Color, UA Light-Yellow      Appearance, UA Slightly Cloudy (*)     Specific Gravity, UA 1.010      pH, UA 6.0      Protein, UA 1+ (*)     Glucose, UA Negative      Ketones, UA Negative      Blood, UA 1+ (*)     Bilirubin, UA Negative      Urobilinogen, UA 0.2      Nitrites, UA Negative      Leukocyte Esterase, UA 2+ (*)     RBC, UA 11-20 (*)     WBC, UA >100 (*)     WBC Clumps, UA Many (*)     Bacteria, UA Occasional (*)     Squamous Epithelial Cells, UA Trace      Transitional Epithelial Cells, UA Trace (*)    APTT - Abnormal    PTT 37.3 (*)    PROTIME-INR - Abnormal    PT 14.6 (*)     INR 1.1      Narrative:     Protimes are used to monitor anticoagulant agents such as warfarin. PT INR values are based on the current patient normal mean and the VAL value for the specific instrument reagent used.  **Routine theraputic target values for the INR are 2.0-3.0**   CBC WITH DIFFERENTIAL - Abnormal    WBC 21.49 (*)     RBC 3.35 (*)     Hgb 9.5 (*)     Hct 30.9 (*)     MCV 92.2      MCH 28.4      MCHC 30.7 (*)     RDW 16.4      Platelet 307      MPV 10.0      Neut % 71.4      Lymph % 17.7      Mono % 7.4      Eos % 2.4      Basophil % 0.4      Imm Grans % 0.7      Neut # 15.36 (*)     Lymph # 3.80      Mono # 1.58 (*)     Eos # 0.52      Baso # 0.08      Imm Gran # 0.15 (*)     NRBC% 0.0     MAGNESIUM - Normal    Magnesium Level 2.00     LACTIC ACID, PLASMA - Normal    Lactic Acid Level 1.0     CULTURE, URINE   BLOOD CULTURE [facility]   BLOOD CULTURE [facility]   CBC W/ AUTO DIFFERENTIAL    Narrative:     The following orders were created for panel order CBC auto differential.  Procedure                               Abnormality         Status                     ---------                               -----------         ------                     CBC with Differential[[id number]]       Abnormal            Final result                 Please view results for these tests on the  "individual orders.   DRUG SCREEN, URINE (BEAKER)          Imaging Results              X-Ray Chest 1 View for Line/Tube Placement (Final result)  Result time 04/24/25 15:06:45      Final result by Charlie Martini MD (04/24/25 15:06:45)                   Impression:      Right PICC tip overlies the mid SVC.      Electronically signed by: Charlie Martini  Date:    04/24/2025  Time:    15:06               Narrative:    EXAMINATION:  XR CHEST 1 VIEW FOR LINE/TUBE PLACEMENT    CLINICAL HISTORY:  picc placement;    COMPARISON:  19 April 2025    FINDINGS:  Frontal view of the chest was obtained. Right PICC tip overlies the mid SVC.  Left-sided dialysis catheter tip overlies the upper SVC.  The heart is not enlarged.  Lungs are clear.  There is no pneumothorax or significant effusion.                                       Medications   sodium chloride 0.9% flush 10 mL (has no administration in time range)   mupirocin 2 % ointment (has no administration in time range)   alteplase injection 2 mg (1.9 mg Other Given 4/24/25 1429)   alteplase injection 2 mg (1.9 mg Other Given 4/24/25 1430)   cefTRIAXone injection 1 g (1 g Intravenous Given 4/24/25 1516)     Medical Decision Making  59yoAAM w/hx of neuropathy and ESRD (HD-MWF) with recent catheter placement left chest wall, presents to the ER with "vascular access" problem so he didn't do dialysis yesterday, and he pulled his picc line out while "drying off after a shower" last night. Denies nausea,vomiting, abdominal pain, fever, chest pain, chills, or shortness of breath. Nephrologist, Dr. Etienne. Patient still produces urine. Rocephin daily with PICC line for positive blood cultures that grew with the end of March.  Patient had recently had a PICC line placed approximately 3 days ago.  Patient recommended to have PICC line for approximately 26 days.    Problems Addressed:  Abnormal white blood cell (WBC): acute illness or injury     Details: Differential diagnosis included but " not limited to:  Leukocytosis, urinary tract infection, sepsis, other etiology     While patient has had leukocytosis recently, it is significantly more elevated the was in the past.  Patient comfortable with admission.  Patient is still producing urine.  Dialysis nurse was able to push CATHFLOW  through the catheter.  we will let it as long as we can until patient needs to dialyze again. Vascular surgery is aware of this patient.  Patient will be admitted at this time for further evaluation of leukocytosis.  Problem with vascular access: acute illness or injury  Status post PICC central line placement: acute illness or injury    Amount and/or Complexity of Data Reviewed  Labs: ordered. Decision-making details documented in ED Course.  Radiology: ordered.    Risk  Prescription drug management.  Decision regarding hospitalization.               ED Course as of 04/24/25 1604   Thu Apr 24, 2025   1319 Bacteria, UA(!): Occasional [ST]   1319 WBC, UA(!): >100 [ST]   1319 RBC, UA(!): 11-20 [ST]   1319 Leukocyte Esterase, UA(!): 2+ [ST]   1400 WBC(!): 21.49 [ST]   1400 HM paged  [ST]   1414 Leukocytosis evident with increased from 13,000 to 23,000 in the last few days.  Hospital Medicine page. [ST]      ED Course User Index  [ST] Beverly Bradley PA                           Clinical Impression:  Final diagnoses:  [Z78.9] Problem with vascular access (Primary)  [Z95.828] Status post PICC central line placement  [D72.9] Abnormal white blood cell (WBC)  [N30.01] Acute cystitis with hematuria          ED Disposition Condition    Admit            This note was typed partially using voice recognition software.  Please be reminded that not all corrections/addendums to grammar may have been made prior to closing of this chart.           [1]   Social History  Tobacco Use    Smoking status: Former     Current packs/day: 0.50     Types: Cigarettes   Substance Use Topics    Alcohol use: Not Currently     Alcohol/week: 28.0  standard drinks of alcohol     Types: 28 Shots of liquor per week     Comment: half a pint per day        Beverly Bradley PA  04/24/25 [id number]

## 2025-04-24 NOTE — ED PROVIDER NOTES
Encounter Date: 4/24/2025       History     Chief Complaint   Patient presents with    Vascular Access Problem     Patient reports unable to dialyze yesterday due to left chest wall catheter being clogged. Dialysis MWF, last run on Monday. Patient also reports right arm PICC line came out yesterday. Patient on outpatient abx. Denies chest pain and SOB.     See MDM for details.      The history is provided by the patient. No  was used.     Review of patient's allergies indicates:   Allergen Reactions    Opioids - morphine analogues Hallucinations     Past Medical History:   Diagnosis Date    Neuropathy      Past Surgical History:   Procedure Laterality Date    ESOPHAGOGASTRODUODENOSCOPY N/A 03/30/2025    Procedure: EGD (ESOPHAGOGASTRODUODENOSCOPY);  Surgeon: Stan Elder MD;  Location: [facility] OR;  Service: Endoscopy;  Laterality: N/A;    INSERTION OF TUNNELED CENTRAL VENOUS HEMODIALYSIS CATHETER N/A 4/17/2025    Procedure: Insertion, Catheter, Central Venous, Hemodialysis;  Surgeon: Kyrie Laws DO;  Location: [facility] CATH LAB;  Service: Nephrology;  Laterality: N/A;    LEG SURGERY Right      No family history on file.  Social History[1]  Review of Systems    Physical Exam     Initial Vitals [04/24/25 1118]   BP Pulse Resp Temp SpO2   119/78 92 16 98.7 °F (37.1 °C) 98 %      MAP       --         Physical Exam    ED Course   Procedures  Labs Reviewed   CBC W/ AUTO DIFFERENTIAL    Narrative:     The following orders were created for panel order CBC auto differential.  Procedure                               Abnormality         Status                     ---------                               -----------         ------                     CBC with Differential[[id number]]                                                        Please view results for these tests on the individual orders.   COMPREHENSIVE METABOLIC PANEL   URINALYSIS, REFLEX TO URINE CULTURE   APTT   PROTIME-INR   MAGNESIUM   CBC WITH  "DIFFERENTIAL          Imaging Results    None          Medications - No data to display  Medical Decision Making                                    Clinical Impression:   ***Please document a Clinical Impression and click the "Refresh" button to refresh your note and automatically pull in before signing.***                [1]   Social History  Tobacco Use    Smoking status: Former     Current packs/day: 0.50     Types: Cigarettes   Substance Use Topics    Alcohol use: Not Currently     Alcohol/week: 28.0 standard drinks of alcohol     Types: 28 Shots of liquor per week     Comment: half a pint per day     "

## 2025-04-24 NOTE — Clinical Note
Catheter tip was verified under fluoroscopy. Ports aspirate and flush appropriately. Ports were locked with Heparin 2.0 ml each.

## 2025-04-24 NOTE — Clinical Note
The left chest and left neck was prepped. The site was prepped with Betadine. The patient was draped.

## 2025-04-24 NOTE — H&P
[facility] Hospital Medicine History & Physical Examination       Patient Name: Tao Bee  MRN: [id number]  Patient Class: IP- Inpatient   Admission Date: 4/24/2025   Admitting Physician: VIRIDIANA Service   Length of Stay: 0  Attending Physician: Dr evita Shultz  Primary Care Provider: Penny Garsia FNP  Face-to-Face encounter date: 04/24/2025  Code Status: Full code  Chief Complaint: Vascular Access Problem (Patient reports unable to dialyze yesterday due to left chest wall catheter being clogged. Dialysis MWF, last run on Monday. Patient also reports right arm PICC line came out yesterday. Patient on outpatient abx. Denies chest pain and SOB.)        Screening for Social Drivers for health:  Patient screened for food insecurity, housing instability, transportation needs, utility difficulties, and interpersonal safety (select all that apply as identified as concern)  []Housing or Food  []Transportation Needs  []Utility Difficulties  []Interpersonal safety  [x]None    Patient information was obtained from patient, patient's family, past medical records and/or ER records.     HISTORY OF PRESENT ILLNESS:   Tao Bee is a 59 y.o. male who PMH includes ESRD with HD, mitral valve endocarditis, E coli bacteremia, chronic anemia, HTN, HLD, neuropathy, presents to the ED at [facility] on 4/24/2025 with a primary complaint of PICC line being dislodged along with left upper chest wall dialysis catheter malfunction and.  Patient reports his last run of dialysis was on Monday.  He did try to go to dialysis yesterday however his left upper chest wall dialysis catheter was clogged and the center place cath flow in an attempt to unclog the access.  Patient reports while taking a shower last night his PICC line became dislodged.  Of note patient was recently admitted to our services from 4/15-04/21/2025 which he was treated for mitral valve endocarditis, E coli bacteremia and subsequently discharged on  IV antibiotic therapy at home.  Patient was seen by ID Services last hospitalization with recommendations of 2 g ceftriaxone IV daily with the end date of 05/14/2025.  Patient denies any injury, trauma, or falls.  He reports the PICC access was accidentally dislodged while bathing.  No reported chest pain, fever, chills, cough, congestion or any sick contacts.  Lab work reviewed demonstrated WBCs 2, HH 9.5/30.9, BUN/Creat 28.2/3.98, other indicis unremarkable, UA suggestive of UTI.   Initial VS /78 pulse 92 respirations 16 temperature 98.7° F O2 saturation 98% on room air.  Vascular Services were consulted and PICC team was consulted.  Patient had PICC replaced.  Per nursing reports Cathflo has been placed in dialysis access.  Renal Services have been consulted.  Patient is admitted to hospital medicine services for further management.    PAST MEDICAL HISTORY:   ESRD on HD   Mitral valve endocarditis   Chronic anemia   Neuropathy   HLD   HTN    PAST SURGICAL HISTORY:     Past Surgical History:   Procedure Laterality Date    ESOPHAGOGASTRODUODENOSCOPY N/A 03/30/2025    Procedure: EGD (ESOPHAGOGASTRODUODENOSCOPY);  Surgeon: Stan Elder MD;  Location: [facility] OR;  Service: Endoscopy;  Laterality: N/A;    INSERTION OF TUNNELED CENTRAL VENOUS HEMODIALYSIS CATHETER N/A 4/17/2025    Procedure: Insertion, Catheter, Central Venous, Hemodialysis;  Surgeon: Kyrie Laws DO;  Location: [facility] CATH LAB;  Service: Nephrology;  Laterality: N/A;    LEG SURGERY Right        ALLERGIES:   Opioids - morphine analogues    FAMILY HISTORY:   Reviewed and negative    SOCIAL HISTORY:     Social History     Tobacco Use    Smoking status: Former     Current packs/day: 0.50     Types: Cigarettes    Smokeless tobacco: Not on file   Substance Use Topics    Alcohol use: Not Currently     Alcohol/week: 28.0 standard drinks of alcohol     Types: 28 Shots of liquor per week     Comment: half a pint per day        HOME MEDICATIONS:   As  documented  Prior to Admission medications    Medication Sig Start Date End Date Taking? Authorizing Provider   cefTRIAXone (ROCEPHIN) 2 gram injection Inject 2 g into the vein once daily. for 23 days 4/21/25 5/14/25  Ok Belcher MD   gabapentin (NEURONTIN) 600 MG tablet Take 600 mg by mouth 3 (three) times daily.    Provider, Meryl   sevelamer carbonate (RENVELA) 800 mg Tab Take 1 tablet (800 mg total) by mouth 3 (three) times daily with meals. 4/21/25 4/21/26  Ok Belcher MD   sodium bicarbonate 650 MG tablet Take 1 tablet (650 mg total) by mouth 2 (two) times daily. 4/21/25 4/21/26  Ok Belcher MD       REVIEW OF SYSTEMS:   Except as documented, all other systems reviewed and negative     PHYSICAL EXAM:     VITAL SIGNS: 24 HRS MIN & MAX LAST   Temp  Min: 98.7 °F (37.1 °C)  Max: 99.3 °F (37.4 °C) 99.3 °F (37.4 °C)   BP  Min: 119/75  Max: 135/82 119/75   Pulse  Min: 76  Max: 94  94   Resp  Min: 13  Max: 21 14   SpO2  Min: 98 %  Max: 100 % 98 %       General appearance: Well-developed, thin chronically ill-appearing male looks older than stated age; nontoxic, sister is at the bedside   HENT: Atraumatic head. Moist mucous membranes of oral cavity, edentulous no lesions noted.  Eyes:  PERRL.   Lungs: Clear to auscultation bilaterally. No wheezing present.  Symmetrical expansion, unlabored respirations O2 saturation stable on room air  Heart: Regular rate and rhythm. S1 and S2 present capillary refill brisk, no edema; dialysis access to left upper chest wall, PICC line access to right upper arm Abdomen: Soft, non-distended, non-tender. No rebound tenderness/guarding. Bowel sounds are normal.   Extremities: No cyanosis, clubbing  Skin:  Warm and dry  Neuro:  Oriented no acute focal deficits  Psych/mental status:  Flat affect, cooperative  LABS AND IMAGING:     Recent Labs   Lab 04/20/25  0306 04/21/25  0520 04/24/25  1212   WBC 14.00* 13.81* 21.49*   RBC 3.50* 3.39* 3.35*   HGB 10.0* 9.8* 9.5*   HCT 31.4* 30.5*  30.9*   MCV 89.7 90.0 92.2   MCH 28.6 28.9 28.4   MCHC 31.8* 32.1* 30.7*   RDW 16.4 16.3 16.4   * 453* 307   MPV 9.7 9.5 10.0       Recent Labs   Lab 04/18/25  0354 04/19/25  0238 04/20/25  0306 04/21/25  0520 04/24/25  1213    138 136 136 137   K 3.8 4.0 4.4 4.2 4.4    101 102 103 101   CO2 26 24 24 23 29   BUN 16.8 21.9 19.1 30.9* 28.2*   CREATININE 3.32* 4.31* 3.65* 4.22* 3.98*   CALCIUM 9.2 9.2 9.6 10.2 10.0   MG 1.60 1.70  --  2.00 2.00   ALBUMIN 2.7*  --  2.9*  --  3.2*   ALKPHOS 63  --  63  --  61   ALT 6  --  5  --  <5   AST 12  --  14  --  13   BILITOT 0.3  --  0.2  --  0.3       Microbiology Results (last 7 days)       Procedure Component Value Units Date/Time    Blood Culture #1 **CANNOT BE ORDERED STAT** [[id number]] Collected: 04/24/25 1420    Order Status: Resulted Specimen: Blood Updated: 04/24/25 1422    Blood Culture #2 **CANNOT BE ORDERED STAT** [[id number]] Collected: 04/24/25 1420    Order Status: Resulted Specimen: Blood Updated: 04/24/25 1421    Urine culture [[id number]] Collected: 04/24/25 1212    Order Status: Sent Specimen: Urine Updated: 04/24/25 1318             X-Ray Chest 1 View for Line/Tube Placement  Narrative: EXAMINATION:  XR CHEST 1 VIEW FOR LINE/TUBE PLACEMENT    CLINICAL HISTORY:  picc placement;    COMPARISON:  19 April 2025    FINDINGS:  Frontal view of the chest was obtained. Right PICC tip overlies the mid SVC.  Left-sided dialysis catheter tip overlies the upper SVC.  The heart is not enlarged.  Lungs are clear.  There is no pneumothorax or significant effusion.  Impression: Right PICC tip overlies the mid SVC.    Electronically signed by: Charlie Martini  Date:    04/24/2025  Time:    15:06        ASSESSMENT & PLAN:   ASSESSMENT:  PICC line malfunction-dislodging-POA   Dialysis access malfunction, clotting-POA   Leukocytosis-worsening-POA  E coli positive blood cultures with right renal in zohra nephric abscess-POA   ESRD with HD-POA  Valvular heart disease  with mitral valve endocarditis-status post treatment-POA   Anemia-chronic disease-POA   HTN-essential-POA   HLD-on statin therapy-POA   Chronic neuropathy-POA     PLAN:  Vascular Services have been consulted appreciate assistance and recommendations   Renal Services have been consulted appreciate assistance and recommendations   PICC team has been consulted and PICC has been replaced appreciate assistance and recommendations   Accurate I&O   Daily weights   Fall precautions   Resume home medication as deemed necessary   Continue with ceftriaxone 2 g daily per ID recommendation for duration through 05/14/2025         VTE Prophylaxis: SCD for DVT prophylaxis and will be advised to be as mobile as possible and sit in a chair as tolerated    Patient condition:  Stable  __________________________________________________________________________  INPATIENT LIST OF MEDICATIONS     Scheduled Meds:   [START ON 4/25/2025] cefTRIAXone  2 g Intravenous Daily    gabapentin  600 mg Oral TID    heparin (porcine)  5,000 Units Subcutaneous Q12H    mupirocin   Nasal BID    sevelamer carbonate  800 mg Oral TID WM    sodium bicarbonate  650 mg Oral BID     Continuous Infusions:  PRN Meds:.  Current Facility-Administered Medications:     acetaminophen, 1,000 mg, Oral, Q6H PRN    acetaminophen, 650 mg, Oral, Q4H PRN    dextrose 50%, 12.5 g, Intravenous, PRN    dextrose 50%, 25 g, Intravenous, PRN    glucagon (human recombinant), 1 mg, Intramuscular, PRN    glucose, 16 g, Oral, PRN    glucose, 24 g, Oral, PRN    naloxone, 0.02 mg, Intravenous, PRN    ondansetron, 4 mg, Intravenous, Q4H PRN    prochlorperazine, 5 mg, Intravenous, Q6H PRN    Flushing PICC/Midline Protocol, , , Until Discontinued **AND** sodium chloride 0.9%, 10 mL, Intravenous, Q12H PRN    sodium chloride 0.9%, 10 mL, Intravenous, PRN      I, SUNDAR Bliss have reviewed and discussed the case with   Dr. Hilton Shultz.  Please see the following addendum for further  assessment and plan from their attending MD. This note was created with a assistance of electronic voice recognition software.  There may be transcription errors as a result of using this technology however minimal; and effort has been made to assure accuracy of the transcription but any obvious areas or admissions should be clarified with the author of the document   Sapphire KARAN Aguilar, [facility]   04/24/2025    ________________________________________________________________________________    MD Addendum:  Dr. ROSITA ---assumed care of this patient today at ---am/pm  For the patient encounter, I performed the substantive portion of the visit, I reviewed the NP/PA documentation, treatment plan, and medical decision making.  I had face to face time with this patient     A. History:    B. Physical exam:    C. Medical decision making:    Discharge Planning and Disposition: No mobility needs. Ambulating well. Good social support system.   Anticipated discharge    All diagnosis and differential diagnosis have been reviewed; assessment and plan has been documented; I have personally reviewed the labs and test results that are presently available; I have reviewed the patients medication list; I have reviewed the consulting providers response and recommendations. I have reviewed or attempted to review medical records based upon their availability.    All of the patient and family questions have been addressed and answered. Patient's is agreeable to the above stated plan. I will continue to monitor closely and make adjustments to medical management as needed.

## 2025-04-24 NOTE — PROCEDURES
"Tao Bee is a 59 y.o. male patient.    Temp: 98.7 °F (37.1 °C) (04/24/25 1118)  Pulse: 90 (04/24/25 1358)  Resp: 14 (04/24/25 1358)  BP: 129/86 (04/24/25 1358)  SpO2: 100 % (04/24/25 1358)  Weight: 59.4 kg (131 lb) (04/24/25 1118)  Height: 5' 7" (170.2 cm) (04/24/25 1118)    PICC  Date/Time: 4/24/2025 2:56 PM  Performed by: August Pink RN  Consent Done: Yes  Time out: Immediately prior to procedure a time out was called to verify the correct patient, procedure, equipment, support staff and site/side marked as required  Indications: med administration and vascular access  Anesthesia: local infiltration    Preparation: skin prepped with ChloraPrep  Location details: right brachial  Catheter type: double lumen  Catheter size: 5 Fr  Catheter Length: 34cm    Ultrasound guidance: yes  Vessel Caliber: patent, compressibility normal  Needle advanced into vessel with real time Ultrasound guidance.  Guidewire confirmed in vessel.  Sterile sheath used.  Number of attempts: 1  Post-procedure: blood return through all ports, chlorhexidine patch and sterile dressing applied            Name ALEJANDRINA Pink RN   4/24/2025    "

## 2025-04-24 NOTE — NURSING
Carhflo 1.9mg per lumen instilled into dialysis catheter. Catheter labeled do not use. Nurse and pt educated

## 2025-04-24 NOTE — FIRST PROVIDER EVALUATION
"Medical screening examination initiated.  I have conducted a focused provider triage encounter, findings are as follows:    Brief history of present illness:  59 year old male presents after he was told dialysis catheter was clogged yesterday so he was unable to run. Last run Monday. Catheter was inserted 4/17 by Eusebia. His Picc line also came out yesterday. He is on rocephin for UTI.    Vitals:    04/24/25 1118   BP: 119/78   Pulse: 92   Resp: 16   Temp: 98.7 °F (37.1 °C)   TempSrc: Oral   SpO2: 98%   Weight: 59.4 kg (131 lb)   Height: 5' 7" (1.702 m)       Pertinent physical exam:  Pt is awake, alert, and oriented. Ambulatory to triage    Brief workup plan:  Labs, call picc nurse    Preliminary workup initiated; this workup will be continued and followed by the physician or advanced practice provider that is assigned to the patient when roomed.  "

## 2025-04-25 LAB
ALBUMIN SERPL-MCNC: 3 G/DL (ref 3.5–5)
ALBUMIN/GLOB SERPL: 0.7 RATIO (ref 1.1–2)
ALP SERPL-CCNC: 67 UNIT/L (ref 40–150)
ALT SERPL-CCNC: <5 UNIT/L (ref 0–55)
ANION GAP SERPL CALC-SCNC: 10 MEQ/L
AST SERPL-CCNC: 12 UNIT/L (ref 11–45)
B PERT.PT PRMT NPH QL NAA+NON-PROBE: NOT DETECTED
BASOPHILS # BLD AUTO: 0.07 X10(3)/MCL
BASOPHILS NFR BLD AUTO: 0.4 %
BILIRUB SERPL-MCNC: 0.2 MG/DL
BUN SERPL-MCNC: 30.4 MG/DL (ref 8.4–25.7)
C PNEUM DNA NPH QL NAA+NON-PROBE: NOT DETECTED
CALCIUM SERPL-MCNC: 9.3 MG/DL (ref 8.4–10.2)
CHLORIDE SERPL-SCNC: 101 MMOL/L (ref 98–107)
CO2 SERPL-SCNC: 30 MMOL/L (ref 22–29)
CREAT SERPL-MCNC: 4.2 MG/DL (ref 0.72–1.25)
CREAT/UREA NIT SERPL: 7
EOSINOPHIL # BLD AUTO: 0.65 X10(3)/MCL (ref 0–0.9)
EOSINOPHIL NFR BLD AUTO: 3.5 %
ERYTHROCYTE [DISTWIDTH] IN BLOOD BY AUTOMATED COUNT: 16.7 % (ref 11.5–17)
FLUAV AG UPPER RESP QL IA.RAPID: NOT DETECTED
FLUBV AG UPPER RESP QL IA.RAPID: NOT DETECTED
GFR SERPLBLD CREATININE-BSD FMLA CKD-EPI: 15 ML/MIN/1.73/M2
GLOBULIN SER-MCNC: 4.1 GM/DL (ref 2.4–3.5)
GLUCOSE SERPL-MCNC: 80 MG/DL (ref 74–100)
HADV DNA NPH QL NAA+NON-PROBE: NOT DETECTED
HCOV 229E RNA NPH QL NAA+NON-PROBE: NOT DETECTED
HCOV HKU1 RNA NPH QL NAA+NON-PROBE: NOT DETECTED
HCOV NL63 RNA NPH QL NAA+NON-PROBE: NOT DETECTED
HCOV OC43 RNA NPH QL NAA+NON-PROBE: NOT DETECTED
HCT VFR BLD AUTO: 30.3 % (ref 42–52)
HGB BLD-MCNC: 9.6 G/DL (ref 14–18)
HMPV RNA NPH QL NAA+NON-PROBE: NOT DETECTED
HPIV1 RNA NPH QL NAA+NON-PROBE: NOT DETECTED
HPIV2 RNA NPH QL NAA+NON-PROBE: NOT DETECTED
HPIV3 RNA NPH QL NAA+NON-PROBE: NOT DETECTED
HPIV4 RNA NPH QL NAA+NON-PROBE: NOT DETECTED
IMM GRANULOCYTES # BLD AUTO: 0.17 X10(3)/MCL (ref 0–0.04)
IMM GRANULOCYTES NFR BLD AUTO: 0.9 %
LYMPHOCYTES # BLD AUTO: 4.7 X10(3)/MCL (ref 0.6–4.6)
LYMPHOCYTES NFR BLD AUTO: 25.6 %
M PNEUMO DNA NPH QL NAA+NON-PROBE: NOT DETECTED
MCH RBC QN AUTO: 29 PG (ref 27–31)
MCHC RBC AUTO-ENTMCNC: 31.7 G/DL (ref 33–36)
MCV RBC AUTO: 91.5 FL (ref 80–94)
MONOCYTES # BLD AUTO: 1.53 X10(3)/MCL (ref 0.1–1.3)
MONOCYTES NFR BLD AUTO: 8.3 %
NEUTROPHILS # BLD AUTO: 11.23 X10(3)/MCL (ref 2.1–9.2)
NEUTROPHILS NFR BLD AUTO: 61.3 %
NRBC BLD AUTO-RTO: 0 %
PLATELET # BLD AUTO: 294 X10(3)/MCL (ref 130–400)
PMV BLD AUTO: 10.1 FL (ref 7.4–10.4)
POTASSIUM SERPL-SCNC: 4.6 MMOL/L (ref 3.5–5.1)
PROT SERPL-MCNC: 7.1 GM/DL (ref 6.4–8.3)
RBC # BLD AUTO: 3.31 X10(6)/MCL (ref 4.7–6.1)
RSV RNA NPH QL NAA+NON-PROBE: NOT DETECTED
RV+EV RNA NPH QL NAA+NON-PROBE: NOT DETECTED
SARS-COV-2 RNA RESP QL NAA+PROBE: NOT DETECTED
SODIUM SERPL-SCNC: 141 MMOL/L (ref 136–145)
WBC # BLD AUTO: 18.35 X10(3)/MCL (ref 4.5–11.5)

## 2025-04-25 PROCEDURE — 25000003 PHARM REV CODE 250: Performed by: NURSE PRACTITIONER

## 2025-04-25 PROCEDURE — 0J2TXYZ CHANGE OTHER DEVICE IN TRUNK SUBCUTANEOUS TISSUE AND FASCIA, EXTERNAL APPROACH: ICD-10-PCS | Performed by: STUDENT IN AN ORGANIZED HEALTH CARE EDUCATION/TRAINING PROGRAM

## 2025-04-25 PROCEDURE — 87798 DETECT AGENT NOS DNA AMP: CPT | Performed by: INTERNAL MEDICINE

## 2025-04-25 PROCEDURE — C1750 CATH, HEMODIALYSIS,LONG-TERM: HCPCS | Performed by: STUDENT IN AN ORGANIZED HEALTH CARE EDUCATION/TRAINING PROGRAM

## 2025-04-25 PROCEDURE — 11000001 HC ACUTE MED/SURG PRIVATE ROOM

## 2025-04-25 PROCEDURE — 99152 MOD SED SAME PHYS/QHP 5/>YRS: CPT | Mod: ,,, | Performed by: STUDENT IN AN ORGANIZED HEALTH CARE EDUCATION/TRAINING PROGRAM

## 2025-04-25 PROCEDURE — 0240U COVID/FLU A&B PCR: CPT | Performed by: INTERNAL MEDICINE

## 2025-04-25 PROCEDURE — 5A1D70Z PERFORMANCE OF URINARY FILTRATION, INTERMITTENT, LESS THAN 6 HOURS PER DAY: ICD-10-PCS | Performed by: INTERNAL MEDICINE

## 2025-04-25 PROCEDURE — 85025 COMPLETE CBC W/AUTO DIFF WBC: CPT | Performed by: NURSE PRACTITIONER

## 2025-04-25 PROCEDURE — 80053 COMPREHEN METABOLIC PANEL: CPT | Performed by: NURSE PRACTITIONER

## 2025-04-25 PROCEDURE — 63600175 PHARM REV CODE 636 W HCPCS: Performed by: STUDENT IN AN ORGANIZED HEALTH CARE EDUCATION/TRAINING PROGRAM

## 2025-04-25 PROCEDURE — 63600175 PHARM REV CODE 636 W HCPCS: Performed by: NURSE PRACTITIONER

## 2025-04-25 PROCEDURE — C1769 GUIDE WIRE: HCPCS | Performed by: STUDENT IN AN ORGANIZED HEALTH CARE EDUCATION/TRAINING PROGRAM

## 2025-04-25 PROCEDURE — 36581 REPLACE TUNNELED CV CATH: CPT | Mod: 78,LT,, | Performed by: STUDENT IN AN ORGANIZED HEALTH CARE EDUCATION/TRAINING PROGRAM

## 2025-04-25 PROCEDURE — 36415 COLL VENOUS BLD VENIPUNCTURE: CPT | Performed by: NURSE PRACTITIONER

## 2025-04-25 PROCEDURE — 25500020 PHARM REV CODE 255: Performed by: INTERNAL MEDICINE

## 2025-04-25 PROCEDURE — 77001 FLUOROGUIDE FOR VEIN DEVICE: CPT | Mod: 26,,, | Performed by: STUDENT IN AN ORGANIZED HEALTH CARE EDUCATION/TRAINING PROGRAM

## 2025-04-25 PROCEDURE — 36581 REPLACE TUNNELED CV CATH: CPT | Mod: LT | Performed by: STUDENT IN AN ORGANIZED HEALTH CARE EDUCATION/TRAINING PROGRAM

## 2025-04-25 PROCEDURE — 99223 1ST HOSP IP/OBS HIGH 75: CPT | Mod: 25,,, | Performed by: STUDENT IN AN ORGANIZED HEALTH CARE EDUCATION/TRAINING PROGRAM

## 2025-04-25 PROCEDURE — 99152 MOD SED SAME PHYS/QHP 5/>YRS: CPT | Performed by: STUDENT IN AN ORGANIZED HEALTH CARE EDUCATION/TRAINING PROGRAM

## 2025-04-25 PROCEDURE — 80100016 HC MAINTENANCE HEMODIALYSIS

## 2025-04-25 PROCEDURE — 25000003 PHARM REV CODE 250: Performed by: INTERNAL MEDICINE

## 2025-04-25 PROCEDURE — 77001 FLUOROGUIDE FOR VEIN DEVICE: CPT | Performed by: STUDENT IN AN ORGANIZED HEALTH CARE EDUCATION/TRAINING PROGRAM

## 2025-04-25 DEVICE — GLIDEPATH HEMODIALYSIS CATH, ST, DL, 14.5 FR. 27CM
Type: IMPLANTABLE DEVICE | Site: CHEST  WALL | Status: FUNCTIONAL
Brand: GLIDEPATH LONG-TERM HEMODIALYSIS CATHETER WITH PRELOADED STYLET

## 2025-04-25 RX ORDER — AMOXICILLIN 250 MG
2 CAPSULE ORAL 2 TIMES DAILY
Status: DISCONTINUED | OUTPATIENT
Start: 2025-04-25 | End: 2025-04-26 | Stop reason: HOSPADM

## 2025-04-25 RX ORDER — ACETAMINOPHEN 325 MG/1
650 TABLET ORAL EVERY 6 HOURS PRN
Status: DISCONTINUED | OUTPATIENT
Start: 2025-04-25 | End: 2025-04-25

## 2025-04-25 RX ORDER — MIDAZOLAM HYDROCHLORIDE 1 MG/ML
INJECTION INTRAMUSCULAR; INTRAVENOUS
Status: DISCONTINUED | OUTPATIENT
Start: 2025-04-25 | End: 2025-04-25 | Stop reason: HOSPADM

## 2025-04-25 RX ORDER — ENOXAPARIN SODIUM 100 MG/ML
30 INJECTION SUBCUTANEOUS EVERY 24 HOURS
Status: DISCONTINUED | OUTPATIENT
Start: 2025-04-25 | End: 2025-04-26 | Stop reason: HOSPADM

## 2025-04-25 RX ORDER — FENTANYL CITRATE 50 UG/ML
INJECTION, SOLUTION INTRAMUSCULAR; INTRAVENOUS
Status: DISCONTINUED | OUTPATIENT
Start: 2025-04-25 | End: 2025-04-25 | Stop reason: HOSPADM

## 2025-04-25 RX ORDER — LIDOCAINE HYDROCHLORIDE 10 MG/ML
INJECTION, SOLUTION INFILTRATION; PERINEURAL
Status: DISCONTINUED | OUTPATIENT
Start: 2025-04-25 | End: 2025-04-25 | Stop reason: HOSPADM

## 2025-04-25 RX ADMIN — MUPIROCIN: 20 OINTMENT TOPICAL at 08:04

## 2025-04-25 RX ADMIN — CEFTRIAXONE SODIUM 2 G: 2 INJECTION, POWDER, FOR SOLUTION INTRAMUSCULAR; INTRAVENOUS at 10:04

## 2025-04-25 RX ADMIN — IOHEXOL 100 ML: 350 INJECTION, SOLUTION INTRAVENOUS at 09:04

## 2025-04-25 RX ADMIN — ACETAMINOPHEN 1000 MG: 500 TABLET ORAL at 08:04

## 2025-04-25 RX ADMIN — SODIUM BICARBONATE 650 MG TABLET 650 MG: at 08:04

## 2025-04-25 RX ADMIN — SENNOSIDES AND DOCUSATE SODIUM 2 TABLET: 50; 8.6 TABLET ORAL at 08:04

## 2025-04-25 RX ADMIN — MUPIROCIN: 20 OINTMENT TOPICAL at 10:04

## 2025-04-25 RX ADMIN — GABAPENTIN 600 MG: 300 CAPSULE ORAL at 08:04

## 2025-04-25 NOTE — PROGRESS NOTES
Inpatient Nutrition Assessment    Admit Date: 4/24/2025   Total duration of encounter: 1 day   Patient Age: 59 y.o.    Nutrition Recommendation/Prescription     Resume Renal diet as tolerated    Communication of Recommendations: reviewed with nurse    Nutrition Assessment     Malnutrition Assessment/Nutrition-Focused Physical Exam  Unable to complete physical assessment due to pt in surgery 4/25                                                              A minimum of two characteristics is recommended for diagnosis of either severe or non-severe malnutrition.    Chart Review    Reason Seen: malnutrition screening tool (MST)    Malnutrition Screening Tool Results   Have you recently lost weight without trying?: Unsure  Have you been eating poorly because of a decreased appetite?: No   MST Score: 2   Diagnosis:  PICC line accidentally pulled-replace   Tunneled HD catheter function  Sepsis  Recent E coli bacteremia with presumed endocarditis  Positive UDS for MDMA and cocaine    Relevant Medical History: ESRD with HD, mitral valve endocarditis, E coli bacteremia, chronic anemia, HTN, HLD, neuropathy     Scheduled Medications:  cefTRIAXone, 2 g, Daily  enoxparin, 30 mg, Q24H (prophylaxis, 1700)  gabapentin, 600 mg, TID  mupirocin, , BID  senna-docusate, 2 tablet, BID  sevelamer carbonate, 800 mg, TID WM  sodium bicarbonate, 650 mg, BID    Continuous Infusions:   PRN Medications:  acetaminophen, 1,000 mg, Q6H PRN  acetaminophen, 650 mg, Q4H PRN  dextrose 50%, 12.5 g, PRN  dextrose 50%, 25 g, PRN  glucagon (human recombinant), 1 mg, PRN  glucose, 16 g, PRN  glucose, 24 g, PRN  naloxone, 0.02 mg, PRN  ondansetron, 4 mg, Q4H PRN  prochlorperazine, 5 mg, Q6H PRN  sodium chloride 0.9%, 10 mL, Q12H PRN  sodium chloride 0.9%, 10 mL, PRN    Calorie Containing IV Medications: no significant kcals from medications at this time    Recent Labs   Lab 04/19/25  0238 04/20/25  0306 04/21/25  0520 04/24/25  1212 04/24/25  1213  "04/25/25  0338    136 136  --  137 141   K 4.0 4.4 4.2  --  4.4 4.6   CALCIUM 9.2 9.6 10.2  --  10.0 9.3   PHOS 6.6*  --  6.7*  --   --   --    MG 1.70  --  2.00  --  2.00  --     102 103  --  101 101   CO2 24 24 23  --  29 30*   BUN 21.9 19.1 30.9*  --  28.2* 30.4*   CREATININE 4.31* 3.65* 4.22*  --  3.98* 4.20*   EGFRNORACEVR 15 18 15  --  17 15   GLUCOSE 90 94 87  --  120* 80   BILITOT  --  0.2  --   --  0.3 0.2   ALKPHOS  --  63  --   --  61 67   ALT  --  5  --   --  <5 <5   AST  --  14  --   --  13 12   ALBUMIN  --  2.9*  --   --  3.2* 3.0*   WBC 14.19* 14.00* 13.81* 21.49*  --  18.35*   HGB 9.1* 10.0* 9.8* 9.5*  --  9.6*   HCT 29.6* 31.4* 30.5* 30.9*  --  30.3*     Nutrition Orders:  Diet Renal On Dialysis Standard Tray      Appetite/Oral Intake: NPO/not applicable  Factors Affecting Nutritional Intake: NPO  Social Needs Impacting Accesintacts to Food: none identified  Food/Quaker/Cultural Preferences: none reported  Food Allergies: no known food allergies  Last Bowel Movement: 04/24/25  Wound(s):  intact    Comments    4/25: Pt was in cath lab at time of visit. NPO today for procedure. Appetite was good on admit. On HD x3/week.     Anthropometrics    Height: 5' 7.01" (170.2 cm),    Last Weight: 59.9 kg (132 lb 1.6 oz) (04/24/25 1700), Weight Method: Bed Scale  BMI (Calculated): 20.7  BMI Classification: normal (BMI 18.5-24.9)        Ideal Body Weight (IBW), Male: 148.06 lb     % Ideal Body Weight, Male (lb): 88.51 %                          Usual Weight Provided By: EMR weight history    Wt Readings from Last 5 Encounters:   04/24/25 59.9 kg (132 lb 1.6 oz)   04/21/25 59.7 kg (131 lb 9.6 oz)   04/08/25 70 kg (154 lb 5.2 oz)   12/07/23 76.7 kg (169 lb)     Weight Change(s) Since Admission: .  Wt Readings from Last 1 Encounters:   04/24/25 1700 59.9 kg (132 lb 1.6 oz)   04/24/25 1118 59.4 kg (131 lb)   Admit Weight: 59.4 kg (131 lb) (04/24/25 1118), Weight Method: Bed Scale    Estimated " Needs    Weight Used For Calorie Calculations: 59.9 kg (132 lb 0.9 oz)  Energy Calorie Requirements (kcal): 1785 kcal (MSJxSF 1.3)     Weight Used For Protein Calculations: 59.9 kg (132 lb 0.9 oz)  Protein Requirements: 84 gm pro (1.4 g/kg)  Fluid Requirements (mL): 1000 mL + urine output        Enteral Nutrition     Patient not receiving enteral nutrition at this time.    Parenteral Nutrition     Patient not receiving parenteral nutrition support at this time.    Evaluation of Received Nutrient Intake    Calories: not meeting estimated needs  Protein: not meeting estimated needs    Patient Education     Not applicable.    Nutrition Diagnosis     PES: No nutrition diagnosis at this time    PES:            Nutrition Interventions     Intervention(s): collaboration with other providers  Intervention(s):      Goal: Meet greater than 80% of nutritional needs by follow-up. (new)  Goal: Maintain weight throughout hospitalization. (new)    Nutrition Goals & Monitoring     Dietitian will monitor: food and beverage intake, weight change, and electrolyte/renal panel  Discharge planning: continue Renal diet  Nutrition Risk/Follow-Up: low (follow-up in 5-7 days)   Please consult if re-assessment needed sooner.

## 2025-04-25 NOTE — PROCEDURES
INTERVENTIONAL NEPHROLOGY PROCEDURE NOTE:   TUNNELED DIALYSIS CATHETER (TDC) EXCHANGE       Patient Name: Tao Bee  SONA 1965    Procedure Date:    2025    Performing Physician:   Dr. Laws and Dr. Ortez    Access History: Pt is with LEANDRO requiring HD.    Pre-Op Diagnosis: T82.4 Mechanical complication of vascular dialysis catheter, initial encounter, N17.9 Acute Kidney Injury (LEANDRO).  Post-Op Diagnosis: Same    Procedure: Tunneled dialysis catheter exchange.    Indication: Nonfunctional/dysfunctional dialysis catheter.    Anatomical Site: left internal jugular (LIJ)/left chest wall (LCW)    Informed Consent:  The patient was evaluated in the pre-operative area with assessment including the American Society of Anesthesia risk classification. The procedure is discussed in detail including risks, benefits alternatives and options and the patient agrees to proceed. Informed consent was obtained from the patient.     Maximum sterile barrier technique: The patient was prepped and draped using chlorhexidine prep and maximum sterile barrier technique.    Sedation Note:  Risks and benefits of sedation were reviewed with the patient or surrogate, including bleeding, infection, nausea, vomiting, dizziness, instability, damage to a nerve, damage to a blood vessel, cellulitis, reaction to medications, amnesia, loss of consciousness, respiratory arrest, cardiac arrest.     The patient received the following medications: Versed 2 mg IV and Fentanyl 50 mcg IV; patient did remain alert, responsive, and conversational throughout the procedure. I was personally responsible for supervising the administration of moderate sedation services during the procedure performed and I affirm all the guidelines and requirements described in the CPT 2025 section on moderate sedation were followed, including the use of an independent trained observer who had no other duties during the procedure. The total face-to-face time  was 20 minutes.    Procedure Steps:   After the patient was placed on the imaging table, the patient's left internal jugular (LIJ)/left chest wall (LCW) was scrubbed and the patient was draped in the usual sterile fashion.    The old catheter was interrogated for function:  - The venous port functioned sluggishly.  - The arterial port functioned sluggishly.     fluoroscopy image revealed the old catheter tip was superiorly in the SVC.    A 150 cm glidewire was passed through the old catheter with it's position verified with fluoroscopy. The exit site/cuff was infiltrated with lidocaine. The cuff was freed with blunt dissection. The catheter was then partially retracted over the glidewire.    Angiogram showed no fibrin sheath.    At this time, the old catheter was removed over glidewire. The glidewire was carefully cleansed with betadine x 3 then saline x 3, then the  re-gloved.    The new permacath was then passed through the existing tunnel. Catheter was flushed easily with a 10 cc syringe. The placement of the tip (at the junction of the SVC/right atrium) and the curve of the catheter was confirmed with fluoroscopy. The cuff was placed approximately 1 1/2 centimeters inside the tunnel.    Catheter hub was sutured to the skin with silk. Each port of the TDC catheter was filled with heparin as appropriate for the catheter length. A pressure dressing was applied.    ASSESSMENT/PLAN:  - Successful exchange of tunneled dialysis catheter: left internal jugular (LIJ)/left chest wall (LCW) 27 cm (cuff-to-tip) TDC (BD Glidepath).  - Okay to use TDC    EBL: 2 cc    Contrast: 5 cc    Complications: None    Post-op Instructions: The patient was given both verbal and written post-op instructions. If excessive bleeding at the site, they have been instructed to call their physician or proceed to a local emergency room.    Orders to the dialysis unit: OK to use tunneled dialysis catheter for dialysis.    Thank you  for allowing me the opportunity in taking care of this patient. Please reach me with any questions.    Ismael Ortez MD  Interventional Nephrology  Cell: [phone]

## 2025-04-25 NOTE — PT/OT/SLP PROGRESS
Occupational Therapy      Patient Name:  Tao Bee   MRN:  [id number]    Patient recently eval/discharged because he was Adelita with mobility and ADLs using cane. Went and spoke with pt who voiced no therapy needs, stating he is still ambulating around room without assistance and that he doesn't need therapy. Pt refusing screening stating he does not need therapy and wishes for us to sign off. Will d/c per pt request. Please re-consult if situation changes.     4/25/2025

## 2025-04-25 NOTE — CONSULTS
Nephrology Initial Consult Note    Patient Name: Tao Bee  Age: 59 y.o.  : 1965  MRN: [id number]  Admission Date: 2025    Reason for Consult:      ESRD    HPI:     Tao Bee is a 59 y.o. male is a chronic hemodialysis patient at [facility] on .  Admitted as his PICC line came out and is tunneled dialysis catheter was not working.  Overnight in new PICC line has been placed.  Now he is waiting to get tunneled dialysis catheter changed.  His only complaint is that he has starving.  No chest pains no abdominal pains no nausea no vomiting no diarrhea no dysuria no hematuria no hesitancy frequency urgency no cough cold congestion no fever or chills no headache blurring diplopia.  His brother present in the room.        Current Medications[1]    Penny Garsia FNP    Past Medical History:   Diagnosis Date    Neuropathy       Past Surgical History:   Procedure Laterality Date    ESOPHAGOGASTRODUODENOSCOPY N/A 2025    Procedure: EGD (ESOPHAGOGASTRODUODENOSCOPY);  Surgeon: Stan Elder MD;  Location: [facility] OR;  Service: Endoscopy;  Laterality: N/A;    INSERTION OF TUNNELED CENTRAL VENOUS HEMODIALYSIS CATHETER N/A 2025    Procedure: Insertion, Catheter, Central Venous, Hemodialysis;  Surgeon: Kyrie Laws DO;  Location: [facility] CATH LAB;  Service: Nephrology;  Laterality: N/A;    LEG SURGERY Right       No family history on file.  Social History     Tobacco Use    Smoking status: Former     Current packs/day: 0.50     Types: Cigarettes    Smokeless tobacco: Not on file   Substance Use Topics    Alcohol use: Not Currently     Alcohol/week: 28.0 standard drinks of alcohol     Types: 28 Shots of liquor per week     Comment: half a pint per day     Prescriptions Prior to Admission[2]  Review of patient's allergies indicates:   Allergen Reactions    Opioids - morphine analogues Hallucinations            Review of Systems:     All 12 point review of system done negative  except 1 history of present illness.    Objective:       VITAL SIGNS: 24 HR MIN & MAX LAST    Temp  Min: 98 °F (36.7 °C)  Max: 102.3 °F (39.1 °C)  98.9 °F (37.2 °C)        BP  Min: 114/73  Max: 154/90  114/73     Pulse  Min: 76  Max: 124  97     Resp  Min: 13  Max: 21  18    SpO2  Min: 95 %  Max: 100 %  98 %      GEN:  Moderately developed and nourished.  Awake alert oriented to time person place.  No acute distress noted.  HEENT:  Atraumatic normocephalic.  Pupils reactive.  Extraocular movements intact.  No oral lesion.  Neck supple.  No JVD.  CV: RRR +S1,S2 without murmur  PULM: CTAB, unlabored  ABD: Soft, NT/ND abdomen with NABS  EXT: No cyanosis or edema  SKIN: Warm and dry  PSYCH: Awake, alert, and appropriately conversant  Vascular access:  Left IJ tunneled dialysis catheter noted.  Which will be removed and a new catheter will be placed.  He also has a right upper arm PICC line present.          Component Value Date/Time     04/25/2025 0338     04/24/2025 1213    K 4.6 04/25/2025 0338    K 4.4 04/24/2025 1213    CO2 30 (H) 04/25/2025 0338    CO2 29 04/24/2025 1213    BUN 30.4 (H) 04/25/2025 0338    BUN 28.2 (H) 04/24/2025 1213    CREATININE 4.20 (H) 04/25/2025 0338    CREATININE 3.98 (H) 04/24/2025 1213    CALCIUM 9.3 04/25/2025 0338    CALCIUM 10.0 04/24/2025 1213    PHOS 6.7 (H) 04/21/2025 0520            Component Value Date/Time    WBC 18.35 (H) 04/25/2025 0338    WBC 21.49 (H) 04/24/2025 1212    WBC 21.29 04/04/2025 0357    WBC 30.45 04/03/2025 0505    HGB 9.6 (L) 04/25/2025 0338    HGB 9.5 (L) 04/24/2025 1212    HCT 30.3 (L) 04/25/2025 0338    HCT 30.9 (L) 04/24/2025 1212     04/25/2025 0338     04/24/2025 1212         CT Chest Abdomen Pelvis With IV Contrast (XPD) NO Oral Contrast   Final Result      1. Overall appearance of the kidneys and perinephric soft tissues is improved compared to prior contrast enhanced exam 04/03/2025. There is mild residual patchy cortical  hypoenhancement and stranding compatible with inflammatory change, right greater than left.  Small residual right perinephric collection, significantly decreased in size from prior.   2. Mild, patchy, multilobar pulmonary infiltrates suggest pneumonitis.         Electronically signed by: Hyalee Correia   Date:    04/25/2025   Time:    09:43      X-Ray Chest 1 View for Line/Tube Placement   Final Result      Right PICC tip overlies the mid SVC.         Electronically signed by: Charlie Martini   Date:    04/24/2025   Time:    15:06          Assessment / Plan:   ESRD.  Dialysis Monday Wednesday Friday.  History of mitral valve endocarditis.  On antibiotics.  Hypertension  Hyperlipidemia    Recommendation  Hemodialysis today prior to discharge.  Patient will need 3 g Rocephin after the dialysis today prior to discharge.      Thank you for this consultation.         [1]   Current Facility-Administered Medications   Medication Dose Route Frequency Provider Last Rate Last Admin    acetaminophen tablet 1,000 mg  1,000 mg Oral Q6H PRN Sapphire Aguilar FNP   1,000 mg at 04/24/25 2034    acetaminophen tablet 650 mg  650 mg Oral Q4H PRN Sapphire Aguilar FNP        cefTRIAXone injection 2 g  2 g Intravenous Daily Sapphire Aguilar, FNP   2 g at 04/25/25 1002    dextrose 50% injection 12.5 g  12.5 g Intravenous PRN Sapphire Aguilar, YOHANAP        dextrose 50% injection 25 g  25 g Intravenous PRN Sapphire Aguilar FNP        enoxaparin injection 30 mg  30 mg Subcutaneous Q24H (prophylaxis, 1700) Hilton Shultz MD        gabapentin capsule 600 mg  600 mg Oral TID Sapphire Aguilar FNP   600 mg at 04/24/25 2034    glucagon (human recombinant) injection 1 mg  1 mg Intramuscular PRN Sapphire Aguilar FNP        glucose chewable tablet 16 g  16 g Oral PRN Sapphire Aguilar, YOHANAP        glucose chewable tablet 24 g  24 g Oral PRN Sapphire Aguilar FNP        mupirocin 2 % ointment   Nasal  BID Bobby Gross III, MD   Given at 04/25/25 1002    naloxone 0.4 mg/mL injection 0.02 mg  0.02 mg Intravenous PRN Sapphire Aguilar FNP        ondansetron injection 4 mg  4 mg Intravenous Q4H PRN Sapphire Aguilar FNP        prochlorperazine injection Soln 5 mg  5 mg Intravenous Q6H PRN Sapphire Aguilar FNP        senna-docusate 8.6-50 mg per tablet 2 tablet  2 tablet Oral BID Hilton Shultz MD        sevelamer carbonate tablet 800 mg  800 mg Oral TID WM Sapphire Aguilar FNP   800 mg at 04/24/25 1745    sodium bicarbonate tablet 650 mg  650 mg Oral BID Sapphire Aguilar FNP   650 mg at 04/24/25 2034    sodium chloride 0.9% flush 10 mL  10 mL Intravenous Q12H PRN Bobby Gross III, MD        sodium chloride 0.9% flush 10 mL  10 mL Intravenous PRN Sapphire Aguilar FNP       [2]   Medications Prior to Admission   Medication Sig Dispense Refill Last Dose/Taking    cefTRIAXone (ROCEPHIN) 2 gram injection Inject 2 g into the vein once daily. for 23 days 46 g 0     gabapentin (NEURONTIN) 600 MG tablet Take 600 mg by mouth 3 (three) times daily.       sevelamer carbonate (RENVELA) 800 mg Tab Take 1 tablet (800 mg total) by mouth 3 (three) times daily with meals. 90 tablet 11     sodium bicarbonate 650 MG tablet Take 1 tablet (650 mg total) by mouth 2 (two) times daily. 60 tablet 11

## 2025-04-25 NOTE — PROGRESS NOTES
[facility] - 5th Floor [facility] MEDICINE ~ PROGRESS NOTE        CHIEF COMPLAINT   Hospital follow up    HOSPITAL COURSE   Tao Bee is a 59 y.o. male who PMH includes ESRD with HD, mitral valve endocarditis, E coli bacteremia, chronic anemia, HTN, HLD, neuropathy, presents to the ED at [facility] on 4/24/2025 with a primary complaint of PICC line being dislodged along with left upper chest wall dialysis catheter malfunction and.  Patient reports his last run of dialysis was on Monday.  He did try to go to dialysis yesterday however his left upper chest wall dialysis catheter was clogged and the center place cath flow in an attempt to unclog the access.  Patient reports while taking a shower last night his PICC line became dislodged.  Of note patient was recently admitted to our services from 4/15-04/21/2025 which he was treated for mitral valve endocarditis, E coli bacteremia and subsequently discharged on IV antibiotic therapy at home.  Patient was seen by ID Services last hospitalization with recommendations of 2 g ceftriaxone IV daily with the end date of 05/14/2025.  Patient denies any injury, trauma, or falls.  He reports the PICC access was accidentally dislodged while bathing.  No reported chest pain, fever, chills, cough, congestion or any sick contacts.  Lab work reviewed demonstrated WBCs 2, HH 9.5/30.9, BUN/Creat 28.2/3.98, other indicis unremarkable, UA suggestive of UTI.   Initial VS /78 pulse 92 respirations 16 temperature 98.7° F O2 saturation 98% on room air.  Vascular Services were consulted and PICC team was consulted.  Patient had PICC replaced.  Per nursing reports Cathflo has been placed in dialysis access.  Renal Services have been consulted.  Patient is admitted to hospital medicine services for further management.    Today  Having fever this morning up to 102.3 and feels warm.  He still reports no complaints at all, spine is without tenderness, no spinal  complaints, all joints with good range of motion.  Infectious Disease to be consulted given recurrence of fevers and leukocytosis.  I will check CT chest abdomen pelvis for any hidden source of infection.  Nephrology consulted for HD.  Also discussed positive UDS with him but he denies using.        OBJECTIVE/PHYSICAL EXAM     VITAL SIGNS (MOST RECENT):  Temp: (!) 102.3 °F (39.1 °C) (04/25/25 0733)  Pulse: (!) 124 (04/25/25 0733)  Resp: 18 (04/25/25 0733)  BP: 122/78 (04/25/25 0733)  SpO2: 95 % (04/25/25 0733) VITAL SIGNS (24 HOUR RANGE):  Temp:  [98 °F (36.7 °C)-102.3 °F (39.1 °C)] 102.3 °F (39.1 °C)  Pulse:  [] 124  Resp:  [13-21] 18  SpO2:  [95 %-100 %] 95 %  BP: (119-154)/(75-90) 122/78   GENERAL: In no acute distress, afebrile  HEENT:  CHEST: Clear to auscultation bilaterally, left upper chest HD catheter  HEART: S1, S2, no appreciable murmur  ABDOMEN: Soft, nontender, BS +  MSK: Warm, no lower extremity edema, no clubbing or cyanosis  NEUROLOGIC: Alert and oriented x4, moving all extremities with good strength   INTEGUMENTARY:  PSYCHIATRY:        ASSESSMENT/PLAN   PICC line accidentally pulled-replace   Tunneled HD catheter function  Sepsis  Recent E coli bacteremia with presumed endocarditis  Positive UDS for MDMA and cocaine    History of: As listed above      Infectious disease consulted.  Continue Rocephin 2 g daily.  Obtain CT chest abdomen pelvis with contrast today to eval for hidden infectious source.  Follow up blood cultures.  Check COVID/flu/respiratory panel to rule out other causes of fever   Interventional nephrology consulted to evaluate the tunneled catheter.  Nephrology consulted.  HD per them.    DVT prophylaxis:  Lovenox 30    Critical care diagnosis:  Ongoing Sepsis  Critical care time spent:  35 minutes  Intervention for the above diagnosis was given to prevent further deterioration/decompensation of the patient condition.      Anticipated discharge and disposition:    __________________________________________________________________________    NUTRITIONAL STATUS     Patient meets ASPEN criteria for   malnutrition of   per RD assessment as evidenced by:                       A minimum of two characteristics is recommended for diagnosis of either severe or non-severe malnutrition.     LABS/MICRO/MEDS/DIAGNOSTICS       LABS  Recent Labs     04/25/25  0338      K 4.6   CO2 30*   BUN 30.4*   CREATININE 4.20*   GLUCOSE 80   CALCIUM 9.3   ALKPHOS 67   AST 12   ALT <5   ALBUMIN 3.0*     Recent Labs     04/25/25 0338   WBC 18.35*   RBC 3.31*   HCT 30.3*   MCV 91.5          MICROBIOLOGY  Microbiology Results (last 7 days)       Procedure Component Value Units Date/Time    Urine culture [[id number]] Collected: 04/24/25 1212    Order Status: Completed Specimen: Urine Updated: 04/25/25 0716     Urine Culture No Growth At 24 Hours    Blood Culture #1 **CANNOT BE ORDERED STAT** [[id number]] Collected: 04/24/25 1420    Order Status: Resulted Specimen: Blood Updated: 04/24/25 1422    Blood Culture #2 **CANNOT BE ORDERED STAT** [[id number]] Collected: 04/24/25 1420    Order Status: Resulted Specimen: Blood Updated: 04/24/25 1421               MEDICATIONS   cefTRIAXone  2 g Intravenous Daily    gabapentin  600 mg Oral TID    heparin (porcine)  5,000 Units Subcutaneous Q12H    mupirocin   Nasal BID    sevelamer carbonate  800 mg Oral TID WM    sodium bicarbonate  650 mg Oral BID         INFUSIONS         DIAGNOSTIC TESTS  X-Ray Chest 1 View for Line/Tube Placement   Final Result      Right PICC tip overlies the mid SVC.         Electronically signed by: Charlie Martini   Date:    04/24/2025   Time:    15:06      CT Chest Abdomen Pelvis With IV Contrast (XPD) NO Oral Contrast    (Results Pending)        No echocardiogram results found for the past 14 days.         Case related differential diagnoses have been reviewed; assessment and plan has been documented. I have personally reviewed  the labs and test results that are currently available; I have reviewed the patients medication list. I have reviewed the consulting providers recommendations. I have reviewed or attempted to review medical records based upon their availability.  All of the patient's and/or family's questions have been addressed and answered to the best of my ability.  I will continue to monitor closely and make adjustments to medical management as needed.  This document was created using M*Modal Fluency Direct.  Transcription errors may have been made.  Please contact me if any questions may rise regarding documentation to clarify transcription.        Hilton Shultz MD   Internal Medicine  Department of Hospital Medicine  [facility] - 5th Floor Med Surg

## 2025-04-25 NOTE — PROGRESS NOTES
04/25/25 1807   Tunneled Central Line - Double Lumen  04/25/25 1354 Internal Jugular Left   Placement Date/Time: 04/25/25 1354   Present Prior to Hospital Arrival?: No  Inserted by: MD  Hand Hygiene: Performed  Barrier Precautions: Performed  Location: Internal Jugular Left  : BARD  Lot Number: [id number]  Catheter Size (Fr): 14.5 ...   Site Assessment No drainage;No redness;No swelling;No warmth   Line Securement Device Secured with sutures   Dressing Type CHG impregnated dressing/sponge;Transparent (Tegaderm)   Dressing Status Clean;Dry;Intact   Dressing Intervention Integrity maintained   Post-Hemodialysis Assessment   Blood Volume Processed (Liters) 54 L   Dialyzer Clearance Lightly streaked   Duration of Treatment 150 minutes   Additional Fluid Intake (mL) 500 mL   Total UF (mL) 500 mL   Net Fluid Removal 0   Patient Response to Treatment tolerated well   Post-Hemodialysis Comments tx complete, pt reinfused, cvc deaccessed, new sterile caps applied

## 2025-04-25 NOTE — CONSULTS
INTERVENTIONAL NEPHROLOGY INITIAL CONSULTATION NOTE       Patient Name: Tao Bee  SONA 1965    Patient Seen Date: 2025  Patient Seen Time: 10:28 AM     Consult requested by: Hilton Shultz MD     Reason for consult: TDC dysfunction; TDC exchange       HPI: Patient is a 59 year old male w/ a PMH of LEANDRO requiring inpatient HD, recent admission from 3/29/25 to 25 d/t E coli bacteremia 2/2 right renal and perinephric abscesses, MARY suggestive of MV IE, patient left AMA, was not on abx since discharge, readmitted when patient noted increased lower extremity edema, ampicillin/sulbactam restarted by ID, nephrology consulted for LEANDRO, IN consulted for tunneled dialysis catheter placement. BCs positive for GNR 3/30/25, repeat BCs 4/3/25 ngtd. LIJ TDC placed 25. D/c'd to home 25. PICC dislodged while showering at home. TDC not functioning well, intraluminal alteplase given w/o resolution of catheter dysfunction, IN consulted for TDC exchange.      Review of Systems:  General:  No fatigue  Skin: No rashes  HEENT: No vision changes  CVS: No CP  RS: No SOB  GIT: No abdominal pain  Extremities: No swelling  Neurological:  No focal weakness  Psych: No depression    Past Medical History:   Diagnosis Date    Neuropathy       Past Surgical History:   Procedure Laterality Date    ESOPHAGOGASTRODUODENOSCOPY N/A 2025    Procedure: EGD (ESOPHAGOGASTRODUODENOSCOPY);  Surgeon: Stan Elder MD;  Location: [facility] OR;  Service: Endoscopy;  Laterality: N/A;    INSERTION OF TUNNELED CENTRAL VENOUS HEMODIALYSIS CATHETER N/A 2025    Procedure: Insertion, Catheter, Central Venous, Hemodialysis;  Surgeon: Kyrie Laws DO;  Location: [facility] CATH LAB;  Service: Nephrology;  Laterality: N/A;    LEG SURGERY Right       Review of patient's allergies indicates:   Allergen Reactions    Opioids - morphine analogues Hallucinations      Social History[1]   No family history on file.    Current  Medications[2]    Vital Signs (24 h):  Temp:  [98 °F (36.7 °C)-102.3 °F (39.1 °C)] 99.9 °F (37.7 °C)  Pulse:  [] 124  Resp:  [13-21] 18  SpO2:  [95 %-100 %] 95 %  BP: (119-154)/(75-90) 122/78   No intake/output data recorded.  No intake/output data recorded.        Physical Exam:  General: NAD  HEENT: NC/AT, EOMI  CVS: S1/S2 present and normal. No murmur/rubs/gallop.      RS: Lung CTAB, No rales/rhonchi/wheeze.     Abdominal: Soft, NT/ND.  Extremities: No edema b/l LE  Skin: No rash, no lesions.  Neurological: No focal deficits.  Psych: Normal affect  Dialysis Access: left internal jugular (LIJ)/left chest wall (LCW) tunneled dialysis catheter (TDC)    Results:    Lab Results   Component Value Date     04/25/2025    K 4.6 04/25/2025     04/25/2025    CO2 30 (H) 04/25/2025    BUN 30.4 (H) 04/25/2025    CREATININE 4.20 (H) 04/25/2025     Lab Results   Component Value Date    WBC 18.35 (H) 04/25/2025    WBC 21.29 04/04/2025    HGB 9.6 (L) 04/25/2025     04/25/2025    MCV 91.5 04/25/2025       Assessment and Plan:      # LEANDRO  - on HD  - peak Cr 11.76, 3/29/25  # LIJ TDC dysfunction     - TDC exchange today  - keep npo    Thank you for your consult. Please feel free to reach me with any questions.  Plan for follow-up tomorrow.    Ismael Ortez MD  Interventional Nephrology  Cell: [phone]       [1]   Social History  Tobacco Use    Smoking status: Former     Current packs/day: 0.50     Types: Cigarettes   Substance Use Topics    Alcohol use: Not Currently     Alcohol/week: 28.0 standard drinks of alcohol     Types: 28 Shots of liquor per week     Comment: half a pint per day   [2]   Current Facility-Administered Medications:     acetaminophen tablet 1,000 mg, 1,000 mg, Oral, Q6H PRN, Sapphire Aguilar FNP, 1,000 mg at 04/24/25 2034    acetaminophen tablet 650 mg, 650 mg, Oral, Q4H PRN, Sapphire Aguilar FNP    cefTRIAXone injection 2 g, 2 g, Intravenous, Daily, Sapphire Aguilar  A, FNP, 2 g at 04/25/25 1002    dextrose 50% injection 12.5 g, 12.5 g, Intravenous, PRN, Sapphire Aguilar A, FNP    dextrose 50% injection 25 g, 25 g, Intravenous, PRN, Sapphire Aguilar A, FNP    enoxaparin injection 30 mg, 30 mg, Subcutaneous, Q24H (prophylaxis, 1700), Hilton Shultz MD    gabapentin capsule 600 mg, 600 mg, Oral, TID, Sapphire Aguilar, FNP, 600 mg at 04/24/25 2034    glucagon (human recombinant) injection 1 mg, 1 mg, Intramuscular, PRN, Sapphire Aguilar, YOHANAP    glucose chewable tablet 16 g, 16 g, Oral, PRN, Sapphire Aguilar A, FNP    glucose chewable tablet 24 g, 24 g, Oral, PRN, Sapphire Aguilar A, FNP    mupirocin 2 % ointment, , Nasal, BID, Bobby Gross III, MD, Given at 04/25/25 1002    naloxone 0.4 mg/mL injection 0.02 mg, 0.02 mg, Intravenous, PRN, Sapphire Aguilar, FNP    ondansetron injection 4 mg, 4 mg, Intravenous, Q4H PRN, Sapphire Aguilar, FNP    prochlorperazine injection Soln 5 mg, 5 mg, Intravenous, Q6H PRN, Sapphire Aguilar, FNP    senna-docusate 8.6-50 mg per tablet 2 tablet, 2 tablet, Oral, BID, Hilton Shultz MD    sevelamer carbonate tablet 800 mg, 800 mg, Oral, TID WM, Sapphire Aguilar A, FNP, 800 mg at 04/24/25 1745    sodium bicarbonate tablet 650 mg, 650 mg, Oral, BID, Sapphire Aguilar, FNP, 650 mg at 04/24/25 2034    Flushing PICC/Midline Protocol, , , Until Discontinued **AND** sodium chloride 0.9% flush 10 mL, 10 mL, Intravenous, Q12H PRN, Bobby Gross III, MD    sodium chloride 0.9% flush 10 mL, 10 mL, Intravenous, PRN, Sapphire Aguilar, FNP

## 2025-04-25 NOTE — PLAN OF CARE
Problem: Adult Inpatient Plan of Care  Goal: Plan of Care Review  Outcome: Progressing  Goal: Patient-Specific Goal (Individualized)  Outcome: Progressing  Goal: Absence of Hospital-Acquired Illness or Injury  Outcome: Progressing  Goal: Optimal Comfort and Wellbeing  Outcome: Progressing  Goal: Readiness for Transition of Care  Outcome: Progressing     Problem: Acute Kidney Injury/Impairment  Goal: Fluid and Electrolyte Balance  Outcome: Progressing  Goal: Improved Oral Intake  Outcome: Progressing  Goal: Effective Renal Function  Outcome: Progressing     Problem: Infection  Goal: Absence of Infection Signs and Symptoms  Outcome: Progressing     Problem: Wound  Goal: Optimal Coping  Outcome: Progressing  Goal: Optimal Functional Ability  Outcome: Progressing  Goal: Absence of Infection Signs and Symptoms  Outcome: Progressing  Goal: Improved Oral Intake  Outcome: Progressing  Goal: Optimal Pain Control and Function  Outcome: Progressing  Goal: Skin Health and Integrity  Outcome: Progressing  Goal: Optimal Wound Healing  Outcome: Progressing     Problem: Hemodialysis  Goal: Safe, Effective Therapy Delivery  Outcome: Progressing  Goal: Effective Tissue Perfusion  Outcome: Progressing  Goal: Absence of Infection Signs and Symptoms  Outcome: Progressing

## 2025-04-25 NOTE — PT/OT/SLP PROGRESS
Physical Therapy      Patient Name:  Tao Bee   MRN:  [id number]    Patient known to me from recent hospitalization, where he was eval/discharged because he was Adelita with mobility using cane. Went and spoke with pt who voiced no therapy needs, stating he is still ambulating around room without assistance and that he doesn't need therapy. Asked if I could screen mobility in the room; however, pt refusing stating he does not need therapy and wishes for us to sign off. Will d/c per pt request. Please re-consult if situation changes.

## 2025-04-25 NOTE — CONSULTS
INFECTIOUS DISEASE CONSULT NOTE   Admit Date: 4/24/2025  CONSULT FOR :  Chief Complaint:  Problem with vascular access          ASSESSMENT/PLAN:       Problem List[1]    ASSESSMENT:    Dislodged PICC line, occluded TDC-  - RUE PICC line was replaced in the ED on presentation  -S/P TDC exchange today by Nephrology    2- New fevers- T-max 102.3° last night currently afebrile  - WBC 18 K, down from 21.4  - patient denies any symptoms.  -repeat CT C/A/P- with improving perinephric abscess, and inflammation.  Pneumonitis  - blood culture negative times 24 hours, negative urine cultures so far    3. Recent E coli bacteremia (3/29 and 3/30) associated with right renal and perinephric abscesses, E coli UTI.  Blood cultures have been negative since 04/03 (negative also 4/15 after being off antibiotics for a few days).      4. Presumed infective endocarditis involving mitral valve based on MARY findings  - 4/8 MARY reported calcified MV, redundant chordae tendineae-hyper mobile and possible small vegetations.  Normal EF  5. Right renal abscess.  - CT a/p shows improvement, small residual right renal fluid collection, residual bilateral inflammatory stranding    6. Patchy multilobar opacities on CT chest 4/25, possible pneumonitis     PLAN:    Unclear etiology of high fever last night, however could be reactive.  Afebrile today, with WBC improving  Continue with ceftriaxone at this time as previously planned 2 g daily through 05/14/2025  Follow-up on repeat blood cultures and urine culture in progress    Follow-up in ID clinic in 1 week -       Thank you very much for the consult.  ID will follow.      HPI:      Mr. Bee is a 59 yro. male HTN, ESRD with HD, previous MV endocarditis, E coli bacteremia, who was admitted 4/24/2025 with dislodged PICC line and TDC atheter malfunction.  He was dialyzed on Monday, however on Wednesday the catheter was clogged.  That evening his PICC line in the right upper extremity  was dislodged in the shower.  Upon arrival, patient was afebrile with WBC 21.4 K..  His PICC line was replaced in the ED.  overnight however, patient has spiked a fever of 102.3.  His urine sample was questionable on admission, blood cultures are in progress, as are urine culture.  He was recently admitted from 4/15-04/21/2025 when he was diagnosed with E coli bacteremia, E coli UTI and mitral valve endocarditis.  He was discharged on IV antibiotic therapy at home.  Patient was seen by ID Services last hospitalization with recommendations of 2 g ceftriaxone IV daily with the end date of 05/14/2025. No fever, chills at home, no respiratory or GI complaints.   Patient denies any complaints and very anxious to go home.  Today had tunneled dialysis catheter exchange over wire.  No pain at the site or on his arm.    ROS:      All 14 systems reviewed and negative except as noted above.    PMHx:      Past Medical History:   Diagnosis Date    Neuropathy         PMSx:              Past Surgical History:   Procedure Laterality Date    ESOPHAGOGASTRODUODENOSCOPY N/A 03/30/2025     Procedure: EGD (ESOPHAGOGASTRODUODENOSCOPY);  Surgeon: Stan Elder MD;  Location: [facility] OR;  Service: Endoscopy;  Laterality: N/A;    INSERTION OF TUNNELED CENTRAL VENOUS HEMODIALYSIS CATHETER N/A 4/17/2025     Procedure: Insertion, Catheter, Central Venous, Hemodialysis;  Surgeon: Kyrie Laws DO;  Location: [facility] CATH LAB;  Service: Nephrology;  Laterality: N/A;    LEG SURGERY Right        Social Hx:      Social History     Socioeconomic History    Marital status: Single   Tobacco Use    Smoking status: Former     Current packs/day: 0.50     Types: Cigarettes   Substance and Sexual Activity    Alcohol use: Not Currently     Alcohol/week: 28.0 standard drinks of alcohol     Types: 28 Shots of liquor per week     Comment: half a pint per day     Social Drivers of Health     Financial Resource Strain: Low Risk  (4/25/2025)    Overall Financial  Resource Strain (CARDIA)     Difficulty of Paying Living Expenses: Not hard at all   Food Insecurity: No Food Insecurity (4/25/2025)    Hunger Vital Sign     Worried About Running Out of Food in the Last Year: Never true     Ran Out of Food in the Last Year: Never true   Transportation Needs: No Transportation Needs (4/25/2025)    PRAPARE - Transportation     Lack of Transportation (Medical): No     Lack of Transportation (Non-Medical): No   Stress: No Stress Concern Present (4/25/2025)    [employer] [place] [employer] - Occupational Stress Questionnaire     Feeling of Stress : Not at all   Housing Stability: Low Risk  (4/25/2025)    Housing Stability Vital Sign     Unable to Pay for Housing in the Last Year: No     Number of Times Moved in the Last Year: 0     Homeless in the Last Year: No        Family Hx:      No family history on file. `  Review of patient's allergies indicates:   Allergen Reactions    Opioids - morphine analogues Hallucinations       Medications:      Current Facility-Administered Medications   Medication    acetaminophen tablet 1,000 mg    acetaminophen tablet 650 mg    cefTRIAXone injection 2 g    dextrose 50% injection 12.5 g    dextrose 50% injection 25 g    enoxaparin injection 30 mg    gabapentin capsule 600 mg    glucagon (human recombinant) injection 1 mg    glucose chewable tablet 16 g    glucose chewable tablet 24 g    mupirocin 2 % ointment    naloxone 0.4 mg/mL injection 0.02 mg    ondansetron injection 4 mg    prochlorperazine injection Soln 5 mg    senna-docusate 8.6-50 mg per tablet 2 tablet    sevelamer carbonate tablet 800 mg    sodium bicarbonate tablet 650 mg    sodium chloride 0.9% flush 10 mL    sodium chloride 0.9% flush 10 mL       VITALS:      Vital Signs Range (Last 24H):  Temp:  [98 °F (36.7 °C)-102.3 °F (39.1 °C)]   Pulse:  []   Resp:  [13-21]   BP: (114-154)/(73-90)   SpO2:  [95 %-100 %]     I & O (Last 24H):  No intake or output data in the 24 hours  "ending 04/25/25 1056    Physical Exam   Constitutional: Pt is alert and oriented to person, place, and time. Appears well-developed and well-nourished.   Head: Normocephalic and atraumatic.   Eyes, nose and throat:  Pale moist mucosa, anicteric and acyanotic, DAVID, no oral or pharyngeal exudates  Neck: Neck supple, no cervical lymphadenopathy  Cardiovascular: Normal rate and regular rhythm.  S1 and S2   Pulmonary/Chest: Effort normal, no crepitations or wheezes auscultated , left chest TDC in place- intact, nontender  Abdomen:  not distended, normal bowel sounds. Soft. Non-tender.   Neurological: No obvious focal deficits.  Extremities: No edema, right upper extremity PICC line in place  Skin: No rashes.  Psychiatric:  Normal mood and affect, judgment normal    Laboratory Data:    No results for input(s): "CPK", "CPKMB", "TROPONINI", "MB" in the last 24 hours. No results for input(s): "POCTGLUCOSE" in the last 24 hours.     Lab Results   Component Value Date    INR 1.1 04/24/2025    INR 1.4 (H) 03/30/2025       Lab Results   Component Value Date    WBC 18.35 (H) 04/25/2025    HGB 9.6 (L) 04/25/2025    HCT 30.3 (L) 04/25/2025    MCV 91.5 04/25/2025     04/25/2025       BMP  Recent Labs   Lab 04/24/25  1213 04/25/25  0338    141   K 4.4 4.6    101   CO2 29 30*   BUN 28.2* 30.4*   CREATININE 3.98* 4.20*   CALCIUM 10.0 9.3   MG 2.00  --        Above lab results reviewed and interpreted by me.    Radiology:  Reviewed and noted in plan where applicable- Please see chart for full radiology data.           [1]   Patient Active Problem List  Diagnosis    LEANDRO (acute kidney injury)    Hyperkalemia    Anemia of chronic renal failure    Anemia    Problem with vascular access     "

## 2025-04-26 VITALS
BODY MASS INDEX: 20.74 KG/M2 | DIASTOLIC BLOOD PRESSURE: 83 MMHG | HEIGHT: 67 IN | OXYGEN SATURATION: 97 % | WEIGHT: 132.13 LBS | RESPIRATION RATE: 18 BRPM | HEART RATE: 79 BPM | TEMPERATURE: 99 F | SYSTOLIC BLOOD PRESSURE: 132 MMHG

## 2025-04-26 LAB
ALBUMIN SERPL-MCNC: 2.8 G/DL (ref 3.5–5)
BACTERIA UR CULT: NO GROWTH
BASOPHILS # BLD AUTO: 0.07 X10(3)/MCL
BASOPHILS NFR BLD AUTO: 0.4 %
BUN SERPL-MCNC: 17.8 MG/DL (ref 8.4–25.7)
CALCIUM SERPL-MCNC: 9.2 MG/DL (ref 8.4–10.2)
CHLORIDE SERPL-SCNC: 98 MMOL/L (ref 98–107)
CO2 SERPL-SCNC: 33 MMOL/L (ref 22–29)
CREAT SERPL-MCNC: 2.89 MG/DL (ref 0.72–1.25)
EOSINOPHIL # BLD AUTO: 0.56 X10(3)/MCL (ref 0–0.9)
EOSINOPHIL NFR BLD AUTO: 3.3 %
ERYTHROCYTE [DISTWIDTH] IN BLOOD BY AUTOMATED COUNT: 16.8 % (ref 11.5–17)
GFR SERPLBLD CREATININE-BSD FMLA CKD-EPI: 24 ML/MIN/1.73/M2
GLUCOSE SERPL-MCNC: 91 MG/DL (ref 74–100)
HCT VFR BLD AUTO: 29.2 % (ref 42–52)
HGB BLD-MCNC: 9 G/DL (ref 14–18)
IMM GRANULOCYTES # BLD AUTO: 0.18 X10(3)/MCL (ref 0–0.04)
IMM GRANULOCYTES NFR BLD AUTO: 1.1 %
LYMPHOCYTES # BLD AUTO: 3.98 X10(3)/MCL (ref 0.6–4.6)
LYMPHOCYTES NFR BLD AUTO: 23.4 %
MAGNESIUM SERPL-MCNC: 1.8 MG/DL (ref 1.6–2.6)
MCH RBC QN AUTO: 28.8 PG (ref 27–31)
MCHC RBC AUTO-ENTMCNC: 30.8 G/DL (ref 33–36)
MCV RBC AUTO: 93.3 FL (ref 80–94)
MONOCYTES # BLD AUTO: 1.61 X10(3)/MCL (ref 0.1–1.3)
MONOCYTES NFR BLD AUTO: 9.5 %
NEUTROPHILS # BLD AUTO: 10.6 X10(3)/MCL (ref 2.1–9.2)
NEUTROPHILS NFR BLD AUTO: 62.3 %
NRBC BLD AUTO-RTO: 0 %
PHOSPHATE SERPL-MCNC: 4.3 MG/DL (ref 2.3–4.7)
PLATELET # BLD AUTO: 288 X10(3)/MCL (ref 130–400)
PMV BLD AUTO: 10.3 FL (ref 7.4–10.4)
POTASSIUM SERPL-SCNC: 4.4 MMOL/L (ref 3.5–5.1)
RBC # BLD AUTO: 3.13 X10(6)/MCL (ref 4.7–6.1)
SODIUM SERPL-SCNC: 139 MMOL/L (ref 136–145)
WBC # BLD AUTO: 17 X10(3)/MCL (ref 4.5–11.5)

## 2025-04-26 PROCEDURE — 83735 ASSAY OF MAGNESIUM: CPT | Performed by: INTERNAL MEDICINE

## 2025-04-26 PROCEDURE — 36415 COLL VENOUS BLD VENIPUNCTURE: CPT | Performed by: INTERNAL MEDICINE

## 2025-04-26 PROCEDURE — 85025 COMPLETE CBC W/AUTO DIFF WBC: CPT | Performed by: INTERNAL MEDICINE

## 2025-04-26 PROCEDURE — 25000003 PHARM REV CODE 250: Performed by: EMERGENCY MEDICINE

## 2025-04-26 PROCEDURE — 63600175 PHARM REV CODE 636 W HCPCS: Performed by: NURSE PRACTITIONER

## 2025-04-26 PROCEDURE — 25000003 PHARM REV CODE 250: Performed by: INTERNAL MEDICINE

## 2025-04-26 PROCEDURE — 80069 RENAL FUNCTION PANEL: CPT | Performed by: INTERNAL MEDICINE

## 2025-04-26 PROCEDURE — 25000003 PHARM REV CODE 250: Performed by: NURSE PRACTITIONER

## 2025-04-26 RX ORDER — AZITHROMYCIN 250 MG/1
TABLET, FILM COATED ORAL
Qty: 6 TABLET | Refills: 0 | Status: SHIPPED | OUTPATIENT
Start: 2025-04-26

## 2025-04-26 RX ADMIN — SENNOSIDES AND DOCUSATE SODIUM 2 TABLET: 50; 8.6 TABLET ORAL at 08:04

## 2025-04-26 RX ADMIN — CEFTRIAXONE SODIUM 2 G: 2 INJECTION, POWDER, FOR SOLUTION INTRAMUSCULAR; INTRAVENOUS at 08:04

## 2025-04-26 RX ADMIN — SEVELAMER CARBONATE 800 MG: 800 TABLET, FILM COATED ORAL at 08:04

## 2025-04-26 RX ADMIN — ACETAMINOPHEN 1000 MG: 500 TABLET ORAL at 08:04

## 2025-04-26 RX ADMIN — GABAPENTIN 600 MG: 300 CAPSULE ORAL at 08:04

## 2025-04-26 RX ADMIN — MUPIROCIN: 20 OINTMENT TOPICAL at 08:04

## 2025-04-26 NOTE — PLAN OF CARE
04/26/25 1114   Final Note   Assessment Type Final Discharge Note   Anticipated Discharge Disposition Home-Health   Post-Acute Status   Post-Acute Authorization Home Health;IV Infusion   Home Health Status Referrals Sent   IV Infusion Status Referral(s) sent   Discharge Delays None known at this time     Patient currently on services with [facility]  and [facility] for IV antibiotics. Clinicals and dc paperwork sent via Epic.  Simeon with [facility] notified of dc. Cody with [facility] confirmed patient's IV antibiotics were shipped to his home yesterday.

## 2025-04-26 NOTE — DISCHARGE SUMMARY
[facility] - 5th Floor [facility] MEDICINE - DISCHARGE SUMMARY    Patient Name: Tao eBe  MRN: [id number]  Admission Date: 4/24/2025  Discharge Date: 04/26/2025  Hospital Length of Stay: 2 days  Discharge Provider: Hilton Shultz MD  Primary Care Provider: Penny Garsia FNP      HOSPITAL COURSE   Tao Bee is a 59 y.o. male who PMH includes ESRD with HD, mitral valve endocarditis, E coli bacteremia, chronic anemia, HTN, HLD, neuropathy, presents to the ED at [facility] on 4/24/2025 with a primary complaint of PICC line being dislodged along with left upper chest wall dialysis catheter malfunction and.  Patient reports his last run of dialysis was on Monday.  He did try to go to dialysis yesterday however his left upper chest wall dialysis catheter was clogged and the center place cath flow in an attempt to unclog the access.  Patient reports while taking a shower last night his PICC line became dislodged.  Of note patient was recently admitted to our services from 4/15-04/21/2025 which he was treated for mitral valve endocarditis, E coli bacteremia and subsequently discharged on IV antibiotic therapy at home.  Patient was seen by ID Services last hospitalization with recommendations of 2 g ceftriaxone IV daily with the end date of 05/14/2025.  Patient denies any injury, trauma, or falls.  He reports the PICC access was accidentally dislodged while bathing.  No reported chest pain, fever, chills, cough, congestion or any sick contacts.  Lab work reviewed demonstrated WBCs 2, HH 9.5/30.9, BUN/Creat 28.2/3.98, other indicis unremarkable, UA suggestive of UTI.   Initial VS /78 pulse 92 respirations 16 temperature 98.7° F O2 saturation 98% on room air.  Vascular Services were consulted and PICC team was consulted.  Patient had PICC replaced.  Per nursing reports Cathflo has been placed in dialysis access.  Renal Services have been consulted.  Patient is admitted to hospital medicine  services for further management.  He developed one fever while in the hospital and workup is negative for acute infection.  Ct chest abd shows improvement, pneumonitis is felt to represent pulmonary edema in my opinion due to missing HD.  I will add z pack in case infectious but less likely.    Tunneled HD catheter was replaced by dr mark.    Blood cx at this time is still negative and no fevers at 24 hours.  Discussed with id attending dr guerra and loulou to NE with plans to continue same abx as before with same end date.        PHYSICAL EXAM     Most Recent Vital Signs:  Temp: 98.8 °F (37.1 °C) (04/26/25 1048)  Pulse: 79 (04/26/25 1048)  Resp: 18 (04/26/25 1048)  BP: 132/83 (04/26/25 1048)  SpO2: 97 % (04/26/25 1048)   GENERAL: In no acute distress, afebrile  HEENT:  CHEST: Clear to auscultation bilaterally  HEART: S1, S2, no appreciable murmur  ABDOMEN: Soft, nontender, BS +  MSK: Warm, no lower extremity edema, no clubbing or cyanosis  NEUROLOGIC: Alert and oriented x4, moving all extremities with good strength   INTEGUMENTARY:  PSYCHIATRY:          DISCHARGE DIAGNOSIS   PICC line accidentally pulled-replace   Tunneled HD catheter function-replaced  Isolated fever  Recent E coli bacteremia with presumed endocarditis  Positive UDS for MDMA and cocaine        _____________________________________________________________________________      DISCHARGE MED REC     Current Discharge Medication List        START taking these medications    Details   azithromycin (ZITHROMAX Z-LEIGH) 250 MG tablet Day 1 take 2 tablets, then days 2-5 take 1 tablet daily PO  Qty: 6 tablet, Refills: 0           CONTINUE these medications which have NOT CHANGED    Details   cefTRIAXone (ROCEPHIN) 2 gram injection Inject 2 g into the vein once daily. for 23 days  Qty: 46 g, Refills: 0      gabapentin (NEURONTIN) 600 MG tablet Take 600 mg by mouth 3 (three) times daily.      sevelamer carbonate (RENVELA) 800 mg Tab Take 1 tablet (800 mg  total) by mouth 3 (three) times daily with meals.  Qty: 90 tablet, Refills: 11           STOP taking these medications       sodium bicarbonate 650 MG tablet Comments:   Reason for Stopping:                  CONSULTS     Consults (From admission, onward)          Status Ordering Provider     Inpatient consult to Nephrology  Once        Provider:  Vilma Lang MD    Acknowledged MERCEDES WALLER     Inpatient consult to Infectious Diseases  Once        Provider:  Ehsan Quintero MD    Completed ANAI SHULTZ     Inpatient consult to Nephrology  Once        Provider:  Kyrie Laws DO    Acknowledged KURT MELARA     Inpatient consult to PICC team ([facility])  Once        Provider:  (Not yet assigned)    Acknowledged RODY MAYORGA III              FOLLOW UP      Follow-up Information       Penny Garsia FNP Follow up in 1 week(s).    Specialty: Family Medicine  Contact information:  [address] [place] [id number] [phone]                                 DISCHARGE INSTRUCTIONS     Explained in detail to the patient about the discharge plan, medications, and follow-up visits. Pt understands and agrees with the treatment plan.  Discharged Condition: stable  Diet as tolerated  Activities as tolerated  Discharge to: [facility] [place] [facility]    TIME SPENT ON DISCHARGE   35 minutes        Anai Shultz MD  Internal Medicine  Department of Hospital Medicine  [facility] - 5th Floor Med Surg      This document was created using electronic dictation services.  Please excuse any errors that may have been made.  Contact me if any questions regarding documentation to clarify verbiage.

## 2025-04-26 NOTE — PROGRESS NOTES
"Nephrology Progress Note  [facility]      Patient Name: Tao Bee  Age: 59 y.o.  : 1965  MRN: [id number]  Admission Date: 2025      HPI:      Tao Bee is a 59 y.o. male is a chronic hemodialysis patient at [facility] on .  Admitted as his PICC line came out and is tunneled dialysis catheter was not working.  Overnight in new PICC line has been placed.  Now he is waiting to get tunneled dialysis catheter changed.  His only complaint is that he has starving.  No chest pains no abdominal pains no nausea no vomiting no diarrhea no dysuria no hematuria no hesitancy frequency urgency no cough cold congestion no fever or chills no headache blurring diplopia.  His brother present in the room.       Interval History:    25  Tolerated HD yesterday well, no UF. He is walking around the room, has no complaints. His new left CVC is nontender. He says he feels well enough to go home. No edema, CP, SOB.     ROS:    Review of Systems   Constitutional:  Negative for fever, malaise/fatigue and weight loss.   Respiratory:  Negative for cough and shortness of breath.    Cardiovascular:  Negative for chest pain, palpitations and leg swelling.   Gastrointestinal:  Negative for abdominal pain, blood in stool, constipation, diarrhea, heartburn, melena, nausea and vomiting.   Musculoskeletal:  Negative for back pain and myalgias.   Skin:  Negative for rash.   Neurological:  Negative for speech change and focal weakness.   All other systems reviewed and are negative.      Vital Signs:  /77   Pulse 85   Temp 98.1 °F (36.7 °C)   Resp 16   Ht 5' 7.01" (1.702 m)   Wt 59.9 kg (132 lb 1.6 oz)   SpO2 96%   BMI 20.68 kg/m²   Body mass index is 20.68 kg/m².      Physical Exam  Vitals and nursing note reviewed.   Constitutional:       Appearance: Normal appearance.   HENT:      Head: Normocephalic and atraumatic.   Eyes:      General: No scleral icterus.     Extraocular " Movements: Extraocular movements intact.   Cardiovascular:      Rate and Rhythm: Normal rate and regular rhythm.      Heart sounds: Normal heart sounds.   Pulmonary:      Effort: Pulmonary effort is normal. No respiratory distress.      Breath sounds: Normal breath sounds.   Abdominal:      General: Abdomen is flat. Bowel sounds are normal. There is no distension.      Palpations: Abdomen is soft.      Tenderness: There is no abdominal tenderness.   Musculoskeletal:         General: No swelling or deformity. Normal range of motion.      Right lower leg: No edema.      Left lower leg: No edema.   Skin:     General: Skin is warm and dry.   Neurological:      General: No focal deficit present.      Mental Status: He is alert and oriented to person, place, and time.   Psychiatric:         Mood and Affect: Mood normal.         Behavior: Behavior normal.       Dialysis Access: left IJ CVC    Labs:  Recent Results (from the past 24 hours)   COVID/FLU A&B PCR    Collection Time: 04/25/25 10:13 AM   Result Value Ref Range    Influenza A PCR Not Detected Not Detected    Influenza B PCR Not Detected Not Detected    SARS-CoV-2 PCR Not Detected Not Detected, Negative   Respiratory Panel    Collection Time: 04/25/25 10:13 AM   Result Value Ref Range    Adenovirus Not Detected Not Detected    Coronavirus 229E Not Detected Not Detected    Coronavirus HKU1 Not Detected Not Detected    Coronavirus NL63 Not Detected Not Detected    Coronavirus OC43 PCR, Common Cold Virus Not Detected Not Detected    Human Metapneumovirus Not Detected Not Detected    Parainfluenza Virus 1 Not Detected Not Detected    Parainfluenza Virus 2 Not Detected Not Detected    Parainfluenza Virus 3 Not Detected Not Detected    Parainfluenza Virus 4 Not Detected Not Detected    Bordetella pertussis (ptxP) Not Detected Not Detected    Chlamydia pneumoniae Not Detected Not Detected    Mycoplasma pneumoniae Not Detected Not Detected    Human Rhinovirus/Enterovirus  Not Detected Not Detected    Bordetella parapertussis ([id number]) Not Detected Not Detected   Renal Function Panel    Collection Time: 04/26/25  4:01 AM   Result Value Ref Range    Sodium 139 136 - 145 mmol/L    Potassium 4.4 3.5 - 5.1 mmol/L    Chloride 98 98 - 107 mmol/L    CO2 33 (H) 22 - 29 mmol/L    Glucose 91 74 - 100 mg/dL    Blood Urea Nitrogen 17.8 8.4 - 25.7 mg/dL    Creatinine 2.89 (H) 0.72 - 1.25 mg/dL    Calcium 9.2 8.4 - 10.2 mg/dL    Albumin 2.8 (L) 3.5 - 5.0 g/dL    Phosphorus Level 4.3 2.3 - 4.7 mg/dL    eGFR 24 mL/min/1.73/m2   Magnesium    Collection Time: 04/26/25  4:01 AM   Result Value Ref Range    Magnesium Level 1.80 1.60 - 2.60 mg/dL   CBC with Differential    Collection Time: 04/26/25  4:01 AM   Result Value Ref Range    WBC 17.00 (H) 4.50 - 11.50 x10(3)/mcL    RBC 3.13 (L) 4.70 - 6.10 x10(6)/mcL    Hgb 9.0 (L) 14.0 - 18.0 g/dL    Hct 29.2 (L) 42.0 - 52.0 %    MCV 93.3 80.0 - 94.0 fL    MCH 28.8 27.0 - 31.0 pg    MCHC 30.8 (L) 33.0 - 36.0 g/dL    RDW 16.8 11.5 - 17.0 %    Platelet 288 130 - 400 x10(3)/mcL    MPV 10.3 7.4 - 10.4 fL    Neut % 62.3 %    Lymph % 23.4 %    Mono % 9.5 %    Eos % 3.3 %    Basophil % 0.4 %    Imm Grans % 1.1 %    Neut # 10.60 (H) 2.1 - 9.2 x10(3)/mcL    Lymph # 3.98 0.6 - 4.6 x10(3)/mcL    Mono # 1.61 (H) 0.1 - 1.3 x10(3)/mcL    Eos # 0.56 0 - 0.9 x10(3)/mcL    Baso # 0.07 <=0.2 x10(3)/mcL    Imm Gran # 0.18 (H) 0.00 - 0.04 x10(3)/mcL    NRBC% 0.0 %         Assessment/Plan:    ESRD MWF - recently started on dialysis by Dr. Black   MV endocarditis - on abx  HTN  HLD  Substance abuse - UDS + cocaine/MDMA     Recommendations:    - s/p tunneled cath exchange by Dr. Laws 4/25   - continue HD MWF while inpatient. Ok to d/c from nephrology standpoint when ready

## 2025-04-27 NOTE — PLAN OF CARE
4/27: Received notification from Ayleen, on call nurse with [facility], at 0640; agency unable to accept patient back on services. yAleen informed [facility]'s director, Corrina, will contact the patient with an explanation. DESTINI did contact patient and informed him of his home health status. Instructed patient to contact his PCP Monday morning, patient verbalizes understanding. DESTINI also notified Cody with [facility] of home health status.

## 2025-04-28 ENCOUNTER — TELEPHONE (OUTPATIENT)
Dept: INFECTIOUS DISEASES | Facility: CLINIC | Age: 60
End: 2025-04-28
Payer: MEDICARE

## 2025-04-28 NOTE — TELEPHONE ENCOUNTER
[facility]  NURSE CALLED TO SAY THT THEY ARE D/C PT FROM THEIR CARE..PT TESTED POSITIVE FOR COCAINE AND ECSTACY ..SLSL

## 2025-04-29 ENCOUNTER — OFFICE VISIT (OUTPATIENT)
Dept: INFECTIOUS DISEASES | Facility: CLINIC | Age: 60
End: 2025-04-29
Payer: MEDICARE

## 2025-04-29 VITALS
HEART RATE: 85 BPM | HEIGHT: 67 IN | SYSTOLIC BLOOD PRESSURE: 133 MMHG | BODY MASS INDEX: 21.94 KG/M2 | WEIGHT: 139.81 LBS | DIASTOLIC BLOOD PRESSURE: 89 MMHG | OXYGEN SATURATION: 99 % | RESPIRATION RATE: 18 BRPM | TEMPERATURE: 98 F

## 2025-04-29 DIAGNOSIS — I05.8 MITRAL VALVE MASS: ICD-10-CM

## 2025-04-29 DIAGNOSIS — R78.81 E COLI BACTEREMIA: Primary | ICD-10-CM

## 2025-04-29 DIAGNOSIS — N15.1 PERINEPHRIC ABSCESS: ICD-10-CM

## 2025-04-29 DIAGNOSIS — B96.20 E COLI BACTEREMIA: Primary | ICD-10-CM

## 2025-04-29 LAB
BACTERIA BLD CULT: NORMAL
BACTERIA BLD CULT: NORMAL

## 2025-04-29 PROCEDURE — 99999 PR PBB SHADOW E&M-EST. PATIENT-LVL III: CPT | Mod: PBBFAC,,,

## 2025-04-29 NOTE — PROGRESS NOTES
Subjective:       Patient ID: Tao Bee 59 y.o.     Chief Complaint:   Chief Complaint   Patient presents with    Hospital Follow Up     E coli bacteremia        HPI:  4/25/2025 Hospital Visit (Most Recent):  Mr. Bee is a 59 yro. male HTN, ESRD with HD, previous MV endocarditis, E coli bacteremia, who was admitted 4/24/2025 with dislodged PICC line and TDC atheter malfunction.  He was dialyzed on Monday, however on Wednesday the catheter was clogged.  That evening his PICC line in the right upper extremity was dislodged in the shower.  Upon arrival, patient was afebrile with WBC 21.4 K..  His PICC line was replaced in the ED.  overnight however, patient has spiked a fever of 102.3.  His urine sample was questionable on admission, blood cultures are in progress, as are urine culture.  He was recently admitted from 4/15-04/21/2025 when he was diagnosed with E coli bacteremia, E coli UTI and mitral valve endocarditis.  He was discharged on IV antibiotic therapy at home.  Patient was seen by ID Services last hospitalization with recommendations of 2 g ceftriaxone IV daily with the end date of 05/14/2025. No fever, chills at home, no respiratory or GI complaints.   Patient denies any complaints and very anxious to go home.  Today had tunneled dialysis catheter exchange over wire.  No pain at the site or on his arm.     4/29/2025 Office Visit:  Mr. Bee presents today for follow up and is accompanied by his sister, Princess.  He is doing well, with no reported fevers, chills or night sweats.  He has been on Ceftriaxone since his previous discharge from the hospital for E. Coli bacteremia secondary to a perinephric abscess as well as a possible MV vegetation noted on MARY.  Plans were for Ceftriaxone 2g Daily IV through 5/14/25.  Unfortunately when he went to the ED for his PICC line becoming dislodged on 4/24 his UDS was positive for cocaine and MDMA making him ineligible as a candidate with [facility] who has  discharged he from their care.  He is here today for follow up and to discuss next steps regarding finishing his ABX treatment plan.      Past Medical History:   Diagnosis Date    Neuropathy         Past Surgical History:   Procedure Laterality Date    ESOPHAGOGASTRODUODENOSCOPY N/A 03/30/2025    Procedure: EGD (ESOPHAGOGASTRODUODENOSCOPY);  Surgeon: Stan Elder MD;  Location: [facility] OR;  Service: Endoscopy;  Laterality: N/A;    INSERTION OF TUNNELED CENTRAL VENOUS HEMODIALYSIS CATHETER N/A 4/17/2025    Procedure: Insertion, Catheter, Central Venous, Hemodialysis;  Surgeon: Kyrie Laws DO;  Location: [facility] CATH LAB;  Service: Nephrology;  Laterality: N/A;    LEG SURGERY Right     REPLACEMENT OF DIALYSIS CATHETER OVER GUIDEWIRE THROUGH EXISTING VENOUS ACCESS N/A 4/25/2025    Procedure: PERMCATH REWIRE- TUNNELED CATH REWIRE;  Surgeon: Ismael Ortez MD;  Location: [facility] CATH LAB;  Service: Nephrology;  Laterality: N/A;        Social History     Socioeconomic History    Marital status: Single   Tobacco Use    Smoking status: Former     Current packs/day: 0.50     Types: Cigarettes   Substance and Sexual Activity    Alcohol use: Not Currently     Alcohol/week: 28.0 standard drinks of alcohol     Types: 28 Shots of liquor per week     Comment: half a pint per day     Social Drivers of Health     Financial Resource Strain: Low Risk  (4/25/2025)    Overall Financial Resource Strain (CARDIA)     Difficulty of Paying Living Expenses: Not hard at all   Food Insecurity: No Food Insecurity (4/25/2025)    Hunger Vital Sign     Worried About Running Out of Food in the Last Year: Never true     Ran Out of Food in the Last Year: Never true   Transportation Needs: No Transportation Needs (4/25/2025)    PRAPARE - Transportation     Lack of Transportation (Medical): No     Lack of Transportation (Non-Medical): No   Stress: No Stress Concern Present (4/25/2025)    [employer] - Occupational Stress  "Questionnaire     Feeling of Stress : Not at all   Housing Stability: Low Risk  (4/25/2025)    Housing Stability Vital Sign     Unable to Pay for Housing in the Last Year: No     Number of Times Moved in the Last Year: 0     Homeless in the Last Year: No        No family history on file.     Review of patient's allergies indicates:   Allergen Reactions    Opioids - morphine analogues Hallucinations        Immunization History   Administered Date(s) Administered    COVID-19, vector-nr, rS-Ad26, PF ([employer]) 04/12/2021, 02/23/2022        Review of Systems   All other systems reviewed and are negative.         Objective:      /89 (BP Location: Left forearm, Patient Position: Sitting)   Pulse 85   Temp 97.5 °F (36.4 °C) (Oral)   Resp 18   Ht 5' 7" (1.702 m)   Wt 63.4 kg (139 lb 12.8 oz)   SpO2 99%   BMI 21.90 kg/m²      Physical Exam  Constitutional:       Appearance: Normal appearance.   HENT:      Head: Normocephalic and atraumatic.      Mouth/Throat:      Mouth: Mucous membranes are moist.   Eyes:      Extraocular Movements: Extraocular movements intact.      Pupils: Pupils are equal, round, and reactive to light.   Cardiovascular:      Rate and Rhythm: Normal rate and regular rhythm.      Pulses: Normal pulses.      Heart sounds: Normal heart sounds.   Pulmonary:      Effort: Pulmonary effort is normal.      Breath sounds: Normal breath sounds.   Abdominal:      Palpations: Abdomen is soft.   Musculoskeletal:         General: Normal range of motion.      Cervical back: Normal range of motion and neck supple.   Skin:     General: Skin is warm and dry.      Comments: RUE PICC in place.    L chest tunneled dialysis catheter with intact dressing.     Neurological:      General: No focal deficit present.      Mental Status: He is alert and oriented to person, place, and time.   Psychiatric:         Mood and Affect: Mood normal.         Behavior: Behavior normal.          Labs: Reviewed most recent relevant " labs available, notable results highlighted in this note    Imaging: Reviewed most recent relevant imaging studies available, notable results highlighted in this note    Assessment:       Problem List Items Addressed This Visit          Cardiac/Vascular    Mitral valve mass       Renal/    Perinephric abscess    Relevant Orders    Comprehensive Metabolic Panel    CBC Auto Differential    CT Abdomen Pelvis With IV Contrast Routine Oral Contrast       ID    E coli bacteremia - Primary    Relevant Orders    Comprehensive Metabolic Panel    CBC Auto Differential          Plan:   -ID initially consulted on 4/3 for septic shock secondary to E. Coli bacteremia with R sided pyelonephritis and perinephric abscess.  At that time he was adjusted to Unasyn from Ceftriaxone.      -On 4/7 a MARY was completed showing a possible small vegetation on the MV.  Decision to treat him for a total of 6 weeks to cover for endocarditis.  He left AMA on 4/9/25.  -On 4/16 ID was reconsulted after he returned to the hospital for increased swelling in his legs.  He was restarted on Unasyn at that time.    - He was subsequently discharged home with plans to completed IV Ceftriaxone 2g Daily through 5/14/25.  -He returned to the ED on 4/24 with complaints of a dislodged PICC line and occluded TDC.  ID was reconsulted to assist with his ABX  -On 4/24 a UDS was performed showing positive for Cocaine and MDMA.  [facility] has since discharged him from their service.    -His sister has given him his Ceftriaxone up through 4/28/25.    -PICC removed by me today in the office without complication. 34 CM  -Given his HD schedule, we are adjusting his OP IV ABX therapy to Cefepime 2g/2g/3g to be dosed with his HD schedule on MW.  I spoke with Emely, the charge RN at [facility] via telephone today, 4/29/2025, around 11 am and gave a verbal order for this change, with an end date of 5/14/2025.    -Will send for labs at [facility] for Friday of  this week, with plans to continue every Friday until ABX are completed.  -Also have ordered a repeat CT A/P with contrast to assess for abscess resolution prior to completing ABX.    -Patient and his sister verbalize understanding of new change to ABX plan, plan for labs, and plan for CT A/P.  They also understand the need to reach out to us or seek further care for any new fevers, chills, or night sweats.          Follow up in about 2 weeks (around 5/13/2025).    Portions of this note dictated using EMR integrated voice recognition software, and may be subject to voice recognition errors not corrected at proofreading. Please contact writer for clarification if needed.    60 minutes of total time spent on the encounter, which includes face to face time and non-face to face time preparing to see the patient (eg, review of tests), Obtaining and/or reviewing separately obtained history, Documenting clinical information in the electronic or other health record, Independently interpreting results (not separately reported) and communicating results to the patient/family/caregiver, or Care coordination (not separately reported).

## 2025-05-02 ENCOUNTER — LAB VISIT (OUTPATIENT)
Dept: LAB | Facility: HOSPITAL | Age: 60
End: 2025-05-02
Payer: MEDICARE

## 2025-05-02 DIAGNOSIS — R78.81 E COLI BACTEREMIA: ICD-10-CM

## 2025-05-02 DIAGNOSIS — N15.1 PERINEPHRIC ABSCESS: ICD-10-CM

## 2025-05-02 DIAGNOSIS — B96.20 E COLI BACTEREMIA: ICD-10-CM

## 2025-05-02 LAB
ALBUMIN SERPL-MCNC: 3.1 G/DL (ref 3.5–5)
ALBUMIN/GLOB SERPL: 0.7 RATIO (ref 1.1–2)
ALP SERPL-CCNC: 54 UNIT/L (ref 40–150)
ALT SERPL-CCNC: 5 UNIT/L (ref 0–55)
ANION GAP SERPL CALC-SCNC: 9 MEQ/L
AST SERPL-CCNC: 12 UNIT/L (ref 11–45)
BASOPHILS # BLD AUTO: 0.05 X10(3)/MCL
BASOPHILS NFR BLD AUTO: 0.4 %
BILIRUB SERPL-MCNC: 0.3 MG/DL
BUN SERPL-MCNC: 17 MG/DL (ref 8.4–25.7)
CALCIUM SERPL-MCNC: 9.5 MG/DL (ref 8.4–10.2)
CHLORIDE SERPL-SCNC: 100 MMOL/L (ref 98–107)
CO2 SERPL-SCNC: 28 MMOL/L (ref 22–29)
CREAT SERPL-MCNC: 3.46 MG/DL (ref 0.72–1.25)
CREAT/UREA NIT SERPL: 5
EOSINOPHIL # BLD AUTO: 0.51 X10(3)/MCL (ref 0–0.9)
EOSINOPHIL NFR BLD AUTO: 3.8 %
ERYTHROCYTE [DISTWIDTH] IN BLOOD BY AUTOMATED COUNT: 16.8 % (ref 11.5–17)
GFR SERPLBLD CREATININE-BSD FMLA CKD-EPI: 20 ML/MIN/1.73/M2
GLOBULIN SER-MCNC: 4.2 GM/DL (ref 2.4–3.5)
GLUCOSE SERPL-MCNC: 119 MG/DL (ref 74–100)
HCT VFR BLD AUTO: 27.6 % (ref 42–52)
HGB BLD-MCNC: 8.6 G/DL (ref 14–18)
IMM GRANULOCYTES # BLD AUTO: 0.12 X10(3)/MCL (ref 0–0.04)
IMM GRANULOCYTES NFR BLD AUTO: 0.9 %
LYMPHOCYTES # BLD AUTO: 3.38 X10(3)/MCL (ref 0.6–4.6)
LYMPHOCYTES NFR BLD AUTO: 25.3 %
MCH RBC QN AUTO: 29.1 PG (ref 27–31)
MCHC RBC AUTO-ENTMCNC: 31.2 G/DL (ref 33–36)
MCV RBC AUTO: 93.2 FL (ref 80–94)
MONOCYTES # BLD AUTO: 1.21 X10(3)/MCL (ref 0.1–1.3)
MONOCYTES NFR BLD AUTO: 9 %
NEUTROPHILS # BLD AUTO: 8.11 X10(3)/MCL (ref 2.1–9.2)
NEUTROPHILS NFR BLD AUTO: 60.6 %
NRBC BLD AUTO-RTO: 0 %
PLATELET # BLD AUTO: 306 X10(3)/MCL (ref 130–400)
PMV BLD AUTO: 9.4 FL (ref 7.4–10.4)
POTASSIUM SERPL-SCNC: 4 MMOL/L (ref 3.5–5.1)
PROT SERPL-MCNC: 7.3 GM/DL (ref 6.4–8.3)
RBC # BLD AUTO: 2.96 X10(6)/MCL (ref 4.7–6.1)
SODIUM SERPL-SCNC: 137 MMOL/L (ref 136–145)
WBC # BLD AUTO: 13.38 X10(3)/MCL (ref 4.5–11.5)

## 2025-05-02 PROCEDURE — 80053 COMPREHEN METABOLIC PANEL: CPT

## 2025-05-02 PROCEDURE — 36415 COLL VENOUS BLD VENIPUNCTURE: CPT

## 2025-05-02 PROCEDURE — 85025 COMPLETE CBC W/AUTO DIFF WBC: CPT

## 2025-05-15 ENCOUNTER — HOSPITAL ENCOUNTER (OUTPATIENT)
Dept: RADIOLOGY | Facility: HOSPITAL | Age: 60
Discharge: HOME | End: 2025-05-15
Payer: MEDICARE

## 2025-05-15 ENCOUNTER — OFFICE VISIT (OUTPATIENT)
Dept: INFECTIOUS DISEASES | Facility: CLINIC | Age: 60
End: 2025-05-15
Payer: MEDICARE

## 2025-05-15 VITALS
HEIGHT: 67 IN | OXYGEN SATURATION: 99 % | TEMPERATURE: 98 F | HEART RATE: 68 BPM | SYSTOLIC BLOOD PRESSURE: 144 MMHG | DIASTOLIC BLOOD PRESSURE: 87 MMHG | RESPIRATION RATE: 18 BRPM | WEIGHT: 139.81 LBS | BODY MASS INDEX: 21.94 KG/M2

## 2025-05-15 DIAGNOSIS — B96.20 E COLI BACTEREMIA: ICD-10-CM

## 2025-05-15 DIAGNOSIS — R78.81 E COLI BACTEREMIA: ICD-10-CM

## 2025-05-15 DIAGNOSIS — N15.1 PERINEPHRIC ABSCESS: ICD-10-CM

## 2025-05-15 DIAGNOSIS — I05.8 MITRAL VALVE MASS: Primary | ICD-10-CM

## 2025-05-15 PROCEDURE — 25500020 PHARM REV CODE 255

## 2025-05-15 PROCEDURE — 74177 CT ABD & PELVIS W/CONTRAST: CPT | Mod: TC

## 2025-05-15 PROCEDURE — 99999 PR PBB SHADOW E&M-EST. PATIENT-LVL III: CPT | Mod: PBBFAC,,,

## 2025-05-15 RX ORDER — DIATRIZOATE MEGLUMINE AND DIATRIZOATE SODIUM 660; 100 MG/ML; MG/ML
30 SOLUTION ORAL; RECTAL
Status: COMPLETED | OUTPATIENT
Start: 2025-05-15 | End: 2025-05-15

## 2025-05-15 RX ADMIN — DIATRIZOATE MEGLUMINE AND DIATRIZOATE SODIUM 30 ML: 660; 100 LIQUID ORAL; RECTAL at 08:05

## 2025-05-15 RX ADMIN — IOHEXOL 100 ML: 350 INJECTION, SOLUTION INTRAVENOUS at 08:05

## 2025-05-15 NOTE — PROGRESS NOTES
Subjective:       Patient ID: Tao Bee 59 y.o.     Chief Complaint:   Chief Complaint   Patient presents with    2 week follow up      E coli bacteremia         HPI:  4/25/2025 Hospital Visit (Most Recent):  Mr. Bee is a 59 yro. male HTN, ESRD with HD, previous MV endocarditis, E coli bacteremia, who was admitted 4/24/2025 with dislodged PICC line and TDC atheter malfunction.  He was dialyzed on Monday, however on Wednesday the catheter was clogged.  That evening his PICC line in the right upper extremity was dislodged in the shower.  Upon arrival, patient was afebrile with WBC 21.4 K..  His PICC line was replaced in the ED.  overnight however, patient has spiked a fever of 102.3.  His urine sample was questionable on admission, blood cultures are in progress, as are urine culture.  He was recently admitted from 4/15-04/21/2025 when he was diagnosed with E coli bacteremia, E coli UTI and mitral valve endocarditis.  He was discharged on IV antibiotic therapy at home.  Patient was seen by ID Services last hospitalization with recommendations of 2 g ceftriaxone IV daily with the end date of 05/14/2025. No fever, chills at home, no respiratory or GI complaints.   Patient denies any complaints and very anxious to go home.  Today had tunneled dialysis catheter exchange over wire.  No pain at the site or on his arm.     4/29/2025 Office Visit:  Mr. Bee presents today for follow up and is accompanied by his sister, Princess.  He is doing well, with no reported fevers, chills or night sweats.  He has been on Ceftriaxone since his previous discharge from the hospital for E. Coli bacteremia secondary to a perinephric abscess as well as a possible MV vegetation noted on MARY.  Plans were for Ceftriaxone 2g Daily IV through 5/14/25.  Unfortunately when he went to the ED for his PICC line becoming dislodged on 4/24 his UDS was positive for cocaine and MDMA making him ineligible as a candidate with [facility] who has  discharged he from their care.  He is here today for follow up and to discuss next steps regarding finishing his ABX treatment plan.    5/15/2025 Office Visit:  Mr. Bee presents today for follow up and is accompanied by his sister, Princess.  He had his last dose of Cefepime with his HD yesterday and had a repeat CT scan this morning that is still pending read by radiology dept.  Overall he is doing well with no complaints of fever, chills, night sweats.  He also denies any nausea, vomiting or diarrhea.  He has no flank pain or tenderness along the affected kidney.      Past Medical History:   Diagnosis Date    Neuropathy         Past Surgical History:   Procedure Laterality Date    ESOPHAGOGASTRODUODENOSCOPY N/A 03/30/2025    Procedure: EGD (ESOPHAGOGASTRODUODENOSCOPY);  Surgeon: Stan Elder MD;  Location: [facility] OR;  Service: Endoscopy;  Laterality: N/A;    INSERTION OF TUNNELED CENTRAL VENOUS HEMODIALYSIS CATHETER N/A 4/17/2025    Procedure: Insertion, Catheter, Central Venous, Hemodialysis;  Surgeon: Kyrie Laws DO;  Location: [facility] CATH LAB;  Service: Nephrology;  Laterality: N/A;    LEG SURGERY Right     REPLACEMENT OF DIALYSIS CATHETER OVER GUIDEWIRE THROUGH EXISTING VENOUS ACCESS N/A 4/25/2025    Procedure: PERMCATH REWIRE- TUNNELED CATH REWIRE;  Surgeon: Ismael Ortez MD;  Location: [facility] CATH LAB;  Service: Nephrology;  Laterality: N/A;        Social History     Socioeconomic History    Marital status: Single   Tobacco Use    Smoking status: Former     Current packs/day: 0.50     Types: Cigarettes   Substance and Sexual Activity    Alcohol use: Not Currently     Alcohol/week: 28.0 standard drinks of alcohol     Types: 28 Shots of liquor per week     Comment: half a pint per day     Social Drivers of Health     Financial Resource Strain: Low Risk  (4/25/2025)    Overall Financial Resource Strain (CARDIA)     Difficulty of Paying Living Expenses: Not hard at all   Food Insecurity: No Food Insecurity  "(4/25/2025)    Hunger Vital Sign     Worried About Running Out of Food in the Last Year: Never true     Ran Out of Food in the Last Year: Never true   Transportation Needs: No Transportation Needs (4/25/2025)    PRAPARE - Transportation     Lack of Transportation (Medical): No     Lack of Transportation (Non-Medical): No   Stress: No Stress Concern Present (4/25/2025)    [employer] [place] [employer] - Occupational Stress Questionnaire     Feeling of Stress : Not at all   Housing Stability: Low Risk  (4/25/2025)    Housing Stability Vital Sign     Unable to Pay for Housing in the Last Year: No     Number of Times Moved in the Last Year: 0     Homeless in the Last Year: No        No family history on file.     Review of patient's allergies indicates:   Allergen Reactions    Opioids - morphine analogues Hallucinations        Immunization History   Administered Date(s) Administered    COVID-19, vector-nr, rS-Ad26, PF ([employer]) 04/12/2021, 02/23/2022        Review of Systems   All other systems reviewed and are negative.         Objective:      BP (!) 144/87 (BP Location: Right arm, Patient Position: Sitting)   Pulse 68   Temp 97.6 °F (36.4 °C) (Oral)   Resp 18   Ht 5' 7" (1.702 m)   Wt 63.4 kg (139 lb 12.8 oz)   SpO2 99%   BMI 21.90 kg/m²      Physical Exam  Constitutional:       Appearance: Normal appearance.   HENT:      Head: Normocephalic and atraumatic.      Mouth/Throat:      Mouth: Mucous membranes are moist.   Eyes:      Extraocular Movements: Extraocular movements intact.      Pupils: Pupils are equal, round, and reactive to light.   Cardiovascular:      Rate and Rhythm: Normal rate and regular rhythm.      Pulses: Normal pulses.      Heart sounds: Normal heart sounds.   Pulmonary:      Effort: Pulmonary effort is normal.      Breath sounds: Normal breath sounds.   Abdominal:      Palpations: Abdomen is soft.   Musculoskeletal:         General: Normal range of motion.      Cervical back: " Normal range of motion and neck supple.   Skin:     General: Skin is warm and dry.      Comments: L chest tunneled dialysis catheter with intact dressing.     Neurological:      General: No focal deficit present.      Mental Status: He is alert and oriented to person, place, and time.   Psychiatric:         Mood and Affect: Mood normal.         Behavior: Behavior normal.          Labs: Reviewed most recent relevant labs available, notable results highlighted in this note    Imaging: Reviewed most recent relevant imaging studies available, notable results highlighted in this note    Assessment:       Problem List Items Addressed This Visit          Cardiac/Vascular    Mitral valve mass - Primary       Renal/    Perinephric abscess       ID    E coli bacteremia            Plan:   -ID initially consulted on 4/3 for septic shock secondary to E. Coli bacteremia with R sided pyelonephritis and perinephric abscess.  At that time he was adjusted to Unasyn from Ceftriaxone.      -On 4/7 a MARY was completed showing a possible small vegetation on the MV.  Decision to treat him for a total of 6 weeks to cover for endocarditis.  He left AMA on 4/9/25.  -On 4/16 ID was reconsulted after he returned to the hospital for increased swelling in his legs.  He was restarted on Unasyn at that time.    - He was subsequently discharged home with plans to completed IV Ceftriaxone 2g Daily through 5/14/25.  -He returned to the ED on 4/24 with complaints of a dislodged PICC line and occluded TDC.  ID was reconsulted to assist with his ABX  -On 4/24 a UDS was performed showing positive for Cocaine and MDMA.  [facility] has since discharged him from their service.    -His sister has given him his Ceftriaxone up through 4/28/25.    -PICC removed in office 4/29/25 without complication. 34 CM  -Given his HD schedule, we adjusted his OP IV ABX therapy to Cefepime 2g/2g/3g to be dosed with his HD schedule on MW.  I spoke with Emely, the charge RN  at [facility] via telephone, 4/29/2025, around 11 am and gave a verbal order for this change, with an end date of 5/14/2025.    -Repeat CT scan on 5/15 showed improvement in the abscess, now measures 1.3 x 1 cm from 3.2 x 2.3 x 1.1 cm on 4/25; originally 8.7 x 7.6 x 1.7 cm on 4/3/25.  -I have called [facility] today, 5/15, at 11:55 and given verbal orders to continue Cefepime MWF 2g/2g/3g through 5/30/2025.    -Will continue with labs at [facility] for Friday of this week, with plans to continue every Friday until ABX are completed.  -Will order repeat CT A/P for ~2 weeks to reassess for abscess resolution  -Patient and his sister understand the need to reach out to us or seek further care for any new fevers, chills, or night sweats.    -Attempting to contact patient to advise him of his updated plan.          Follow up in about 2 weeks (around 5/29/2025).    Portions of this note dictated using EMR integrated voice recognition software, and may be subject to voice recognition errors not corrected at proofreading. Please contact writer for clarification if needed.    35 minutes of total time spent on the encounter, which includes face to face time and non-face to face time preparing to see the patient (eg, review of tests), Obtaining and/or reviewing separately obtained history, Documenting clinical information in the electronic or other health record, Independently interpreting results (not separately reported) and communicating results to the patient/family/caregiver, or Care coordination (not separately reported).

## 2025-05-16 ENCOUNTER — LAB VISIT (OUTPATIENT)
Dept: LAB | Facility: HOSPITAL | Age: 60
End: 2025-05-16
Payer: MEDICARE

## 2025-05-16 DIAGNOSIS — N15.1 PERINEPHRIC ABSCESS: ICD-10-CM

## 2025-05-16 LAB
ALBUMIN SERPL-MCNC: 3.4 G/DL (ref 3.5–5)
ALBUMIN/GLOB SERPL: 0.9 RATIO (ref 1.1–2)
ALP SERPL-CCNC: 60 UNIT/L (ref 40–150)
ALT SERPL-CCNC: 8 UNIT/L (ref 0–55)
ANION GAP SERPL CALC-SCNC: 12 MEQ/L
AST SERPL-CCNC: 20 UNIT/L (ref 11–45)
BASOPHILS # BLD AUTO: 0.06 X10(3)/MCL
BASOPHILS NFR BLD AUTO: 0.9 %
BILIRUB SERPL-MCNC: 0.5 MG/DL
BUN SERPL-MCNC: 8.9 MG/DL (ref 8.4–25.7)
CALCIUM SERPL-MCNC: 9.8 MG/DL (ref 8.4–10.2)
CHLORIDE SERPL-SCNC: 98 MMOL/L (ref 98–107)
CO2 SERPL-SCNC: 28 MMOL/L (ref 22–29)
CREAT SERPL-MCNC: 2.54 MG/DL (ref 0.72–1.25)
CREAT/UREA NIT SERPL: 4
EOSINOPHIL # BLD AUTO: 0.5 X10(3)/MCL (ref 0–0.9)
EOSINOPHIL NFR BLD AUTO: 7.2 %
ERYTHROCYTE [DISTWIDTH] IN BLOOD BY AUTOMATED COUNT: 17.2 % (ref 11.5–17)
GFR SERPLBLD CREATININE-BSD FMLA CKD-EPI: 28 ML/MIN/1.73/M2
GLOBULIN SER-MCNC: 3.8 GM/DL (ref 2.4–3.5)
GLUCOSE SERPL-MCNC: 68 MG/DL (ref 74–100)
HCT VFR BLD AUTO: 29.6 % (ref 42–52)
HGB BLD-MCNC: 9.2 G/DL (ref 14–18)
IMM GRANULOCYTES # BLD AUTO: 0.01 X10(3)/MCL (ref 0–0.04)
IMM GRANULOCYTES NFR BLD AUTO: 0.1 %
LYMPHOCYTES # BLD AUTO: 2.74 X10(3)/MCL (ref 0.6–4.6)
LYMPHOCYTES NFR BLD AUTO: 39.5 %
MCH RBC QN AUTO: 29.4 PG (ref 27–31)
MCHC RBC AUTO-ENTMCNC: 31.1 G/DL (ref 33–36)
MCV RBC AUTO: 94.6 FL (ref 80–94)
MONOCYTES # BLD AUTO: 0.61 X10(3)/MCL (ref 0.1–1.3)
MONOCYTES NFR BLD AUTO: 8.8 %
NEUTROPHILS # BLD AUTO: 3.02 X10(3)/MCL (ref 2.1–9.2)
NEUTROPHILS NFR BLD AUTO: 43.5 %
NRBC BLD AUTO-RTO: 0 %
PLATELET # BLD AUTO: 297 X10(3)/MCL (ref 130–400)
PMV BLD AUTO: 9.6 FL (ref 7.4–10.4)
POTASSIUM SERPL-SCNC: 3.9 MMOL/L (ref 3.5–5.1)
PROT SERPL-MCNC: 7.2 GM/DL (ref 6.4–8.3)
RBC # BLD AUTO: 3.13 X10(6)/MCL (ref 4.7–6.1)
SODIUM SERPL-SCNC: 138 MMOL/L (ref 136–145)
WBC # BLD AUTO: 6.94 X10(3)/MCL (ref 4.5–11.5)

## 2025-05-16 PROCEDURE — 85025 COMPLETE CBC W/AUTO DIFF WBC: CPT

## 2025-05-16 PROCEDURE — 80053 COMPREHEN METABOLIC PANEL: CPT

## 2025-05-16 PROCEDURE — 36415 COLL VENOUS BLD VENIPUNCTURE: CPT

## 2025-05-19 ENCOUNTER — TELEPHONE (OUTPATIENT)
Dept: INFECTIOUS DISEASES | Facility: HOSPITAL | Age: 60
End: 2025-05-19
Payer: MEDICARE

## 2025-05-19 RX ORDER — CIPROFLOXACIN 500 MG/1
500 TABLET, FILM COATED ORAL DAILY
Qty: 30 TABLET | Refills: 0 | Status: SHIPPED | OUTPATIENT
Start: 2025-05-19 | End: 2025-06-18

## 2025-05-19 NOTE — TELEPHONE ENCOUNTER
Spoke with Mr. Bee's sister, Princess, this morning after they called to discuss ABX options as Mr. Bee will no longer be on HD.  We discussed plans for repeat CT A/P in the next week or so prior to his next appt with me on 5/29 to assess resolution of the abscess.  The isolated E. Coli is pan sensitive so we will plan renally dosed Ciprofloxacin PO of 500 mg once daily, will send RX for 30 days but may DC prior to that end date if repeat imaging is clear.  RX sent to pharmacy on file.        SUBMITTED BY:  Joshua Parrish, [facility]  Infectious Disease  [facility]   5/19/2025

## 2025-05-23 ENCOUNTER — HOSPITAL ENCOUNTER (OUTPATIENT)
Dept: RADIOLOGY | Facility: HOSPITAL | Age: 60
Discharge: HOME | End: 2025-05-23
Payer: MEDICARE

## 2025-05-23 DIAGNOSIS — N15.1 PERINEPHRIC ABSCESS: ICD-10-CM

## 2025-05-23 PROCEDURE — 74176 CT ABD & PELVIS W/O CONTRAST: CPT | Mod: TC

## 2025-05-28 ENCOUNTER — TELEPHONE (OUTPATIENT)
Dept: CARDIOLOGY | Facility: HOSPITAL | Age: 60
End: 2025-05-28
Payer: MEDICARE

## 2025-05-28 NOTE — TELEPHONE ENCOUNTER
HD center sent referral for catheter removal s/t withdrawing hemodialysis treatment. He told the HD nurse that if he gets back on drugs, he is not returning to the dialysis center. He has not dialyzed in two weeks.     Attempted to call his sister (Princess) to schedule appointment without success. She is his point of contact. Left voicemail.     -ZT

## 2025-05-29 ENCOUNTER — OFFICE VISIT (OUTPATIENT)
Dept: INFECTIOUS DISEASES | Facility: CLINIC | Age: 60
End: 2025-05-29
Payer: MEDICARE

## 2025-05-29 VITALS
DIASTOLIC BLOOD PRESSURE: 89 MMHG | HEART RATE: 90 BPM | TEMPERATURE: 98 F | RESPIRATION RATE: 18 BRPM | BODY MASS INDEX: 20.72 KG/M2 | HEIGHT: 67 IN | WEIGHT: 132 LBS | OXYGEN SATURATION: 100 % | SYSTOLIC BLOOD PRESSURE: 132 MMHG

## 2025-05-29 DIAGNOSIS — N15.1 PERINEPHRIC ABSCESS: Primary | ICD-10-CM

## 2025-05-29 DIAGNOSIS — B96.20 E COLI BACTEREMIA: ICD-10-CM

## 2025-05-29 DIAGNOSIS — I05.8 MITRAL VALVE MASS: ICD-10-CM

## 2025-05-29 DIAGNOSIS — R78.81 E COLI BACTEREMIA: ICD-10-CM

## 2025-05-29 DIAGNOSIS — N17.9 AKI (ACUTE KIDNEY INJURY): ICD-10-CM

## 2025-05-29 PROCEDURE — 99999 PR PBB SHADOW E&M-EST. PATIENT-LVL III: CPT | Mod: PBBFAC,,,

## 2025-05-29 RX ORDER — CIPROFLOXACIN 500 MG/1
500 TABLET, FILM COATED ORAL DAILY
Qty: 13 TABLET | Refills: 0 | Status: SHIPPED | OUTPATIENT
Start: 2025-06-19 | End: 2025-07-02

## 2025-05-29 RX ORDER — CIPROFLOXACIN 500 MG/1
500 TABLET, FILM COATED ORAL DAILY
Qty: 13 TABLET | Refills: 0 | Status: CANCELLED | OUTPATIENT
Start: 2025-06-19 | End: 2025-07-02

## 2025-05-29 NOTE — PROGRESS NOTES
Subjective:       Patient ID: Tao Bee 59 y.o.     Chief Complaint:   Chief Complaint   Patient presents with    2 week follow up      E coli bacteremia         HPI:  4/25/2025 Hospital Visit (Most Recent):  Mr. Bee is a 59 yro. male HTN, ESRD with HD, previous MV endocarditis, E coli bacteremia, who was admitted 4/24/2025 with dislodged PICC line and TDC atheter malfunction.  He was dialyzed on Monday, however on Wednesday the catheter was clogged.  That evening his PICC line in the right upper extremity was dislodged in the shower.  Upon arrival, patient was afebrile with WBC 21.4 K..  His PICC line was replaced in the ED.  overnight however, patient has spiked a fever of 102.3.  His urine sample was questionable on admission, blood cultures are in progress, as are urine culture.  He was recently admitted from 4/15-04/21/2025 when he was diagnosed with E coli bacteremia, E coli UTI and mitral valve endocarditis.  He was discharged on IV antibiotic therapy at home.  Patient was seen by ID Services last hospitalization with recommendations of 2 g ceftriaxone IV daily with the end date of 05/14/2025. No fever, chills at home, no respiratory or GI complaints.   Patient denies any complaints and very anxious to go home.  Today had tunneled dialysis catheter exchange over wire.  No pain at the site or on his arm.     4/29/2025 Office Visit:  Mr. Bee presents today for follow up and is accompanied by his sister, Princess.  He is doing well, with no reported fevers, chills or night sweats.  He has been on Ceftriaxone since his previous discharge from the hospital for E. Coli bacteremia secondary to a perinephric abscess as well as a possible MV vegetation noted on MARY.  Plans were for Ceftriaxone 2g Daily IV through 5/14/25.  Unfortunately when he went to the ED for his PICC line becoming dislodged on 4/24 his UDS was positive for cocaine and MDMA making him ineligible as a candidate with [facility] who has  discharged he from their care.  He is here today for follow up and to discuss next steps regarding finishing his ABX treatment plan.    5/15/2025 Office Visit:  Mr. Bee presents today for follow up and is accompanied by his sister, Princess.  He had his last dose of Cefepime with his HD yesterday and had a repeat CT scan this morning that is still pending read by radiology dept.  Overall he is doing well with no complaints of fever, chills, night sweats.  He also denies any nausea, vomiting or diarrhea.  He has no flank pain or tenderness along the affected kidney.      5/29/2025 Office Visit:  Mr. Bee presents today for follow up accompanied by his sister.  He continues to do well on daily Ciprofloxacin and denies any nausea, vomiting, or diarrhea.  He has no fevers, chills, or night sweats.  He continues with some improved renal function and remains off of HD at this time.  His most recent CT scan was discussed, noting that there is some residual perinephric abscess.  Overall he continues to do well and remains compliant with therapy.     Past Medical History:   Diagnosis Date    Neuropathy         Past Surgical History:   Procedure Laterality Date    ESOPHAGOGASTRODUODENOSCOPY N/A 03/30/2025    Procedure: EGD (ESOPHAGOGASTRODUODENOSCOPY);  Surgeon: Stan Elder MD;  Location: [facility] OR;  Service: Endoscopy;  Laterality: N/A;    INSERTION OF TUNNELED CENTRAL VENOUS HEMODIALYSIS CATHETER N/A 4/17/2025    Procedure: Insertion, Catheter, Central Venous, Hemodialysis;  Surgeon: Kyrie Laws DO;  Location: [facility] CATH LAB;  Service: Nephrology;  Laterality: N/A;    LEG SURGERY Right     REPLACEMENT OF DIALYSIS CATHETER OVER GUIDEWIRE THROUGH EXISTING VENOUS ACCESS N/A 4/25/2025    Procedure: PERMCATH REWIRE- TUNNELED CATH REWIRE;  Surgeon: Ismael Ortez MD;  Location: [facility] CATH LAB;  Service: Nephrology;  Laterality: N/A;        Social History     Socioeconomic History    Marital status: Single   Tobacco Use     "Smoking status: Former     Current packs/day: 0.50     Types: Cigarettes   Substance and Sexual Activity    Alcohol use: Not Currently     Alcohol/week: 28.0 standard drinks of alcohol     Types: 28 Shots of liquor per week     Comment: half a pint per day     Social Drivers of Health     Financial Resource Strain: Low Risk  (4/25/2025)    Overall Financial Resource Strain (CARDIA)     Difficulty of Paying Living Expenses: Not hard at all   Food Insecurity: No Food Insecurity (4/25/2025)    Hunger Vital Sign     Worried About Running Out of Food in the Last Year: Never true     Ran Out of Food in the Last Year: Never true   Transportation Needs: No Transportation Needs (4/25/2025)    PRAPARE - Transportation     Lack of Transportation (Medical): No     Lack of Transportation (Non-Medical): No   Stress: No Stress Concern Present (4/25/2025)    [employer] - Occupational Stress Questionnaire     Feeling of Stress : Not at all   Housing Stability: Low Risk  (4/25/2025)    Housing Stability Vital Sign     Unable to Pay for Housing in the Last Year: No     Number of Times Moved in the Last Year: 0     Homeless in the Last Year: No        No family history on file.     Review of patient's allergies indicates:   Allergen Reactions    Opioids - morphine analogues Hallucinations        Immunization History   Administered Date(s) Administered    COVID-19, vector-nr, rS-Ad26, PF ([employer]) 04/12/2021, 02/23/2022        Review of Systems   All other systems reviewed and are negative.         Objective:      /89 (BP Location: Right arm, Patient Position: Sitting)   Pulse 90   Temp 98 °F (36.7 °C) (Oral)   Resp 18   Ht 5' 7" (1.702 m)   Wt 59.9 kg (132 lb)   SpO2 100%   BMI 20.67 kg/m²      Physical Exam  Constitutional:       Appearance: Normal appearance.   HENT:      Head: Normocephalic and atraumatic.      Mouth/Throat:      Mouth: Mucous membranes are moist.   Eyes:      Extraocular " Movements: Extraocular movements intact.      Pupils: Pupils are equal, round, and reactive to light.   Cardiovascular:      Rate and Rhythm: Normal rate and regular rhythm.      Pulses: Normal pulses.      Heart sounds: Normal heart sounds.   Pulmonary:      Effort: Pulmonary effort is normal.      Breath sounds: Normal breath sounds.   Abdominal:      Palpations: Abdomen is soft.   Musculoskeletal:         General: Normal range of motion.      Cervical back: Normal range of motion and neck supple.   Skin:     General: Skin is warm and dry.      Comments: L chest tunneled dialysis catheter with intact dressing.     Neurological:      General: No focal deficit present.      Mental Status: He is alert and oriented to person, place, and time.   Psychiatric:         Mood and Affect: Mood normal.         Behavior: Behavior normal.        Labs: Reviewed most recent relevant labs available, notable results highlighted in this note    Imaging: Reviewed most recent relevant imaging studies available, notable results highlighted in this note    Assessment:       Problem List Items Addressed This Visit          Cardiac/Vascular    Mitral valve mass       Renal/    LEANDRO (acute kidney injury)    Perinephric abscess - Primary    Relevant Orders    CT Abdomen Pelvis  Without Contrast       ID    RESOLVED: E coli bacteremia            Plan:   -ID initially consulted on 4/3 for septic shock secondary to E. Coli bacteremia with R sided pyelonephritis and perinephric abscess.  At that time he was adjusted to Unasyn from Ceftriaxone.      -On 4/7 a MARY was completed showing a possible small vegetation on the MV.  Decision to treat him for a total of 6 weeks to cover for endocarditis.  He left AMA on 4/9/25.  -On 4/16 ID was reconsulted after he returned to the hospital for increased swelling in his legs.  He was restarted on Unasyn at that time.    - He was subsequently discharged home with plans to completed IV Ceftriaxone 2g Daily  through 5/14/25.  -He returned to the ED on 4/24 with complaints of a dislodged PICC line and occluded TDC.  ID was reconsulted to assist with his ABX  -On 4/24 a UDS was performed showing positive for Cocaine and MDMA.  [facility] has since discharged him from their service.    -His sister has given him his Ceftriaxone up through 4/28/25.    -PICC removed in office 4/29/25 without complication. 34 CM  -Given his HD schedule, we adjusted his OP IV ABX therapy to Cefepime 2g/2g/3g to be dosed with his HD schedule on MW.  I spoke with Emely, the charge RN at [facility] via telephone, 4/29/2025, around 11 am and gave a verbal order for this change, with an end date of 5/14/2025.    -Repeat CT scan on 5/15 showed improvement in the abscess, now measures 1.3 x 1 cm from 3.2 x 2.3 x 1.1 cm on 4/25; originally 8.7 x 7.6 x 1.7 cm on 4/3/25.  -Patient now has improving kidney function and is off HD.  On 5/19 I adjusted him from IV Cefepime to PO Ciprofloxacin and he has been taking this daily since.    -His most recent CT A/P on 5/23 shows continued improvement of R perinephric abscess but some minimal residual abscess persists.    -Will continue Ciprofloxacin 500 mg PO once daily until next visit in about 4 weeks.    -Will order repeat CT A/P for ~3-4 weeks to reassess for abscess resolution  -Patient and his sister understand the need to reach out to us or seek further care for any new fevers, chills, or night sweats.            Follow up in about 4 weeks (around 6/26/2025).    Portions of this note dictated using EMR integrated voice recognition software, and may be subject to voice recognition errors not corrected at proofreading. Please contact writer for clarification if needed.    25 minutes of total time spent on the encounter, which includes face to face time and non-face to face time preparing to see the patient (eg, review of tests), Obtaining and/or reviewing separately obtained history,  Documenting clinical information in the electronic or other health record, Independently interpreting results (not separately reported) and communicating results to the patient/family/caregiver, or Care coordination (not separately reported).

## 2025-05-30 ENCOUNTER — HOSPITAL ENCOUNTER (OUTPATIENT)
Facility: HOSPITAL | Age: 60
Discharge: HOME | End: 2025-05-30
Attending: INTERNAL MEDICINE | Admitting: INTERNAL MEDICINE
Payer: MEDICARE

## 2025-05-30 ENCOUNTER — ANESTHESIA EVENT (OUTPATIENT)
Dept: ENDOSCOPY | Facility: HOSPITAL | Age: 60
End: 2025-05-30
Payer: MEDICARE

## 2025-05-30 ENCOUNTER — ANESTHESIA (OUTPATIENT)
Dept: ENDOSCOPY | Facility: HOSPITAL | Age: 60
End: 2025-05-30
Payer: MEDICARE

## 2025-05-30 VITALS
TEMPERATURE: 97 F | HEIGHT: 67 IN | DIASTOLIC BLOOD PRESSURE: 100 MMHG | HEART RATE: 66 BPM | BODY MASS INDEX: 20.72 KG/M2 | OXYGEN SATURATION: 100 % | SYSTOLIC BLOOD PRESSURE: 163 MMHG | RESPIRATION RATE: 16 BRPM | WEIGHT: 132 LBS

## 2025-05-30 DIAGNOSIS — K27.9 PUD (PEPTIC ULCER DISEASE): ICD-10-CM

## 2025-05-30 DIAGNOSIS — K29.70 GASTRITIS, PRESENCE OF BLEEDING UNSPECIFIED, UNSPECIFIED CHRONICITY, UNSPECIFIED GASTRITIS TYPE: ICD-10-CM

## 2025-05-30 PROBLEM — K31.A0 INTESTINAL METAPLASIA OF GASTRIC MUCOSA: Status: ACTIVE | Noted: 2025-05-30

## 2025-05-30 PROCEDURE — 43239 EGD BIOPSY SINGLE/MULTIPLE: CPT | Performed by: INTERNAL MEDICINE

## 2025-05-30 PROCEDURE — 63600175 PHARM REV CODE 636 W HCPCS: Performed by: NURSE ANESTHETIST, CERTIFIED REGISTERED

## 2025-05-30 PROCEDURE — 37000008 HC ANESTHESIA 1ST 15 MINUTES: Performed by: INTERNAL MEDICINE

## 2025-05-30 PROCEDURE — 27201423 OPTIME MED/SURG SUP & DEVICES STERILE SUPPLY: Performed by: INTERNAL MEDICINE

## 2025-05-30 PROCEDURE — 25000003 PHARM REV CODE 250: Performed by: NURSE ANESTHETIST, CERTIFIED REGISTERED

## 2025-05-30 RX ORDER — LIDOCAINE HYDROCHLORIDE 10 MG/ML
INJECTION, SOLUTION EPIDURAL; INFILTRATION; INTRACAUDAL; PERINEURAL
Status: COMPLETED
Start: 2025-05-30 | End: 2025-05-30

## 2025-05-30 RX ORDER — SODIUM CHLORIDE, SODIUM GLUCONATE, SODIUM ACETATE, POTASSIUM CHLORIDE AND MAGNESIUM CHLORIDE 30; 37; 368; 526; 502 MG/100ML; MG/100ML; MG/100ML; MG/100ML; MG/100ML
INJECTION, SOLUTION INTRAVENOUS CONTINUOUS
OUTPATIENT
Start: 2025-05-30 | End: 2025-06-29

## 2025-05-30 RX ORDER — PROPOFOL 10 MG/ML
VIAL (ML) INTRAVENOUS
Status: COMPLETED
Start: 2025-05-30 | End: 2025-05-30

## 2025-05-30 RX ORDER — PROPOFOL 10 MG/ML
VIAL (ML) INTRAVENOUS CONTINUOUS PRN
Status: DISCONTINUED | OUTPATIENT
Start: 2025-05-30 | End: 2025-05-30

## 2025-05-30 RX ORDER — INSULIN ASPART 100 [IU]/ML
0-5 INJECTION, SOLUTION INTRAVENOUS; SUBCUTANEOUS EVERY 4 HOURS PRN
OUTPATIENT
Start: 2025-05-30

## 2025-05-30 RX ORDER — PROPOFOL 10 MG/ML
VIAL (ML) INTRAVENOUS
Status: DISCONTINUED | OUTPATIENT
Start: 2025-05-30 | End: 2025-05-30

## 2025-05-30 RX ORDER — LIDOCAINE HYDROCHLORIDE 10 MG/ML
1 INJECTION, SOLUTION EPIDURAL; INFILTRATION; INTRACAUDAL; PERINEURAL ONCE
OUTPATIENT
Start: 2025-05-30 | End: 2025-05-30

## 2025-05-30 RX ORDER — SODIUM CITRATE AND CITRIC ACID MONOHYDRATE 334; 500 MG/5ML; MG/5ML
30 SOLUTION ORAL
OUTPATIENT
Start: 2025-05-30

## 2025-05-30 RX ORDER — LIDOCAINE HYDROCHLORIDE 10 MG/ML
INJECTION, SOLUTION EPIDURAL; INFILTRATION; INTRACAUDAL; PERINEURAL
Status: DISCONTINUED | OUTPATIENT
Start: 2025-05-30 | End: 2025-05-30

## 2025-05-30 RX ADMIN — SODIUM CHLORIDE: 9 INJECTION, SOLUTION INTRAVENOUS at 09:05

## 2025-05-30 RX ADMIN — LIDOCAINE HYDROCHLORIDE 50 MG: 10 INJECTION, SOLUTION EPIDURAL; INFILTRATION; INTRACAUDAL; PERINEURAL at 09:05

## 2025-05-30 RX ADMIN — PROPOFOL 100 MG: 10 INJECTION, EMULSION INTRAVENOUS at 09:05

## 2025-05-30 RX ADMIN — PROPOFOL 150 MCG/KG/MIN: 10 INJECTION, EMULSION INTRAVENOUS at 09:05

## 2025-05-30 RX ADMIN — PROPOFOL 20 MG: 10 INJECTION, EMULSION INTRAVENOUS at 09:05

## 2025-05-30 NOTE — ANESTHESIA POSTPROCEDURE EVALUATION
Anesthesia Post Evaluation    Patient: Tao Bee    Procedure(s) Performed: Procedure(s) (LRB):  EGD (N/A)    Final Anesthesia Type: general      Patient location during evaluation: PACU  Patient participation: Yes- Able to Participate  Post-procedure mental status: @ basline.  Pain management: adequate  AQI66 DAINA Mitigation: See pacu RN note for any measures applied.  PONV status: See postop meds for drugs used to control n/v if any.  Anesthetic complications: no      Cardiovascular status: stable  Respiratory status: @ baseline.  Hydration status: euvolemic                Vitals Value Taken Time   /100 05/30/25 09:50   Temp 36 °C (96.8 °F) 05/30/25 09:17   Pulse 72 05/30/25 09:58   Resp 15 05/30/25 09:58   SpO2 100 % 05/30/25 09:58   Vitals shown include unfiled device data.      No case tracking events are documented in the log.      Pain/Ciara Score: Ciara Score: 10 (5/30/2025  9:40 AM)

## 2025-05-30 NOTE — ANESTHESIA PREPROCEDURE EVALUATION
05/30/2025    Tao Bee is a 59 y.o., male with with some improved renal function and remains off of HD at this time, htn, etoh abuse, admitted 4/3 for septic shock secondary to E. Coli bacteremia with R sided pyelonephritis and perinephric abscess, 4/7 a MARY was completed showing a possible small vegetation on the MV, also has melena, and other medical problems noted in the EMR         Pre-operative evaluation for Procedure(s) (LRB):  EGD (N/A)    Pre-op Assessment    I have reviewed the Patient Summary Reports.     I have reviewed the Nursing Notes. I have reviewed the NPO Status.   I have reviewed the Medications.     Review of Systems  Anesthesia Hx:  No problems with previous Anesthesia                    Past Medical History:   Diagnosis Date    Neuropathy        Problem List[1]    Review of patient's allergies indicates:   Allergen Reactions    Opioids - morphine analogues Hallucinations       Current Outpatient Medications   Medication Instructions    azithromycin (ZITHROMAX Z-LEIGH) 250 MG tablet Day 1 take 2 tablets, then days 2-5 take 1 tablet daily PO    [START ON 6/19/2025] ciprofloxacin HCl (CIPRO) 500 mg, Oral, Daily    gabapentin (NEURONTIN) 600 mg, 3 times daily    sevelamer carbonate (RENVELA) 800 mg, Oral, 3 times daily with meals       Past Surgical History:   Procedure Laterality Date    ESOPHAGOGASTRODUODENOSCOPY N/A 03/30/2025    Procedure: EGD (ESOPHAGOGASTRODUODENOSCOPY);  Surgeon: Stan Elder MD;  Location: [facility] OR;  Service: Endoscopy;  Laterality: N/A;    INSERTION OF TUNNELED CENTRAL VENOUS HEMODIALYSIS CATHETER N/A 4/17/2025    Procedure: Insertion, Catheter, Central Venous, Hemodialysis;  Surgeon: Kyrie Laws DO;  Location: [facility] CATH LAB;  Service: Nephrology;  Laterality: N/A;    LEG SURGERY Right     REPLACEMENT OF DIALYSIS CATHETER OVER GUIDEWIRE THROUGH EXISTING VENOUS ACCESS N/A 4/25/2025    Procedure: PERMCATH REWIRE- TUNNELED CATH REWIRE;  Surgeon: Ismael Ortez MD;   "Location: [facility] CATH LAB;  Service: Nephrology;  Laterality: N/A;       Social History[2]    BP (!) 167/103   Pulse 64   Temp 36 °C (96.8 °F)   Resp 14   Ht 5' 7" (1.702 m)   Wt 59.9 kg (132 lb)   SpO2 99%   BMI 20.67 kg/m²       Physical Exam  General: Well nourished and Cooperative    Airway:  Mallampati: II   Mouth Opening: Normal  TM Distance: Normal  Tongue: Normal  Neck ROM: Normal ROM    Dental:  Edentulous    Chest/Lungs:  Clear to auscultation    Heart:  Rhythm: Regular Rhythm        Lab Results   Component Value Date    WBC 6.94 05/16/2025    HGB 9.2 (L) 05/16/2025    HCT 29.6 (L) 05/16/2025    MCV 94.6 (H) 05/16/2025     05/16/2025          BMP  Lab Results   Component Value Date    HCT 29.6 (L) 05/16/2025     05/16/2025    K 3.9 05/16/2025    BUN 8.9 05/16/2025    CREATININE 2.54 (H) 05/16/2025    CALCIUM 9.8 05/16/2025        INR  No results for input(s): "PT", "INR", "PROTIME", "APTT" in the last 72 hours.      Diagnostic Studies:    4/8/25      Left Ventricle: The left ventricle is normal in size. There is normal systolic function with a visually estimated ejection fraction of 55 - 60%.    Right Ventricle: The right ventricle is normal in size. Systolic function is normal.    Mitral Valve: Mildly calcified subvalvular apparatus. Chordae tendinae attached to the anterior leaflet appear calcified and likely redundant and hypermobile. Cannot exclude small vegetations. There is mild regurgitation.  .  EKG  Results for orders placed or performed during the hospital encounter of 03/29/25   EKG 12-lead    Collection Time: 03/29/25 10:11 AM   Result Value Ref Range    QRS Duration 86 ms    OHS QTC Calculation 427 ms    Narrative    Test Reason : Z13.6,    Vent. Rate :  77 BPM     Atrial Rate :  77 BPM     P-R Int : 162 ms          QRS Dur :  86 ms      QT Int : 378 ms       P-R-T Axes :  32  24  33 degrees    QTcB Int : 427 ms    Normal sinus rhythm  Possible Left atrial " enlargement  Borderline Abnormal ECG  Confirmed by Cheko Bains ([id number]) on 3/29/2025 4:32:54 PM    Referred By: AAAREFERRAL SELF           Confirmed By: Cheko Bains         Anesthesia Plan  Type of Anesthesia, risks & benefits discussed:    Anesthesia Type: Gen Natural Airway  Intra-op Monitoring Plan: Standard ASA Monitors  Induction:  IV  Informed Consent: Informed consent signed with the Patient and all parties understand the risks and agree with anesthesia plan.  All questions answered.   ASA Score: 3  Day of Surgery Review of History & Physical: H&P Update referred to the surgeon/provider.    Ready For Surgery From Anesthesia Perspective.     .  Anesthesia consent includes material facts, risks, complications & alternatives, and possibility of altering the anesthesia plan due to intraoperative conditions.    I reviewed problem list, prior to admission medication list, appropriate labs, any workup, Xray, EKG etc   See anesthesia chart for details of the anesthesia plan carried out.       Mo Meza MD    ¤              [1]   Patient Active Problem List  Diagnosis    LEANDRO (acute kidney injury)    Hyperkalemia    Anemia of chronic renal failure    Anemia    Problem with vascular access    Mitral valve mass    Perinephric abscess   [2]   Social History  Socioeconomic History    Marital status: Single   Tobacco Use    Smoking status: Former     Current packs/day: 0.50     Types: Cigarettes   Substance and Sexual Activity    Alcohol use: Not Currently     Alcohol/week: 28.0 standard drinks of alcohol     Types: 28 Shots of liquor per week     Comment: half a pint per day     Social Drivers of Health     Financial Resource Strain: Low Risk  (4/25/2025)    Overall Financial Resource Strain (CARDIA)     Difficulty of Paying Living Expenses: Not hard at all   Food Insecurity: No Food Insecurity (4/25/2025)    Hunger Vital Sign     Worried About Running Out of Food in the Last Year: Never true     Ran Out  of Food in the Last Year: Never true   Transportation Needs: No Transportation Needs (4/25/2025)    PRAPARE - Transportation     Lack of Transportation (Medical): No     Lack of Transportation (Non-Medical): No   Stress: No Stress Concern Present (4/25/2025)    [employer] - Occupational Stress Questionnaire     Feeling of Stress : Not at all   Housing Stability: Low Risk  (4/25/2025)    Housing Stability Vital Sign     Unable to Pay for Housing in the Last Year: No     Number of Times Moved in the Last Year: 0     Homeless in the Last Year: No

## 2025-05-30 NOTE — TRANSFER OF CARE
"Anesthesia Transfer of Care Note    Patient: Tao Bee    Procedure(s) Performed: Procedure(s) (LRB):  EGD (N/A)    Patient location: GI    Anesthesia Type: general    Transport from OR: Transported from OR on room air with adequate spontaneous ventilation    Post pain: adequate analgesia    Post assessment: no apparent anesthetic complications    Post vital signs: stable    Level of consciousness: awake and sedated    Nausea/Vomiting: no nausea/vomiting    Complications: none    Transfer of care protocol was followed    Last vitals: Visit Vitals  BP (!) 139/90   Pulse 74   Temp 36 °C (96.8 °F)   Resp (!) 30   Ht 5' 7" (1.702 m)   Wt 59.9 kg (132 lb)   SpO2 100%   BMI 20.67 kg/m²     "

## 2025-06-02 LAB — PSYCHE PATHOLOGY RESULT: NORMAL

## 2025-06-03 ENCOUNTER — HOSPITAL ENCOUNTER (OUTPATIENT)
Facility: HOSPITAL | Age: 60
Discharge: HOME | End: 2025-06-03
Attending: STUDENT IN AN ORGANIZED HEALTH CARE EDUCATION/TRAINING PROGRAM | Admitting: STUDENT IN AN ORGANIZED HEALTH CARE EDUCATION/TRAINING PROGRAM
Payer: MEDICARE

## 2025-06-03 VITALS
OXYGEN SATURATION: 97 % | WEIGHT: 131.19 LBS | TEMPERATURE: 99 F | HEIGHT: 67 IN | SYSTOLIC BLOOD PRESSURE: 134 MMHG | DIASTOLIC BLOOD PRESSURE: 93 MMHG | BODY MASS INDEX: 20.59 KG/M2 | HEART RATE: 89 BPM

## 2025-06-03 DIAGNOSIS — N18.6 END STAGE RENAL DISEASE: ICD-10-CM

## 2025-06-03 PROCEDURE — 36589 REMOVAL TUNNELED CV CATH: CPT | Performed by: STUDENT IN AN ORGANIZED HEALTH CARE EDUCATION/TRAINING PROGRAM

## 2025-06-03 PROCEDURE — 63600175 PHARM REV CODE 636 W HCPCS: Performed by: STUDENT IN AN ORGANIZED HEALTH CARE EDUCATION/TRAINING PROGRAM

## 2025-06-03 RX ORDER — LIDOCAINE HYDROCHLORIDE 10 MG/ML
10 INJECTION, SOLUTION INFILTRATION; PERINEURAL ONCE
Status: DISCONTINUED | OUTPATIENT
Start: 2025-06-03 | End: 2025-06-03 | Stop reason: HOSPADM

## 2025-06-03 RX ORDER — LIDOCAINE HYDROCHLORIDE 10 MG/ML
INJECTION, SOLUTION INFILTRATION; PERINEURAL
Status: DISCONTINUED | OUTPATIENT
Start: 2025-06-03 | End: 2025-06-03 | Stop reason: HOSPADM

## 2025-06-25 ENCOUNTER — HOSPITAL ENCOUNTER (OUTPATIENT)
Dept: RADIOLOGY | Facility: HOSPITAL | Age: 60
Discharge: HOME | End: 2025-06-25
Payer: MEDICARE

## 2025-06-25 DIAGNOSIS — N15.1 PERINEPHRIC ABSCESS: ICD-10-CM

## 2025-06-25 PROCEDURE — 74176 CT ABD & PELVIS W/O CONTRAST: CPT | Mod: TC

## 2025-07-01 ENCOUNTER — OFFICE VISIT (OUTPATIENT)
Dept: INFECTIOUS DISEASES | Facility: CLINIC | Age: 60
End: 2025-07-01
Payer: MEDICARE

## 2025-07-01 VITALS
HEIGHT: 67 IN | WEIGHT: 125.81 LBS | OXYGEN SATURATION: 100 % | SYSTOLIC BLOOD PRESSURE: 134 MMHG | HEART RATE: 80 BPM | DIASTOLIC BLOOD PRESSURE: 93 MMHG | RESPIRATION RATE: 18 BRPM | BODY MASS INDEX: 19.75 KG/M2 | TEMPERATURE: 98 F

## 2025-07-01 DIAGNOSIS — N15.1 PERINEPHRIC ABSCESS: Primary | ICD-10-CM

## 2025-07-01 PROCEDURE — 99213 OFFICE O/P EST LOW 20 MIN: CPT | Mod: S$GLB,,,

## 2025-07-01 PROCEDURE — 3080F DIAST BP >= 90 MM HG: CPT | Mod: CPTII,S$GLB,,

## 2025-07-01 PROCEDURE — 3066F NEPHROPATHY DOC TX: CPT | Mod: CPTII,S$GLB,,

## 2025-07-01 PROCEDURE — 99999 PR PBB SHADOW E&M-EST. PATIENT-LVL IV: CPT | Mod: PBBFAC,,,

## 2025-07-01 PROCEDURE — 3075F SYST BP GE 130 - 139MM HG: CPT | Mod: CPTII,S$GLB,,

## 2025-07-01 PROCEDURE — 3008F BODY MASS INDEX DOCD: CPT | Mod: CPTII,S$GLB,,

## 2025-07-01 RX ORDER — AMLODIPINE BESYLATE 5 MG/1
5 TABLET ORAL NIGHTLY
COMMUNITY
Start: 2025-06-02

## 2025-07-01 NOTE — PROGRESS NOTES
Subjective:       Patient ID: Tao Bee 59 y.o.     Chief Complaint:   Chief Complaint   Patient presents with    4 week follow up     E coli bacteremia        HPI:  4/25/2025 Hospital Visit (Most Recent):  Mr. Bee is a 59 yro. male HTN, ESRD with HD, previous MV endocarditis, E coli bacteremia, who was admitted 4/24/2025 with dislodged PICC line and TDC atheter malfunction.  He was dialyzed on Monday, however on Wednesday the catheter was clogged.  That evening his PICC line in the right upper extremity was dislodged in the shower.  Upon arrival, patient was afebrile with WBC 21.4 K..  His PICC line was replaced in the ED.  overnight however, patient has spiked a fever of 102.3.  His urine sample was questionable on admission, blood cultures are in progress, as are urine culture.  He was recently admitted from 4/15-04/21/2025 when he was diagnosed with E coli bacteremia, E coli UTI and mitral valve endocarditis.  He was discharged on IV antibiotic therapy at home.  Patient was seen by ID Services last hospitalization with recommendations of 2 g ceftriaxone IV daily with the end date of 05/14/2025. No fever, chills at home, no respiratory or GI complaints.   Patient denies any complaints and very anxious to go home.  Today had tunneled dialysis catheter exchange over wire.  No pain at the site or on his arm.     4/29/2025 Office Visit:  Mr. Bee presents today for follow up and is accompanied by his sister, Princess.  He is doing well, with no reported fevers, chills or night sweats.  He has been on Ceftriaxone since his previous discharge from the hospital for E. Coli bacteremia secondary to a perinephric abscess as well as a possible MV vegetation noted on MARY.  Plans were for Ceftriaxone 2g Daily IV through 5/14/25.  Unfortunately when he went to the ED for his PICC line becoming dislodged on 4/24 his UDS was positive for cocaine and MDMA making him ineligible as a candidate with [facility] who has discharged  he from their care.  He is here today for follow up and to discuss next steps regarding finishing his ABX treatment plan.    5/15/2025 Office Visit:  Mr. Bee presents today for follow up and is accompanied by his sister, Princess.  He had his last dose of Cefepime with his HD yesterday and had a repeat CT scan this morning that is still pending read by radiology dept.  Overall he is doing well with no complaints of fever, chills, night sweats.  He also denies any nausea, vomiting or diarrhea.  He has no flank pain or tenderness along the affected kidney.      5/29/2025 Office Visit:  Mr. Bee presents today for follow up accompanied by his sister.  He continues to do well on daily Ciprofloxacin and denies any nausea, vomiting, or diarrhea.  He has no fevers, chills, or night sweats.  He continues with some improved renal function and remains off of HD at this time.  His most recent CT scan was discussed, noting that there is some residual perinephric abscess.  Overall he continues to do well and remains compliant with therapy.     7/1/2025 Office Visit:  Mr. Bee is here today for follow up, accompanied by his sister.  He is doing well, finishing his Ciprofloxacin 500 mg PO daily tomorrow.  He denies any new fevers, night sweats, or chills.  He has no reported nausea, vomiting, or night sweats.  He also denies any flank or CVA tenderness.  He states he is urinating as normal with no complaints of dysuria or suprapubic tenderness. His most recent CT scan was discussed and plans to stop ABX and watch off after tomorrow.  Both he and his sister are agreeable to this plan.      Past Medical History:   Diagnosis Date    Neuropathy         Past Surgical History:   Procedure Laterality Date    ESOPHAGOGASTRODUODENOSCOPY N/A 03/30/2025    Procedure: EGD (ESOPHAGOGASTRODUODENOSCOPY);  Surgeon: Stan Elder MD;  Location: [facility];  Service: Endoscopy;  Laterality: N/A;    ESOPHAGOGASTRODUODENOSCOPY N/A 5/30/2025     Procedure: EGD;  Surgeon: Stan Elder MD;  Location: [facility] ENDOSCOPY;  Service: Endoscopy;  Laterality: N/A;    INSERTION OF TUNNELED CENTRAL VENOUS HEMODIALYSIS CATHETER N/A 4/17/2025    Procedure: Insertion, Catheter, Central Venous, Hemodialysis;  Surgeon: Kyrie Laws DO;  Location: [facility] CATH LAB;  Service: Nephrology;  Laterality: N/A;    LEG SURGERY Right     REMOVAL OF TUNNELED CENTRAL VENOUS CATHETER (CVC) Left 6/3/2025    Procedure: REMOVAL, CATHETER, CENTRAL VENOUS, TUNNELED;  Surgeon: Ismael Ortez MD;  Location: [facility] CATH LAB;  Service: Nephrology;  Laterality: Left;    REPLACEMENT OF DIALYSIS CATHETER OVER GUIDEWIRE THROUGH EXISTING VENOUS ACCESS N/A 4/25/2025    Procedure: PERMCATH REWIRE- TUNNELED CATH REWIRE;  Surgeon: Ismael Ortez MD;  Location: [facility] CATH LAB;  Service: Nephrology;  Laterality: N/A;        Social History     Socioeconomic History    Marital status: Single   Tobacco Use    Smoking status: Former     Current packs/day: 0.50     Types: Cigarettes   Substance and Sexual Activity    Alcohol use: Not Currently     Alcohol/week: 28.0 standard drinks of alcohol     Types: 28 Shots of liquor per week     Comment: half a pint per day     Social Drivers of Health     Financial Resource Strain: Low Risk  (4/25/2025)    Overall Financial Resource Strain (CARDIA)     Difficulty of Paying Living Expenses: Not hard at all   Food Insecurity: No Food Insecurity (4/25/2025)    Hunger Vital Sign     Worried About Running Out of Food in the Last Year: Never true     Ran Out of Food in the Last Year: Never true   Transportation Needs: No Transportation Needs (4/25/2025)    PRAPARE - Transportation     Lack of Transportation (Medical): No     Lack of Transportation (Non-Medical): No   Stress: No Stress Concern Present (4/25/2025)    Citizen of [place] [employer] - Occupational Stress Questionnaire     Feeling of Stress : Not at all   Housing Stability: Low Risk  (4/25/2025)     "Housing Stability Vital Sign     Unable to Pay for Housing in the Last Year: No     Number of Times Moved in the Last Year: 0     Homeless in the Last Year: No        No family history on file.     Review of patient's allergies indicates:   Allergen Reactions    Opioids - morphine analogues Hallucinations        Immunization History   Administered Date(s) Administered    COVID-19, vector-nr, rS-Ad26, PF ([employer]) 04/12/2021, 02/23/2022        Review of Systems   All other systems reviewed and are negative.         Objective:      BP (!) 134/93 (BP Location: Left arm, Patient Position: Sitting)   Pulse 80   Temp 98 °F (36.7 °C) (Oral)   Resp 18   Ht 5' 7" (1.702 m)   Wt 57.1 kg (125 lb 12.8 oz)   SpO2 100%   BMI 19.70 kg/m²      Physical Exam  Constitutional:       Appearance: Normal appearance.   HENT:      Head: Normocephalic and atraumatic.      Mouth/Throat:      Mouth: Mucous membranes are moist.   Eyes:      Extraocular Movements: Extraocular movements intact.      Pupils: Pupils are equal, round, and reactive to light.   Cardiovascular:      Rate and Rhythm: Normal rate and regular rhythm.      Pulses: Normal pulses.      Heart sounds: Normal heart sounds.   Pulmonary:      Effort: Pulmonary effort is normal.      Breath sounds: Normal breath sounds.   Abdominal:      Palpations: Abdomen is soft.   Musculoskeletal:         General: Normal range of motion.      Cervical back: Normal range of motion and neck supple.   Skin:     General: Skin is warm and dry.      Comments: Prior L chest tunneled dialysis catheter site healed well with no surrounding redness, swelling, or tenderness   Neurological:      General: No focal deficit present.      Mental Status: He is alert and oriented to person, place, and time.   Psychiatric:         Mood and Affect: Mood normal.         Behavior: Behavior normal.          Labs: Reviewed most recent relevant labs available, notable results highlighted in this " note    Imaging: Reviewed most recent relevant imaging studies available, notable results highlighted in this note    Assessment:       Problem List Items Addressed This Visit          Renal/    Perinephric abscess - Primary              Plan:   -ID initially consulted on 4/3 for septic shock secondary to E. Coli bacteremia with R sided pyelonephritis and perinephric abscess.  At that time he was adjusted to Unasyn from Ceftriaxone.      -On 4/7 a MARY was completed showing a possible small vegetation on the MV.  Decision to treat him for a total of 6 weeks to cover for endocarditis.  He left AMA on 4/9/25.  -On 4/16 ID was reconsulted after he returned to the hospital for increased swelling in his legs.  He was restarted on Unasyn at that time.    - He was subsequently discharged home with plans to completed IV Ceftriaxone 2g Daily through 5/14/25.  -He returned to the ED on 4/24 with complaints of a dislodged PICC line and occluded TDC.  ID was reconsulted to assist with his ABX  -On 4/24 a UDS was performed showing positive for Cocaine and MDMA.  [facility] has since discharged him from their service.    -His sister has given him his Ceftriaxone up through 4/28/25.    -PICC removed in office 4/29/25 without complication. 34 CM  -Given his HD schedule, we adjusted his OP IV ABX therapy to Cefepime 2g/2g/3g to be dosed with his HD schedule on [facility].  I spoke with Emely, the charge RN at [facility] [place] via telephone, 4/29/2025, around 11 am and gave a verbal order for this change, with an end date of 5/14/2025.    -Repeat CT scan on 5/15 showed improvement in the abscess, now measures 1.3 x 1 cm from 3.2 x 2.3 x 1.1 cm on 4/25; originally 8.7 x 7.6 x 1.7 cm on 4/3/25.  -Patient now has improving kidney function and is off HD.  On 5/19 I adjusted him from IV Cefepime to PO Ciprofloxacin and he has been taking this daily since.    -CT A/P on 5/23 shows continued improvement of R perinephric abscess but some  minimal residual abscess persists.    -Will finish Ciprofloxacin 500 mg PO daily tomorrow, 7/2/2025.  -Repeated CT A/P (NON-CONTRAST due to renal function) on 6/25/2025 shows interval improvement of the perinephric stranding around the right kidney with only a minimal amount of residual inflammatory changes seen.  No fluid collection no obvious abscess is seen.  -Planning follow up in 6 weeks to assess progress off ABX therapy  -Patient and his sister understand the need to reach out to us or seek further care for any new fevers, chills, or night sweats.          Follow up in about 6 weeks (around 8/12/2025).    Portions of this note dictated using EMR integrated voice recognition software, and may be subject to voice recognition errors not corrected at proofreading. Please contact writer for clarification if needed.    25 minutes of total time spent on the encounter, which includes face to face time and non-face to face time preparing to see the patient (eg, review of tests), Obtaining and/or reviewing separately obtained history, Documenting clinical information in the electronic or other health record, Independently interpreting results (not separately reported) and communicating results to the patient/family/caregiver, or Care coordination (not separately reported).

## (undated) DEVICE — TUBING O2 FEMALE CONN 13FT

## (undated) DEVICE — DRESSING QUIKCLOT 1.5X1.5FT

## (undated) DEVICE — GUIDEWIRE STF .035X180CM ANG

## (undated) DEVICE — Device

## (undated) DEVICE — SUT MONOCRYL 3-0 PS-2 UND

## (undated) DEVICE — CHLORAPREP TNT2%SOL10.5ML TEAL

## (undated) DEVICE — COLLECTION SPECIMEN NEPTUNE

## (undated) DEVICE — TRAY CENT PREM SUT REM PK SGL

## (undated) DEVICE — KIT CANIST SUCTION 1200CC

## (undated) DEVICE — SUT SILK 0 BLK BR CT-1 30IN

## (undated) DEVICE — BLOCK BLOX BITE DENT RIM 54FR

## (undated) DEVICE — KIT SURGICAL COLON .25 1.1OZ

## (undated) DEVICE — KIT MINI STK MAX COAX 5FR 10CM

## (undated) DEVICE — TOWEL OR DISP STRL BLUE 4/PK

## (undated) DEVICE — SPONGE COTTON TRAY 4X4IN

## (undated) DEVICE — SOL IRRI STRL WATER 1000ML

## (undated) DEVICE — DRESSING TEGADERM CHG 3.5X4.5

## (undated) DEVICE — TIP SUCTION YANKAUER

## (undated) DEVICE — CONTAINER SPECIMEN SCREW 4OZ

## (undated) DEVICE — COVER PROBE US 5.5X58L NON LTX

## (undated) DEVICE — ADHESIVE DERMABOND ADVANCED

## (undated) DEVICE — FORCEP ALLIGATOR 2.8MM W/NDL

## (undated) DEVICE — NDL SYR 10ML 18X1.5 LL BLUNT

## (undated) DEVICE — DRESSING TRANS 4X4 TEGADERM

## (undated) DEVICE — FORCEP HEMOSTAT MOSQUITO STR

## (undated) DEVICE — FLOWSWITCH HP 1-W W/O LL